# Patient Record
Sex: MALE | Race: WHITE | NOT HISPANIC OR LATINO | Employment: OTHER | URBAN - NONMETROPOLITAN AREA
[De-identification: names, ages, dates, MRNs, and addresses within clinical notes are randomized per-mention and may not be internally consistent; named-entity substitution may affect disease eponyms.]

---

## 2019-10-07 ENCOUNTER — OFFICE VISIT (OUTPATIENT)
Dept: INTERNAL MEDICINE | Facility: CLINIC | Age: 74
End: 2019-10-07

## 2019-10-07 VITALS
BODY MASS INDEX: 29.73 KG/M2 | WEIGHT: 185 LBS | TEMPERATURE: 97.8 F | OXYGEN SATURATION: 98 % | HEIGHT: 66 IN | HEART RATE: 55 BPM | SYSTOLIC BLOOD PRESSURE: 166 MMHG | DIASTOLIC BLOOD PRESSURE: 79 MMHG | RESPIRATION RATE: 18 BRPM

## 2019-10-07 DIAGNOSIS — I10 ESSENTIAL HYPERTENSION: ICD-10-CM

## 2019-10-07 DIAGNOSIS — G62.9 PERIPHERAL POLYNEUROPATHY: ICD-10-CM

## 2019-10-07 DIAGNOSIS — M25.511 ACUTE PAIN OF RIGHT SHOULDER: Primary | ICD-10-CM

## 2019-10-07 DIAGNOSIS — E78.5 HYPERLIPIDEMIA, UNSPECIFIED HYPERLIPIDEMIA TYPE: ICD-10-CM

## 2019-10-07 DIAGNOSIS — I44.7 BUNDLE BRANCH BLOCK, LEFT: ICD-10-CM

## 2019-10-07 DIAGNOSIS — J44.9 CHRONIC OBSTRUCTIVE PULMONARY DISEASE, UNSPECIFIED COPD TYPE (HCC): ICD-10-CM

## 2019-10-07 PROCEDURE — 99213 OFFICE O/P EST LOW 20 MIN: CPT | Performed by: NURSE PRACTITIONER

## 2019-10-07 RX ORDER — PHENOL 1.4 %
600 AEROSOL, SPRAY (ML) MUCOUS MEMBRANE 2 TIMES DAILY
COMMUNITY

## 2019-10-07 RX ORDER — CYCLOSPORINE 0.5 MG/ML
1 EMULSION OPHTHALMIC 2 TIMES DAILY
COMMUNITY
End: 2020-01-09

## 2019-10-07 RX ORDER — BUDESONIDE AND FORMOTEROL FUMARATE DIHYDRATE 160; 4.5 UG/1; UG/1
2 AEROSOL RESPIRATORY (INHALATION)
COMMUNITY
End: 2019-12-19 | Stop reason: SDUPTHER

## 2019-10-07 RX ORDER — GABAPENTIN 300 MG/1
300 CAPSULE ORAL 3 TIMES DAILY
COMMUNITY
End: 2020-06-15 | Stop reason: SDUPTHER

## 2019-10-07 RX ORDER — AMLODIPINE BESYLATE 5 MG/1
5 TABLET ORAL DAILY
COMMUNITY
End: 2020-06-15 | Stop reason: SDUPTHER

## 2019-10-07 RX ORDER — ASPIRIN 81 MG/1
81 TABLET ORAL DAILY
COMMUNITY

## 2019-10-07 RX ORDER — CYCLOBENZAPRINE HCL 10 MG
10 TABLET ORAL 3 TIMES DAILY PRN
Qty: 25 TABLET | Refills: 0 | Status: ON HOLD | OUTPATIENT
Start: 2019-10-07 | End: 2021-09-29

## 2019-10-07 RX ORDER — LISINOPRIL 20 MG/1
20 TABLET ORAL DAILY
COMMUNITY
End: 2020-06-15 | Stop reason: SDUPTHER

## 2019-10-07 RX ORDER — BISOPROLOL FUMARATE 5 MG/1
5 TABLET, FILM COATED ORAL DAILY
COMMUNITY
End: 2020-06-15 | Stop reason: SDUPTHER

## 2019-10-07 RX ORDER — IBUPROFEN 800 MG/1
800 TABLET ORAL EVERY 6 HOURS PRN
Status: ON HOLD | COMMUNITY
End: 2020-01-14

## 2019-10-07 RX ORDER — ALBUTEROL SULFATE 90 UG/1
2 AEROSOL, METERED RESPIRATORY (INHALATION) EVERY 4 HOURS PRN
COMMUNITY
End: 2020-09-15 | Stop reason: SDUPTHER

## 2019-10-07 RX ORDER — OMEPRAZOLE 20 MG/1
20 CAPSULE, DELAYED RELEASE ORAL DAILY
COMMUNITY

## 2019-10-07 RX ORDER — ASCORBIC ACID 500 MG
500 TABLET ORAL DAILY
COMMUNITY

## 2019-10-07 RX ORDER — SIMVASTATIN 40 MG
80 TABLET ORAL NIGHTLY
COMMUNITY
End: 2020-06-15 | Stop reason: SDUPTHER

## 2019-10-07 RX ORDER — UBIDECARENONE 100 MG
100 CAPSULE ORAL DAILY
COMMUNITY

## 2019-10-07 NOTE — PROGRESS NOTES
Subjective     Chief Complaint   Patient presents with   • Shoulder Injury     right - fall yesterday       History of Present Illness  He presents here to establish care today and with complaints of right shoulder pain. States he was going down steps and missed a step. Fell directly onto his right shoulder. Did not hit his head. Also scraped his right forearm and right knee. No chest pain or shortness of breath. He admits that he had dizziness after the fall yesterday but none today. Does not check his blood pressure at home. States it had been stable for some time so he quit checking it. He does state that his shoulder feels like it did before when he tore his rotator cuff. He has had it repaired in the past same shoulder.     Patient's PMR from outside medical facility reviewed and noted.    Review of Systems   Constitutional: Negative for activity change, fever and unexpected weight change.   HENT: Negative for congestion and sore throat.    Eyes: Negative.    Respiratory: Positive for shortness of breath.    Gastrointestinal: Negative for abdominal pain, blood in stool, constipation and diarrhea.   Endocrine: Negative.    Genitourinary: Negative.  Negative for urgency.   Musculoskeletal: Positive for joint swelling.   Allergic/Immunologic: Negative.  Negative for environmental allergies and food allergies.   Neurological: Positive for dizziness.   Hematological: Bruises/bleeds easily.      Otherwise complete ROS reviewed and negative except as mentioned in the HPI.    Past Medical History:   Past Medical History:   Diagnosis Date   • Arthritis    • COPD (chronic obstructive pulmonary disease) (CMS/[facility])    • Hyperlipidemia    • Hypertension    • Sleep apnea      Past Surgical History:  Past Surgical History:   Procedure Laterality Date   • KNEE MENISCAL REPAIR Left 2018   • ROTATOR CUFF REPAIR Right 2000   • SPINE SURGERY  1981    lower back ruptured disc     Social History:  reports that he quit smoking  about 8 years ago. He has never used smokeless tobacco. He reports that he drinks about 3.0 oz of alcohol per week. He reports that he does not use drugs.    Family History: family history includes Arthritis in his father and mother; Cancer in his brother; Diabetes in his mother and sister; Hypertension in his father and mother; Kidney disease in his mother; Obesity in his sister; Osteoporosis in his mother; Stroke in his father.      Allergies:  No Known Allergies  Medications:  Prior to Admission medications    Medication Sig Start Date End Date Taking? Authorizing Provider   Acetaminophen (TYLENOL ARTHRITIS PAIN PO) Take 600 capsules by mouth.   Yes Cristian Pfeiffer MD   albuterol sulfate  (90 Base) MCG/ACT inhaler Inhale 2 puffs Every 4 (Four) Hours As Needed for Wheezing.   Yes Cristian Pfeiffer MD   amLODIPine (NORVASC) 5 MG tablet Take 5 mg by mouth Daily.   Yes Cristian Pfeiffer MD   aspirin 81 MG EC tablet Take 81 mg by mouth Daily.   Yes Cristian Pfeiffer MD   bisoprolol (ZEBeta) 5 MG tablet Take 5 mg by mouth Daily.   Yes Cristian Pfeiffer MD   budesonide-formoterol (SYMBICORT) 160-4.5 MCG/ACT inhaler Inhale 2 puffs 2 (Two) Times a Day.   Yes Cristian Pfeiffer MD   calcium carbonate (OS-TASHIA) 600 MG tablet Take 600 mg by mouth 2 (Two) Times a Day.   Yes Cristian Pfeiffer MD   Cholecalciferol (VITAMIN D3) 1000 units capsule Take 1,000 Units by mouth.   Yes Cristian Pfeiffer MD   coenzyme Q10 100 MG capsule Take 100 mg by mouth Daily.   Yes Cristian Pfeiffer MD   cycloSPORINE (RESTASIS) 0.05 % ophthalmic emulsion 1 drop 2 (Two) Times a Day.   Yes Cristian Pfeiffer MD   gabapentin (NEURONTIN) 300 MG capsule Take 300 mg by mouth 3 (Three) Times a Day.   Yes Cristian Pfeiffer MD   Garlic 1000 MG capsule Take  by mouth.   Yes Cristian Pfeiffer MD   Glucos-Chondroit-Hyaluron-MSM (GLUCOSAMINE CHONDROITIN JOINT PO) Take  by mouth.   Yes Cristian Pfeiffer  "MD   ibuprofen (ADVIL,MOTRIN) 800 MG tablet Take 800 mg by mouth Every 6 (Six) Hours As Needed for Mild Pain .   Yes Cristian Pfeiffer MD   Krill Oil 300 MG capsule Take  by mouth.   Yes Cristian Pfeiffer MD   lisinopril (PRINIVIL,ZESTRIL) 20 MG tablet Take 20 mg by mouth Daily.   Yes Cristian Pfeiffer MD   Milk Thistle 175 MG capsule Take 175 mg by mouth.   Yes Cristian Pfeiffer MD   omeprazole (priLOSEC) 20 MG capsule Take 20 mg by mouth Daily.   Yes Cristian Pfeiffer MD   polyethyl glycol-propyl glycol (SYSTANE) 0.4-0.3 % solution ophthalmic solution Every 1 (One) Hour As Needed.   Yes Cristian Pfeiffer MD   S-Adenosylmethionine (SULAIMAN-E) 200 MG tablet Take 200 mg by mouth.   Yes Cristian Pfeiffer MD   simvastatin (ZOCOR) 40 MG tablet Take 40 mg by mouth Every Night.   Yes Cristian Pfeiffer MD   SUPER B COMPLEX/C PO Take  by mouth.   Yes Cristian Pfeiffer MD   TAMSULOSIN HCL PO Take 0.4 mg by mouth.   Yes Cristian Pfeiffre MD   tiotropium (SPIRIVA) 18 MCG per inhalation capsule Place 1 capsule into inhaler and inhale Daily.   Yes Cristian Pfeiffer MD   tiotropium bromide monohydrate (SPIRIVA RESPIMAT) 2.5 MCG/ACT aerosol solution inhaler Inhale 2 puffs Daily.   Yes Cristian Pfeiffer MD   TURMERIC PO Take 1,000 mg by mouth.   Yes Cristian Pfeiffer MD   vitamin C (ASCORBIC ACID) 500 MG tablet Take 500 mg by mouth Daily.   Yes Cristian Pfeiffer MD       Objective     Vital Signs: /79 (BP Location: Left arm, Patient Position: Sitting, Cuff Size: Adult)   Pulse 55   Temp 97.8 °F (36.6 °C) (Oral)   Resp 18   Ht 167.6 cm (66\")   Wt 83.9 kg (185 lb)   SpO2 98%   BMI 29.86 kg/m²   Physical Exam   Constitutional: He is oriented to person, place, and time. He appears well-developed and well-nourished.   HENT:   Head: Normocephalic and atraumatic.   Mouth/Throat: Oropharynx is clear and moist.   Eyes: Conjunctivae and EOM are normal. Pupils are equal, round, " and reactive to light. No scleral icterus.   Neck: Neck supple. No JVD present.   Cardiovascular: Normal rate, regular rhythm, normal heart sounds and intact distal pulses. Exam reveals no gallop and no friction rub.   No murmur heard.  Pulmonary/Chest: Effort normal and breath sounds normal. He has no wheezes. He has no rales.   Abdominal: Soft. Bowel sounds are normal. He exhibits no distension and no mass. There is no tenderness. There is no rebound and no guarding.   Musculoskeletal: He exhibits tenderness (right shoulder tenderness. Unable to laterally raise his right arm. ). He exhibits no edema.   No cyanosis or clubbing   Lymphadenopathy:     He has no cervical adenopathy.   Neurological: He is alert and oriented to person, place, and time. No cranial nerve deficit.   Skin: Skin is warm and dry.   Skin tear right forearm. Right lateral wrist area with abrasion and right knee with abrasions X3. No evidence of infection noted.    Psychiatric: He has a normal mood and affect. His behavior is normal.       Patient's Body mass index is 29.86 kg/m². BMI is above normal parameters. Recommendations include: educational material.      Results Reviewed:  No results found for: GLUCOSE, BUN, CREATININE, NA, K, CL, CO2, CALCIUM, ALT, AST, WBC, HCT, PLT, CHOL, TRIG, HDL, LDL, LDLHDL, HGBA1C      Assessment / Plan     Assessment/Plan:  Justin was seen today for shoulder injury.    Diagnoses and all orders for this visit:    Acute pain of right shoulder  -     XR Shoulder 2+ View Right (In Office)  -     Ambulatory Referral to Orthopedic Surgery    Chronic obstructive pulmonary disease, unspecified COPD type (CMS/HCC)    Essential hypertension    Bundle branch block, left    Hyperlipidemia, unspecified hyperlipidemia type    Peripheral polyneuropathy  Comments:  secondary to chronic back pain.     Other orders  -     cyclobenzaprine (FLEXERIL) 10 MG tablet; Take 1 tablet by mouth 3 (Three) Times a Day As Needed for Muscle  Spasms.        Return in about 1 month (around 11/7/2019) for Annual physical.     Code Status: Full    I have discussed the patient results/orders and and plan/recommendation with them at today's visit.      Katerine Mcclain, APRN   10/07/2019

## 2019-10-14 ENCOUNTER — OFFICE VISIT (OUTPATIENT)
Dept: INTERNAL MEDICINE | Facility: CLINIC | Age: 74
End: 2019-10-14

## 2019-10-14 VITALS
DIASTOLIC BLOOD PRESSURE: 84 MMHG | BODY MASS INDEX: 29.8 KG/M2 | TEMPERATURE: 97.8 F | WEIGHT: 185.4 LBS | SYSTOLIC BLOOD PRESSURE: 182 MMHG | HEART RATE: 65 BPM | OXYGEN SATURATION: 98 % | HEIGHT: 66 IN | RESPIRATION RATE: 18 BRPM

## 2019-10-14 DIAGNOSIS — J42 CHRONIC BRONCHITIS, UNSPECIFIED CHRONIC BRONCHITIS TYPE (HCC): ICD-10-CM

## 2019-10-14 DIAGNOSIS — R07.81 RIB PAIN ON RIGHT SIDE: Primary | ICD-10-CM

## 2019-10-14 DIAGNOSIS — I10 ESSENTIAL HYPERTENSION: ICD-10-CM

## 2019-10-14 PROCEDURE — 96372 THER/PROPH/DIAG INJ SC/IM: CPT | Performed by: NURSE PRACTITIONER

## 2019-10-14 PROCEDURE — 99214 OFFICE O/P EST MOD 30 MIN: CPT | Performed by: NURSE PRACTITIONER

## 2019-10-14 RX ORDER — MELOXICAM 15 MG/1
15 TABLET ORAL DAILY
COMMUNITY
End: 2020-09-15 | Stop reason: SDUPTHER

## 2019-10-14 RX ORDER — HYDROCODONE BITARTRATE AND ACETAMINOPHEN 5; 325 MG/1; MG/1
1 TABLET ORAL EVERY 8 HOURS PRN
Qty: 10 TABLET | Refills: 0 | Status: ON HOLD | OUTPATIENT
Start: 2019-10-14 | End: 2020-01-14

## 2019-10-14 RX ORDER — KETOROLAC TROMETHAMINE 15 MG/ML
15 INJECTION, SOLUTION INTRAMUSCULAR; INTRAVENOUS ONCE
Status: COMPLETED | OUTPATIENT
Start: 2019-10-14 | End: 2019-10-14

## 2019-10-14 RX ADMIN — KETOROLAC TROMETHAMINE 15 MG: 15 INJECTION, SOLUTION INTRAMUSCULAR; INTRAVENOUS at 16:22

## 2019-10-14 NOTE — PROGRESS NOTES
Subjective     Chief Complaint   Patient presents with   • Pain     Patient reports right rib pain, he states that he fell one week ago.       History of Present Illness  Pt comes in today with complaints of right sided rib pain. He was seen last week after sustaining a fall after missing a step. States the pain in the right rib cage started about 3 days after the fall but is now worsening in intensity. Rates pain at a 8-9/10. Worse with movement and deep breathing. Has not tried any modalities to help. States it has just worsened today more so than other days. No shortness of breath above his baseline. No chest pain. Shoulder pain is better. He saw orthopedic institute and was given steroid injection in the joint. He is scheduled for an MRI next week.    Patient's PMR from outside medical facility reviewed and noted.    Review of Systems   Otherwise complete ROS reviewed and negative except as mentioned in the HPI.    Past Medical History:   Past Medical History:   Diagnosis Date   • Arthritis    • COPD (chronic obstructive pulmonary disease) (CMS/[facility])    • Hyperlipidemia    • Hypertension    • Sleep apnea      Past Surgical History:  Past Surgical History:   Procedure Laterality Date   • KNEE MENISCAL REPAIR Left 2018   • ROTATOR CUFF REPAIR Right 2000   • SPINE SURGERY  1981    lower back ruptured disc     Social History:  reports that he quit smoking about 8 years ago. He has never used smokeless tobacco. He reports that he drinks about 3.0 oz of alcohol per week. He reports that he does not use drugs.    Family History: family history includes Arthritis in his father and mother; Cancer in his brother; Diabetes in his mother and sister; Hypertension in his father and mother; Kidney disease in his mother; Obesity in his sister; Osteoporosis in his mother; Stroke in his father.      Allergies:  No Known Allergies  Medications:  Prior to Admission medications    Medication Sig Start Date End Date Taking?  Authorizing Provider   Acetaminophen (TYLENOL ARTHRITIS PAIN PO) Take 600 capsules by mouth.   Yes Cristian Pfeiffer MD   albuterol sulfate  (90 Base) MCG/ACT inhaler Inhale 2 puffs Every 4 (Four) Hours As Needed for Wheezing.   Yes Cristian Pfeiffer MD   amLODIPine (NORVASC) 5 MG tablet Take 5 mg by mouth Daily.   Yes Cristian fPeiffer MD   aspirin 81 MG EC tablet Take 81 mg by mouth Daily.   Yes Cristian Pfeiffer MD   bisoprolol (ZEBeta) 5 MG tablet Take 5 mg by mouth Daily.   Yes Cristian Pfeiffer MD   budesonide-formoterol (SYMBICORT) 160-4.5 MCG/ACT inhaler Inhale 2 puffs 2 (Two) Times a Day.   Yes Cristian Pfeiffer MD   calcium carbonate (OS-TASHIA) 600 MG tablet Take 600 mg by mouth 2 (Two) Times a Day.   Yes Cristian Pfeiffer MD   Cholecalciferol (VITAMIN D3) 1000 units capsule Take 1,000 Units by mouth.   Yes Cristian Pfeiffer MD   coenzyme Q10 100 MG capsule Take 100 mg by mouth Daily.   Yes Cristian Pfeiffer MD   cycloSPORINE (RESTASIS) 0.05 % ophthalmic emulsion 1 drop 2 (Two) Times a Day.   Yes Cristian Pfeiffer MD   gabapentin (NEURONTIN) 300 MG capsule Take 300 mg by mouth 3 (Three) Times a Day.   Yes Cristian Pfeiffer MD   Garlic 1000 MG capsule Take  by mouth.   Yes Cristian Pfeiffer MD   Glucos-Chondroit-Hyaluron-MSM (GLUCOSAMINE CHONDROITIN JOINT PO) Take  by mouth.   Yes Cristian Pfeiffer MD   Krill Oil 300 MG capsule Take  by mouth.   Yes Cristian Pfeiffer MD   lisinopril (PRINIVIL,ZESTRIL) 20 MG tablet Take 20 mg by mouth Daily.   Yes ProviderCristian MD   meloxicam (MOBIC) 15 MG tablet Take 15 mg by mouth Daily.   Yes Cristian Pfeiffer MD   Milk Thistle 175 MG capsule Take 175 mg by mouth.   Yes Cristian Pfeiffer MD   omeprazole (priLOSEC) 20 MG capsule Take 20 mg by mouth Daily.   Yes Cristian Pfeiffer MD   polyethyl glycol-propyl glycol (SYSTANE) 0.4-0.3 % solution ophthalmic solution Every 1 (One) Hour As Needed.    "Yes Cristian Pfeiffer MD   S-Adenosylmethionine (SULAIMAN-E) 200 MG tablet Take 200 mg by mouth.   Yes Cristian Pfeiffer MD   simvastatin (ZOCOR) 40 MG tablet Take 40 mg by mouth Every Night.   Yes Cristian Pfeiffer MD   SUPER B COMPLEX/C PO Take  by mouth.   Yes Cristian Pfeiffer MD   TAMSULOSIN HCL PO Take 0.4 mg by mouth.   Yes Cristian Pfeiffer MD   tiotropium (SPIRIVA) 18 MCG per inhalation capsule Place 1 capsule into inhaler and inhale Daily.   Yes Cristian Pfeiffer MD   tiotropium bromide monohydrate (SPIRIVA RESPIMAT) 2.5 MCG/ACT aerosol solution inhaler Inhale 2 puffs Daily.   Yes Cristian Pfeiffer MD   TURMERIC PO Take 1,000 mg by mouth.   Yes Cristian Pfeiffer MD   vitamin C (ASCORBIC ACID) 500 MG tablet Take 500 mg by mouth Daily.   Yes Cristian Pfeiffer MD   cyclobenzaprine (FLEXERIL) 10 MG tablet Take 1 tablet by mouth 3 (Three) Times a Day As Needed for Muscle Spasms. 10/7/19   Katerine Mcclain APRN   ibuprofen (ADVIL,MOTRIN) 800 MG tablet Take 800 mg by mouth Every 6 (Six) Hours As Needed for Mild Pain .    ProviderCristian MD       Objective     Vital Signs: BP (!) 182/84 (BP Location: Right arm, Patient Position: Sitting, Cuff Size: Adult)   Pulse 65   Temp 97.8 °F (36.6 °C) (Oral)   Resp 18   Ht 167.6 cm (65.98\")   Wt 84.1 kg (185 lb 6.4 oz)   SpO2 98%   BMI 29.94 kg/m²   Physical Exam   Constitutional: He is oriented to person, place, and time. He appears well-developed and well-nourished.   HENT:   Head: Normocephalic and atraumatic.   Eyes: EOM are normal. Pupils are equal, round, and reactive to light.   Neck: Normal range of motion. Neck supple. No JVD present.   Cardiovascular: Normal rate and regular rhythm.   Pulmonary/Chest: Effort normal.   Right sided chest wall tenderness.    Abdominal: Soft. Bowel sounds are normal.   Musculoskeletal: He exhibits no edema or deformity.   Lymphadenopathy:     He has no cervical adenopathy. "   Neurological: He is alert and oriented to person, place, and time.   Skin: Skin is warm and dry.   Psychiatric: He has a normal mood and affect. His behavior is normal. Judgment and thought content normal.   Vitals reviewed.      Results Reviewed:  No results found for: GLUCOSE, BUN, CREATININE, NA, K, CL, CO2, CALCIUM, ALT, AST, WBC, HCT, PLT, CHOL, TRIG, HDL, LDL, LDLHDL, HGBA1C      Assessment / Plan     Right rib fractures:  1. Toradol IM 15 mg in office today.   2. Norco 5/325 mg every 8 hours prn. #10 with no refills.   3. Discussed deep breathing.   4. Xray-with rib detail shows 8th and 9th distal rib fractures probable.     Hypertension  1. Suspect his blood pressure is more elevated today secondary to pain.   2. Continue his home medications of norvasc, bisoprolol, lisinopril,     No Follow-up on file. unless patient needs to be seen sooner or acute issues arise.    I have discussed the patient results/orders and and plan/recommendation with them at today's visit.      Katerine Mcclain, APRN   10/14/2019

## 2019-10-16 ENCOUNTER — TELEPHONE (OUTPATIENT)
Dept: INTERNAL MEDICINE | Facility: CLINIC | Age: 74
End: 2019-10-16

## 2019-10-16 NOTE — TELEPHONE ENCOUNTER
Called to check on how he is feeling since coming into the office on 10/14/19, and to also make sure he was able to get his medications filled. Pt stated that he was able to get the medicine and it seems to be working. He states that he is feeling better. I let the pt know that if his symptoms get worse or if he has and questions or concerns to please give us a call. Pt has no questions or concerns at this time.

## 2019-10-29 ENCOUNTER — OFFICE VISIT (OUTPATIENT)
Dept: INTERNAL MEDICINE | Facility: CLINIC | Age: 74
End: 2019-10-29

## 2019-10-29 VITALS
TEMPERATURE: 98.7 F | HEIGHT: 66 IN | HEART RATE: 69 BPM | DIASTOLIC BLOOD PRESSURE: 74 MMHG | OXYGEN SATURATION: 97 % | RESPIRATION RATE: 24 BRPM | BODY MASS INDEX: 26.28 KG/M2 | SYSTOLIC BLOOD PRESSURE: 136 MMHG | WEIGHT: 163.5 LBS

## 2019-10-29 DIAGNOSIS — J44.1 COPD EXACERBATION (HCC): Primary | ICD-10-CM

## 2019-10-29 PROCEDURE — 96372 THER/PROPH/DIAG INJ SC/IM: CPT | Performed by: FAMILY MEDICINE

## 2019-10-29 PROCEDURE — 99213 OFFICE O/P EST LOW 20 MIN: CPT | Performed by: FAMILY MEDICINE

## 2019-10-29 RX ORDER — METHYLPREDNISOLONE SODIUM SUCCINATE 125 MG/2ML
125 INJECTION, POWDER, LYOPHILIZED, FOR SOLUTION INTRAMUSCULAR; INTRAVENOUS ONCE
Status: COMPLETED | OUTPATIENT
Start: 2019-10-29 | End: 2019-10-29

## 2019-10-29 RX ORDER — PREDNISONE 10 MG/1
TABLET ORAL
Qty: 30 TABLET | Refills: 0 | Status: SHIPPED | OUTPATIENT
Start: 2019-10-29 | End: 2019-12-10

## 2019-10-29 RX ORDER — METHYLPREDNISOLONE SODIUM SUCCINATE 125 MG/2ML
125 INJECTION, POWDER, LYOPHILIZED, FOR SOLUTION INTRAMUSCULAR; INTRAVENOUS EVERY 6 HOURS
Status: DISCONTINUED | OUTPATIENT
Start: 2019-10-29 | End: 2019-10-29

## 2019-10-29 RX ADMIN — METHYLPREDNISOLONE SODIUM SUCCINATE 125 MG: 125 INJECTION, POWDER, LYOPHILIZED, FOR SOLUTION INTRAMUSCULAR; INTRAVENOUS at 10:00

## 2019-10-29 NOTE — PROGRESS NOTES
Subjective     Chief Complaint   Patient presents with   • COPD     increased coughing, 2 days, his pulminologist is in [place] has not found one here but is generally put on steroids       History of Present Illness  Patient presents with increased cough over the last 2 days.  He does have continued sputum production but notes that it remains the same color as usual.  He has had no fever chills.  He has used his albuterol inhaler.  He continues to use his Spiriva routinely.    Patient's PMR from outside medical facility reviewed and noted.    Review of Systems     Otherwise complete ROS reviewed and negative except as mentioned in the HPI.    Past Medical History:   Past Medical History:   Diagnosis Date   • Arthritis    • COPD (chronic obstructive pulmonary disease) (CMS/[facility])    • Hyperlipidemia    • Hypertension    • Sleep apnea      Past Surgical History:  Past Surgical History:   Procedure Laterality Date   • KNEE MENISCAL REPAIR Left 2018   • ROTATOR CUFF REPAIR Right 2000   • SPINE SURGERY  1981    lower back ruptured disc     Social History:  reports that he quit smoking about 8 years ago. He has never used smokeless tobacco. He reports that he drinks about 3.0 oz of alcohol per week. He reports that he does not use drugs.    Family History: family history includes Arthritis in his father and mother; Cancer in his brother; Diabetes in his mother and sister; Hypertension in his father and mother; Kidney disease in his mother; Obesity in his sister; Osteoporosis in his mother; Stroke in his father.       Allergies:  No Known Allergies  Medications:  Prior to Admission medications    Medication Sig Start Date End Date Taking? Authorizing Provider   Acetaminophen (TYLENOL ARTHRITIS PAIN PO) Take 600 capsules by mouth.   Yes Cristian Pfeiffer MD   albuterol sulfate  (90 Base) MCG/ACT inhaler Inhale 2 puffs Every 4 (Four) Hours As Needed for Wheezing.   Yes Cristian Pfeiffer MD   amLODIPine  (NORVASC) 5 MG tablet Take 5 mg by mouth Daily.   Yes ProviderCristian MD   aspirin 81 MG EC tablet Take 81 mg by mouth Daily.   Yes Cristian Pfeiffer MD   bisoprolol (ZEBeta) 5 MG tablet Take 5 mg by mouth Daily.   Yes ProviderCristian MD   budesonide-formoterol (SYMBICORT) 160-4.5 MCG/ACT inhaler Inhale 2 puffs 2 (Two) Times a Day.   Yes Cristian Pfeiffer MD   calcium carbonate (OS-TASHIA) 600 MG tablet Take 600 mg by mouth 2 (Two) Times a Day.   Yes ProviderCristian MD   Cholecalciferol (VITAMIN D3) 1000 units capsule Take 1,000 Units by mouth.   Yes ProviderCristian MD   coenzyme Q10 100 MG capsule Take 100 mg by mouth Daily.   Yes ProviderCristian MD   cyclobenzaprine (FLEXERIL) 10 MG tablet Take 1 tablet by mouth 3 (Three) Times a Day As Needed for Muscle Spasms. 10/7/19  Yes Katerine Mcclain APRN   cycloSPORINE (RESTASIS) 0.05 % ophthalmic emulsion 1 drop 2 (Two) Times a Day.   Yes Cristian Pfeiffer MD   gabapentin (NEURONTIN) 300 MG capsule Take 300 mg by mouth 3 (Three) Times a Day.   Yes ProviderCristian MD   Garlic 1000 MG capsule Take  by mouth.   Yes ProviderCristian MD   Glucos-Chondroit-Hyaluron-MSM (GLUCOSAMINE CHONDROITIN JOINT PO) Take  by mouth.   Yes ProviderCristian MD   HYDROcodone-acetaminophen (NORCO) 5-325 MG per tablet Take 1 tablet by mouth Every 8 (Eight) Hours As Needed for Severe Pain . 10/14/19  Yes Katerine Mcclain APRN   ibuprofen (ADVIL,MOTRIN) 800 MG tablet Take 800 mg by mouth Every 6 (Six) Hours As Needed for Mild Pain .   Yes ProviderCristian MD   Krill Oil 300 MG capsule Take  by mouth.   Yes ProviderCristian MD   lisinopril (PRINIVIL,ZESTRIL) 20 MG tablet Take 20 mg by mouth Daily.   Yes ProviderCristian MD   meloxicam (MOBIC) 15 MG tablet Take 15 mg by mouth Daily.   Yes ProviderCristian MD   Milk Thistle 175 MG capsule Take 175 mg by mouth.   Yes ProviderCristian MD   omeprazole  "(priLOSEC) 20 MG capsule Take 20 mg by mouth Daily.   Yes Cristian Pfeiffer MD   polyethyl glycol-propyl glycol (SYSTANE) 0.4-0.3 % solution ophthalmic solution Every 1 (One) Hour As Needed.   Yes Cristian Pfeiffer MD   S-Adenosylmethionine (SULAIMAN-E) 200 MG tablet Take 200 mg by mouth.   Yes Cristian Pfeiffer MD   simvastatin (ZOCOR) 40 MG tablet Take 40 mg by mouth Every Night.   Yes Cristian Pfeiffer MD   SUPER B COMPLEX/C PO Take  by mouth.   Yes Cristian Pfeiffer MD   TAMSULOSIN HCL PO Take 0.4 mg by mouth.   Yes Cristian Pfeiffer MD   tiotropium (SPIRIVA) 18 MCG per inhalation capsule Place 1 capsule into inhaler and inhale Daily.   Yes Cristian Pfeiffer MD   tiotropium bromide monohydrate (SPIRIVA RESPIMAT) 2.5 MCG/ACT aerosol solution inhaler Inhale 2 puffs Daily.   Yes Cristian Pfeiffer MD   TURMERIC PO Take 1,000 mg by mouth.   Yes Cristian Pfeiffer MD   vitamin C (ASCORBIC ACID) 500 MG tablet Take 500 mg by mouth Daily.   Yes Cristian Pfeiffer MD       Objective     Vital Signs: /74 (BP Location: Left arm, Patient Position: Sitting, Cuff Size: Adult)   Pulse 69   Temp 98.7 °F (37.1 °C) (Temporal)   Resp 24   Ht 167.6 cm (65.98\")   Wt 74.2 kg (163 lb 8 oz)   SpO2 97%   BMI 26.40 kg/m²   Physical Exam   Constitutional: He is oriented to person, place, and time. He appears well-developed.   HENT:   Head: Normocephalic and atraumatic.   Right Ear: External ear normal.   Left Ear: External ear normal.   Nose: Nose normal.   Mouth/Throat: Oropharynx is clear and moist.   Eyes: Conjunctivae and EOM are normal. Pupils are equal, round, and reactive to light.   Neck: Normal range of motion. Neck supple. No JVD present.   Cardiovascular: Normal rate, regular rhythm, normal heart sounds and intact distal pulses. Exam reveals no gallop and no friction rub.   No murmur heard.  Pulmonary/Chest: Effort normal.   Prolonged expiratory phase.  Frequent cough.  Lungs " diminished bases.   Abdominal: Soft. Bowel sounds are normal.   Musculoskeletal: Normal range of motion. He exhibits no edema.   Neurological: He is alert and oriented to person, place, and time.   Skin: Skin is warm and dry.   Psychiatric: He has a normal mood and affect. His behavior is normal.   Nursing note and vitals reviewed.        Patient's Body mass index is 26.4 kg/m². BMI is within normal parameters. No follow-up required..      Results Reviewed:  No results found for: GLUCOSE, BUN, CREATININE, NA, K, CL, CO2, CALCIUM, ALT, AST, WBC, HCT, PLT, CHOL, TRIG, HDL, LDL, LDLHDL, HGBA1C      Assessment / Plan     Assessment/Plan:  1. COPD exacerbation (CMS/HCC)    - methylPREDNISolone sodium succinate (SOLU-Medrol) injection 125 mg  Prednisone taper dose      Return if symptoms worsen or fail to improve. unless patient needs to be seen sooner or acute issues arise.    Code Status: full    I have discussed the patient results/orders and and plan/recommendation with them at today's visit.      Angelica Mccabe DO   10/29/2019

## 2019-11-01 ENCOUNTER — OFFICE VISIT (OUTPATIENT)
Dept: INTERNAL MEDICINE | Facility: CLINIC | Age: 74
End: 2019-11-01

## 2019-11-01 VITALS
HEIGHT: 66 IN | HEART RATE: 59 BPM | BODY MASS INDEX: 29.73 KG/M2 | DIASTOLIC BLOOD PRESSURE: 78 MMHG | SYSTOLIC BLOOD PRESSURE: 140 MMHG | WEIGHT: 185 LBS | OXYGEN SATURATION: 98 % | RESPIRATION RATE: 18 BRPM | TEMPERATURE: 97.8 F

## 2019-11-01 DIAGNOSIS — R05.8 NOCTURNAL COUGH: ICD-10-CM

## 2019-11-01 DIAGNOSIS — J41.0 SIMPLE CHRONIC BRONCHITIS (HCC): Primary | ICD-10-CM

## 2019-11-01 DIAGNOSIS — J98.11 ATELECTASIS: ICD-10-CM

## 2019-11-01 PROCEDURE — 99213 OFFICE O/P EST LOW 20 MIN: CPT | Performed by: FAMILY MEDICINE

## 2019-11-01 RX ORDER — BENZONATATE 100 MG/1
200 CAPSULE ORAL 3 TIMES DAILY PRN
Qty: 30 CAPSULE | Refills: 3 | Status: SHIPPED | OUTPATIENT
Start: 2019-11-01 | End: 2019-12-19

## 2019-11-01 RX ORDER — GUAIFENESIN AND CODEINE PHOSPHATE 100; 10 MG/5ML; MG/5ML
5 SOLUTION ORAL 3 TIMES DAILY PRN
Qty: 180 ML | Refills: 0 | Status: SHIPPED | OUTPATIENT
Start: 2019-11-01 | End: 2019-12-19

## 2019-11-01 NOTE — PROGRESS NOTES
Subjective     Chief Complaint   Patient presents with   • Follow-up     does not feel as though he is improving       History of Present Illness  Patient with complaints that he still feels like he is having a problem breathing.  Mostly when he is taking a deep breath.  He is also having problems with nocturnal cough.  No real sputum production.  Is bad enough that his wife is leaving the bed at night sleep in another room.  He said no fever.  He is taking his steroids in a compliant manner.  Patient's PMR from outside medical facility reviewed and noted.    Review of Systems     Otherwise complete ROS reviewed and negative except as mentioned in the HPI.    Past Medical History:   Past Medical History:   Diagnosis Date   • Arthritis    • COPD (chronic obstructive pulmonary disease) (CMS/[facility])    • Hyperlipidemia    • Hypertension    • Sleep apnea      Past Surgical History:  Past Surgical History:   Procedure Laterality Date   • KNEE MENISCAL REPAIR Left 2018   • ROTATOR CUFF REPAIR Right 2000   • SPINE SURGERY  1981    lower back ruptured disc     Social History:  reports that he quit smoking about 8 years ago. He has never used smokeless tobacco. He reports that he drinks about 3.0 oz of alcohol per week. He reports that he does not use drugs.    Family History: family history includes Arthritis in his father and mother; Cancer in his brother; Diabetes in his mother and sister; Hypertension in his father and mother; Kidney disease in his mother; Obesity in his sister; Osteoporosis in his mother; Stroke in his father.       Allergies:  No Known Allergies  Medications:  Prior to Admission medications    Medication Sig Start Date End Date Taking? Authorizing Provider   Acetaminophen (TYLENOL ARTHRITIS PAIN PO) Take 600 capsules by mouth.   Yes ProviderCristian MD   albuterol sulfate  (90 Base) MCG/ACT inhaler Inhale 2 puffs Every 4 (Four) Hours As Needed for Wheezing.   Yes ProviderCristian  MD   amLODIPine (NORVASC) 5 MG tablet Take 5 mg by mouth Daily.   Yes Cristian Pfeiffer MD   aspirin 81 MG EC tablet Take 81 mg by mouth Daily.   Yes Cristian Pfeiffer MD   bisoprolol (ZEBeta) 5 MG tablet Take 5 mg by mouth Daily.   Yes Cristian Pfeiffer MD   budesonide-formoterol (SYMBICORT) 160-4.5 MCG/ACT inhaler Inhale 2 puffs 2 (Two) Times a Day.   Yes Cristian Pfeiffer MD   calcium carbonate (OS-TASHIA) 600 MG tablet Take 600 mg by mouth 2 (Two) Times a Day.   Yes Cristian Pfeiffer MD   Cholecalciferol (VITAMIN D3) 1000 units capsule Take 1,000 Units by mouth.   Yes Cristian Pfeiffer MD   coenzyme Q10 100 MG capsule Take 100 mg by mouth Daily.   Yes Cristian Pfeiffer MD   cyclobenzaprine (FLEXERIL) 10 MG tablet Take 1 tablet by mouth 3 (Three) Times a Day As Needed for Muscle Spasms. 10/7/19  Yes Katerine Mcclain APRN   cycloSPORINE (RESTASIS) 0.05 % ophthalmic emulsion 1 drop 2 (Two) Times a Day.   Yes Cristian Pfeiffer MD   gabapentin (NEURONTIN) 300 MG capsule Take 300 mg by mouth 3 (Three) Times a Day.   Yes Cristian Pfeiffer MD   Garlic 1000 MG capsule Take  by mouth.   Yes Cristian Pfeiffer MD   Glucos-Chondroit-Hyaluron-MSM (GLUCOSAMINE CHONDROITIN JOINT PO) Take  by mouth.   Yes Cristian Pfeiffer MD   HYDROcodone-acetaminophen (NORCO) 5-325 MG per tablet Take 1 tablet by mouth Every 8 (Eight) Hours As Needed for Severe Pain . 10/14/19  Yes Katerine Mcclain APRN   ibuprofen (ADVIL,MOTRIN) 800 MG tablet Take 800 mg by mouth Every 6 (Six) Hours As Needed for Mild Pain .   Yes Cristian Pfeiffer MD   Krill Oil 300 MG capsule Take  by mouth.   Yes Cristian Pfeiffer MD   lisinopril (PRINIVIL,ZESTRIL) 20 MG tablet Take 20 mg by mouth Daily.   Yes Cristian Pfeiffer MD   meloxicam (MOBIC) 15 MG tablet Take 15 mg by mouth Daily.   Yes Cristian Pfeiffer MD   Milk Thistle 175 MG capsule Take 175 mg by mouth.   Yes Cristian Pfeiffer MD  "  omeprazole (priLOSEC) 20 MG capsule Take 20 mg by mouth Daily.   Yes Cristian Pfeiffer MD   polyethyl glycol-propyl glycol (SYSTANE) 0.4-0.3 % solution ophthalmic solution Every 1 (One) Hour As Needed.   Yes Cristian Pfeiffer MD   predniSONE (DELTASONE) 10 MG tablet Take 4 daily x 3 then 3 daily x 3 then 2 daily x 3 then 1 daily x 3. 10/29/19  Yes Angelica Mccabe DO   S-Adenosylmethionine (SULAIMAN-E) 200 MG tablet Take 200 mg by mouth.   Yes Cristian Pfeiffer MD   simvastatin (ZOCOR) 40 MG tablet Take 40 mg by mouth Every Night.   Yes Cristian Pfeiffer MD   SUPER B COMPLEX/C PO Take  by mouth.   Yes Cristian Pfeiffer MD   TAMSULOSIN HCL PO Take 0.4 mg by mouth.   Yes Cristian Pfeiffer MD   tiotropium (SPIRIVA) 18 MCG per inhalation capsule Place 1 capsule into inhaler and inhale Daily.   Yes Cristian Pfeiffer MD   tiotropium bromide monohydrate (SPIRIVA RESPIMAT) 2.5 MCG/ACT aerosol solution inhaler Inhale 2 puffs Daily.   Yes Cristian Pfeiffer MD   TURMERIC PO Take 1,000 mg by mouth.   Yes Cristian Pfeiffer MD   vitamin C (ASCORBIC ACID) 500 MG tablet Take 500 mg by mouth Daily.   Yes Cristian Pfeiffer MD       Objective     Vital Signs: /78 (BP Location: Left arm, Patient Position: Sitting, Cuff Size: Adult)   Pulse 59   Temp 97.8 °F (36.6 °C) (Temporal)   Resp 18   Ht 167.6 cm (65.98\")   Wt 83.9 kg (185 lb) Comment: Pt states that he believes his weight was entered in [facility]  SpO2 98%   BMI 29.87 kg/m²   Physical Exam   Constitutional: He is oriented to person, place, and time. He appears well-developed.   HENT:   Head: Normocephalic and atraumatic.   Right Ear: External ear normal.   Left Ear: External ear normal.   Mouth/Throat: Oropharynx is clear and moist.   Eyes: EOM are normal. Pupils are equal, round, and reactive to light.   Neck: Normal range of motion. Neck supple. No JVD present.   Cardiovascular: Normal rate, regular rhythm, normal heart sounds " and intact distal pulses. Exam reveals no gallop and no friction rub.   No murmur heard.  Pulmonary/Chest: Effort normal and breath sounds normal.   Mildly diminished bases.  No crackles or wheezes.   Abdominal: Soft. Bowel sounds are normal.   Musculoskeletal: He exhibits no deformity.   Neurological: He is alert and oriented to person, place, and time. No cranial nerve deficit.   Skin: Skin is warm and dry.   Psychiatric: He has a normal mood and affect. His behavior is normal.   Nursing note and vitals reviewed.      Patient's Body mass index is 29.87 kg/m². BMI is within normal parameters. No follow-up required..      Results Reviewed:  No results found for: GLUCOSE, BUN, CREATININE, NA, K, CL, CO2, CALCIUM, ALT, AST, WBC, HCT, PLT, CHOL, TRIG, HDL, LDL, LDLHDL, HGBA1C      Assessment / Plan     Assessment/Plan:  1. Simple chronic bronchitis (CMS/HCC)    - XR Chest PA & Lateral (In Office)  - OSCILLATING POSITIVE EXIRATORY PRESSURE (OPEP)    2. Atelectasis  Accapella device to be used 2-4 times a day    3. Nocturnal cough  Robitussin-AC 1 teaspoon every 8 hours.  Tessalon Perles 200 mg every 8 hours.        Return in about 1 week (around 11/8/2019). unless patient needs to be seen sooner or acute issues arise.    Code Status: <no information>     I have discussed the patient results/orders and and plan/recommendation with them at today's visit.      Angelica Mccabe, DO   11/01/2019

## 2019-11-04 ENCOUNTER — TELEPHONE (OUTPATIENT)
Dept: INTERNAL MEDICINE | Facility: CLINIC | Age: 74
End: 2019-11-04

## 2019-11-05 NOTE — TELEPHONE ENCOUNTER
Returned call to Pt's wife as requested, she states that she has had him in the office since leaving that message. He is doing ok.

## 2019-11-08 ENCOUNTER — OFFICE VISIT (OUTPATIENT)
Dept: INTERNAL MEDICINE | Facility: CLINIC | Age: 74
End: 2019-11-08

## 2019-11-08 VITALS
RESPIRATION RATE: 16 BRPM | DIASTOLIC BLOOD PRESSURE: 78 MMHG | OXYGEN SATURATION: 98 % | HEART RATE: 68 BPM | WEIGHT: 187 LBS | BODY MASS INDEX: 30.05 KG/M2 | TEMPERATURE: 98 F | HEIGHT: 66 IN | SYSTOLIC BLOOD PRESSURE: 136 MMHG

## 2019-11-08 DIAGNOSIS — J42 CHRONIC BRONCHITIS, UNSPECIFIED CHRONIC BRONCHITIS TYPE (HCC): Primary | ICD-10-CM

## 2019-11-08 DIAGNOSIS — I25.10 CORONARY ARTERY DISEASE INVOLVING NATIVE CORONARY ARTERY OF NATIVE HEART WITHOUT ANGINA PECTORIS: ICD-10-CM

## 2019-11-08 PROCEDURE — 99213 OFFICE O/P EST LOW 20 MIN: CPT | Performed by: FAMILY MEDICINE

## 2019-11-08 NOTE — PROGRESS NOTES
Subjective     Chief Complaint   Patient presents with   • Follow-up     bronchitis       History of Present Illness  Feels better.   Using the accapella device helped significantly.  Feels back to base line.     Patient relates he needs a referral to cardiology for surgical clearance as he does not believe that his cardiologist in [place] will provide surgical clearance.      Patient's PMR from outside medical facility reviewed and noted.    Review of Systems     Otherwise complete ROS reviewed and negative except as mentioned in the HPI.    Past Medical History:   Past Medical History:   Diagnosis Date   • Arthritis    • COPD (chronic obstructive pulmonary disease) (CMS/[facility])    • Hyperlipidemia    • Hypertension    • Sleep apnea      Past Surgical History:  Past Surgical History:   Procedure Laterality Date   • KNEE MENISCAL REPAIR Left 2018   • ROTATOR CUFF REPAIR Right 2000   • SPINE SURGERY  1981    lower back ruptured disc     Social History:  reports that he quit smoking about 8 years ago. He has never used smokeless tobacco. He reports that he drinks about 3.0 oz of alcohol per week. He reports that he does not use drugs.    Family History: family history includes Arthritis in his father and mother; Cancer in his brother; Diabetes in his mother and sister; Hypertension in his father and mother; Kidney disease in his mother; Obesity in his sister; Osteoporosis in his mother; Stroke in his father.       Allergies:  No Known Allergies  Medications:  Prior to Admission medications    Medication Sig Start Date End Date Taking? Authorizing Provider   Acetaminophen (TYLENOL ARTHRITIS PAIN PO) Take 600 capsules by mouth.   Yes Cristian Pfeiffer MD   albuterol sulfate  (90 Base) MCG/ACT inhaler Inhale 2 puffs Every 4 (Four) Hours As Needed for Wheezing.   Yes Cristian Pfeiffer MD   amLODIPine (NORVASC) 5 MG tablet Take 5 mg by mouth Daily.   Yes Cristian Pfeiffer MD   aspirin 81 MG EC tablet  Take 81 mg by mouth Daily.   Yes Cristian Pfeiffer MD   bisoprolol (ZEBeta) 5 MG tablet Take 5 mg by mouth Daily.   Yes Cristian Pfeiffer MD   budesonide-formoterol (SYMBICORT) 160-4.5 MCG/ACT inhaler Inhale 2 puffs 2 (Two) Times a Day.   Yes Cristian Pfeiffer MD   calcium carbonate (OS-TASHIA) 600 MG tablet Take 600 mg by mouth 2 (Two) Times a Day.   Yes Cristian Pfeiffer MD   Cholecalciferol (VITAMIN D3) 1000 units capsule Take 1,000 Units by mouth.   Yes Cristian Pfeiffer MD   coenzyme Q10 100 MG capsule Take 100 mg by mouth Daily.   Yes Cristian Pfeiffer MD   cyclobenzaprine (FLEXERIL) 10 MG tablet Take 1 tablet by mouth 3 (Three) Times a Day As Needed for Muscle Spasms. 10/7/19  Yes Katerine Mcclain APRN   cycloSPORINE (RESTASIS) 0.05 % ophthalmic emulsion 1 drop 2 (Two) Times a Day.   Yes Cristian Pfeiffer MD   gabapentin (NEURONTIN) 300 MG capsule Take 300 mg by mouth 3 (Three) Times a Day.   Yes Cristian Pfeiffer MD   Garlic 1000 MG capsule Take  by mouth.   Yes Cristian Pfeiffer MD   Glucos-Chondroit-Hyaluron-MSM (GLUCOSAMINE CHONDROITIN JOINT PO) Take  by mouth.   Yes Cristian Pfeiffer MD   HYDROcodone-acetaminophen (NORCO) 5-325 MG per tablet Take 1 tablet by mouth Every 8 (Eight) Hours As Needed for Severe Pain . 10/14/19  Yes Katerien Mcclain APRN   ibuprofen (ADVIL,MOTRIN) 800 MG tablet Take 800 mg by mouth Every 6 (Six) Hours As Needed for Mild Pain .   Yes Cristian Pfeiffer MD   Krill Oil 300 MG capsule Take  by mouth.   Yes Cristian Pfeiffer MD   lisinopril (PRINIVIL,ZESTRIL) 20 MG tablet Take 20 mg by mouth Daily.   Yes Cristian Pfeiffer MD   meloxicam (MOBIC) 15 MG tablet Take 15 mg by mouth Daily.   Yes Cristian Pfeiffer MD   Milk Thistle 175 MG capsule Take 175 mg by mouth.   Yes Cristian Pfeiffer MD   omeprazole (priLOSEC) 20 MG capsule Take 20 mg by mouth Daily.   Yes Cristian Pfeiffer MD   polyethyl glycol-propyl  "glycol (SYSTANE) 0.4-0.3 % solution ophthalmic solution Every 1 (One) Hour As Needed.   Yes Cristian Pfeiffer MD   predniSONE (DELTASONE) 10 MG tablet Take 4 daily x 3 then 3 daily x 3 then 2 daily x 3 then 1 daily x 3. 10/29/19  Yes Angelica Mccabe DO   S-Adenosylmethionine (SULAIMAN-E) 200 MG tablet Take 200 mg by mouth.   Yes ProviderCristian MD   simvastatin (ZOCOR) 40 MG tablet Take 40 mg by mouth Every Night.   Yes ProviderCristian MD   SUPER B COMPLEX/C PO Take  by mouth.   Yes Cristian Pfeiffer MD   TAMSULOSIN HCL PO Take 0.4 mg by mouth.   Yes Cristian Pfeiffer MD   tiotropium (SPIRIVA) 18 MCG per inhalation capsule Place 1 capsule into inhaler and inhale Daily.   Yes Cristian Pfeiffer MD   tiotropium bromide monohydrate (SPIRIVA RESPIMAT) 2.5 MCG/ACT aerosol solution inhaler Inhale 2 puffs Daily.   Yes ProviderCristian MD   TURMERIC PO Take 1,000 mg by mouth.   Yes ProviderCristian MD   vitamin C (ASCORBIC ACID) 500 MG tablet Take 500 mg by mouth Daily.   Yes ProviderCristian MD   benzonatate (TESSALON PERLES) 100 MG capsule Take 2 capsules by mouth 3 (Three) Times a Day As Needed for Cough. 11/1/19   Angelica Mccabe DO   guaifenesin-codeine (GUAIFENESIN AC) 100-10 MG/5ML liquid Take 5 mL by mouth 3 (Three) Times a Day As Needed for Cough. 11/1/19   Angelica Mccabe DO       Objective     Vital Signs: /78 (BP Location: Left arm, Patient Position: Sitting, Cuff Size: Adult)   Pulse 68   Temp 98 °F (36.7 °C) (Temporal)   Resp 16   Ht 167.6 cm (65.98\")   Wt 84.8 kg (187 lb)   SpO2 98%   BMI 30.20 kg/m²   Physical Exam   Constitutional: He is oriented to person, place, and time. He appears well-developed and well-nourished.   HENT:   Head: Normocephalic and atraumatic.   Eyes: Pupils are equal, round, and reactive to light.   Neck: Neck supple.   Cardiovascular: Normal rate, regular rhythm and normal heart sounds. Exam reveals no gallop and no " friction rub.   No murmur heard.  Pulmonary/Chest: Effort normal and breath sounds normal.   Much improved air movement bilaterally   Abdominal: Soft. Bowel sounds are normal.   Neurological: He is alert and oriented to person, place, and time.   Skin: Skin is warm and dry.   Psychiatric: He has a normal mood and affect. His behavior is normal.   Nursing note and vitals reviewed.        Results Reviewed:  No results found for: GLUCOSE, BUN, CREATININE, NA, K, CL, CO2, CALCIUM, ALT, AST, WBC, HCT, PLT, CHOL, TRIG, HDL, LDL, LDLHDL, HGBA1C      Assessment / Plan     Assessment/Plan:  1. Chronic bronchitis, unspecified chronic bronchitis type (CMS/HCC)  May use accapella prn now     2. Coronary artery disease involving native coronary artery of native heart without angina pectoris    - Ambulatory Referral to Cardiology      Keep next scheduled follow up visit and     Return if symptoms worsen or fail to improve. unless patient needs to be seen sooner or acute issues arise.    I have discussed the patient results/orders and and plan/recommendation with them at today's visit.      Angelica Mccabe DO   11/08/2019

## 2019-11-23 ENCOUNTER — TELEPHONE (OUTPATIENT)
Dept: INTERNAL MEDICINE | Facility: CLINIC | Age: 74
End: 2019-11-23

## 2019-12-10 ENCOUNTER — OFFICE VISIT (OUTPATIENT)
Dept: INTERNAL MEDICINE | Facility: CLINIC | Age: 74
End: 2019-12-10

## 2019-12-10 VITALS
TEMPERATURE: 97.7 F | WEIGHT: 189.25 LBS | HEIGHT: 66 IN | SYSTOLIC BLOOD PRESSURE: 130 MMHG | RESPIRATION RATE: 24 BRPM | HEART RATE: 84 BPM | BODY MASS INDEX: 30.41 KG/M2 | DIASTOLIC BLOOD PRESSURE: 64 MMHG | OXYGEN SATURATION: 96 %

## 2019-12-10 DIAGNOSIS — J41.8 MIXED SIMPLE AND MUCOPURULENT CHRONIC BRONCHITIS (HCC): Primary | ICD-10-CM

## 2019-12-10 DIAGNOSIS — R06.02 SHORTNESS OF BREATH: ICD-10-CM

## 2019-12-10 PROCEDURE — 99213 OFFICE O/P EST LOW 20 MIN: CPT | Performed by: FAMILY MEDICINE

## 2019-12-10 RX ORDER — PREDNISONE 10 MG/1
TABLET ORAL
Qty: 30 TABLET | Refills: 0 | Status: SHIPPED | OUTPATIENT
Start: 2019-12-10 | End: 2019-12-19

## 2019-12-10 RX ORDER — DOXYCYCLINE 100 MG/1
100 CAPSULE ORAL 2 TIMES DAILY
Qty: 14 CAPSULE | Refills: 0 | Status: SHIPPED | OUTPATIENT
Start: 2019-12-10 | End: 2019-12-17

## 2019-12-10 NOTE — PROGRESS NOTES
Subjective     Chief Complaint   Patient presents with   • Cough     runny nose, 3 weeks       History of Present Illness  Symptoms onset for 3 weeks.  Increased cough with thick sputum.  Has not used his bladder valve.  No fever chills.  No nausea or vomiting.  No chest pain.  Patient did try to get into see pulmonologist in [place] however they do need a referral.   Is taking Flonase, Symbicort, Spiriva  .  Is not currently taking any prednisone.  Patient's PMR from outside medical facility reviewed and noted.    Review of Systems     Otherwise complete ROS reviewed and negative except as mentioned in the HPI.    Past Medical History:   Past Medical History:   Diagnosis Date   • Arthritis    • COPD (chronic obstructive pulmonary disease) (CMS/[facility])    • Hyperlipidemia    • Hypertension    • Sleep apnea      Past Surgical History:  Past Surgical History:   Procedure Laterality Date   • KNEE MENISCAL REPAIR Left 2018   • ROTATOR CUFF REPAIR Right 2000   • SPINE SURGERY  1981    lower back ruptured disc     Social History:  reports that he quit smoking about 8 years ago. He has never used smokeless tobacco. He reports that he drinks about 5.0 standard drinks of alcohol per week. He reports that he does not use drugs.    Family History: family history includes Arthritis in his father and mother; Cancer in his brother; Diabetes in his mother and sister; Hypertension in his father and mother; Kidney disease in his mother; Obesity in his sister; Osteoporosis in his mother; Stroke in his father.   Is taking Flonase, Symbicort, Spiriva,    Allergies:  No Known Allergies  Medications:  Prior to Admission medications    Medication Sig Start Date End Date Taking? Authorizing Provider   Acetaminophen (TYLENOL ARTHRITIS PAIN PO) Take 600 capsules by mouth.   Yes Provider, MD Cristian   albuterol sulfate  (90 Base) MCG/ACT inhaler Inhale 2 puffs Every 4 (Four) Hours As Needed for Wheezing.   Yes Provider  MD Cristian   amLODIPine (NORVASC) 5 MG tablet Take 5 mg by mouth Daily.   Yes Cristian Pfeiffer MD   aspirin 81 MG EC tablet Take 81 mg by mouth Daily.   Yes Cristian Pfeiffer MD   benzonatate (TESSALON PERLES) 100 MG capsule Take 2 capsules by mouth 3 (Three) Times a Day As Needed for Cough. 11/1/19  Yes Angelica Mccabe DO   bisoprolol (ZEBeta) 5 MG tablet Take 5 mg by mouth Daily.   Yes Cristian Pfeiffer MD   budesonide-formoterol (SYMBICORT) 160-4.5 MCG/ACT inhaler Inhale 2 puffs 2 (Two) Times a Day.   Yes Cristian Pfeiffer MD   calcium carbonate (OS-TASHIA) 600 MG tablet Take 600 mg by mouth 2 (Two) Times a Day.   Yes Cristian Pfeiffer MD   Cholecalciferol (VITAMIN D3) 1000 units capsule Take 1,000 Units by mouth.   Yes Cristian Pfeiffer MD   coenzyme Q10 100 MG capsule Take 100 mg by mouth Daily.   Yes Cristian Pfeiffer MD   cyclobenzaprine (FLEXERIL) 10 MG tablet Take 1 tablet by mouth 3 (Three) Times a Day As Needed for Muscle Spasms. 10/7/19  Yes Katerine Mcclain APRN   cycloSPORINE (RESTASIS) 0.05 % ophthalmic emulsion 1 drop 2 (Two) Times a Day.   Yes Cristian Pfeiffer MD   gabapentin (NEURONTIN) 300 MG capsule Take 300 mg by mouth 3 (Three) Times a Day.   Yes Cristian Pfeiffer MD   Garlic 1000 MG capsule Take  by mouth.   Yes Cristian Pfeiffer MD   Glucos-Chondroit-Hyaluron-MSM (GLUCOSAMINE CHONDROITIN JOINT PO) Take  by mouth.   Yes Cristian Pfeiffer MD   guaifenesin-codeine (GUAIFENESIN AC) 100-10 MG/5ML liquid Take 5 mL by mouth 3 (Three) Times a Day As Needed for Cough. 11/1/19  Yes Angelica Mccabe DO   HYDROcodone-acetaminophen (NORCO) 5-325 MG per tablet Take 1 tablet by mouth Every 8 (Eight) Hours As Needed for Severe Pain . 10/14/19  Yes Katerine Mcclain APRN   ibuprofen (ADVIL,MOTRIN) 800 MG tablet Take 800 mg by mouth Every 6 (Six) Hours As Needed for Mild Pain .   Yes Cristian Pfeiffer MD   Krill Oil 300 MG capsule Take   "by mouth.   Yes Cristian Pfeiffer MD   lisinopril (PRINIVIL,ZESTRIL) 20 MG tablet Take 20 mg by mouth Daily.   Yes Cristian Pfeiffer MD   meloxicam (MOBIC) 15 MG tablet Take 15 mg by mouth Daily.   Yes Cristian Pfeiffer MD   Milk Thistle 175 MG capsule Take 175 mg by mouth.   Yes Cristian Pfeiffer MD   omeprazole (priLOSEC) 20 MG capsule Take 20 mg by mouth Daily.   Yes Cristian Pfeiffer MD   polyethyl glycol-propyl glycol (SYSTANE) 0.4-0.3 % solution ophthalmic solution Every 1 (One) Hour As Needed.   Yes Cristian Pfeiffer MD   S-Adenosylmethionine (SULAIMAN-E) 200 MG tablet Take 200 mg by mouth.   Yes Cristian Pfeiffer MD   simvastatin (ZOCOR) 40 MG tablet Take 40 mg by mouth Every Night.   Yes Cristian Pfeiffer MD   SUPER B COMPLEX/C PO Take  by mouth.   Yes Cristian Pfeiffer MD   TAMSULOSIN HCL PO Take 0.4 mg by mouth.   Yes Cristian Pfeiffer MD   tiotropium (SPIRIVA) 18 MCG per inhalation capsule Place 1 capsule into inhaler and inhale Daily.   Yes Cristian Pfeiffer MD   tiotropium bromide monohydrate (SPIRIVA RESPIMAT) 2.5 MCG/ACT aerosol solution inhaler Inhale 2 puffs Daily.   Yes Cristian Pfeiffer MD   TURMERIC PO Take 1,000 mg by mouth.   Yes Cristian Pfeiffer MD   vitamin C (ASCORBIC ACID) 500 MG tablet Take 500 mg by mouth Daily.   Yes Cristian Pfeiffer MD   predniSONE (DELTASONE) 10 MG tablet Take 4 daily x 3 then 3 daily x 3 then 2 daily x 3 then 1 daily x 3. 10/29/19 12/10/19 Yes Angelica Mccabe DO       Objective     Vital Signs: /64 (BP Location: Left arm, Patient Position: Sitting, Cuff Size: Adult)   Pulse 84   Temp 97.7 °F (36.5 °C) (Temporal)   Resp 24   Ht 167.6 cm (65.98\")   Wt 85.8 kg (189 lb 4 oz)   SpO2 96%   BMI 30.56 kg/m²   Physical Exam   Constitutional: He is oriented to person, place, and time. He appears well-developed and well-nourished.   HENT:   Head: Normocephalic and atraumatic.   Right Ear: External ear " normal.   Left Ear: External ear normal.   Nose: Nose normal.   Mouth/Throat: Oropharynx is clear and moist. No oropharyngeal exudate.   Eyes: Pupils are equal, round, and reactive to light. Conjunctivae and EOM are normal.   Neck: Normal range of motion. Neck supple. No JVD present.   Cardiovascular: Normal rate, regular rhythm and intact distal pulses. Exam reveals no gallop and no friction rub.   No murmur heard.  Pulmonary/Chest: Effort normal.   Markedly diminished breath sounds bilaterally   Abdominal: Soft. Bowel sounds are normal.   Musculoskeletal: Normal range of motion. He exhibits no edema.   Neurological: He is alert and oriented to person, place, and time.   Skin: Skin is warm and dry.   Psychiatric: He has a normal mood and affect. His behavior is normal.   Nursing note and vitals reviewed.            Results Reviewed:  No results found for: GLUCOSE, BUN, CREATININE, NA, K, CL, CO2, CALCIUM, ALT, AST, WBC, HCT, PLT, CHOL, TRIG, HDL, LDL, LDLHDL, HGBA1C    Chest PA and lateral.  Reviewed by myself.  Reveals no active infiltrate.  However there is an area of haziness in the right  lung.  This is previously been associated with atelectasis.    Assessment / Plan     Assessment/Plan:  1. Mixed simple and mucopurulent chronic bronchitis (CMS/HCC)    - Ambulatory Referral to Pulmonology    2. Shortness of breath    - XR Chest PA & Lateral (In Office)    Doxycycline 100 mg twice daily for 7 days  Prednisone taper dose 40 daily for 3,30 daily for 3,20 daily for 3,10 daily for 3 then stop  Use flutter valve daily  Consideration should be given to potentially getting percussive vest therapy for this gentleman.    Return in about 3 months (around 3/10/2020), or if symptoms worsen or fail to improve. unless patient needs to be seen sooner or acute issues arise.        I have discussed the patient results/orders and and plan/recommendation with them at today's visit.      Angelica Mccabe,    12/10/2019

## 2019-12-19 ENCOUNTER — OFFICE VISIT (OUTPATIENT)
Dept: CARDIOLOGY | Facility: CLINIC | Age: 74
End: 2019-12-19

## 2019-12-19 VITALS
WEIGHT: 188 LBS | HEART RATE: 59 BPM | HEIGHT: 66 IN | DIASTOLIC BLOOD PRESSURE: 80 MMHG | BODY MASS INDEX: 30.22 KG/M2 | SYSTOLIC BLOOD PRESSURE: 138 MMHG | OXYGEN SATURATION: 97 %

## 2019-12-19 DIAGNOSIS — Z01.818 PRE-OP EVALUATION: Primary | ICD-10-CM

## 2019-12-19 DIAGNOSIS — I10 ESSENTIAL HYPERTENSION: ICD-10-CM

## 2019-12-19 DIAGNOSIS — E78.2 MIXED HYPERLIPIDEMIA: ICD-10-CM

## 2019-12-19 PROBLEM — E78.5 HYPERLIPIDEMIA: Status: ACTIVE | Noted: 2019-12-19

## 2019-12-19 PROBLEM — I25.10 CAD (CORONARY ARTERY DISEASE): Status: ACTIVE | Noted: 2019-12-19

## 2019-12-19 PROCEDURE — 93000 ELECTROCARDIOGRAM COMPLETE: CPT | Performed by: INTERNAL MEDICINE

## 2019-12-19 PROCEDURE — 99204 OFFICE O/P NEW MOD 45 MIN: CPT | Performed by: INTERNAL MEDICINE

## 2019-12-19 RX ORDER — BUDESONIDE AND FORMOTEROL FUMARATE DIHYDRATE 160; 4.5 UG/1; UG/1
1 AEROSOL RESPIRATORY (INHALATION) DAILY
COMMUNITY
End: 2020-06-15

## 2019-12-19 NOTE — PROGRESS NOTES
Subjective:     Encounter Date:12/19/2019      Patient ID: Justin Robledo is a 74 y.o. male with a reported history of coronary artery disease, although the patient denies any history of PCI or coronary artery bypass grafting, also with hypertension, hyperlipidemia, COPD, here for preoperative risk assessment in the setting of upcoming shoulder surgery.    Referring provider:Angelica Mccabe DO  Reason for referral: Preoperative evaluation, establish care    Chief Complaint: Establish cardiac care, also preoperative evaluation prior to shoulder surgery    Hypertension   This is a chronic problem. The problem is unchanged. The problem is controlled. Pertinent negatives include no blurred vision, chest pain, headaches, neck pain, orthopnea, palpitations, peripheral edema, PND or shortness of breath. There are no associated agents to hypertension. Risk factors for coronary artery disease include male gender and dyslipidemia. Current antihypertension treatment includes calcium channel blockers, beta blockers and ACE inhibitors. The current treatment provides significant improvement. There are no compliance problems.  Hypertensive end-organ damage includes CAD/MI. There is no history of angina.      This is a 74-year-old male who is here for preoperative evaluation.  Reportedly, in the medical records, the patient is identified as having coronary artery disease although the patient denies ever having any history of PCI or coronary artery bypass grafting.  He says that in about 2000 or 2011 he had significantly elevated blood pressure and it was recommended that at that time he saw a cardiologist.  He tells me that he had a stress test in that same year that was normal and has never had any further cardiac work-up since then including cardiac catheterization.  In any event, he also has a history of hyperlipidemia and previously tobacco abuse.  The patient is to have upcoming shoulder surgery.  He says that on a  normal day he has been doing well in terms of his activity level with no decline in exercise tolerance.  He says that he could easily walk up a flight of steps without any difficulties.  He does have a history of COPD therefore he says that he does have some mild intermittent shortness of breath and dyspnea on exertion that have not increased lately.  Occasionally, he will have some cough and wheezing.  No orthopnea, PND or any significant edema.  No lightheadedness, dizziness or syncope.  No palpitations.  He reports that his blood pressures generally well controlled.    The following portions of the patient's history were reviewed and updated as appropriate: allergies, current medications, past family history, past medical history, past social history, past surgical history and problem list.     Past Medical History:   Diagnosis Date   • Arthritis    • CAD (coronary artery disease)    • COPD (chronic obstructive pulmonary disease) (CMS/[facility])    • Hyperlipidemia    • Hypertension    • Sleep apnea      Past Surgical History:   Procedure Laterality Date   • KNEE MENISCAL REPAIR Left 2018   • ROTATOR CUFF REPAIR Right 2000   • SPINE SURGERY  1981    lower back ruptured disc       Current Outpatient Medications:   •  Acetaminophen (TYLENOL ARTHRITIS PAIN PO), Take 600 capsules by mouth., Disp: , Rfl:   •  albuterol sulfate  (90 Base) MCG/ACT inhaler, Inhale 2 puffs Every 4 (Four) Hours As Needed for Wheezing., Disp: , Rfl:   •  amLODIPine (NORVASC) 5 MG tablet, Take 5 mg by mouth Daily., Disp: , Rfl:   •  aspirin 81 MG EC tablet, Take 81 mg by mouth Daily., Disp: , Rfl:   •  bisoprolol (ZEBeta) 5 MG tablet, Take 5 mg by mouth Daily., Disp: , Rfl:   •  budesonide-formoterol (SYMBICORT) 160-4.5 MCG/ACT inhaler, Inhale 1 puff Daily., Disp: , Rfl:   •  calcium carbonate (OS-TASHIA) 600 MG tablet, Take 600 mg by mouth 2 (Two) Times a Day., Disp: , Rfl:   •  Cholecalciferol (VITAMIN D3) 1000 units capsule, Take 1,000  Units by mouth., Disp: , Rfl:   •  coenzyme Q10 100 MG capsule, Take 100 mg by mouth Daily., Disp: , Rfl:   •  Coenzyme Q10 400 MG capsule, Take 1 capsule by mouth Daily., Disp: , Rfl:   •  cyclobenzaprine (FLEXERIL) 10 MG tablet, Take 1 tablet by mouth 3 (Three) Times a Day As Needed for Muscle Spasms., Disp: 25 tablet, Rfl: 0  •  cycloSPORINE (RESTASIS) 0.05 % ophthalmic emulsion, 1 drop 2 (Two) Times a Day., Disp: , Rfl:   •  gabapentin (NEURONTIN) 300 MG capsule, Take 300 mg by mouth 3 (Three) Times a Day., Disp: , Rfl:   •  Garlic 1000 MG capsule, Take  by mouth., Disp: , Rfl:   •  Glucos-Chondroit-Hyaluron-MSM (GLUCOSAMINE CHONDROITIN JOINT PO), Take  by mouth., Disp: , Rfl:   •  HYDROcodone-acetaminophen (NORCO) 5-325 MG per tablet, Take 1 tablet by mouth Every 8 (Eight) Hours As Needed for Severe Pain ., Disp: 10 tablet, Rfl: 0  •  ibuprofen (ADVIL,MOTRIN) 800 MG tablet, Take 800 mg by mouth Every 6 (Six) Hours As Needed for Mild Pain ., Disp: , Rfl:   •  Krill Oil 300 MG capsule, Take  by mouth., Disp: , Rfl:   •  lisinopril (PRINIVIL,ZESTRIL) 20 MG tablet, Take 20 mg by mouth Daily., Disp: , Rfl:   •  meloxicam (MOBIC) 15 MG tablet, Take 15 mg by mouth Daily., Disp: , Rfl:   •  Milk Thistle 175 MG capsule, Take 175 mg by mouth., Disp: , Rfl:   •  Multiple Vitamins-Minerals (OCUVITE ADULT 50+ PO), Take 1 tablet by mouth Daily., Disp: , Rfl:   •  omeprazole (priLOSEC) 20 MG capsule, Take 20 mg by mouth Daily., Disp: , Rfl:   •  polyethyl glycol-propyl glycol (SYSTANE) 0.4-0.3 % solution ophthalmic solution, Every 1 (One) Hour As Needed., Disp: , Rfl:   •  S-Adenosylmethionine (SULAIMAN-E) 200 MG tablet, Take 200 mg by mouth., Disp: , Rfl:   •  simvastatin (ZOCOR) 40 MG tablet, Take 40 mg by mouth Every Night., Disp: , Rfl:   •  SUPER B COMPLEX/C PO, Take  by mouth., Disp: , Rfl:   •  TAMSULOSIN HCL PO, Take 0.4 mg by mouth., Disp: , Rfl:   •  tiotropium (SPIRIVA) 18 MCG per inhalation capsule, Place 1 capsule  into inhaler and inhale Daily., Disp: , Rfl:   •  tiotropium bromide monohydrate (SPIRIVA RESPIMAT) 2.5 MCG/ACT aerosol solution inhaler, Inhale 2 puffs Daily., Disp: , Rfl:   •  TURMERIC PO, Take 1,000 mg by mouth., Disp: , Rfl:   •  vitamin C (ASCORBIC ACID) 500 MG tablet, Take 500 mg by mouth Daily., Disp: , Rfl:   •  tobramycin-dexamethasone (TOBRADEX) 0.3-0.1 % ophthalmic suspension, Administer 1 drop into the left eye Every 4 (Four) Hours While Awake., Disp: 5 mL, Rfl: 1    No Known Allergies    Social History     Tobacco Use   • Smoking status: Former Smoker     Last attempt to quit:      Years since quittin.0   • Smokeless tobacco: Never Used   Substance Use Topics   • Alcohol use: Yes     Alcohol/week: 5.0 standard drinks     Types: 5 Cans of beer per week     Family History   Problem Relation Age of Onset   • Arthritis Mother    • Diabetes Mother    • Osteoporosis Mother    • Hypertension Mother    • Kidney disease Mother    • Arthritis Father    • Hypertension Father    • Stroke Father    • Diabetes Sister    • Obesity Sister    • Cancer Brother      Review of Systems   Constitution: Negative for chills, fever, night sweats and weight loss.   HENT: Negative for congestion and hearing loss.    Eyes: Negative for blurred vision and pain.   Cardiovascular: Negative for chest pain, claudication, dyspnea on exertion, irregular heartbeat, leg swelling, orthopnea, palpitations, paroxysmal nocturnal dyspnea and syncope.   Respiratory: Positive for cough. Negative for hemoptysis, shortness of breath and wheezing.    Endocrine: Negative for cold intolerance, heat intolerance, polydipsia and polyuria.   Hematologic/Lymphatic: Negative for adenopathy and bleeding problem. Does not bruise/bleed easily.   Skin: Negative for color change, poor wound healing and rash.   Musculoskeletal: Positive for arthritis and joint pain. Negative for back pain, joint swelling, myalgias and neck pain.   Gastrointestinal:  Negative for abdominal pain, change in bowel habit, constipation, diarrhea, heartburn, hematochezia, melena, nausea and vomiting.   Genitourinary: Negative for dysuria, frequency, hematuria and nocturia.   Neurological: Negative for dizziness, focal weakness, headaches, light-headedness, loss of balance and numbness.   Psychiatric/Behavioral: Negative for altered mental status, memory loss and substance abuse.   Allergic/Immunologic: Negative for hives and persistent infections.       ECG 12 Lead  Date/Time: 12/19/2019 11:15 AM  Performed by: Ramiro Cutler MD  Authorized by: Ramiro Cutler MD   Rhythm: sinus rhythm  Rate: normal  BPM: 70  Conduction: left bundle branch block  T inversion: V1-V6  QRS axis: normal  Other findings: poor R wave progression    Clinical impression: abnormal EKG             Objective:     Physical Exam   Constitutional: He is oriented to person, place, and time. Vital signs are normal. He appears well-developed and well-nourished. He is cooperative.  Non-toxic appearance. No distress.   HENT:   Head: Normocephalic and atraumatic.   Right Ear: External ear normal.   Left Ear: External ear normal.   Nose: Nose normal.   Mouth/Throat: Uvula is midline, oropharynx is clear and moist and mucous membranes are normal. Mucous membranes are not pale, not dry and not cyanotic. No oropharyngeal exudate.   Eyes: Pupils are equal, round, and reactive to light. EOM and lids are normal.   Neck: Normal range of motion. Neck supple. No hepatojugular reflux and no JVD present. Carotid bruit is not present. No tracheal deviation and no edema present. No thyroid mass and no thyromegaly present.   Cardiovascular: Normal rate, regular rhythm, S1 normal, S2 normal, normal heart sounds, intact distal pulses and normal pulses.  No extrasystoles are present. PMI is not displaced. Exam reveals no gallop and no friction rub.   No murmur heard.  Pulses:       Radial pulses are 2+ on the right side,  "and 2+ on the left side.        Femoral pulses are 2+ on the right side, and 2+ on the left side.       Dorsalis pedis pulses are 2+ on the right side, and 2+ on the left side.        Posterior tibial pulses are 2+ on the right side, and 2+ on the left side.   Pulmonary/Chest: Effort normal and breath sounds normal. No accessory muscle usage. No respiratory distress. He has no wheezes. He has no rales. He exhibits no tenderness.   Abdominal: Soft. Normal appearance and bowel sounds are normal. He exhibits no distension, no abdominal bruit and no pulsatile midline mass. There is no hepatosplenomegaly. There is no tenderness.   Musculoskeletal: Normal range of motion. He exhibits no edema, tenderness or deformity.   Lymphadenopathy:     He has no cervical adenopathy.   Neurological: He is oriented to person, place, and time. He has normal strength. No cranial nerve deficit.   Skin: Skin is warm, dry and intact. No rash noted. He is not diaphoretic. No cyanosis or erythema. Nails show no clubbing.   Psychiatric: He has a normal mood and affect. His speech is normal and behavior is normal. Thought content normal.   Vitals reviewed.    /80 (BP Location: Left arm, Patient Position: Sitting)   Pulse 59   Ht 167.6 cm (66\")   Wt 85.3 kg (188 lb)   SpO2 97%   BMI 30.34 kg/m²         Assessment:          Diagnosis Plan   1. Pre-op evaluation  ECG 12 Lead   2. Essential hypertension     3. Mixed hyperlipidemia            Plan:       1.  Preoperative evaluation: This patient is to undergo shoulder surgery.  The patient has no history of coronary artery disease or congestive heart failure.  He does not have any cardiac arrhythmias.  He does not have any history of diabetes or renal failure.  His exercise tolerance is estimated greater than or equal to 4 metabolic equivalents and at this level of activity the patient is asymptomatic.  Therefore, with the patient's lack of symptoms and stable clinical status, he would " not need any further cardiac testing prior to his surgery as this would not necessarily lower his operative risk.    2.  Essential hypertension: The patient's blood pressure is well controlled today and he reports good control on home readings in the past.  Continue current medications.    3.  Mixed hyperlipidemia: Patient remains on statin therapy at this time.  His lipids have been followed by his primary care provider.    Patient's Body mass index is 30.34 kg/m². BMI is above normal parameters. Recommendations include: exercise counseling and nutrition counseling.    Follow-up: 12 months unless otherwise needed sooner.

## 2019-12-30 ENCOUNTER — OFFICE VISIT (OUTPATIENT)
Dept: INTERNAL MEDICINE | Facility: CLINIC | Age: 74
End: 2019-12-30

## 2019-12-30 VITALS
TEMPERATURE: 97.9 F | OXYGEN SATURATION: 98 % | SYSTOLIC BLOOD PRESSURE: 147 MMHG | BODY MASS INDEX: 30.56 KG/M2 | HEART RATE: 65 BPM | HEIGHT: 66 IN | WEIGHT: 190.13 LBS | DIASTOLIC BLOOD PRESSURE: 77 MMHG

## 2019-12-30 DIAGNOSIS — S05.8X2A EYE ABRASION, LEFT, INITIAL ENCOUNTER: Primary | ICD-10-CM

## 2019-12-30 PROCEDURE — 99213 OFFICE O/P EST LOW 20 MIN: CPT | Performed by: INTERNAL MEDICINE

## 2019-12-30 RX ORDER — TETRACAINE HYDROCHLORIDE 5 MG/ML
1 SOLUTION OPHTHALMIC ONCE
Qty: 5 ML | Refills: 0 | Status: SHIPPED | OUTPATIENT
Start: 2019-12-30 | End: 2019-12-30

## 2019-12-30 RX ORDER — TOBRAMYCIN AND DEXAMETHASONE 3; 1 MG/ML; MG/ML
1 SUSPENSION/ DROPS OPHTHALMIC
Qty: 5 ML | Refills: 1 | Status: SHIPPED | OUTPATIENT
Start: 2019-12-30 | End: 2020-01-09

## 2019-12-30 NOTE — PROGRESS NOTES
Subjective     Chief Complaint   Patient presents with   • Eye Pain     Eye is red, itching and painful. Symptoms started on Thursday 12-26       History of Present Illness  It happened Thursday morning whle trying to get liquid out of my eye. Cant remember if he was using a tissue or his hand. Patient has had cataract surgery and has a lens in his eye.   Has chronic dry eye and his eyes tear a lot. Feels like there is something in the eye with blinking.     Patient's PMR from outside medical facility reviewed and noted.    Review of Systems   Constitutional: Negative for chills and fever.   HENT: Negative for postnasal drip and sore throat.    Eyes: Positive for pain and redness.   Respiratory: Positive for cough and shortness of breath.    Skin: Negative for color change and wound.        Otherwise complete ROS reviewed and negative except as mentioned in the HPI.    Past Medical History:   Past Medical History:   Diagnosis Date   • Arthritis    • CAD (coronary artery disease)    • COPD (chronic obstructive pulmonary disease) (CMS/[facility])    • Hyperlipidemia    • Hypertension    • Sleep apnea      Past Surgical History:  Past Surgical History:   Procedure Laterality Date   • KNEE MENISCAL REPAIR Left 2018   • ROTATOR CUFF REPAIR Right 2000   • SPINE SURGERY  1981    lower back ruptured disc     Social History:  reports that he quit smoking about 9 years ago. He has never used smokeless tobacco. He reports that he drinks about 5.0 standard drinks of alcohol per week. He reports that he does not use drugs.    Family History: family history includes Arthritis in his father and mother; Cancer in his brother; Diabetes in his mother and sister; Hypertension in his father and mother; Kidney disease in his mother; Obesity in his sister; Osteoporosis in his mother; Stroke in his father.       Allergies:  No Known Allergies  Medications:  Prior to Admission medications    Medication Sig Start Date End Date Taking?  Authorizing Provider   Acetaminophen (TYLENOL ARTHRITIS PAIN PO) Take 600 capsules by mouth.    Cristian Pfeiffer MD   albuterol sulfate  (90 Base) MCG/ACT inhaler Inhale 2 puffs Every 4 (Four) Hours As Needed for Wheezing.    Cristian Pfeiffer MD   amLODIPine (NORVASC) 5 MG tablet Take 5 mg by mouth Daily.    Cristian Pfeiffer MD   aspirin 81 MG EC tablet Take 81 mg by mouth Daily.    Cristian Pfeiffer MD   bisoprolol (ZEBeta) 5 MG tablet Take 5 mg by mouth Daily.    Cristian Pfeiffer MD   budesonide-formoterol (SYMBICORT) 160-4.5 MCG/ACT inhaler Inhale 1 puff Daily.    Cristian Pfeiffer MD   calcium carbonate (OS-TASHIA) 600 MG tablet Take 600 mg by mouth 2 (Two) Times a Day.    Cristian Pfeiffer MD   Cholecalciferol (VITAMIN D3) 1000 units capsule Take 1,000 Units by mouth.    Cristian Pfeiffer MD   coenzyme Q10 100 MG capsule Take 100 mg by mouth Daily.    Cristian Pfeiffer MD   Coenzyme Q10 400 MG capsule Take 1 capsule by mouth Daily.    Cristian Pfeiffer MD   cyclobenzaprine (FLEXERIL) 10 MG tablet Take 1 tablet by mouth 3 (Three) Times a Day As Needed for Muscle Spasms. 10/7/19   Katerine Mcclain APRN   cycloSPORINE (RESTASIS) 0.05 % ophthalmic emulsion 1 drop 2 (Two) Times a Day.    Cristian Pfeiffer MD   gabapentin (NEURONTIN) 300 MG capsule Take 300 mg by mouth 3 (Three) Times a Day.    Cristian Pfeiffer MD   Garlic 1000 MG capsule Take  by mouth.    Cristian Pfeiffer MD   Glucos-Chondroit-Hyaluron-MSM (GLUCOSAMINE CHONDROITIN JOINT PO) Take  by mouth.    Cristian Pfeiffer MD   HYDROcodone-acetaminophen (NORCO) 5-325 MG per tablet Take 1 tablet by mouth Every 8 (Eight) Hours As Needed for Severe Pain . 10/14/19   Katerine Mcclain APRN   ibuprofen (ADVIL,MOTRIN) 800 MG tablet Take 800 mg by mouth Every 6 (Six) Hours As Needed for Mild Pain .    Cristian Pfeiffer MD   Krill Oil 300 MG capsule Take  by mouth.    Kentrell  "MD Cristian   lisinopril (PRINIVIL,ZESTRIL) 20 MG tablet Take 20 mg by mouth Daily.    Cristian Pfeiffer MD   meloxicam (MOBIC) 15 MG tablet Take 15 mg by mouth Daily.    Cristian Pfeiffer MD   Milk Thistle 175 MG capsule Take 175 mg by mouth.    Cristian Pfeiffer MD   Multiple Vitamins-Minerals (OCUVITE ADULT 50+ PO) Take 1 tablet by mouth Daily.    Cristian Pfeiffer MD   omeprazole (priLOSEC) 20 MG capsule Take 20 mg by mouth Daily.    Cristian Pfeiffer MD   polyethyl glycol-propyl glycol (SYSTANE) 0.4-0.3 % solution ophthalmic solution Every 1 (One) Hour As Needed.    Cristian Pfeiffer MD   S-Adenosylmethionine (SULAIMAN-E) 200 MG tablet Take 200 mg by mouth.    Cristian Pfeiffer MD   simvastatin (ZOCOR) 40 MG tablet Take 40 mg by mouth Every Night.    Cristian Pfeiffer MD   SUPER B COMPLEX/C PO Take  by mouth.    Cristian Pfeiffer MD   TAMSULOSIN HCL PO Take 0.4 mg by mouth.    Cristian Pfeiffer MD   tiotropium (SPIRIVA) 18 MCG per inhalation capsule Place 1 capsule into inhaler and inhale Daily.    Cristian Pfeiffer MD   tiotropium bromide monohydrate (SPIRIVA RESPIMAT) 2.5 MCG/ACT aerosol solution inhaler Inhale 2 puffs Daily.    Cristian Pfeiffer MD   TURMERIC PO Take 1,000 mg by mouth.    Cristian Pfeiffer MD   vitamin C (ASCORBIC ACID) 500 MG tablet Take 500 mg by mouth Daily.    Cristian Pfeiffer MD       Objective     Vital Signs: /77 (BP Location: Right arm, Patient Position: Sitting, Cuff Size: Adult)   Pulse 65   Temp 97.9 °F (36.6 °C) (Oral)   Ht 167.6 cm (66\")   Wt 86.2 kg (190 lb 2 oz)   SpO2 98%   BMI 30.69 kg/m²   Physical Exam   Constitutional: He appears well-developed and well-nourished.   Eyes: Right eye exhibits discharge.   Left eye redness and visible abrasion on eye, iris area mid central eye. No green-yellow discharge.    Neck: Normal range of motion. Neck supple.   Pulmonary/Chest: Effort normal. No respiratory distress. "   Musculoskeletal: Normal range of motion. He exhibits no deformity.   Neurological: He is alert. Coordination normal.   Skin: Skin is warm and dry.   Psychiatric: He has a normal mood and affect. Thought content normal.       Patient's Body mass index is 30.69 kg/m². BMI is within normal parameters. No follow-up required..      Results Reviewed:  No results found for: GLUCOSE, BUN, CREATININE, NA, K, CL, CO2, CALCIUM, ALT, AST, WBC, HCT, PLT, CHOL, TRIG, HDL, LDL, LDLHDL, HGBA1C      Assessment / Plan     Assessment/Plan:  1. Eye abrasion, left, initial encounter  - tobramycin-dexamethasone (TOBRADEX) 0.3-0.1 % ophthalmic suspension; Administer 1 drop into the left eye Every 4 (Four) Hours While Awake.  Dispense: 5 mL; Refill: 1  - tetracaine (ALTACAINE) 0.5 % ophthalmic solution; Administer 1 drop into the left eye 1 (One) Time for 1 dose.  Dispense: 5 mL; Refill: 0    Refer to follow up at Opthalmologist. Appointment given at discharge    Return for Next scheduled follow up. unless patient needs to be seen sooner or acute issues arise.    Code Status: Full    I have discussed the patient results/orders and and plan/recommendation with them at today's visit.      Nelsy Rich,    12/30/2019

## 2020-01-09 ENCOUNTER — APPOINTMENT (OUTPATIENT)
Dept: PREADMISSION TESTING | Facility: HOSPITAL | Age: 75
End: 2020-01-09

## 2020-01-09 VITALS
WEIGHT: 190.92 LBS | BODY MASS INDEX: 30.68 KG/M2 | SYSTOLIC BLOOD PRESSURE: 144 MMHG | HEART RATE: 60 BPM | HEIGHT: 66 IN | OXYGEN SATURATION: 96 % | RESPIRATION RATE: 16 BRPM | DIASTOLIC BLOOD PRESSURE: 75 MMHG

## 2020-01-09 LAB
ANION GAP SERPL CALCULATED.3IONS-SCNC: 10 MMOL/L (ref 5–15)
BUN BLD-MCNC: 10 MG/DL (ref 8–23)
BUN/CREAT SERPL: 12.8 (ref 7–25)
CALCIUM SPEC-SCNC: 9.7 MG/DL (ref 8.6–10.5)
CHLORIDE SERPL-SCNC: 105 MMOL/L (ref 98–107)
CO2 SERPL-SCNC: 29 MMOL/L (ref 22–29)
CREAT BLD-MCNC: 0.78 MG/DL (ref 0.76–1.27)
DEPRECATED RDW RBC AUTO: 46.6 FL (ref 37–54)
ERYTHROCYTE [DISTWIDTH] IN BLOOD BY AUTOMATED COUNT: 13.2 % (ref 12.3–15.4)
GFR SERPL CREATININE-BSD FRML MDRD: 97 ML/MIN/1.73
GLUCOSE BLD-MCNC: 116 MG/DL (ref 65–99)
HCT VFR BLD AUTO: 40.4 % (ref 37.5–51)
HGB BLD-MCNC: 13.7 G/DL (ref 13–17.7)
MCH RBC QN AUTO: 32.6 PG (ref 26.6–33)
MCHC RBC AUTO-ENTMCNC: 33.9 G/DL (ref 31.5–35.7)
MCV RBC AUTO: 96.2 FL (ref 79–97)
PLATELET # BLD AUTO: 172 10*3/MM3 (ref 140–450)
PMV BLD AUTO: 10.2 FL (ref 6–12)
POTASSIUM BLD-SCNC: 3.6 MMOL/L (ref 3.5–5.2)
RBC # BLD AUTO: 4.2 10*6/MM3 (ref 4.14–5.8)
SODIUM BLD-SCNC: 144 MMOL/L (ref 136–145)
WBC NRBC COR # BLD: 4.57 10*3/MM3 (ref 3.4–10.8)

## 2020-01-09 PROCEDURE — 80048 BASIC METABOLIC PNL TOTAL CA: CPT | Performed by: ORTHOPAEDIC SURGERY

## 2020-01-09 PROCEDURE — 85027 COMPLETE CBC AUTOMATED: CPT | Performed by: ORTHOPAEDIC SURGERY

## 2020-01-09 PROCEDURE — 36415 COLL VENOUS BLD VENIPUNCTURE: CPT

## 2020-01-09 NOTE — DISCHARGE INSTRUCTIONS
DAY OF SURGERY INSTRUCTIONS        YOUR SURGEON: SHO    PROCEDURE: RIGHT REVERSE TOTAL SHOULDER ARTHROPLASTY    DATE OF SURGERY: January 14, 2020    ARRIVAL TIME: AS DIRECTED BY OFFICE    YOU MAY TAKE THE FOLLOWING MEDICATION(S) THE MORNING OF SURGERY WITH A SIP OF WATER: NORVASC       ALL OTHER HOME MEDICATION CHECK WITH YOUR PHYSICIAN                MANAGING PAIN AFTER SURGERY    We know you are probably wondering what your pain will be like after surgery.  Following surgery it is unrealistic to expect you will not have pain.   Pain is how our bodies let us know that something is wrong or cautions us to be careful.  That said, our goal is to make your pain tolerable.    Methods we may use to treat your pain include (oral or IV medications, PCAs, epidurals, nerve blocks, etc.)   While some procedures require IV pain medications for a short time after surgery, transitioning to pain medications by mouth allows for better management of pain.   Your nurse will encourage you to take oral pain medications whenever possible.  IV medications work almost immediately, but only last a short while.  Taking medications by mouth allows for a more constant level of medication in your blood stream for a longer period of time.      Once your pain is out of control it is harder to get back under control.  It is important you are aware when your next dose of pain medication is due.  If you are admitted, your nurse may write the time of your next dose on the white board in your room to help you remember.      We are interested in your pain and encourage you to inform us about aggravating factors during your visit.   Many times a simple repositioning every few hours can make a big difference.    If your physician says it is okay, do not let your pain prevent you from getting out of bed. Be sure to call your nurse for assistance prior to getting up so you do not fall.      Before surgery, please decide your tolerable pain goal.   These faces help describe the pain ratings we use on a 0-10 scale.   Be prepared to tell us your goal and whether or not you take pain or anxiety medications at home.          BEFORE YOU COME TO THE HOSPITAL  (Pre-op instructions)  • Do not eat, drink, smoke or chew gum after midnight the night before surgery.  This also includes no mints.  • Morning of surgery take only the medicines you have been instructed with a sip of water unless otherwise instructed  by your physician.  • Do not shave, wear makeup or dark nail polish.  • Remove all jewelry including rings.  • Leave anything you consider valuable at home.  • Leave your suitcase in the car until after your surgery.  • Bring the following with you if applicable:  o Picture ID and insurance, Medicare or Medicaid cards  o Co-pay/deductible required by insurance (cash, check, credit card)  o Copy of advance directive, living will or power-of- documents if not brought to PAT  o CPAP or BIPAP mask and tubing  o Relaxation aids ( book, magazine), etc.  o Hearing aids                        ON THE DAY OF SURGERY  · On the day of surgery check in at registration located at the main entrance of the hospital.   ? You will be registered and given a beeper with instructions where to wait in the main lobby.  ? When your beeper lights up and vibrates a member of the Outpatient Surgery staff will meet you at the double doors under the stair steps and escort you to your preoperative room.   · You may have cloth compression devices placed on your legs. These help to prevent blood clots and reduce swelling in your legs.  · An IV may be inserted into one of your veins.  · In the operating room, you may be given one or more of the following:  ? A medicine to help you relax (sedative).  ? A medicine to numb the area (local anesthetic).  ? A medicine to make you fall asleep (general anesthetic).  ? A medicine that is injected into an area of your body to numb everything below  "the injection site (regional anesthetic).  · Your surgical site will be marked or identified.  · You may be given an antibiotic through your IV to help prevent infection.  Contact a health care provider if you:  · Develop a fever of more than 100.4°F (38°C) or other feelings of illness during the 48 hours before your surgery.  · Have symptoms that get worse.  Have questions or concerns about your surgery    General Anesthesia/Surgery, Adult  General anesthesia is the use of medicines to make a person \"go to sleep\" (unconscious) for a medical procedure. General anesthesia must be used for certain procedures, and is often recommended for procedures that:  · Last a long time.  · Require you to be still or in an unusual position.  · Are major and can cause blood loss.  The medicines used for general anesthesia are called general anesthetics. As well as making you unconscious for a certain amount of time, these medicines:  · Prevent pain.  · Control your blood pressure.  · Relax your muscles.  Tell a health care provider about:  · Any allergies you have.  · All medicines you are taking, including vitamins, herbs, eye drops, creams, and over-the-counter medicines.  · Any problems you or family members have had with anesthetic medicines.  · Types of anesthetics you have had in the past.  · Any blood disorders you have.  · Any surgeries you have had.  · Any medical conditions you have.  · Any recent upper respiratory, chest, or ear infections.  · Any history of:  ? Heart or lung conditions, such as heart failure, sleep apnea, asthma, or chronic obstructive pulmonary disease (COPD).  ?  service.  ? Depression or anxiety.  · Any tobacco or drug use, including marijuana or alcohol use.  · Whether you are pregnant or may be pregnant.  What are the risks?  Generally, this is a safe procedure. However, problems may occur, including:  · Allergic reaction.  · Lung and heart problems.  · Inhaling food or liquid from the " stomach into the lungs (aspiration).  · Nerve injury.  · Air in the bloodstream, which can lead to stroke.  · Extreme agitation or confusion (delirium) when you wake up from the anesthetic.  · Waking up during your procedure and being unable to move. This is rare.  These problems are more likely to develop if you are having a major surgery or if you have an advanced or serious medical condition. You can prevent some of these complications by answering all of your health care provider's questions thoroughly and by following all instructions before your procedure.  General anesthesia can cause side effects, including:  · Nausea or vomiting.  · A sore throat from the breathing tube.  · Hoarseness.  · Wheezing or coughing.  · Shaking chills.  · Tiredness.  · Body aches.  · Anxiety.  · Sleepiness or drowsiness.  · Confusion or agitation.  RISKS AND COMPLICATIONS OF SURGERY  Your health care provider will discuss possible risks and complications with you before surgery. Common risks and complications include:    · Problems due to the use of anesthetics.  · Blood loss and replacement (does not apply to minor surgical procedures).  · Temporary increase in pain due to surgery.  · Uncorrected pain or problems that the surgery was meant to correct.  · Infection.  · New damage.    What happens before the procedure?    Medicines  Ask your health care provider about:  · Changing or stopping your regular medicines. This is especially important if you are taking diabetes medicines or blood thinners.  · Taking medicines such as aspirin and ibuprofen. These medicines can thin your blood. Do not take these medicines unless your health care provider tells you to take them.  · Taking over-the-counter medicines, vitamins, herbs, and supplements. Do not take these during the week before your procedure unless your health care provider approves them.  General instructions  · Starting 3-6 weeks before the procedure, do not use any products  that contain nicotine or tobacco, such as cigarettes and e-cigarettes. If you need help quitting, ask your health care provider.  · If you brush your teeth on the morning of the procedure, make sure to spit out all of the toothpaste.  · Tell your health care provider if you become ill or develop a cold, cough, or fever.  · If instructed by your health care provider, bring your sleep apnea device with you on the day of your surgery (if applicable).  · Ask your health care provider if you will be going home the same day, the following day, or after a longer hospital stay.  ? Plan to have someone take you home from the hospital or clinic.  ? Plan to have a responsible adult care for you for at least 24 hours after you leave the hospital or clinic. This is important.  What happens during the procedure?  · You will be given anesthetics through both of the following:  ? A mask placed over your nose and mouth.  ? An IV in one of your veins.  · You may receive a medicine to help you relax (sedative).  · After you are unconscious, a breathing tube may be inserted down your throat to help you breathe. This will be removed before you wake up.  · An anesthesia specialist will stay with you throughout your procedure. He or she will:  ? Keep you comfortable and safe by continuing to give you medicines and adjusting the amount of medicine that you get.  ? Monitor your blood pressure, pulse, and oxygen levels to make sure that the anesthetics do not cause any problems.  The procedure may vary among health care providers and hospitals.  What happens after the procedure?  · Your blood pressure, temperature, heart rate, breathing rate, and blood oxygen level will be monitored until the medicines you were given have worn off.  · You will wake up in a recovery area. You may wake up slowly.  · If you feel anxious or agitated, you may be given medicine to help you calm down.  · If you will be going home the same day, your health care  provider may check to make sure you can walk, drink, and urinate.  · Your health care provider will treat any pain or side effects you have before you go home.  · Do not drive for 24 hours if you were given a sedative.  Summary  · General anesthesia is used to keep you still and prevent pain during a procedure.  · It is important to tell your healthcare provider about your medical history and any surgeries you have had, and previous experience with anesthesia.  · Follow your healthcare provider’s instructions about when to stop eating, drinking, or taking certain medicines before your procedure.  · Plan to have someone take you home from the hospital or clinic.  This information is not intended to replace advice given to you by your health care provider. Make sure you discuss any questions you have with your health care provider.  Document Released: 03/26/2009 Document Revised: 08/03/2018 Document Reviewed: 08/03/2018  [employer] Interactive Patient Education © 2019 [employer]       Fall Prevention in Hospitals, Adult  As a hospital patient, your condition and the treatments you receive can increase your risk for falls. Some additional risk factors for falls in a hospital include:  · Being in an unfamiliar environment.  · Being on bed rest.  · Your surgery.  · Taking certain medicines.  · Your tubing requirements, such as intravenous (IV) therapy or catheters.  It is important that you learn how to decrease fall risks while at the hospital. Below are important tips that can help prevent falls.  SAFETY TIPS FOR PREVENTING FALLS  Talk about your risk of falling.  · Ask your health care provider why you are at risk for falling. Is it your medicine, illness, tubing placement, or something else?  · Make a plan with your health care provider to keep you safe from falls.  · Ask your health care provider or pharmacist about side effects of your medicines. Some medicines can make you dizzy or affect your coordination.  Ask  for help.  · Ask for help before getting out of bed. You may need to press your call button.  · Ask for assistance in getting safely to the toilet.  · Ask for a walker or cane to be put at your bedside. Ask that most of the side rails on your bed be placed up before your health care provider leaves the room.  · Ask family or friends to sit with you.  · Ask for things that are out of your reach, such as your glasses, hearing aids, telephone, bedside table, or call button.  Follow these tips to avoid falling:  · Stay lying or seated, rather than standing, while waiting for help.  · Wear rubber-soled slippers or shoes whenever you walk in the hospital.  · Avoid quick, sudden movements.  ¨ Change positions slowly.  ¨ Sit on the side of your bed before standing.  ¨ Stand up slowly and wait before you start to walk.  · Let your health care provider know if there is a spill on the floor.  · Pay careful attention to the medical equipment, electrical cords, and tubes around you.  · When you need help, use your call button by your bed or in the bathroom. Wait for one of your health care providers to help you.  · If you feel dizzy or unsure of your footing, return to bed and wait for assistance.  · Avoid being distracted by the TV, telephone, or another person in your room.  · Do not lean or support yourself on rolling objects, such as IV poles or bedside tables.     This information is not intended to replace advice given to you by your health care provider. Make sure you discuss any questions you have with your health care provider.     Document Released: 12/15/2001 Document Revised: 01/08/2016 Document Reviewed: 08/25/2013  [employer] Interactive Patient Education ©2016 [employer]       Surgical Site Infections FAQs  What is a Surgical Site Infection (SSI)?  A surgical site infection is an infection that occurs after surgery in the part of the body where the surgery took place. Most patients who have surgery do not develop  an infection. However, infections develop in about 1 to 3 out of every 100 patients who have surgery.  Some of the common symptoms of a surgical site infection are:  · Redness and pain around the area where you had surgery  · Drainage of cloudy fluid from your surgical wound  · Fever  Can SSIs be treated?  Yes. Most surgical site infections can be treated with antibiotics. The antibiotic given to you depends on the bacteria (germs) causing the infection. Sometimes patients with SSIs also need another surgery to treat the infection.  What are some of the things that hospitals are doing to prevent SSIs?  To prevent SSIs, doctors, nurses, and other healthcare providers:  · Clean their hands and arms up to their elbows with an antiseptic agent just before the surgery.  · Clean their hands with soap and water or an alcohol-based hand rub before and after caring for each patient.  · May remove some of your hair immediately before your surgery using electric clippers if the hair is in the same area where the procedure will occur. They should not shave you with a razor.  · Wear special hair covers, masks, gowns, and gloves during surgery to keep the surgery area clean.  · Give you antibiotics before your surgery starts. In most cases, you should get antibiotics within 60 minutes before the surgery starts and the antibiotics should be stopped within 24 hours after surgery.  · Clean the skin at the site of your surgery with a special soap that kills germs.  What can I do to help prevent SSIs?  Before your surgery:  · Tell your doctor about other medical problems you may have. Health problems such as allergies, diabetes, and obesity could affect your surgery and your treatment.  · Quit smoking. Patients who smoke get more infections. Talk to your doctor about how you can quit before your surgery.  · Do not shave near where you will have surgery. Shaving with a razor can irritate your skin and make it easier to develop an  infection.  At the time of your surgery:  · Speak up if someone tries to shave you with a razor before surgery. Ask why you need to be shaved and talk with your surgeon if you have any concerns.  · Ask if you will get antibiotics before surgery.  After your surgery:  · Make sure that your healthcare providers clean their hands before examining you, either with soap and water or an alcohol-based hand rub.  · If you do not see your providers clean their hands, please ask them to do so.  · Family and friends who visit you should not touch the surgical wound or dressings.  · Family and friends should clean their hands with soap and water or an alcohol-based hand rub before and after visiting you. If you do not see them clean their hands, ask them to clean their hands.  What do I need to do when I go home from the hospital?  · Before you go home, your doctor or nurse should explain everything you need to know about taking care of your wound. Make sure you understand how to care for your wound before you leave the hospital.  · Always clean your hands before and after caring for your wound.  · Before you go home, make sure you know who to contact if you have questions or problems after you get home.  · If you have any symptoms of an infection, such as redness and pain at the surgery site, drainage, or fever, call your doctor immediately.  If you have additional questions, please ask your doctor or nurse.  Developed and co-sponsored by The Society for Healthcare Epidemiology of [place] (SHEA); Infectious Diseases Society of [place] (IDSA); American Hospital Association; Association for Professionals in Infection Control and Epidemiology (APIC); Centers for Disease Control and Prevention (CDC); and The Joint Commission.     This information is not intended to replace advice given to you by your health care provider. Make sure you discuss any questions you have with your health care provider.     Document Released: 12/23/2014  Document Revised: 01/08/2016 Document Reviewed: 03/02/2016  [employer] Interactive Patient Education ©2016 [employer]           [facility]  CHG 4% Patient Instruction Sheet    Chlorhexidine Before Surgery  Chlorhexidine gluconate (CHG) is a germ-killing (antiseptic) solution that is used to clean the skin. It gets rid of the bacteria that normally live on the skin. Cleaning your skin with CHG before surgery helps lower the risk for infection after surgery.    How to use CHG solution  · You will take 2 showers, one shower the night before surgery, the second shower the morning of surgery before coming to the hospital.  · Use CHG only as told by your health care provider, and follow the instructions on the label.  · Use CHG solution while taking a shower. Follow these steps when using CHG solution (unless your health care provider gives you different instructions):  1. Start the shower.  2. Use your normal soap and shampoo to wash your face and hair.  3. Turn off the shower or move out of the shower stream.  4. Pour the CHG onto a clean washcloth. Do not use any type of brush or rough-edged sponge.  5. Starting at your neck, lather your body down to your toes. Make sure you:  6. Pay special attention to the part of your body where you will be having surgery. Scrub this area for at least 1 minute.  7. Use the full amount of CHG as directed. Usually, this is one half bottle for each shower.  8. Do not use CHG on your head or face. If the solution gets into your ears or eyes, rinse them well with water.  9. Avoid your genital area.  10. Avoid any areas of skin that have broken skin, cuts, or scrapes.  11. Scrub your back and under your arms. Make sure to wash skin folds.  12. Let the lather sit on your skin for 1-2 minutes or as long as told by your health care  provider.  13. Thoroughly rinse your entire body in the shower. Make sure that all body creases and crevices are rinsed well.  14. Dry off with a  clean towel. Do not put any substances on your body afterward, such as powder, lotion, or perfume.  15. Put on clean clothes or pajamas.  16. If it is the night before your surgery, sleep in clean sheets.    What are the risks?  Risks of using CHG include:  · A skin reaction.  · Hearing loss, if CHG gets in your ears.  · Eye injury, if CHG gets in your eyes and is not rinsed out.  · The CHG product catching fire.  Make sure that you avoid smoking and flames after applying CHG to your skin.  Do not use CHG:  · If you have a chlorhexidine allergy or have previously reacted to chlorhexidine.  · On babies younger than 2 months of age.      On the day of surgery, when you are taken to your room in Outpatient Surgery you will be given a CHG prepackaged cloth to wipe the site for your surgery.  How to use CHG prepackaged cloths  · Follow the instructions on the label.  · Use the CHG cloth on clean, dry skin. Follow these steps when using a CHG cloth (unless your health care provider gives you different instructions):  1. Using the CHG cloth, vigorously scrub the part of your body where you will be having surgery. Scrub using a back-and-forth motion for 3 minutes. The area on your body should be completely wet with CHG when you are finished scrubbing.  2. Do not rinse. Discard the cloth and let the area air-dry for 1 minute. Do not put any substances on your body afterward, such as powder, lotion, or perfume.  Contact a health care provider if:  · Your skin gets irritated after scrubbing.  · You have questions about using your solution or cloth.  Get help right away if:  · Your eyes become very red or swollen.  · Your eyes itch badly.  · Your skin itches badly and is red or swollen.  · Your hearing changes.  · You have trouble seeing.  · You have swelling or tingling in your mouth or throat.  · You have trouble breathing.  · You swallow any chlorhexidine.  Summary  · Chlorhexidine gluconate (CHG) is a germ-killing  (antiseptic) solution that is used to clean the skin. Cleaning your skin with CHG before surgery helps lower the risk for infection after surgery.  · You may be given CHG to use at home. It may be in a bottle or in a prepackaged cloth to use on your skin. Carefully follow your health care provider's instructions and the instructions on the product label.  · Do not use CHG if you have a chlorhexidine allergy.  · Contact your health care provider if your skin gets irritated after scrubbing.  This information is not intended to replace advice given to you by your health care provider. Make sure you discuss any questions you have with your health care provider.  Document Released: 09/11/2013 Document Revised: 11/15/2018 Document Reviewed: 11/15/2018  [employer] Interactive Patient Education © 2019 [employer]          PATIENT/FAMILY/RESPONSIBLE PARTY VERBALIZES UNDERSTANDING OF ABOVE EDUCATION.  COPY OF PAIN SCALE GIVEN AND REVIEWED WITH VERBALIZED UNDERSTANDING.

## 2020-01-12 PROBLEM — M75.101 RIGHT ROTATOR CUFF TEAR ARTHROPATHY: Status: ACTIVE | Noted: 2020-01-12

## 2020-01-12 PROBLEM — M12.811 RIGHT ROTATOR CUFF TEAR ARTHROPATHY: Status: ACTIVE | Noted: 2020-01-12

## 2020-01-12 NOTE — OP NOTE
Patient Name: Casey  MRN: [id number]  : 1945      DATE of SURGERY: 2020    SURGEON: Suman Ventura MD    ASSISTANT: NONE    PREOPERATIVE DIAGNOSIS: Right Shoulder Rotator Cuff Tear Arthropathy    POSTOPERATIVE DIAGNOSIS:  Right Shoulder Rotator Cuff Tear Arthropathy    PROCEDURE PERFORMED:  Right Reverse Total Shoulder Arthroplasty    IMPLANTS: [employer]                Baseplate: medium                Glenosphere: 39 + 4                Poly: + 6                Suture Cup: 39 neutral                              Stem: 11 pressfit    ANESTHESIA USED: General endotrachial anesthesia, interscalene block    OPERATIVE INDICATIONS: 74 y.o. male with progressive loss of function and increasing pain of the upper extremity  due to a massive irreparable tear of the rotator cuff. He had a prior open repair of his rotator cuff years ago that subsequently failed.  Due to loss of function and progressive pain, a reverse shoulder arthroplasty is planned to improve function and decrease pain. Surgical evaluation was discussed and the patient wished to proceed understanding risks, benefits, and alternatives. The surgical indications were to relieve pain, improve function, and prevent future disability in regards to the shoulder pathology dictated in the aboved diagnoses.  Risks included, but were not limited to, that of anesthesia, bleeding, infection, pain, damage to local structures, postoperative dislocation, need for further surgery, instability, stiffness, failure of implants, and loss of function.    The patient has failed a combination of the following improve pain and function: physical therapy >12 wks, corticosteroid injections, NSAID’s, activity modification.     ESTIMATED BLOOD LOSS: 150 mL    DRAINS: none     COMPLICATIONS: none    SPECIMENS: none    PROCEDURE in DETAIL:  The patient was seen in the preoperative holding room, once again the informed consent was reviewed with the  "patient and signed.  The site of surgery was marked with the patient's agreement.  After being transported to the operating room, a timeout was performed identifying the correct patient as well as the operative site.  Dose appropriate IV antibiotics were given prior to incision.  The patient was positioned in the beach chair position, all bony prominences were protected and a sterile prep and drape was performed.  The surgical site was draped with ioban dressing.    A deltopectoral approach to the shoulder joint was utilized as soft tissue was dissected down the level of the cephalic vein which was taken laterally along with the deltoid.  The biceps tendon was located, tenodesed to the superior border of the pectoralis major tendon insertion.  The rotator interval was opened.  A tagging stitch was placed in the subscapularis and with progressive external rotation of the shoulder, the tendon and underlying capsule were peeled from the less tuberosity.  The humeral head was dislocated from the glenoid and strategic retractors were placed to protect the surrounding soft tissue.  The axillary nerve was palpated and protected, verified with a \"tug test.\"    Beginning a the apex of the humeral head, a starting reamer was introduced into the shaft of the humerus, followed by reaming with a 5 and 6 mm reamer.  The proximal humeral osteotomy guide was inserted and an osteotomy was performed and 135 degrees in 30 degrees of retroversion.  A protective plate was placed on the osteotomy site and attention was turned to the glenoid.      Again, strategic retractors were placed surround the glenoid, the axillary nerve was protected, and a complete capsulectomy was performed.  The labrum was excised.  A guidepin was placed in the inferior-central aspect of the glenoid, following by a reaming device, and drilling of the central peg hole.  A baseplate was impacted and superior, central, and inferior locking screws were inserted.  " The glenosphere was then impacted without complication.    Attention was turned back to the humeral side where progressively sized broaches were inserted until a stable fit was achieved, followed by the metaphyseal reaming guide.  The metaphysis was reamed and a trial stem inserted.  Trial polyethylenes were placed in the suture cup until range of motion and stability were adequate.  The conjoint tendon showed increased tension. With traction of the shoulder, the entire scapula was translating without dissociation of the polyethylene.    Trial implants were removed, final implants impacted, and the shoulder was once again reduced showing excellent stability and range of motion.    The incision was thoroughly irrigated, followed by closure in layers.  The skin was closed with adhesive glue.  A sterile dressing and sling were placed.  Counts were correct.    The patient was awakened by anesthesia, transported to the recovery room in stable condition.    POSTOPERATIVE PLAN:  1) Admit inpatient for pain control, neurovascular monitoring  2) Discharge home once pain is controlled  3) Reverse total shoulder protocol    Electronically signed by Suman Ventura MD on 1/14/2020 at 4:28 PM

## 2020-01-12 NOTE — DISCHARGE INSTRUCTIONS
UPPER EXTREMITY POST-OP INSTRUCTIONS - DR. BERKOWITZ    IMPORTANT PHONE NUMBERS:  • For emergencies, please call [phone]  • You may reach Dr. Berkowitz and clinical staff at [phone] M-F 8:00 am-5:00 pm  • After 5pm or on the weekends, please call [phone]  • Call immediately if you have any of the following symptoms:     Elevated temperature above 101.5 degrees for more than 48 hours after surgery     Persistent drainage from wound     Severe pain around surgical site    Sling use: The sling is provided for your comfort and to ensure proper healing of your repair following surgery. Please place the abduction pillow with the curved side against your side and the sling on the side of the pillow. Your surgery requires that you wear the sling if noted below.  ____ For comfort. Remove sling 24 hours and begin range of motion exercises  __x__ At all times except bathing, dressing, and therapy. Also wear the sling during sleep.  ____ No sling required    Bathing:  ___No bandages, no restrictions!!  _x__You may remove you dressing and shower on the 3rd day after surgery (Ex. Tues surgery, shower on Friday)  ** if you are told to it is ok to remove your dressing and shower, DO NOT SOAK your incisions in a tub.  ___Keep splint clean, dry, and intact. DO NOT place foreign objects into your splint.      Dressings: Keep dressing/splint intact unless instructed otherwise below. SOME DRAINAGE IS NORMAL!    • DO NOT touch or apply ointment to the incision.    • DO NOT remove the steri-strips over the incisions (if you have steri-strips). They will         generally fall off on their own or can be removed 1 weeksafter surgery.    • If you have yellow gauze and it comes off, do not worry about it. Leave them off.   • Signs of infection that warrant a phone call to our clinical line:     o Excessive drainage or redness     o Red streaking coming away from the incision  o Increased pain  o Increased temperature  above 101 degrees      Physical Therapy:        *  Your physical therapy status will be discussed with you postoperatively and at your first post-op appointment. Some injuries will not require physical therapy.      *  If you have a shoulder manipulation, please schedule therapy for the next day      Medications: You will be discharged with the appropriate medications following your surgery. Fill these at the pharmacy and take them as directed on the label. Not all of the medications below may be prescribed. Occasionally, other medications may be prescribed with specific instructions.    Percocet/Lortab (oxycodone/hydrocodone with tylenol) - Pain Medication, will cause drowsiness, possibly itchiness (this is NOT an allergy - use benadryl or an over the counter allergy medication such as Claritin or Zyrtec)     o Take 1-2 tablets every 4-6 hours. DO NOT EXCEED 4,000mg of Tylenol in 24 hours.  **DO NOT MIX WITH ALCOHOL, DRIVE WHILE TAKING, OR TAKE with extra TYLENOL**    Colace (Docusate) - stool softener, used for constipation. Take this only if you feel constipated.      Zofran (Ondansetron) or Phenergan - Anti-nausea medication, will cause drowsiness      **If you are running low on pain medications, please notify us if you need a refill 24-48 hours prior to when you run out, so we can make arrangements to refill the prescription for you if we determine is necessary

## 2020-01-14 ENCOUNTER — HOSPITAL ENCOUNTER (OUTPATIENT)
Facility: HOSPITAL | Age: 75
Discharge: HOME | End: 2020-01-15
Attending: ORTHOPAEDIC SURGERY | Admitting: ORTHOPAEDIC SURGERY

## 2020-01-14 ENCOUNTER — APPOINTMENT (OUTPATIENT)
Dept: GENERAL RADIOLOGY | Facility: HOSPITAL | Age: 75
End: 2020-01-14

## 2020-01-14 ENCOUNTER — ANESTHESIA EVENT (OUTPATIENT)
Dept: PERIOP | Facility: HOSPITAL | Age: 75
End: 2020-01-14

## 2020-01-14 ENCOUNTER — ANESTHESIA (OUTPATIENT)
Dept: PERIOP | Facility: HOSPITAL | Age: 75
End: 2020-01-14

## 2020-01-14 DIAGNOSIS — M75.101 RIGHT ROTATOR CUFF TEAR ARTHROPATHY: Primary | ICD-10-CM

## 2020-01-14 DIAGNOSIS — M12.811 RIGHT ROTATOR CUFF TEAR ARTHROPATHY: Primary | ICD-10-CM

## 2020-01-14 DIAGNOSIS — Z78.9 DECREASED ACTIVITIES OF DAILY LIVING (ADL): ICD-10-CM

## 2020-01-14 LAB
ABO GROUP BLD: NORMAL
BLD GP AB SCN SERPL QL: NEGATIVE
INR PPP: 0.91 (ref 0.91–1.09)
PROTHROMBIN TIME: 12.5 SECONDS (ref 11.9–14.6)
RH BLD: POSITIVE
T&S EXPIRATION DATE: NORMAL

## 2020-01-14 PROCEDURE — 94799 UNLISTED PULMONARY SVC/PX: CPT

## 2020-01-14 PROCEDURE — 25010000002 PROPOFOL 10 MG/ML EMULSION: Performed by: NURSE ANESTHETIST, CERTIFIED REGISTERED

## 2020-01-14 PROCEDURE — 86901 BLOOD TYPING SEROLOGIC RH(D): CPT | Performed by: ORTHOPAEDIC SURGERY

## 2020-01-14 PROCEDURE — C9290 INJ, BUPIVACAINE LIPOSOME: HCPCS | Performed by: ANESTHESIOLOGY

## 2020-01-14 PROCEDURE — 25010000002 ONDANSETRON PER 1 MG: Performed by: NURSE ANESTHETIST, CERTIFIED REGISTERED

## 2020-01-14 PROCEDURE — 25010000003 BUPIVACAINE LIPOSOME 1.3 % SUSPENSION: Performed by: ANESTHESIOLOGY

## 2020-01-14 PROCEDURE — 86900 BLOOD TYPING SEROLOGIC ABO: CPT | Performed by: ORTHOPAEDIC SURGERY

## 2020-01-14 PROCEDURE — 25010000002 CEFAZOLIN PER 500 MG: Performed by: ORTHOPAEDIC SURGERY

## 2020-01-14 PROCEDURE — 25010000002 NEOSTIGMINE 10 MG/10ML SOLUTION 10 ML VIAL: Performed by: NURSE ANESTHETIST, CERTIFIED REGISTERED

## 2020-01-14 PROCEDURE — 73030 X-RAY EXAM OF SHOULDER: CPT

## 2020-01-14 PROCEDURE — 25010000002 MIDAZOLAM PER 1 MG: Performed by: ANESTHESIOLOGY

## 2020-01-14 PROCEDURE — 85610 PROTHROMBIN TIME: CPT | Performed by: ORTHOPAEDIC SURGERY

## 2020-01-14 PROCEDURE — C1776 JOINT DEVICE (IMPLANTABLE): HCPCS | Performed by: ORTHOPAEDIC SURGERY

## 2020-01-14 PROCEDURE — 86850 RBC ANTIBODY SCREEN: CPT | Performed by: ORTHOPAEDIC SURGERY

## 2020-01-14 PROCEDURE — 94760 N-INVAS EAR/PLS OXIMETRY 1: CPT

## 2020-01-14 DEVICE — LINER HUM UNIVERS REVERS MD 39  PLS6MM: Type: IMPLANTABLE DEVICE | Status: FUNCTIONAL

## 2020-01-14 DEVICE — CUP SUT UNIVERS REVERS 39 NTRL: Type: IMPLANTABLE DEVICE | Status: FUNCTIONAL

## 2020-01-14 DEVICE — SCRW GLEN UNIVERS REVERS PERIPH 4.5X36MM: Type: IMPLANTABLE DEVICE | Status: FUNCTIONAL

## 2020-01-14 DEVICE — IMPLANTABLE DEVICE: Type: IMPLANTABLE DEVICE | Status: FUNCTIONAL

## 2020-01-14 DEVICE — SCRW GLEN UNIVERS REVERS CENTRL 6.5X20MM: Type: IMPLANTABLE DEVICE | Status: FUNCTIONAL

## 2020-01-14 DEVICE — SCRW GLEN UNIVERS REVERS PERIPH 4.5X30MM: Type: IMPLANTABLE DEVICE | Status: FUNCTIONAL

## 2020-01-14 DEVICE — GLENOSPHERE UNIVERS REVERS M/39 PLS4 LAT: Type: IMPLANTABLE DEVICE | Status: FUNCTIONAL

## 2020-01-14 DEVICE — STEM HUM/SHLDR UNIVERS REVERS SZ11: Type: IMPLANTABLE DEVICE | Status: FUNCTIONAL

## 2020-01-14 DEVICE — BASEPLT GLEN UNIVERS REVERS MD: Type: IMPLANTABLE DEVICE | Status: FUNCTIONAL

## 2020-01-14 RX ORDER — BUPIVACAINE HYDROCHLORIDE 5 MG/ML
INJECTION, SOLUTION EPIDURAL; INTRACAUDAL
Status: COMPLETED | OUTPATIENT
Start: 2020-01-14 | End: 2020-01-14

## 2020-01-14 RX ORDER — METOCLOPRAMIDE HYDROCHLORIDE 5 MG/ML
5 INJECTION INTRAMUSCULAR; INTRAVENOUS
Status: DISCONTINUED | OUTPATIENT
Start: 2020-01-14 | End: 2020-01-14 | Stop reason: HOSPADM

## 2020-01-14 RX ORDER — TAMSULOSIN HYDROCHLORIDE 0.4 MG/1
0.4 CAPSULE ORAL DAILY
Status: DISCONTINUED | OUTPATIENT
Start: 2020-01-14 | End: 2020-01-15 | Stop reason: HOSPADM

## 2020-01-14 RX ORDER — SODIUM CHLORIDE 0.9 % (FLUSH) 0.9 %
1-10 SYRINGE (ML) INJECTION AS NEEDED
Status: DISCONTINUED | OUTPATIENT
Start: 2020-01-14 | End: 2020-01-15 | Stop reason: HOSPADM

## 2020-01-14 RX ORDER — IPRATROPIUM BROMIDE AND ALBUTEROL SULFATE 2.5; .5 MG/3ML; MG/3ML
3 SOLUTION RESPIRATORY (INHALATION) ONCE AS NEEDED
Status: DISCONTINUED | OUTPATIENT
Start: 2020-01-14 | End: 2020-01-14 | Stop reason: HOSPADM

## 2020-01-14 RX ORDER — DIPHENHYDRAMINE HCL 25 MG
25 CAPSULE ORAL EVERY 6 HOURS PRN
Status: DISCONTINUED | OUTPATIENT
Start: 2020-01-14 | End: 2020-01-15 | Stop reason: HOSPADM

## 2020-01-14 RX ORDER — PROPOFOL 10 MG/ML
VIAL (ML) INTRAVENOUS AS NEEDED
Status: DISCONTINUED | OUTPATIENT
Start: 2020-01-14 | End: 2020-01-14 | Stop reason: SURG

## 2020-01-14 RX ORDER — GABAPENTIN 300 MG/1
300 CAPSULE ORAL EVERY 8 HOURS SCHEDULED
Status: DISCONTINUED | OUTPATIENT
Start: 2020-01-14 | End: 2020-01-15 | Stop reason: HOSPADM

## 2020-01-14 RX ORDER — MAGNESIUM HYDROXIDE 1200 MG/15ML
LIQUID ORAL AS NEEDED
Status: DISCONTINUED | OUTPATIENT
Start: 2020-01-14 | End: 2020-01-14 | Stop reason: HOSPADM

## 2020-01-14 RX ORDER — AMLODIPINE BESYLATE 5 MG/1
5 TABLET ORAL DAILY
Status: DISCONTINUED | OUTPATIENT
Start: 2020-01-14 | End: 2020-01-15 | Stop reason: HOSPADM

## 2020-01-14 RX ORDER — ONDANSETRON 4 MG/1
4 TABLET, FILM COATED ORAL EVERY 6 HOURS PRN
Status: DISCONTINUED | OUTPATIENT
Start: 2020-01-14 | End: 2020-01-15 | Stop reason: HOSPADM

## 2020-01-14 RX ORDER — FAMOTIDINE 20 MG/1
40 TABLET, FILM COATED ORAL DAILY
Status: DISCONTINUED | OUTPATIENT
Start: 2020-01-14 | End: 2020-01-15 | Stop reason: HOSPADM

## 2020-01-14 RX ORDER — SODIUM CHLORIDE 0.9 % (FLUSH) 0.9 %
3 SYRINGE (ML) INJECTION AS NEEDED
Status: DISCONTINUED | OUTPATIENT
Start: 2020-01-14 | End: 2020-01-14 | Stop reason: HOSPADM

## 2020-01-14 RX ORDER — BUPIVACAINE HCL/0.9 % NACL/PF 0.1 %
2 PLASTIC BAG, INJECTION (ML) EPIDURAL ONCE
Status: COMPLETED | OUTPATIENT
Start: 2020-01-14 | End: 2020-01-14

## 2020-01-14 RX ORDER — FENTANYL CITRATE 50 UG/ML
25 INJECTION, SOLUTION INTRAMUSCULAR; INTRAVENOUS AS NEEDED
Status: DISCONTINUED | OUTPATIENT
Start: 2020-01-14 | End: 2020-01-14 | Stop reason: HOSPADM

## 2020-01-14 RX ORDER — PROMETHAZINE HYDROCHLORIDE 25 MG/ML
12.5 INJECTION, SOLUTION INTRAMUSCULAR; INTRAVENOUS EVERY 6 HOURS PRN
Status: DISCONTINUED | OUTPATIENT
Start: 2020-01-14 | End: 2020-01-15 | Stop reason: HOSPADM

## 2020-01-14 RX ORDER — ONDANSETRON 2 MG/ML
4 INJECTION INTRAMUSCULAR; INTRAVENOUS EVERY 6 HOURS PRN
Status: DISCONTINUED | OUTPATIENT
Start: 2020-01-14 | End: 2020-01-15 | Stop reason: HOSPADM

## 2020-01-14 RX ORDER — MELOXICAM 7.5 MG/1
15 TABLET ORAL DAILY
Status: DISCONTINUED | OUTPATIENT
Start: 2020-01-14 | End: 2020-01-15 | Stop reason: HOSPADM

## 2020-01-14 RX ORDER — ONDANSETRON 2 MG/ML
INJECTION INTRAMUSCULAR; INTRAVENOUS AS NEEDED
Status: DISCONTINUED | OUTPATIENT
Start: 2020-01-14 | End: 2020-01-14 | Stop reason: SURG

## 2020-01-14 RX ORDER — SODIUM CHLORIDE, SODIUM LACTATE, POTASSIUM CHLORIDE, CALCIUM CHLORIDE 600; 310; 30; 20 MG/100ML; MG/100ML; MG/100ML; MG/100ML
100 INJECTION, SOLUTION INTRAVENOUS CONTINUOUS PRN
Status: DISCONTINUED | OUTPATIENT
Start: 2020-01-14 | End: 2020-01-15 | Stop reason: HOSPADM

## 2020-01-14 RX ORDER — ATORVASTATIN CALCIUM 10 MG/1
20 TABLET, FILM COATED ORAL NIGHTLY
Status: DISCONTINUED | OUTPATIENT
Start: 2020-01-14 | End: 2020-01-15 | Stop reason: HOSPADM

## 2020-01-14 RX ORDER — SODIUM CHLORIDE 0.9 % (FLUSH) 0.9 %
3 SYRINGE (ML) INJECTION EVERY 12 HOURS SCHEDULED
Status: DISCONTINUED | OUTPATIENT
Start: 2020-01-14 | End: 2020-01-15 | Stop reason: HOSPADM

## 2020-01-14 RX ORDER — SODIUM CHLORIDE 0.9 % (FLUSH) 0.9 %
10 SYRINGE (ML) INJECTION AS NEEDED
Status: DISCONTINUED | OUTPATIENT
Start: 2020-01-14 | End: 2020-01-14 | Stop reason: HOSPADM

## 2020-01-14 RX ORDER — MIDAZOLAM HYDROCHLORIDE 1 MG/ML
1 INJECTION INTRAMUSCULAR; INTRAVENOUS
Status: DISCONTINUED | OUTPATIENT
Start: 2020-01-14 | End: 2020-01-14 | Stop reason: HOSPADM

## 2020-01-14 RX ORDER — FLUTICASONE PROPIONATE 50 MCG
2 SPRAY, SUSPENSION (ML) NASAL DAILY
Status: DISCONTINUED | OUTPATIENT
Start: 2020-01-14 | End: 2020-01-15 | Stop reason: HOSPADM

## 2020-01-14 RX ORDER — ONDANSETRON 2 MG/ML
4 INJECTION INTRAMUSCULAR; INTRAVENOUS ONCE AS NEEDED
Status: DISCONTINUED | OUTPATIENT
Start: 2020-01-14 | End: 2020-01-14 | Stop reason: HOSPADM

## 2020-01-14 RX ORDER — SODIUM CHLORIDE, SODIUM LACTATE, POTASSIUM CHLORIDE, CALCIUM CHLORIDE 600; 310; 30; 20 MG/100ML; MG/100ML; MG/100ML; MG/100ML
100 INJECTION, SOLUTION INTRAVENOUS CONTINUOUS
Status: DISCONTINUED | OUTPATIENT
Start: 2020-01-14 | End: 2020-01-15 | Stop reason: HOSPADM

## 2020-01-14 RX ORDER — LABETALOL HYDROCHLORIDE 5 MG/ML
5 INJECTION, SOLUTION INTRAVENOUS
Status: DISCONTINUED | OUTPATIENT
Start: 2020-01-14 | End: 2020-01-14 | Stop reason: HOSPADM

## 2020-01-14 RX ORDER — MIDAZOLAM HYDROCHLORIDE 1 MG/ML
2 INJECTION INTRAMUSCULAR; INTRAVENOUS
Status: DISCONTINUED | OUTPATIENT
Start: 2020-01-14 | End: 2020-01-14 | Stop reason: HOSPADM

## 2020-01-14 RX ORDER — NEOSTIGMINE METHYLSULFATE 1 MG/ML
INJECTION, SOLUTION INTRAVENOUS AS NEEDED
Status: DISCONTINUED | OUTPATIENT
Start: 2020-01-14 | End: 2020-01-14 | Stop reason: SURG

## 2020-01-14 RX ORDER — ACETAMINOPHEN 650 MG/1
650 SUPPOSITORY RECTAL EVERY 4 HOURS PRN
Status: DISCONTINUED | OUTPATIENT
Start: 2020-01-14 | End: 2020-01-15 | Stop reason: HOSPADM

## 2020-01-14 RX ORDER — GLYCOPYRROLATE 0.2 MG/ML
INJECTION INTRAMUSCULAR; INTRAVENOUS AS NEEDED
Status: DISCONTINUED | OUTPATIENT
Start: 2020-01-14 | End: 2020-01-14 | Stop reason: SURG

## 2020-01-14 RX ORDER — BUDESONIDE AND FORMOTEROL FUMARATE DIHYDRATE 160; 4.5 UG/1; UG/1
1 AEROSOL RESPIRATORY (INHALATION) DAILY
Status: DISCONTINUED | OUTPATIENT
Start: 2020-01-14 | End: 2020-01-15 | Stop reason: HOSPADM

## 2020-01-14 RX ORDER — SODIUM CHLORIDE 0.9 % (FLUSH) 0.9 %
3-10 SYRINGE (ML) INJECTION AS NEEDED
Status: DISCONTINUED | OUTPATIENT
Start: 2020-01-14 | End: 2020-01-14 | Stop reason: HOSPADM

## 2020-01-14 RX ORDER — LIDOCAINE HYDROCHLORIDE 10 MG/ML
0.5 INJECTION, SOLUTION EPIDURAL; INFILTRATION; INTRACAUDAL; PERINEURAL ONCE AS NEEDED
Status: DISCONTINUED | OUTPATIENT
Start: 2020-01-14 | End: 2020-01-14 | Stop reason: HOSPADM

## 2020-01-14 RX ORDER — OXYCODONE AND ACETAMINOPHEN 10; 325 MG/1; MG/1
1 TABLET ORAL EVERY 4 HOURS PRN
Status: DISCONTINUED | OUTPATIENT
Start: 2020-01-14 | End: 2020-01-15 | Stop reason: HOSPADM

## 2020-01-14 RX ORDER — NALOXONE HCL 0.4 MG/ML
0.4 VIAL (ML) INJECTION
Status: DISCONTINUED | OUTPATIENT
Start: 2020-01-14 | End: 2020-01-15 | Stop reason: HOSPADM

## 2020-01-14 RX ORDER — OXYCODONE AND ACETAMINOPHEN 7.5; 325 MG/1; MG/1
2 TABLET ORAL EVERY 4 HOURS PRN
Status: DISCONTINUED | OUTPATIENT
Start: 2020-01-14 | End: 2020-01-15 | Stop reason: HOSPADM

## 2020-01-14 RX ORDER — DOCUSATE SODIUM 100 MG/1
100 CAPSULE, LIQUID FILLED ORAL 2 TIMES DAILY PRN
Status: DISCONTINUED | OUTPATIENT
Start: 2020-01-14 | End: 2020-01-15 | Stop reason: HOSPADM

## 2020-01-14 RX ORDER — AMOXICILLIN 250 MG
2 CAPSULE ORAL NIGHTLY
Status: DISCONTINUED | OUTPATIENT
Start: 2020-01-14 | End: 2020-01-15 | Stop reason: HOSPADM

## 2020-01-14 RX ORDER — BUPIVACAINE HCL/0.9 % NACL/PF 0.1 %
2 PLASTIC BAG, INJECTION (ML) EPIDURAL EVERY 8 HOURS
Status: COMPLETED | OUTPATIENT
Start: 2020-01-14 | End: 2020-01-15

## 2020-01-14 RX ORDER — LIDOCAINE HYDROCHLORIDE 40 MG/ML
SOLUTION TOPICAL AS NEEDED
Status: DISCONTINUED | OUTPATIENT
Start: 2020-01-14 | End: 2020-01-14 | Stop reason: SURG

## 2020-01-14 RX ORDER — ALBUTEROL SULFATE 2.5 MG/3ML
2.5 SOLUTION RESPIRATORY (INHALATION) EVERY 4 HOURS PRN
Status: DISCONTINUED | OUTPATIENT
Start: 2020-01-14 | End: 2020-01-15 | Stop reason: HOSPADM

## 2020-01-14 RX ORDER — ROCURONIUM BROMIDE 10 MG/ML
INJECTION, SOLUTION INTRAVENOUS AS NEEDED
Status: DISCONTINUED | OUTPATIENT
Start: 2020-01-14 | End: 2020-01-14 | Stop reason: SURG

## 2020-01-14 RX ORDER — ACETAMINOPHEN 500 MG
1000 TABLET ORAL ONCE
Status: COMPLETED | OUTPATIENT
Start: 2020-01-14 | End: 2020-01-14

## 2020-01-14 RX ORDER — ACETAMINOPHEN 325 MG/1
650 TABLET ORAL EVERY 4 HOURS PRN
Status: DISCONTINUED | OUTPATIENT
Start: 2020-01-14 | End: 2020-01-15 | Stop reason: HOSPADM

## 2020-01-14 RX ORDER — SODIUM CHLORIDE, SODIUM LACTATE, POTASSIUM CHLORIDE, CALCIUM CHLORIDE 600; 310; 30; 20 MG/100ML; MG/100ML; MG/100ML; MG/100ML
1000 INJECTION, SOLUTION INTRAVENOUS CONTINUOUS
Status: DISCONTINUED | OUTPATIENT
Start: 2020-01-14 | End: 2020-01-14 | Stop reason: HOSPADM

## 2020-01-14 RX ORDER — FLUTICASONE PROPIONATE 50 MCG
2 SPRAY, SUSPENSION (ML) NASAL DAILY
COMMUNITY

## 2020-01-14 RX ORDER — DIPHENHYDRAMINE HYDROCHLORIDE 50 MG/ML
25 INJECTION INTRAMUSCULAR; INTRAVENOUS EVERY 6 HOURS PRN
Status: DISCONTINUED | OUTPATIENT
Start: 2020-01-14 | End: 2020-01-15 | Stop reason: HOSPADM

## 2020-01-14 RX ORDER — LISINOPRIL 20 MG/1
20 TABLET ORAL DAILY
Status: DISCONTINUED | OUTPATIENT
Start: 2020-01-14 | End: 2020-01-15 | Stop reason: HOSPADM

## 2020-01-14 RX ORDER — NALOXONE HCL 0.4 MG/ML
0.4 VIAL (ML) INJECTION AS NEEDED
Status: DISCONTINUED | OUTPATIENT
Start: 2020-01-14 | End: 2020-01-14 | Stop reason: HOSPADM

## 2020-01-14 RX ORDER — BISOPROLOL FUMARATE 5 MG/1
5 TABLET, FILM COATED ORAL DAILY
Status: DISCONTINUED | OUTPATIENT
Start: 2020-01-15 | End: 2020-01-15 | Stop reason: HOSPADM

## 2020-01-14 RX ORDER — SODIUM CHLORIDE 0.9 % (FLUSH) 0.9 %
10 SYRINGE (ML) INJECTION EVERY 12 HOURS SCHEDULED
Status: DISCONTINUED | OUTPATIENT
Start: 2020-01-14 | End: 2020-01-14 | Stop reason: HOSPADM

## 2020-01-14 RX ORDER — SODIUM CHLORIDE 0.9 % (FLUSH) 0.9 %
3 SYRINGE (ML) INJECTION EVERY 12 HOURS SCHEDULED
Status: DISCONTINUED | OUTPATIENT
Start: 2020-01-14 | End: 2020-01-14 | Stop reason: HOSPADM

## 2020-01-14 RX ORDER — CYCLOBENZAPRINE HCL 10 MG
10 TABLET ORAL 3 TIMES DAILY PRN
Status: DISCONTINUED | OUTPATIENT
Start: 2020-01-14 | End: 2020-01-15 | Stop reason: HOSPADM

## 2020-01-14 RX ADMIN — BUPIVACAINE HYDROCHLORIDE 10 ML: 5 INJECTION, SOLUTION EPIDURAL; INTRACAUDAL at 14:49

## 2020-01-14 RX ADMIN — PROPOFOL 200 MG: 10 INJECTION, EMULSION INTRAVENOUS at 15:00

## 2020-01-14 RX ADMIN — GABAPENTIN 300 MG: 300 CAPSULE ORAL at 21:58

## 2020-01-14 RX ADMIN — SENNOSIDES AND DOCUSATE SODIUM 2 TABLET: 8.6; 5 TABLET ORAL at 23:49

## 2020-01-14 RX ADMIN — ACETAMINOPHEN 1000 MG: 500 TABLET, FILM COATED ORAL at 14:48

## 2020-01-14 RX ADMIN — NEOSTIGMINE METHYLSULFATE 3.5 MG: 1 INJECTION INTRAVENOUS at 16:07

## 2020-01-14 RX ADMIN — OXYCODONE HYDROCHLORIDE AND ACETAMINOPHEN 2 TABLET: 7.5; 325 TABLET ORAL at 21:59

## 2020-01-14 RX ADMIN — ROCURONIUM BROMIDE 50 MG: 10 INJECTION INTRAVENOUS at 15:00

## 2020-01-14 RX ADMIN — SODIUM CHLORIDE 2 G: 9 INJECTION, SOLUTION INTRAVENOUS at 23:50

## 2020-01-14 RX ADMIN — Medication 2 G: at 14:57

## 2020-01-14 RX ADMIN — FAMOTIDINE 40 MG: 20 TABLET, FILM COATED ORAL at 18:21

## 2020-01-14 RX ADMIN — ATORVASTATIN CALCIUM 20 MG: 10 TABLET, FILM COATED ORAL at 21:58

## 2020-01-14 RX ADMIN — SODIUM CHLORIDE, POTASSIUM CHLORIDE, SODIUM LACTATE AND CALCIUM CHLORIDE: 600; 310; 30; 20 INJECTION, SOLUTION INTRAVENOUS at 16:17

## 2020-01-14 RX ADMIN — ONDANSETRON HYDROCHLORIDE 4 MG: 2 SOLUTION INTRAMUSCULAR; INTRAVENOUS at 15:58

## 2020-01-14 RX ADMIN — FLUTICASONE PROPIONATE 2 SPRAY: 50 SPRAY, METERED NASAL at 18:21

## 2020-01-14 RX ADMIN — MIDAZOLAM 2 MG: 1 INJECTION INTRAMUSCULAR; INTRAVENOUS at 14:46

## 2020-01-14 RX ADMIN — SODIUM CHLORIDE, POTASSIUM CHLORIDE, SODIUM LACTATE AND CALCIUM CHLORIDE 1000 ML: 600; 310; 30; 20 INJECTION, SOLUTION INTRAVENOUS at 14:08

## 2020-01-14 RX ADMIN — GLYCOPYRROLATE 0.4 MG: 0.2 INJECTION, SOLUTION INTRAMUSCULAR; INTRAVENOUS at 16:07

## 2020-01-14 RX ADMIN — SODIUM CHLORIDE, POTASSIUM CHLORIDE, SODIUM LACTATE AND CALCIUM CHLORIDE 100 ML/HR: 600; 310; 30; 20 INJECTION, SOLUTION INTRAVENOUS at 18:21

## 2020-01-14 RX ADMIN — TAMSULOSIN HYDROCHLORIDE 0.4 MG: 0.4 CAPSULE ORAL at 18:21

## 2020-01-14 RX ADMIN — CYCLOBENZAPRINE 10 MG: 10 TABLET, FILM COATED ORAL at 23:49

## 2020-01-14 RX ADMIN — AMLODIPINE BESYLATE 5 MG: 5 TABLET ORAL at 18:21

## 2020-01-14 RX ADMIN — BUPIVACAINE 10 ML: 13.3 INJECTION, SUSPENSION, LIPOSOMAL INFILTRATION at 14:49

## 2020-01-14 RX ADMIN — LISINOPRIL 20 MG: 20 TABLET ORAL at 18:21

## 2020-01-14 RX ADMIN — LIDOCAINE HYDROCHLORIDE 1 EACH: 40 SOLUTION TOPICAL at 15:00

## 2020-01-14 RX ADMIN — OXYCODONE HYDROCHLORIDE AND ACETAMINOPHEN 1 TABLET: 10; 325 TABLET ORAL at 18:20

## 2020-01-14 NOTE — ANESTHESIA POSTPROCEDURE EVALUATION
"Patient: Justin Robledo    Procedure Summary     Date:  01/14/20 Room / Location:   [facility] /  [facility]    Anesthesia Start:  1457 Anesthesia Stop:  1624    Procedure:  RIGHT REVERSE TOTAL SHOULDER  ARTHROPLASTY (Right Shoulder) Diagnosis:       Rotator cuff arthropathy, right      (M75.101)    Surgeon:  Suman Ventura MD Provider:  Jacinto Macias CRNA    Anesthesia Type:  general with block ASA Status:  3          Anesthesia Type: general with block    Vitals  Vitals Value Taken Time   /63 1/14/2020  4:56 PM   Temp 97 °F (36.1 °C) 1/14/2020  4:45 PM   Pulse 56 1/14/2020  5:02 PM   Resp 12 1/14/2020  4:55 PM   SpO2 98 % 1/14/2020  5:02 PM   Vitals shown include unvalidated device data.        Post Anesthesia Care and Evaluation    Patient location during evaluation: PACU  Patient participation: complete - patient participated  Level of consciousness: awake and alert  Pain management: adequate  Airway patency: patent  Anesthetic complications: No anesthetic complications  PONV Status: none  Cardiovascular status: acceptable and hemodynamically stable  Respiratory status: acceptable  Hydration status: acceptable    Comments: Blood pressure 138/62, pulse 58, temperature 97.4 °F (36.3 °C), temperature source Oral, resp. rate 14, height 168 cm (66.14\"), weight 86.6 kg (190 lb 14.7 oz), SpO2 95 %.    Patient discharged from PACU based upon Ramya score. Please see RN notes for further details      "

## 2020-01-14 NOTE — H&P
Pt Name: Justin Robledo  MRN: [id number]  YOB: 1945  Date of evaluation: 1/14/2020    H&P including current review of systems was updated in the paper chart and/or the document previously scanned into the record.  There have been no significant changes or new problems since the original evaluation.  The patient's problems continue and indications for contemplated procedure have not changed.    Electronically signed by Suman Ventura MD on 1/14/2020 at 2:35 PM

## 2020-01-14 NOTE — ANESTHESIA PREPROCEDURE EVALUATION
Anesthesia Evaluation     Patient summary reviewed   no history of anesthetic complications:  NPO Solid Status: > 8 hours  NPO Liquid Status: > 8 hours           Airway   Mallampati: II  TM distance: >3 FB  Neck ROM: full  No difficulty expected  Dental - normal exam     Pulmonary    (+) a smoker Former, COPD, sleep apnea on CPAP,   Cardiovascular   Exercise tolerance: good (4-7 METS)    ECG reviewed  Patient on routine beta blocker and Beta blocker given within 24 hours of surgery    (+) hypertension, hyperlipidemia,   (-) past MI, CAD, angina, CHF, cardiac stents      Neuro/Psych  (-) seizures, TIA, CVA  GI/Hepatic/Renal/Endo    (+)  GERD,    (-) liver disease, no renal disease, diabetes    Musculoskeletal     Abdominal    Substance History      OB/GYN          Other                        Anesthesia Plan    ASA 3     general with block   (With exparel)  intravenous induction     Anesthetic plan, all risks, benefits, and alternatives have been provided, discussed and informed consent has been obtained with: patient.

## 2020-01-14 NOTE — ANESTHESIA PROCEDURE NOTES
Peripheral Block    Pre-sedation assessment completed: 1/14/2020 2:46 PM    Patient reassessed immediately prior to procedure    Patient location during procedure: holding area  Start time: 1/14/2020 2:47 PM  Stop time: 1/14/2020 2:49 PM  Reason for block: procedure for pain, at surgeon's request, post-op pain management and Request by Dr. Ventura  Performed by  Anesthesiologist: Galilea Ragland MD  Preanesthetic Checklist  Completed: patient identified, site marked, surgical consent, pre-op evaluation, timeout performed, IV checked, risks and benefits discussed and monitors and equipment checked  Prep:  Pt Position: supine  Sterile barriers:gloves  Prep: ChloraPrep  Patient monitoring: blood pressure monitoring, continuous pulse oximetry and EKG  Procedure  Sedation:yes    Guidance:ultrasound guided and Brachial plexus identified and local anesthetic seen surrounding nerves  ULTRASOUND INTERPRETATION. Using ultrasound guidance a 22 G gauge needle was placed in close proximity to the nerve, at which point, under ultrasound guidance anesthetic was injected in the area of the nerve and spread of the anesthesia was seen on ultrasound in close proximity thereto.  There were no abnormalities seen on ultrasound; a digital image was taken; and the patient tolerated the procedure with no complications. Images:still images obtained, printed/placed on chart (picture printed and placed in patients chart)    Laterality:right  Block Type:interscalene  Injection Technique:single-shot  Needle Type:echogenic  Needle Gauge:22 G  Resistance on Injection: none    Medications Used: bupivacaine PF (MARCAINE) injection 0.5%, 10 mL  bupivacaine liposome (EXPAREL) 1.3 % injection, 10 mL  Med admintered at 1/14/2020 2:49 PM      Post Assessment  Injection Assessment: negative aspiration for heme, no paresthesia on injection and incremental injection  Patient Tolerance:comfortable throughout block  Complications:no

## 2020-01-14 NOTE — ANESTHESIA PROCEDURE NOTES
Airway  Urgency: elective    Date/Time: 1/14/2020 3:01 PM  Airway not difficult    General Information and Staff    Patient location during procedure: OR  CRNA: KULDEEP Cunningham CRNA    Indications and Patient Condition  Indications for airway management: airway protection    Preoxygenated: yes  MILS maintained throughout  Mask difficulty assessment: 1 - vent by mask    Final Airway Details  Final airway type: endotracheal airway      Successful airway: ETT  Cuffed: yes   Successful intubation technique: direct laryngoscopy  Facilitating devices/methods: intubating stylet  Endotracheal tube insertion site: oral  Blade: Taylor  Blade size: 3  ETT size (mm): 7.5  Placement verified by: chest auscultation and capnometry   Cuff volume (mL): 7  Measured from: lips  ETT/EBT  to lips (cm): 22  Number of attempts at approach: 1  Assessment: lips, teeth, and gum same as pre-op and atraumatic intubation

## 2020-01-14 NOTE — BRIEF OP NOTE
TOTAL SHOULDER REVERSE ARTHROPLASTY  Progress Note    Justin Robledo  1/14/2020    Pre-op Diagnosis:   M75.101       Post-Op Diagnosis Codes:     * Rotator cuff arthropathy, right [M12.811]    Procedure/CPT® Codes:  TX RECONSTR TOTAL SHOULDER IMPLANT [76259]    Procedure(s):  RIGHT REVERSE TOTAL SHOULDER  ARTHROPLASTY    Surgeon(s):  Suman Ventura MD    Anesthesia: General with Block    Staff:   Circulator: Brigid Gates RN  Scrub Person: Arpan Blair; Jose R Acevedo  Assistant: Elena Kimball RN; Diana Bullock    Estimated Blood Loss: minimal    Urine Voided: * No values recorded between 1/14/2020  2:55 PM and 1/14/2020  4:20 PM *    Specimens:                None          Drains: * No LDAs found *    Findings: see op note     Complications: none      Suman Ventura MD     Date: 1/14/2020  Time: 4:26 PM

## 2020-01-15 VITALS
WEIGHT: 190.92 LBS | BODY MASS INDEX: 30.68 KG/M2 | OXYGEN SATURATION: 98 % | RESPIRATION RATE: 16 BRPM | HEIGHT: 66 IN | HEART RATE: 77 BPM | SYSTOLIC BLOOD PRESSURE: 108 MMHG | DIASTOLIC BLOOD PRESSURE: 51 MMHG | TEMPERATURE: 98.1 F

## 2020-01-15 LAB
ANION GAP SERPL CALCULATED.3IONS-SCNC: 10 MMOL/L (ref 5–15)
BUN BLD-MCNC: 16 MG/DL (ref 8–23)
BUN/CREAT SERPL: 18.8 (ref 7–25)
CALCIUM SPEC-SCNC: 8.1 MG/DL (ref 8.6–10.5)
CHLORIDE SERPL-SCNC: 102 MMOL/L (ref 98–107)
CO2 SERPL-SCNC: 25 MMOL/L (ref 22–29)
CREAT BLD-MCNC: 0.85 MG/DL (ref 0.76–1.27)
GFR SERPL CREATININE-BSD FRML MDRD: 88 ML/MIN/1.73
GLUCOSE BLD-MCNC: 145 MG/DL (ref 65–99)
HCT VFR BLD AUTO: 31.3 % (ref 37.5–51)
HGB BLD-MCNC: 10.4 G/DL (ref 13–17.7)
POTASSIUM BLD-SCNC: 4.1 MMOL/L (ref 3.5–5.2)
SODIUM BLD-SCNC: 137 MMOL/L (ref 136–145)

## 2020-01-15 PROCEDURE — 94799 UNLISTED PULMONARY SVC/PX: CPT

## 2020-01-15 PROCEDURE — 80048 BASIC METABOLIC PNL TOTAL CA: CPT | Performed by: ORTHOPAEDIC SURGERY

## 2020-01-15 PROCEDURE — P9612 CATHETERIZE FOR URINE SPEC: HCPCS

## 2020-01-15 PROCEDURE — 25010000002 CEFAZOLIN PER 500 MG: Performed by: ORTHOPAEDIC SURGERY

## 2020-01-15 PROCEDURE — 85014 HEMATOCRIT: CPT | Performed by: ORTHOPAEDIC SURGERY

## 2020-01-15 PROCEDURE — 51702 INSERT TEMP BLADDER CATH: CPT

## 2020-01-15 PROCEDURE — 97166 OT EVAL MOD COMPLEX 45 MIN: CPT | Performed by: OCCUPATIONAL THERAPIST

## 2020-01-15 PROCEDURE — 94760 N-INVAS EAR/PLS OXIMETRY 1: CPT

## 2020-01-15 PROCEDURE — 85018 HEMOGLOBIN: CPT | Performed by: ORTHOPAEDIC SURGERY

## 2020-01-15 RX ORDER — OXYCODONE AND ACETAMINOPHEN 7.5; 325 MG/1; MG/1
TABLET ORAL
Qty: 25 TABLET | Refills: 0 | Status: SHIPPED | OUTPATIENT
Start: 2020-01-15 | End: 2020-01-28 | Stop reason: ALTCHOICE

## 2020-01-15 RX ORDER — PSEUDOEPHEDRINE HCL 30 MG
250 TABLET ORAL 2 TIMES DAILY
Qty: 60 EACH | Refills: 0 | Status: SHIPPED | OUTPATIENT
Start: 2020-01-15 | End: 2020-09-15

## 2020-01-15 RX ADMIN — OXYCODONE HYDROCHLORIDE AND ACETAMINOPHEN 2 TABLET: 7.5; 325 TABLET ORAL at 03:03

## 2020-01-15 RX ADMIN — OXYCODONE HYDROCHLORIDE AND ACETAMINOPHEN 2 TABLET: 7.5; 325 TABLET ORAL at 07:45

## 2020-01-15 RX ADMIN — SODIUM CHLORIDE, POTASSIUM CHLORIDE, SODIUM LACTATE AND CALCIUM CHLORIDE 100 ML/HR: 600; 310; 30; 20 INJECTION, SOLUTION INTRAVENOUS at 04:09

## 2020-01-15 RX ADMIN — GABAPENTIN 300 MG: 300 CAPSULE ORAL at 06:33

## 2020-01-15 RX ADMIN — SODIUM CHLORIDE 2 G: 9 INJECTION, SOLUTION INTRAVENOUS at 06:33

## 2020-01-15 NOTE — PLAN OF CARE
Problem: Patient Care Overview  Goal: Plan of Care Review  Outcome: Ongoing (interventions implemented as appropriate)  Flowsheets  Taken 1/14/2020 1848 by Eli Velasco RN  Progress: no change  Outcome Summary: Pt A&Ox4, VSS safety maintained. C/o of tingling in right hand. C/o pain relieved by po prn pain medication. Will continue to monitor  Taken 1/14/2020 1655 by Denisse De, RN  Plan of Care Reviewed With: patient

## 2020-01-15 NOTE — THERAPY DISCHARGE NOTE
Acute Care - Occupational Therapy Initial Eval/Discharge  [facility]     Patient Name: Justin Robledo  : 1945  MRN: [id number]  Today's Date: 1/15/2020  Onset of Illness/Injury or Date of Surgery: (P) 20  Date of Referral to OT: (P) 20  Referring Physician: (P) Dr. Ventura      Admit Date: 2020       ICD-10-CM ICD-9-CM   1. Right rotator cuff tear arthropathy M75.101 716.81    M12.811    2. Decreased activities of daily living (ADL) Z78.9 V49.89     Patient Active Problem List   Diagnosis   • Rib pain on right side   • Chronic bronchitis (CMS/HCC)   • Essential hypertension   • COPD exacerbation (CMS/HCC)   • Atelectasis   • Nocturnal cough   • Pre-op evaluation   • Hyperlipidemia   • Right rotator cuff tear arthropathy   • Rotator cuff arthropathy, right     Past Medical History:   Diagnosis Date   • Arthritis    • CAD (coronary artery disease)    • Chronic back pain    • Chronic knee pain     LEFT KNEE   • COPD (chronic obstructive pulmonary disease) (CMS/HCC)    • GERD (gastroesophageal reflux disease)    • Hyperlipidemia    • Hypertension    • Sleep apnea      Past Surgical History:   Procedure Laterality Date   • BACK SURGERY     • COLONOSCOPY     • KNEE MENISCAL REPAIR Left 2018   • ROTATOR CUFF REPAIR Right    • SPINE SURGERY  1981    lower back ruptured disc   • TOTAL SHOULDER ARTHROPLASTY W/ DISTAL CLAVICLE EXCISION Right 2020    Procedure: RIGHT REVERSE TOTAL SHOULDER  ARTHROPLASTY;  Surgeon: Suman Ventura MD;  Location: [facility];  Service: Orthopedics          OT ASSESSMENT FLOWSHEET (last 12 hours)      Occupational Therapy Evaluation     Row Name 01/15/20 0813 01/15/20 0800                OT Evaluation Time/Intention    Subjective Information  complains of;pain;numbness  (Pended)  fingers  -CR  --  (Pended)  fingers  -CR       Document Type  evaluation  (Pended)   -CR  --       Mode of Treatment  occupational therapy  (Pended)   -CR  --       Symptoms Noted  During/After Treatment  fatigue  (Pended)  reported due to medication  -CR  --          General Information    Patient Profile Reviewed?  yes  (Pended)   -CR  --       Onset of Illness/Injury or Date of Surgery  01/14/20  (Pended)   -CR  --       Referring Physician  Dr. Ventura  (Pended)   -CR  --       Patient Observations  alert;cooperative;agree to therapy  (Pended)   -CR  --       Patient/Family Observations  spouse returned to room during evaluation  (Pended)   -CR  --       General Observations of Patient  Pt was laying in bed with shoulder imobilizer   (Pended)   -CR  --       Prior Level of Function  independent:  (Pended)   -CR  --       Equipment Currently Used at Home  none  (Pended)   -CR  --       Pertinent History of Current Functional Problem  Pt had prior rotator cuff repair, pt reported tingling in R hand/ fingers, pt reported pain in R hand/ fingers. S/P: 1/14/2020 R reverse total shoulder  (Pended)   -CR  --       Existing Precautions/Restrictions  shoulder;non-weight bearing  (Pended)  RUE non weight bearing   -CR  --       Barriers to Rehab  none identified  (Pended)   -CR  --          Relationship/Environment    Primary Source of Support/Comfort  spouse  (Pended)   -CR  --       Lives With  spouse  (Pended)   -CR  --       Family Caregiver if Needed  spouse  (Pended)   -CR  --          Resource/Environmental Concerns    Current Living Arrangements  home/apartment/condo  (Pended)   -CR  --          Home Main Entrance    Number of Stairs, Main Entrance  three  (Pended)   -CR  --       Stair Railings, Main Entrance  none  (Pended)   -CR  --          Stairs Within Home, Primary    Number of Stairs, Within Home, Primary  --  (Pended)   -CR  --       Stair Railings, Within Home, Primary  --  (Pended)   -CR  --          Cognitive Assessment/Intervention- PT/OT    Orientation Status (Cognition)  oriented x 4  (Pended)   -CR  --          Safety Issues, Functional Mobility    Safety Issues Affecting  Function (Mobility)  --  (Pended)  Lethargy due to medication  -CR  --          Mobility Assessment/Treatment    Extremity Weight-bearing Status  right upper extremity  (Pended)   -CR  --       Right Upper Extremity (Weight-bearing Status)  non weight-bearing (NWB)  (Pended)   -CR  --          Bed Mobility Assessment/Treatment    Bed Mobility Assessment/Treatment  supine-sit  (Pended)   -CR  --       Supine-Sit [place] (Bed Mobility)  conditional independence  (Pended)   -CR  --       Assistive Device (Bed Mobility)  head of bed elevated  (Pended)   -CR  --          Functional Mobility    Functional Mobility- Ind. Level  contact guard assist  (Pended)   -CR  --       Functional Mobility- Device  --  (Pended)  hand held assist on left hand  -CR  --       Functional Mobility-Maintain WBing  able to maintain weight bearing status  (Pended)   -CR  --       Functional Mobility- Comment  Pt walked in hallway and back to sit on edge of bed  (Pended)   -CR  --          Transfer Assessment/Treatment    Transfer Assessment/Treatment  sit-stand transfer;stand-sit transfer  (Pended)   -CR  --       Maintains Weight-bearing Status (Transfers)  verbal cues to maintain  (Pended)   -CR  --          Sit-Stand Transfer    Sit-Stand [place] (Transfers)  supervision  (Pended)   -CR  --          Stand-Sit Transfer    Stand-Sit [place] (Transfers)  contact guard  (Pended)   -CR  --          ADL Assessment/Intervention    BADL Assessment/Intervention  upper body dressing;clothing fastener management;bathing  (Pended)   -CR  --          Bathing Assessment/Intervention    Comment (Bathing)  OT educated and demonstrated to pt and spouse how to forward lean in order to access axillary region for bathing and donning deoderant   (Pended)   -CR  --          Upper Body Dressing Assessment/Training    Upper Body Dressing [place] Level  don;front opening garment;moderate assist (50% patient effort);maximum assist (25% patient  effort)  (Pended)   -CR  --       Upper Body Dressing Position  unsupported sitting  (Pended)   -CR  --          Clothes Fastener Management    [place] Level (Clothes Fastener Management)  buttons;dependent (less than 25% patient effort)  (Pended)   -CR  --       Position (Clothes Fastener Management)  edge of bed sitting  (Pended)   -CR  --          BADL Safety/Performance    Impairments, BADL Safety/Performance  maintains weight bearing status;range of motion;pain;strength;grasp/prehension  (Pended)   -CR  --          General ROM    GENERAL ROM COMMENTS  LUE AROM WFL, R shoulder AROM deffered due to surgical precaution, R elbow AROM impaired 50%-70% flexion, R elbow extension WFL, R wrist/hand WFL  (Pended)   -CR  --          MMT (Manual Muscle Testing)    General MMT Comments  RUE deffered due to surgical precautions, LUE functional strength 4+/5  (Pended)   -CR  --          Motor Assessment/Interventions    Additional Documentation  Balance (Group)  (Pended)   -CR  --          Balance    Balance  static sitting balance;static standing balance;dynamic sitting balance;dynamic standing balance  (Pended)   -CR  --          Static Sitting Balance    Level of [place] (Unsupported Sitting, Static Balance)  supervision  (Pended)   -CR  --       Sitting Position (Unsupported Sitting, Static Balance)  sitting on edge of bed  (Pended)   -CR  --          Dynamic Sitting Balance    Level of [place], Reaches Outside Midline (Sitting, Dynamic Balance)  supervision  (Pended)   -CR  --       Sitting Position, Reaches Outside Midline (Sitting, Dynamic Balance)  sitting on edge of bed  (Pended)   -CR  --          Static Standing Balance    Level of [place] (Supported Standing, Static Balance)  standby assist;contact guard assist  (Pended)   -CR  --          Dynamic Standing Balance    Level of [place], Reaches Outside Midline (Standing, Dynamic Balance)  contact guard assist  (Pended)   -CR  --           Sensory Assessment/Intervention    Sensory General Assessment  light touch sensation deficits identified  (Pended)   -CR  --          Light Touch Sensation Assessment    Comment, Right Upper Extremity: Light Touch Sensation Assessment  Pt reported increased tingling and deminished sensation in his R hand compaired to his L. Pt also reports that tingling feels worse than prior to surgery.  (Pended)   -CR  --          Positioning and Restraints    Pre-Treatment Position  in bed  (Pended)   -CR  --       Post Treatment Position  bed  (Pended)   -CR  --       In Bed  sitting EOB;call light within reach;encouraged to call for assist;with family/caregiver;side rails up x2;with brace  (Pended)   -CR  --          Pain Assessment    Additional Documentation  Pain Scale: Numbers Pre/Post-Treatment (Group)  (Pended)   -CR  --          Pain Scale: Numbers Pre/Post-Treatment    Pain Scale: Numbers, Pretreatment  7/10  (Pended)   -CR  --       Pain Scale: Numbers, Post-Treatment  7/10  (Pended)   -CR  --       Pain Location - Side  Right  (Pended)   -CR  --       Pain Location  shoulder  (Pended)   -CR  --       Pain Intervention(s)  Medication (See MAR);Repositioned;Ambulation/increased activity  (Pended)   -CR  --          Orthotics & Prosthetics Management    Orthosis Location  upper extremity orthosis  (Pended)   -CR  --       Additional Documentation  Orthosis Location (Row)  (Pended)   -CR  --          Upper Extremity Orthosis Management    Type (Upper Extremity Orthosis)  Shoulder Imobilizer sling  (Pended)   -CR  --       Fabrication Comment (Upper Extremity Orthosis)  pre-patrice; OT fit sling for optimal comfort  (Pended)   -CR  --       Functional Design (Upper Extremity Orthosis)  static orthosis  (Pended)   -CR  --       Therapeutic Indications (Upper Extremity Orthosis)  pain management;post-op positioning/protection;stabilization and support  (Pended)   -CR  --       Wearing Schedule (Upper Extremity Orthosis)  wear  full time;remove for hygiene/bathing  (Pended)   -CR  --       Orthosis Training (Upper Extremity Orthosis)  patient;caregiver;activity limitations/precautions;donning/doffing orthosis;orthosis adjustment;purpose/goals of orthosis;wearing schedule;able to verbalize training  (Pended)   -CR  --       Compliance/Wearing Issues (Upper Extremity Orthosis)  patient/caregiver comprehend strategies;patient/caregiver comprehend rationale for orthosis  (Pended)   -CR  --          Wound 01/14/20 1523 Right shoulder Incision    Wound - Properties Group Date first assessed: 01/14/20  - Time first assessed: 1523  - Side: Right  - Location: shoulder  - Primary Wound Type: Incision  -       Plan of Care Review    Plan of Care Reviewed With  patient;spouse  (Pended)   -CR  --       Progress  no change  (Pended)   -CR  --       Outcome Summary  OT evaluation completed. Pt demonstrated no further need for OT services as he is discharging home with family and all training and education was provided. OT discussed with pt adaptive upper body dressing and hygiene, shoulder precautions/safety, donning and doffing shoulder sling, and exercises for elbow/wrist/hand. Pt was max assist for donning shirt, supervision to CGA for functional transfers and mobility. Pt and spouse verbalized understanding of all education provided. OT recommends that pt discharges to home with assist to complete ADL routine.   (Pended)   -CR  --          Clinical Impression (OT)    Date of Referral to OT  01/14/20  (Pended)   -CR  --       OT Diagnosis  impaired ADLs and functional mobility  (Pended)   -CR  --       Patient/Family Goals Statement (OT Eval)  to go home  (Pended)   -CR  --       Criteria for Skilled Therapeutic Interventions Met (OT Eval)  no  (Pended)  Pt discharging home  -CR  --       Therapy Frequency (OT Eval)  evaluation only  (Pended)   -CR  --       Care Plan Review (OT)  evaluation/treatment results reviewed  (Pended)   -CR  --        Care Plan Review, Other Participant (OT Eval)  spouse  (Pended)   -CR  --       Anticipated Discharge Disposition (OT)  home with assist  (Pended)   -CR  --          Living Environment    Home Accessibility  tub/shower is not walk in;stairs to enter home  (Pended)   -CR  --         User Key  (r) = Recorded By, (t) = Taken By, (c) = Cosigned By    Initials Name Effective Dates    Brigid Barber RN 06/07/17 -     CR Shay De Jesus, ROSS Student 01/13/20 -               OT Recommendation and Plan  Outcome Summary/Treatment Plan (OT)  Anticipated Discharge Disposition (OT): (P) home with assist  Therapy Frequency (OT Eval): (P) evaluation only  Plan of Care Review  Plan of Care Reviewed With: (P) patient, spouse  Plan of Care Reviewed With: (P) patient, spouse  Outcome Summary: (P) OT evaluation completed. Pt demonstrated no further need for OT services as he is discharging home with family and all training and education was provided. OT discussed with pt adaptive upper body dressing and hygiene, shoulder precautions/safety, donning and doffing shoulder sling, and exercises for elbow/wrist/hand. Pt was max assist for donning shirt, supervision to CGA for functional transfers and mobility. Pt and spouse verbalized understanding of all education provided. OT recommends that pt discharges to home with assist to complete ADL routine.         Outcome Measures     Row Name 01/15/20 1000             How much help from another is currently needed...    Putting on and taking off regular lower body clothing?  2  (Pended)   -CR      Bathing (including washing, rinsing, and drying)  3  (Pended)   -CR      Toileting (which includes using toilet bed pan or urinal)  3  (Pended)   -CR      Putting on and taking off regular upper body clothing  2  (Pended)   -CR      Taking care of personal grooming (such as brushing teeth)  4  (Pended)   -CR      Eating meals  4  (Pended)   -CR      AM-PAC 6 Clicks Score (OT)  18  (Pended)   -CR          Functional Assessment    Outcome Measure Options  AM-PAC 6 Clicks Daily Activity (OT)  (Pended)   -CR        User Key  (r) = Recorded By, (t) = Taken By, (c) = Cosigned By    Initials Name Provider Type    Shay Angulo OT Vandana OT Student          Time Calculation:   Time Calculation- OT     Row Name 01/15/20 1016             Time Calculation- OT    OT Start Time  0730  (Pended)   -CR      OT Stop Time  0825  (Pended)   -CR      OT Time Calculation (min)  55 min  (Pended)   -CR      OT Received On  01/15/20  (Pended)   -CR        User Key  (r) = Recorded By, (t) = Taken By, (c) = Cosigned By    Initials Name Provider Type    Shay Angulo OT Vandana OT Vandana        Therapy Suggested Charges     Code   Minutes Charges    None           Therapy Charges for Today     Code Description Service Date Service Provider Modifiers Qty    [id number]  OT EVAL MOD COMPLEXITY 4 1/15/2020 Shay De Jesus OT Vandana GO 1               OT Discharge Summary  Anticipated Discharge Disposition (OT): (P) home with assist  Reason for Discharge: (P) Discharge from facility  Outcomes Achieved: (P) Discharge from facility occurred on same date as evluation  Discharge Destination: (P) Home with assist    ROSS Hunt  1/15/2020

## 2020-01-15 NOTE — PLAN OF CARE
Problem: Patient Care Overview  Goal: Plan of Care Review  Outcome: Ongoing (interventions implemented as appropriate)  Flowsheets  Taken 1/15/2020 1012 by Shay De Jesus OT Student  Progress: no change (Pended)  Outcome Summary: OT evaluation completed. Pt demonstrated no further need for OT services as he is discharging home with family and all training and education was provided. OT discussed with pt adaptive upper body dressing and hygiene, shoulder precautions/safety, donning and doffing shoulder sling, and exercises for elbow/wrist/hand. Pt was max assist for donning shirt, supervision to CGA for functional transfers and mobility. Pt and spouse verbalized understanding of all education provided. OT recommends that pt discharges to home with assist to complete ADL routine. (Pended)  Taken 1/15/2020 0872 by Eli Velasco RN  Plan of Care Reviewed With: patient;spouse

## 2020-01-15 NOTE — DISCHARGE SUMMARY
NAME: Justin Robledo  : 1945  MRN: [id number]      Admission Date: 2020    Discharge Date: 1/15/20    Final Diagnoses: Rotator cuff arthropathy, right [M12.811]    Procedures: R Reverse TSA    Consultations: none    Reason for Admission: 74 y.o. male with progressive loss of function and increasing pain of the upper extremity  due to a massive irreparable tear of the rotator cuff. He had a prior open repair of his rotator cuff years ago that subsequently failed.  Due to loss of function and progressive pain, a reverse shoulder arthroplasty is planned to improve function and decrease pain. Surgical evaluation was discussed and the patient wished to proceed understanding risks, benefits, and alternatives. The surgical indications were to relieve pain, improve function, and prevent future disability in regards to the shoulder pathology dictated in the aboved diagnoses.     Hospital Course:  The patient was admitted with the above named diagnosis, surgery was performed and tolerated well.  At the time of discharge, the patient was afebrile, vitals stable, pain was controlled with oral medication, they were tolerating a by mouth diet, and voiding appropriately. Physical therapy and occupational therapy were consulted. Given these findings they were deemed suitable to be discharged.    Disposition: Home    Activity: NWBRUE    Wound Instructions: see postop instructions    Diet: regular    Resume home meds:   Prior to Admission medications    Medication Sig Start Date End Date Taking? Authorizing Provider   Acetaminophen (TYLENOL ARTHRITIS PAIN PO) Take 600 mg by mouth 2 (Two) Times a Day As Needed.   Yes Cristian Pfeiffer MD   amLODIPine (NORVASC) 5 MG tablet Take 5 mg by mouth Daily.   Yes Cristian Pfeiffer MD   aspirin 81 MG EC tablet Take 81 mg by mouth Daily.   Yes Cristian Pfeiffer MD   bisoprolol (ZEBeta) 5 MG tablet Take 5 mg by mouth Daily.   Yes Cristian Pfeiffer MD    budesonide-formoterol (SYMBICORT) 160-4.5 MCG/ACT inhaler Inhale 1 puff Daily.   Yes Cristian Pfeiffer MD   calcium carbonate (OS-TASHIA) 600 MG tablet Take 600 mg by mouth 2 (Two) Times a Day.   Yes Cristian Pfeiffer MD   Cholecalciferol (VITAMIN D3) 1000 units capsule Take 1,000 Units by mouth Daily.   Yes Cristian Pfeiffer MD   coenzyme Q10 100 MG capsule Take 100 mg by mouth Daily.   Yes Cristian Pfeiffer MD   fluticasone (FLONASE) 50 MCG/ACT nasal spray 2 sprays into the nostril(s) as directed by provider Daily.   Yes Cristian Pfeiffer MD   gabapentin (NEURONTIN) 300 MG capsule Take 300 mg by mouth 3 (Three) Times a Day.   Yes Cristian Pfeiffer MD   Garlic 1000 MG capsule Take 1,000 mg by mouth Daily.   Yes Cristian Pfeiffer MD   Glucos-Chondroit-Hyaluron-MSM (GLUCOSAMINE CHONDROITIN JOINT PO) Take 1 tablet/day by mouth Daily.   Yes Cristian Pfeiffer MD   Krill Oil 300 MG capsule Take 300 mg by mouth Daily.   Yes Cristian Pfeiffer MD   lisinopril (PRINIVIL,ZESTRIL) 20 MG tablet Take 20 mg by mouth Daily.   Yes Cristian Pfeiffer MD   meloxicam (MOBIC) 15 MG tablet Take 15 mg by mouth Daily.   Yes ProviderCristian MD   Milk Thistle 175 MG capsule Take 175 mg by mouth Daily.   Yes Cristian Pfeiffer MD   Multiple Vitamins-Minerals (OCUVITE ADULT 50+ PO) Take 1 tablet by mouth Daily.   Yes Cristian Pfeiffer MD   omeprazole (priLOSEC) 20 MG capsule Take 20 mg by mouth Daily.   Yes Cristian Pfeiffer MD   S-Adenosylmethionine (SULAIMAN-E) 200 MG tablet Take 200 mg by mouth.   Yes Cristian Pfeiffer MD   simvastatin (ZOCOR) 40 MG tablet Take 40 mg by mouth Every Night.   Yes Cristian Pfeiffer MD   SUPER B COMPLEX/C PO Take 1 tablet by mouth Every Night.   Yes Cristian Pfeiffer MD   TAMSULOSIN HCL PO Take 0.4 mg by mouth Daily.   Yes Cristian Pfeiffer MD   tiotropium (SPIRIVA) 18 MCG per inhalation capsule Place 1 capsule into inhaler and inhale Daily.    Yes Provider, MD Cristian   TURMERIC PO Take 1,000 mg by mouth Daily.   Yes Provider, MD Cristian   vitamin C (ASCORBIC ACID) 500 MG tablet Take 500 mg by mouth Daily.   Yes ProviderCristian MD   albuterol sulfate  (90 Base) MCG/ACT inhaler Inhale 2 puffs Every 4 (Four) Hours As Needed for Wheezing.    Provider, MD Cristian   cyclobenzaprine (FLEXERIL) 10 MG tablet Take 1 tablet by mouth 3 (Three) Times a Day As Needed for Muscle Spasms. 10/7/19   Katerine Mcclain APRN   docusate sodium 250 MG capsule Take 250 mg by mouth 2 (Two) Times a Day. 1/15/20 1/14/21  Suman Ventura MD   oxyCODONE-acetaminophen (PERCOCET) 7.5-325 MG per tablet 1-2 tabs po q4-6 hrs prn pain 1/15/20   Suman Ventura MD       Prescriptions for:  Percocet 735/325, #25    Return to Clinic: 1 week    Xrays: yes

## 2020-01-15 NOTE — PLAN OF CARE
Problem: Patient Care Overview  Goal: Plan of Care Review  1/15/2020 0826 by Eli Velasco RN  Outcome: Ongoing (interventions implemented as appropriate)  Flowsheets (Taken 1/15/2020 0826)  Progress: improving  Plan of Care Reviewed With: patient; spouse  Outcome Summary: Pt A&OX4, VSS safety maintained. C/o pain relieved by po prn pain medication. Plan to d/c home today

## 2020-01-15 NOTE — PLAN OF CARE
Medicated patient for c/o R shoulder, mainly R hand pain with prn pain med, with relief noted. His R hand went from tingling to numbness now has more feeling to R hand. Dressing to R shoulder C/D/I. VSS. Patient was not voiding last night. In and out 300ml at 0017. This morning he voided small amounts. Patient currently sitting in chair, R arm in sling/immobilizer. Hoping to be discharged home today. Spouse at bedside. Safety maintained.

## 2020-01-17 ENCOUNTER — OFFICE VISIT (OUTPATIENT)
Dept: INTERNAL MEDICINE | Facility: CLINIC | Age: 75
End: 2020-01-17

## 2020-01-17 VITALS
HEART RATE: 76 BPM | HEIGHT: 66 IN | SYSTOLIC BLOOD PRESSURE: 124 MMHG | DIASTOLIC BLOOD PRESSURE: 60 MMHG | WEIGHT: 204.5 LBS | TEMPERATURE: 98.2 F | BODY MASS INDEX: 32.87 KG/M2 | RESPIRATION RATE: 22 BRPM | OXYGEN SATURATION: 95 %

## 2020-01-17 DIAGNOSIS — H53.9 VISUAL DISTURBANCE: ICD-10-CM

## 2020-01-17 DIAGNOSIS — Z96.611 STATUS POST REPLACEMENT OF RIGHT SHOULDER JOINT: ICD-10-CM

## 2020-01-17 DIAGNOSIS — J98.11 ATELECTASIS: ICD-10-CM

## 2020-01-17 DIAGNOSIS — J42 CHRONIC BRONCHITIS, UNSPECIFIED CHRONIC BRONCHITIS TYPE (HCC): ICD-10-CM

## 2020-01-17 DIAGNOSIS — R04.2 HEMOPTYSIS: Primary | ICD-10-CM

## 2020-01-17 PROBLEM — Z01.818 PRE-OP EVALUATION: Status: RESOLVED | Noted: 2019-12-19 | Resolved: 2020-01-17

## 2020-01-17 PROBLEM — J44.1 COPD EXACERBATION: Status: RESOLVED | Noted: 2019-10-29 | Resolved: 2020-01-17

## 2020-01-17 PROCEDURE — 99214 OFFICE O/P EST MOD 30 MIN: CPT | Performed by: FAMILY MEDICINE

## 2020-01-17 RX ORDER — DOXYCYCLINE 100 MG/1
100 CAPSULE ORAL 2 TIMES DAILY
Qty: 20 CAPSULE | Refills: 0 | Status: SHIPPED | OUTPATIENT
Start: 2020-01-17 | End: 2020-01-28 | Stop reason: ALTCHOICE

## 2020-01-17 NOTE — PROGRESS NOTES
Subjective     Chief Complaint   Patient presents with   • Cough     coughing up browm/red mucus(started yesterday), vison in right eye his focus is off, had surgery on right arm tuesday.        History of Present Illness  Patient presents to the office today with his wife.  He has started coughing up dark brown bloody tinged sputum.  Onset was this a.m.  He has had no fever or chills.  He had a right shoulder replacement on Tuesday.  He has been taking Percocet for pain control.  This is caused him quite a bit of sedation.  He is unsteady on his feet.  Today when he is walking in the office he noticed that he is having difficulty seeing out of his right eye.  Is like it will not focus.  And he sees shadows that are not there.  According to his wife he is not drinking enough fluids.  He believes his last bowel movement was yesterday.  Patient's PMR from outside medical facility reviewed and noted.    Review of Systems     Otherwise complete ROS reviewed and negative except as mentioned in the HPI.    Past Medical History:   Past Medical History:   Diagnosis Date   • Arthritis    • CAD (coronary artery disease)    • Chronic back pain    • Chronic knee pain     LEFT KNEE   • COPD (chronic obstructive pulmonary disease) (CMS/[facility])    • GERD (gastroesophageal reflux disease)    • Hyperlipidemia    • Hypertension    • Sleep apnea      Past Surgical History:  Past Surgical History:   Procedure Laterality Date   • BACK SURGERY     • COLONOSCOPY     • KNEE MENISCAL REPAIR Left 2018   • ROTATOR CUFF REPAIR Right 2000   • SPINE SURGERY  1981    lower back ruptured disc   • TOTAL SHOULDER ARTHROPLASTY W/ DISTAL CLAVICLE EXCISION Right 1/14/2020    Procedure: RIGHT REVERSE TOTAL SHOULDER  ARTHROPLASTY;  Surgeon: Suman Ventura MD;  Location: [facility];  Service: Orthopedics     Social History:  reports that he quit smoking about 9 years ago. He has never used smokeless tobacco. He reports that he drinks about 5.0  standard drinks of alcohol per week. He reports that he does not use drugs.    Family History: family history includes Arthritis in his father and mother; Cancer in his brother; Diabetes in his mother and sister; Hypertension in his father and mother; Kidney disease in his mother; Obesity in his sister; Osteoporosis in his mother; Stroke in his father.       Allergies:  No Known Allergies  Medications:  Prior to Admission medications    Medication Sig Start Date End Date Taking? Authorizing Provider   albuterol sulfate  (90 Base) MCG/ACT inhaler Inhale 2 puffs Every 4 (Four) Hours As Needed for Wheezing.   Yes Cristian Pfeiffer MD   amLODIPine (NORVASC) 5 MG tablet Take 5 mg by mouth Daily.   Yes Cristian Pfeiffer MD   aspirin 81 MG EC tablet Take 81 mg by mouth Daily.   Yes Cristian Pfeiffer MD   bisoprolol (ZEBeta) 5 MG tablet Take 5 mg by mouth Daily.   Yes Cristian Pfeiffer MD   budesonide-formoterol (SYMBICORT) 160-4.5 MCG/ACT inhaler Inhale 1 puff Daily.   Yes Cristian Pfeiffer MD   calcium carbonate (OS-TASHIA) 600 MG tablet Take 600 mg by mouth 2 (Two) Times a Day.   Yes Cristian Pfeiffer MD   Cholecalciferol (VITAMIN D3) 1000 units capsule Take 1,000 Units by mouth Daily.   Yes Cristian Pfeiffer MD   coenzyme Q10 100 MG capsule Take 100 mg by mouth Daily.   Yes Cristian Pfeiffer MD   cyclobenzaprine (FLEXERIL) 10 MG tablet Take 1 tablet by mouth 3 (Three) Times a Day As Needed for Muscle Spasms. 10/7/19  Yes Katerine Mcclain APRN   docusate sodium 250 MG capsule Take 250 mg by mouth 2 (Two) Times a Day. 1/15/20 1/14/21 Yes Suman Ventura MD   fluticasone (FLONASE) 50 MCG/ACT nasal spray 2 sprays into the nostril(s) as directed by provider Daily.   Yes Cristian Pfeiffer MD   gabapentin (NEURONTIN) 300 MG capsule Take 300 mg by mouth 3 (Three) Times a Day.   Yes Cristian Pfeiffer MD   Garlic 1000 MG capsule Take 1,000 mg by mouth Daily.   Yes  "ProviderCristian MD   Glucos-Chondroit-Hyaluron-MSM (GLUCOSAMINE CHONDROITIN JOINT PO) Take 1 tablet/day by mouth Daily.   Yes Cristian Pfeiffer MD   Krill Oil 300 MG capsule Take 300 mg by mouth Daily.   Yes Cristian Pfeiffer MD   lisinopril (PRINIVIL,ZESTRIL) 20 MG tablet Take 20 mg by mouth Daily.   Yes Cristian Pfeiffer MD   meloxicam (MOBIC) 15 MG tablet Take 15 mg by mouth Daily.   Yes Cristian Pfeiffer MD   Milk Thistle 175 MG capsule Take 175 mg by mouth Daily.   Yes Cristian Pfeiffer MD   Multiple Vitamins-Minerals (OCUVITE ADULT 50+ PO) Take 1 tablet by mouth Daily.   Yes Cristian Pfeiffer MD   omeprazole (priLOSEC) 20 MG capsule Take 20 mg by mouth Daily.   Yes Cristian Pfeiffer MD   oxyCODONE-acetaminophen (PERCOCET) 7.5-325 MG per tablet Take 1-2 tablets by mouth every 4-6 hours as needed for pain. 1/15/20  Yes Suman Ventura MD   S-Adenosylmethionine (SULAIMAN-E) 200 MG tablet Take 200 mg by mouth.   Yes Cristian Pfeiffer MD   simvastatin (ZOCOR) 40 MG tablet Take 40 mg by mouth Every Night.   Yes Cristian Pfeiffer MD   SUPER B COMPLEX/C PO Take 1 tablet by mouth Every Night.   Yes Cristian Pfeiffer MD   TAMSULOSIN HCL PO Take 0.4 mg by mouth Daily.   Yes Cristian Pfeiffer MD   tiotropium (SPIRIVA) 18 MCG per inhalation capsule Place 1 capsule into inhaler and inhale Daily.   Yes Cristian Pfeiffer MD   TURMERIC PO Take 1,000 mg by mouth Daily.   Yes Cristian Pfeiffer MD   vitamin C (ASCORBIC ACID) 500 MG tablet Take 500 mg by mouth Daily.   Yes ProviderCristian MD   doxycycline (MONODOX) 100 MG capsule Take 1 capsule by mouth 2 (Two) Times a Day. 1/17/20   Angelica Mccabe DO       Objective     Vital Signs: /60 (BP Location: Left arm, Patient Position: Sitting, Cuff Size: Adult)   Pulse 76   Temp 98.2 °F (36.8 °C) (Oral)   Resp 22   Ht 168 cm (66.14\")   Wt 92.8 kg (204 lb 8 oz)   SpO2 95%   BMI 32.87 kg/m²   Physical " Exam   Constitutional: He is oriented to person, place, and time. He appears well-developed and well-nourished.   HENT:   Head: Normocephalic and atraumatic.   Right Ear: External ear normal.   Left Ear: External ear normal.   Mouth/Throat: Oropharynx is clear and moist.   Eyes: Pupils are equal, round, and reactive to light. Conjunctivae and EOM are normal.   Unable to do a retinal evaluation due to the pupillary constriction.   Neck: Normal range of motion. Neck supple. No JVD present.   Cardiovascular: Normal rate, regular rhythm and intact distal pulses. Exam reveals no gallop and no friction rub.   No murmur heard.  Pulmonary/Chest: Effort normal.   There are crackles in the base bilaterally left is worse than right.   Abdominal: Bowel sounds are normal. He exhibits distension. There is no tenderness.   Musculoskeletal: He exhibits edema.   Right arm is in swath and sling  Patient is able to open and close his fingers  Right upper arm shoulder area remains swollen postoperatively   Neurological: He is oriented to person, place, and time. No cranial nerve deficit.   Patient is somewhat lethargic.  He has been taking Percocet.  This is not a normal medication for him.  He is verbally responsive and appropriate.   Skin: Skin is warm and dry.   Psychiatric: He has a normal mood and affect. His behavior is normal.   Nursing note and vitals reviewed.    Chest x-ray ordered and reviewed.  There appears to be atelectasis in the left lower base.  There also appears to be atelectasis versus infiltrate right middle lobe.      Results Reviewed:  Glucose   Date Value Ref Range Status   01/15/2020 145 (H) 65 - 99 mg/dL Final     BUN   Date Value Ref Range Status   01/15/2020 16 8 - 23 mg/dL Final     Creatinine   Date Value Ref Range Status   01/15/2020 0.85 0.76 - 1.27 mg/dL Final     Sodium   Date Value Ref Range Status   01/15/2020 137 136 - 145 mmol/L Final     Potassium   Date Value Ref Range Status   01/15/2020 4.1 3.5  - 5.2 mmol/L Final     Chloride   Date Value Ref Range Status   01/15/2020 102 98 - 107 mmol/L Final     CO2   Date Value Ref Range Status   01/15/2020 25.0 22.0 - 29.0 mmol/L Final     Calcium   Date Value Ref Range Status   01/15/2020 8.1 (L) 8.6 - 10.5 mg/dL Final     WBC   Date Value Ref Range Status   01/09/2020 4.57 3.40 - 10.80 10*3/mm3 Final     Hematocrit   Date Value Ref Range Status   01/15/2020 31.3 (L) 37.5 - 51.0 % Final     Platelets   Date Value Ref Range Status   01/09/2020 172 140 - 450 10*3/mm3 Final         Assessment / Plan     Assessment/Plan:  1. Hemoptysis    - XR Chest PA & Lateral (In Office)    2. Atelectasis  versus pneumonia  doxycycline 100 mg twice daily for 10 days    3. Visual disturbance right eye    Dr. Sanjana James called, case discussed.  Patient will present to her office as soon as he is done here.    4. Status post replacement of right shoulder joint  Continue postop instructions as per Dr. Ventura    5. Chronic bronchitis, unspecified chronic bronchitis type (CMS/HCC)  Incentive spirometer every hour while he is awake for 10 repetitions.  When this is done he is to drink a glass of water  Aerobika 4 times a day   use inhalers as directed          Return if symptoms worsen or fail to improve, for Keep next scheduled appointment. unless patient needs to be seen sooner or acute issues arise.        I have discussed the patient results/orders and and plan/recommendation with them at today's visit.      Angelica Mccabe,    01/17/2020

## 2020-01-20 PROBLEM — I25.810 CORONARY ARTERY DISEASE INVOLVING AUTOLOGOUS VEIN CORONARY BYPASS GRAFT WITHOUT ANGINA PECTORIS: Status: ACTIVE | Noted: 2020-01-20

## 2020-01-20 PROBLEM — Z87.891 PERSONAL HISTORY OF NICOTINE DEPENDENCE: Status: ACTIVE | Noted: 2020-01-20

## 2020-01-20 PROBLEM — G47.33 OBSTRUCTIVE SLEEP APNEA: Status: ACTIVE | Noted: 2020-01-20

## 2020-01-20 PROBLEM — E66.09 CLASS 1 OBESITY DUE TO EXCESS CALORIES WITH SERIOUS COMORBIDITY AND BODY MASS INDEX (BMI) OF 32.0 TO 32.9 IN ADULT: Status: ACTIVE | Noted: 2020-01-20

## 2020-01-20 PROBLEM — E66.811 CLASS 1 OBESITY DUE TO EXCESS CALORIES WITH SERIOUS COMORBIDITY AND BODY MASS INDEX (BMI) OF 32.0 TO 32.9 IN ADULT: Status: ACTIVE | Noted: 2020-01-20

## 2020-01-20 NOTE — PROGRESS NOTES
[facility]ON[facility]leg[facility]  [facility]   Respiratory Disease Clinic  [address] [place] [address]  Phone: [phone]  Fax: [phone]     Justin Robledo is a 74 y.o. male.   CC:   Chief Complaint   Patient presents with   • Bronchitis        HPI: Mr. Robledo is coming in as a new patient referral for management of COPD and recent pneumonia post surgery.  Patient reports moderate persistent shortness of breath and coughing up rust colored sputum occurring in the chest for several weeks.  Recently had an orthopedic procedure.  He was seen at an outlying clinic.  Chest x-ray imaging showed an infiltrate to the right middle lobe and some atelectasis of the left lower lobe.  It appears he was given doxycycline, incentive spirometer and a flutter device.  He reports that these therapies were very beneficial to him.  He is feeling better.  He reports he is also here today to establish care.  He was seeing a pulmonologist when he resided in [place].  He moved here in June and wanted to establish with someone in the event that he had acute symptoms and also to continue his annual assessment of his COPD.  He reports since moving to [place] from [place] he has had several exacerbations.  He is attributing this to allergens and environmental triggers.  He is on Spiriva and full dose Symbicort at baseline.  He does not utilize his emergency medication.  He reports he is never had to use it.  Condition is complicated by underlying coronary artery disease, sleep apnea, obesity and history of smoking although he no longer smokes.  He quit smoking in 2009.  He denies any childhood issues with breathing.  No family history of lung disease.  He was employed as a , [employer] and  over the years.  No exposure to asbestos that he is aware of.  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  He has no pets in the home.  He has been on CPAP since 2014.  He utilizes it religiously and does  benefit from it.  He has been seen by a cardiologist since his arrival here.  He has a known left bundle branch block which is documented.  No other issues to report.    The following portions of the patient's history were reviewed and updated as appropriate: allergies, current medications, past family history, past medical history, past social history, past surgical history and problem list.    Past Medical History:   Diagnosis Date   • Arthritis    • CAD (coronary artery disease)    • Chronic back pain    • Chronic knee pain     LEFT KNEE   • Class 1 obesity due to excess calories with serious comorbidity and body mass index (BMI) of 32.0 to 32.9 in adult 1/20/2020   • COPD (chronic obstructive pulmonary disease) (CMS/[facility])    • Coronary artery disease involving autologous vein coronary bypass graft without angina pectoris 1/20/2020   • Essential hypertension 10/14/2019   • GERD (gastroesophageal reflux disease)    • Hyperlipidemia    • Hypertension    • Obstructive sleep apnea 1/20/2020   • Sleep apnea        Prior to Admission medications    Medication Sig Start Date End Date Taking? Authorizing Provider   albuterol sulfate  (90 Base) MCG/ACT inhaler Inhale 2 puffs Every 4 (Four) Hours As Needed for Wheezing.    Cristian Pfeiffer MD   amLODIPine (NORVASC) 5 MG tablet Take 5 mg by mouth Daily.    Cristian Pfeiffer MD   aspirin 81 MG EC tablet Take 81 mg by mouth Daily.    Cristian Pfeiffer MD   bisoprolol (ZEBeta) 5 MG tablet Take 5 mg by mouth Daily.    Cristian Pfeiffer MD   budesonide-formoterol (SYMBICORT) 160-4.5 MCG/ACT inhaler Inhale 1 puff Daily.    Cristian Pfeiffer MD   calcium carbonate (OS-TASHIA) 600 MG tablet Take 600 mg by mouth 2 (Two) Times a Day.    Cristian Pfieffer MD   Cholecalciferol (VITAMIN D3) 1000 units capsule Take 1,000 Units by mouth Daily.    Cristian Pfeiffer MD   coenzyme Q10 100 MG capsule Take 100 mg by mouth Daily.    Cristian Pfeiffer MD    cyclobenzaprine (FLEXERIL) 10 MG tablet Take 1 tablet by mouth 3 (Three) Times a Day As Needed for Muscle Spasms. 10/7/19   Katerine Mcclain APRN   docusate sodium 250 MG capsule Take 250 mg by mouth 2 (Two) Times a Day. 1/15/20 1/14/21  Suman Ventura MD   doxycycline (MONODOX) 100 MG capsule Take 1 capsule by mouth 2 (Two) Times a Day. 1/17/20   Angelica Mccabe DO   fluticasone (FLONASE) 50 MCG/ACT nasal spray 2 sprays into the nostril(s) as directed by provider Daily.    Cristian Pfeiffer MD   gabapentin (NEURONTIN) 300 MG capsule Take 300 mg by mouth 3 (Three) Times a Day.    Cristian Pfeiffer MD   Garlic 1000 MG capsule Take 1,000 mg by mouth Daily.    Cristian Pfeiffer MD   Glucos-Chondroit-Hyaluron-MSM (GLUCOSAMINE CHONDROITIN JOINT PO) Take 1 tablet/day by mouth Daily.    Cristian Pfeiffer MD   Krill Oil 300 MG capsule Take 300 mg by mouth Daily.    Cristian Pfeiffer MD   lisinopril (PRINIVIL,ZESTRIL) 20 MG tablet Take 20 mg by mouth Daily.    Cristian Pfeiffer MD   meloxicam (MOBIC) 15 MG tablet Take 15 mg by mouth Daily.    Cristian Pfeiffer MD   Milk Thistle 175 MG capsule Take 175 mg by mouth Daily.    Cristian Pfeiffer MD   Multiple Vitamins-Minerals (OCUVITE ADULT 50+ PO) Take 1 tablet by mouth Daily.    Cristian Pfeiffer MD   omeprazole (priLOSEC) 20 MG capsule Take 20 mg by mouth Daily.    Cristian Pfeiffer MD   oxyCODONE-acetaminophen (PERCOCET) 7.5-325 MG per tablet Take 1-2 tablets by mouth every 4-6 hours as needed for pain. 1/15/20   Suman Ventura MD   S-Adenosylmethionine (SULAIMAN-E) 200 MG tablet Take 200 mg by mouth.    Cristian Pfeiffer MD   simvastatin (ZOCOR) 40 MG tablet Take 40 mg by mouth Every Night.    Cristian Pfeiffer MD   SUPER B COMPLEX/C PO Take 1 tablet by mouth Every Night.    Cristian Pfeiffer MD   TAMSULOSIN HCL PO Take 0.4 mg by mouth Daily.    Cristian Pfeiffer MD   tiotropium (SPIRIVA) 18  MCG per inhalation capsule Place 1 capsule into inhaler and inhale Daily.    Provider, MD Cristian   TURMERIC PO Take 1,000 mg by mouth Daily.    ProviderCristian MD   vitamin C (ASCORBIC ACID) 500 MG tablet Take 500 mg by mouth Daily.    ProviderCristian MD       Family History   Problem Relation Age of Onset   • Arthritis Mother    • Diabetes Mother    • Osteoporosis Mother    • Hypertension Mother    • Kidney disease Mother    • Arthritis Father    • Hypertension Father    • Stroke Father    • Diabetes Sister    • Obesity Sister    • Cancer Brother        Social History     Socioeconomic History   • Marital status:      Spouse name: Not on file   • Number of children: Not on file   • Years of education: Not on file   • Highest education level: Not on file   Tobacco Use   • Smoking status: Former Smoker     Years: 50.00     Types: Pipe     Last attempt to quit:      Years since quittin.0   • Smokeless tobacco: Never Used   Substance and Sexual Activity   • Alcohol use: Yes     Alcohol/week: 5.0 standard drinks     Types: 5 Cans of beer per week   • Drug use: No   • Sexual activity: Defer       Review of Systems   Constitutional: Negative for activity change, chills, fatigue and fever.   HENT: Positive for postnasal drip and rhinorrhea. Negative for congestion, sinus pressure, sore throat and trouble swallowing.    Eyes: Negative for blurred vision, double vision and pain.   Respiratory: Positive for cough and shortness of breath. Negative for chest tightness and wheezing.    Cardiovascular: Negative for chest pain, palpitations and leg swelling.   Gastrointestinal: Negative for abdominal distention, constipation, diarrhea, nausea and vomiting.   Endocrine: Negative for polydipsia, polyphagia and polyuria.   Genitourinary: Negative for dysuria, frequency and urgency.   Musculoskeletal: Positive for arthralgias and joint swelling. Negative for back pain and gait problem.   Skin: Negative  "for color change, dry skin, rash and skin lesions.   Allergic/Immunologic: Negative for environmental allergies, food allergies and immunocompromised state.   Neurological: Negative for dizziness, seizures, speech difficulty, weakness, light-headedness, memory problem and confusion.   Hematological: Negative for adenopathy. Does not bruise/bleed easily.   Psychiatric/Behavioral: Negative for sleep disturbance, negative for hyperactivity and depressed mood. The patient is not nervous/anxious.        /68   Pulse 66   Ht 163.8 cm (64.5\")   Wt 87.5 kg (193 lb)   SpO2 98% Comment: RA  BMI 32.62 kg/m²     Physical Exam   Constitutional: He is oriented to person, place, and time. He appears well-developed and well-nourished. No distress. He is obese.  HENT:   Head: Normocephalic and atraumatic.   Right Ear: External ear normal.   Left Ear: External ear normal.   Nose: Nose normal.   Mouth/Throat: Oropharynx is clear and moist. No oropharyngeal exudate.   Eyes: Pupils are equal, round, and reactive to light. Conjunctivae and EOM are normal. Right eye exhibits no discharge. Left eye exhibits no discharge.   Neck: Normal range of motion. Neck supple. No JVD present.   Cardiovascular: Normal rate and regular rhythm.   No murmur heard.  Pulmonary/Chest: Effort normal and breath sounds normal. No respiratory distress. He has no wheezes.   Abdominal: Soft. Bowel sounds are normal. He exhibits no distension. There is no tenderness.   Musculoskeletal: Normal range of motion. He exhibits no edema or deformity.   Sling to right upper extremity   Neurological: He is alert and oriented to person, place, and time. He displays normal reflexes. No cranial nerve deficit. Coordination normal.   Skin: Skin is warm and dry. No rash noted. He is not diaphoretic. No erythema.   Psychiatric: He has a normal mood and affect. His behavior is normal. Thought content normal.   Nursing note and vitals reviewed.      Pulmonary Functions " Testing Results:    PFT Values        Some values may be hidden. Unless noted otherwise, only the newest values recorded on each date are displayed.         Old Values PFT Results 1/28/20   No data to display.      Pre Drug PFT Results 1/28/20      FEV1 90   FEF 25-75% 46   FEV1/FVC 67.97      Post Drug PFT Results 1/28/20   No data to display.      Other Tests PFT Results 1/28/20         DLCO 69   D/VAsb 72               My interpretation of his PFTs show mild obstructive disease with reduced mid flows, normal lung volumes, moderate diffusion impairment corrected for alveolar volume is still moderate    CXR: Chest x-ray imaging at [facility] in January 2020 shows a right middle lobe infiltrate with trace effusion and some left lower lobe atelectasis        Problem List Items Addressed This Visit        Respiratory    Chronic bronchitis (CMS/HCC) - Primary    Relevant Medications    tiotropium bromide monohydrate (SPIRIVA RESPIMAT) 2.5 MCG/ACT aerosol solution inhaler    benzonatate (TESSALON) 100 MG capsule    guaiFENesin-codeine (ROMILAR-AC) 100-10 MG/5ML syrup    Other Relevant Orders    Pulmonary Function Test (Completed)    Obstructive sleep apnea       Digestive    Class 1 obesity due to excess calories with serious comorbidity and body mass index (BMI) of 32.0 to 32.9 in adult       Other    Personal history of nicotine dependence      Other Visit Diagnoses     Lung infiltrate        Relevant Medications    tiotropium bromide monohydrate (SPIRIVA RESPIMAT) 2.5 MCG/ACT aerosol solution inhaler    benzonatate (TESSALON) 100 MG capsule    guaiFENesin-codeine (ROMILAR-AC) 100-10 MG/5ML syrup        Patient's Body mass index is 32.62 kg/m². BMI is above normal parameters. Recommendations include: educational material.      Assessment/Plan         Recent issue with atelectasis postoperatively.  Improved with doxycycline, incentive spirometry and flutter per his primary care provider.  He has mild  COPD which is managed with Spiriva and Symbicort at baseline.  He has a rescue inhaler although that he is never had to use it.  He is on CPAP therapy which is beneficial and updated in 2014.  Recommend he have annual low-dose lung cancer screening till the age of 78.  Annual PFTs.  He will call if he has any exacerbations and we can see him sooner otherwise we will reassess him in 6 months.    Hien Pimentel, APRN  1/28/2020  1:49 PM    Return in about 6 months (around 7/28/2020).

## 2020-01-28 ENCOUNTER — OFFICE VISIT (OUTPATIENT)
Dept: PULMONOLOGY | Facility: CLINIC | Age: 75
End: 2020-01-28

## 2020-01-28 VITALS
SYSTOLIC BLOOD PRESSURE: 112 MMHG | WEIGHT: 193 LBS | HEART RATE: 66 BPM | HEIGHT: 65 IN | DIASTOLIC BLOOD PRESSURE: 68 MMHG | OXYGEN SATURATION: 98 % | BODY MASS INDEX: 32.15 KG/M2

## 2020-01-28 DIAGNOSIS — J42 CHRONIC BRONCHITIS, UNSPECIFIED CHRONIC BRONCHITIS TYPE (HCC): Primary | ICD-10-CM

## 2020-01-28 DIAGNOSIS — G47.33 OBSTRUCTIVE SLEEP APNEA: ICD-10-CM

## 2020-01-28 DIAGNOSIS — E66.09 CLASS 1 OBESITY DUE TO EXCESS CALORIES WITH SERIOUS COMORBIDITY AND BODY MASS INDEX (BMI) OF 32.0 TO 32.9 IN ADULT: ICD-10-CM

## 2020-01-28 DIAGNOSIS — Z87.891 PERSONAL HISTORY OF NICOTINE DEPENDENCE: ICD-10-CM

## 2020-01-28 DIAGNOSIS — R91.8 LUNG INFILTRATE: ICD-10-CM

## 2020-01-28 PROCEDURE — 99214 OFFICE O/P EST MOD 30 MIN: CPT | Performed by: NURSE PRACTITIONER

## 2020-01-28 PROCEDURE — 94010 BREATHING CAPACITY TEST: CPT | Performed by: NURSE PRACTITIONER

## 2020-01-28 PROCEDURE — 94729 DIFFUSING CAPACITY: CPT | Performed by: NURSE PRACTITIONER

## 2020-01-28 PROCEDURE — 94727 GAS DIL/WSHOT DETER LNG VOL: CPT | Performed by: NURSE PRACTITIONER

## 2020-01-28 RX ORDER — CYCLOSPORINE 0.5 MG/ML
1 EMULSION OPHTHALMIC 2 TIMES DAILY
COMMUNITY
End: 2020-09-15

## 2020-01-28 RX ORDER — CODEINE PHOSPHATE AND GUAIFENESIN 10; 100 MG/5ML; MG/5ML
5 SOLUTION ORAL 3 TIMES DAILY PRN
COMMUNITY
End: 2020-09-15

## 2020-01-28 RX ORDER — HYDROCODONE BITARTRATE AND ACETAMINOPHEN 5; 325 MG/1; MG/1
1 TABLET ORAL EVERY 6 HOURS PRN
COMMUNITY
End: 2020-09-15

## 2020-01-28 RX ORDER — BENZONATATE 100 MG/1
100 CAPSULE ORAL 3 TIMES DAILY PRN
COMMUNITY
End: 2020-09-15

## 2020-01-28 RX ORDER — IBUPROFEN 800 MG/1
800 TABLET ORAL EVERY 6 HOURS PRN
COMMUNITY
End: 2022-04-25

## 2020-01-28 NOTE — PROCEDURES
Pulmonary Function Test  Performed by: Hien Pimentel APRN  Authorized by: Hien Pimentel APRN      Pre Drug    FVC: 103%   FEV1: 90%   FEF 25-75%: 46%   FEV1/FVC: 67.97%   T%   RV: 119%   DLCO: 69%   D/VAsb: 72%

## 2020-01-28 NOTE — PATIENT INSTRUCTIONS

## 2020-02-19 ENCOUNTER — OFFICE VISIT (OUTPATIENT)
Dept: INTERNAL MEDICINE | Facility: CLINIC | Age: 75
End: 2020-02-19

## 2020-02-19 VITALS
WEIGHT: 193 LBS | HEIGHT: 65 IN | RESPIRATION RATE: 18 BRPM | SYSTOLIC BLOOD PRESSURE: 143 MMHG | OXYGEN SATURATION: 98 % | TEMPERATURE: 98.1 F | BODY MASS INDEX: 32.15 KG/M2 | DIASTOLIC BLOOD PRESSURE: 67 MMHG | HEART RATE: 67 BPM

## 2020-02-19 DIAGNOSIS — J44.1 ACUTE EXACERBATION OF CHRONIC OBSTRUCTIVE PULMONARY DISEASE (COPD) (HCC): Primary | ICD-10-CM

## 2020-02-19 PROCEDURE — 99213 OFFICE O/P EST LOW 20 MIN: CPT | Performed by: FAMILY MEDICINE

## 2020-02-19 RX ORDER — DOXYCYCLINE 50 MG/1
50 CAPSULE ORAL 2 TIMES DAILY
Qty: 20 CAPSULE | Refills: 0 | Status: SHIPPED | OUTPATIENT
Start: 2020-02-19 | End: 2020-09-15

## 2020-02-19 RX ORDER — PREDNISONE 10 MG/1
TABLET ORAL
Qty: 30 TABLET | Refills: 0 | Status: SHIPPED | OUTPATIENT
Start: 2020-02-19 | End: 2020-11-02

## 2020-02-19 NOTE — PROGRESS NOTES
"        Subjective     Chief Complaint   Patient presents with   • Cough     Patient reports symptoms started about three days ago. Patient denies fever. He reports \"an area of concern of the right side of his face, he states it looks like a laceration\" he first noticed the area three weeks ago and is having trouble healing.   • Nasal Congestion       History of Present Illness  Patient presents with complaints of increased cough and congestion.  Onset approximately 3 days ago.  There is been no nausea or vomiting.  He does have some sputum production.  He does feel short of breath.  He has been out about with his wife as she is undergoing chemotherapy.  He has been wearing a mask during his Sojourn's into the hospital.  Patient's PMR from outside medical facility reviewed and noted.    Review of Systems     Otherwise complete ROS reviewed and negative except as mentioned in the HPI.    Past Medical History:   Past Medical History:   Diagnosis Date   • Arthritis    • CAD (coronary artery disease)    • Chronic back pain    • Chronic knee pain     LEFT KNEE   • Class 1 obesity due to excess calories with serious comorbidity and body mass index (BMI) of 32.0 to 32.9 in adult 1/20/2020   • COPD (chronic obstructive pulmonary disease) (CMS/[facility])    • Coronary artery disease involving autologous vein coronary bypass graft without angina pectoris 1/20/2020   • Essential hypertension 10/14/2019   • GERD (gastroesophageal reflux disease)    • Hyperlipidemia    • Hypertension    • Obstructive sleep apnea 1/20/2020   • Sleep apnea      Past Surgical History:  Past Surgical History:   Procedure Laterality Date   • BACK SURGERY     • COLONOSCOPY     • KNEE MENISCAL REPAIR Left 2018   • ROTATOR CUFF REPAIR Right 2000   • SPINE SURGERY  1981    lower back ruptured disc   • TOTAL SHOULDER ARTHROPLASTY W/ DISTAL CLAVICLE EXCISION Right 1/14/2020    Procedure: RIGHT REVERSE TOTAL SHOULDER  ARTHROPLASTY;  Surgeon: Suman Ventura " MD Jose F;  Location: [facility];  Service: Orthopedics     Social History:  reports that he quit smoking about 9 years ago. His smoking use included pipe. He quit after 50.00 years of use. He has never used smokeless tobacco. He reports that he drinks about 5.0 standard drinks of alcohol per week. He reports that he does not use drugs.    Family History: family history includes Arthritis in his father and mother; Cancer in his brother; Diabetes in his mother and sister; Hypertension in his father and mother; Kidney disease in his mother; Obesity in his sister; Osteoporosis in his mother; Stroke in his father.       Allergies:  No Known Allergies  Medications:  Prior to Admission medications    Medication Sig Start Date End Date Taking? Authorizing Provider   albuterol sulfate  (90 Base) MCG/ACT inhaler Inhale 2 puffs Every 4 (Four) Hours As Needed for Wheezing.   Yes Cristian Pfeiffer MD   amLODIPine (NORVASC) 5 MG tablet Take 5 mg by mouth Daily.   Yes Cristian Pfeiffer MD   aspirin 81 MG EC tablet Take 81 mg by mouth Daily.   Yes Cristian Pfeiffer MD   bisoprolol (ZEBeta) 5 MG tablet Take 5 mg by mouth Daily.   Yes Cristian Pfeiffer MD   budesonide-formoterol (SYMBICORT) 160-4.5 MCG/ACT inhaler Inhale 1 puff Daily.   Yes Cristian Pfeiffer MD   calcium carbonate (OS-TASHIA) 600 MG tablet Take 600 mg by mouth 2 (Two) Times a Day.   Yes Cristian Pfeiffer MD   Cholecalciferol (VITAMIN D3) 1000 units capsule Take 2,000 Units by mouth Daily.   Yes Cristian Pfeiffer MD   coenzyme Q10 100 MG capsule Take 100 mg by mouth Daily.   Yes Cristian Pfeiffer MD   fluticasone (FLONASE) 50 MCG/ACT nasal spray 2 sprays into the nostril(s) as directed by provider Daily.   Yes Cristian Pfeiffer MD   gabapentin (NEURONTIN) 300 MG capsule Take 300 mg by mouth 3 (Three) Times a Day.   Yes Cristian Pfeiffer MD   Garlic 1000 MG capsule Take 1,000 mg by mouth Daily.   Yes Cristian Pfeiffer MD    ibuprofen (ADVIL,MOTRIN) 800 MG tablet Take 800 mg by mouth Every 6 (Six) Hours As Needed for Mild Pain .   Yes Cristian Pfeiffer MD   Krill Oil 300 MG capsule Take 300 mg by mouth Daily.   Yes Cristian Pfeiffer MD   lisinopril (PRINIVIL,ZESTRIL) 20 MG tablet Take 20 mg by mouth Daily.   Yes Cristian Pfeiffer MD   meloxicam (MOBIC) 15 MG tablet Take 15 mg by mouth Daily.   Yes Cristian Pfeiffer MD   Milk Thistle 175 MG capsule Take 175 mg by mouth Daily.   Yes Cristian Pfeiffer MD   Multiple Vitamins-Minerals (OCUVITE ADULT 50+ PO) Take 1 tablet by mouth Daily.   Yes Cristian Pfeiffer MD   omeprazole (priLOSEC) 20 MG capsule Take 20 mg by mouth Daily.   Yes Cristian Pfeiffer MD   S-Adenosylmethionine (SULAIMAN-E) 200 MG tablet Take 200 mg by mouth.   Yes Cristian Pfeiffer MD   simvastatin (ZOCOR) 40 MG tablet Take 40 mg by mouth Every Night.   Yes Cristian Pfeiffer MD   SUPER B COMPLEX/C PO Take 1 tablet by mouth Every Night.   Yes Cristian Pfeiffer MD   TAMSULOSIN HCL PO Take 0.4 mg by mouth Daily.   Yes Cristian Pfeiffer MD   tiotropium bromide monohydrate (SPIRIVA RESPIMAT) 2.5 MCG/ACT aerosol solution inhaler Inhale 2 puffs Daily.   Yes Cristian Pfeiffer MD   TURMERIC PO Take 1,000 mg by mouth Daily.   Yes Cristian Pfeiffer MD   vitamin C (ASCORBIC ACID) 500 MG tablet Take 500 mg by mouth Daily.   Yes Cristian Pfeiffer MD   benzonatate (TESSALON) 100 MG capsule Take 100 mg by mouth 3 (Three) Times a Day As Needed for Cough.    Cristian Pfeiffer MD   cyclobenzaprine (FLEXERIL) 10 MG tablet Take 1 tablet by mouth 3 (Three) Times a Day As Needed for Muscle Spasms. 10/7/19   Katerine Mcclain APRN   cycloSPORINE (RESTASIS) 0.05 % ophthalmic emulsion 1 drop 2 (Two) Times a Day.    Cristian Pfeiffer MD   docusate sodium 250 MG capsule Take 250 mg by mouth 2 (Two) Times a Day. 1/15/20 1/14/21  Suman Ventura MD   Glucos-Chondroit-Hyaluron-MSM  "(GLUCOSAMINE CHONDROITIN JOINT PO) Take 1 tablet/day by mouth Daily.    Provider, MD Cristian   guaiFENesin-codeine (ROMILAR-AC) 100-10 MG/5ML syrup Take 5 mL by mouth 3 (Three) Times a Day As Needed for Cough.    Provider, MD Cristian   HYDROcodone-acetaminophen (NORCO) 5-325 MG per tablet Take 1 tablet by mouth Every 6 (Six) Hours As Needed.    Provider, MD Cristian       Objective     Vital Signs: /67 (BP Location: Right arm, Patient Position: Sitting, Cuff Size: Adult)   Pulse 67   Temp 98.1 °F (36.7 °C) (Oral)   Resp 18   Ht 163.8 cm (64.5\")   Wt 87.5 kg (193 lb)   SpO2 98%   BMI 32.62 kg/m²   Physical Exam   Constitutional: He is oriented to person, place, and time. He appears well-developed and well-nourished.   HENT:   Head: Normocephalic and atraumatic.   Right Ear: External ear normal.   Left Ear: External ear normal.   Nose: Nose normal.   Mouth/Throat: Oropharynx is clear and moist. No oropharyngeal exudate.   Eyes: Pupils are equal, round, and reactive to light. Conjunctivae and EOM are normal.   Neck: Normal range of motion. Neck supple. No JVD present.   Cardiovascular: Normal rate, regular rhythm, normal heart sounds and intact distal pulses. Exam reveals no gallop and no friction rub.   No murmur heard.  Pulmonary/Chest: Effort normal.   Coarse with crackles in the left lower lobe.   Abdominal: Soft. Bowel sounds are normal.   Musculoskeletal: Normal range of motion.   Neurological: He is alert and oriented to person, place, and time. No cranial nerve deficit.   Skin: Skin is warm and dry. Capillary refill takes less than 2 seconds.   Psychiatric: He has a normal mood and affect.   Nursing note and vitals reviewed.        Results Reviewed:  Glucose   Date Value Ref Range Status   01/15/2020 145 (H) 65 - 99 mg/dL Final     BUN   Date Value Ref Range Status   01/15/2020 16 8 - 23 mg/dL Final     Creatinine   Date Value Ref Range Status   01/15/2020 0.85 0.76 - 1.27 mg/dL Final "     Sodium   Date Value Ref Range Status   01/15/2020 137 136 - 145 mmol/L Final     Potassium   Date Value Ref Range Status   01/15/2020 4.1 3.5 - 5.2 mmol/L Final     Chloride   Date Value Ref Range Status   01/15/2020 102 98 - 107 mmol/L Final     CO2   Date Value Ref Range Status   01/15/2020 25.0 22.0 - 29.0 mmol/L Final     Calcium   Date Value Ref Range Status   01/15/2020 8.1 (L) 8.6 - 10.5 mg/dL Final     WBC   Date Value Ref Range Status   01/09/2020 4.57 3.40 - 10.80 10*3/mm3 Final     Hematocrit   Date Value Ref Range Status   01/15/2020 31.3 (L) 37.5 - 51.0 % Final     Platelets   Date Value Ref Range Status   01/09/2020 172 140 - 450 10*3/mm3 Final         Assessment / Plan     Assessment/Plan:  1. Acute exacerbation of chronic obstructive pulmonary disease (COPD) (CMS/[facility])      Prednisone taper dose  Doxycycline  Continue nebs        Return if symptoms worsen or fail to improve. unless patient needs to be seen sooner or acute issues arise.      I have discussed the patient results/orders and and plan/recommendation with them at today's visit.      Angelica Mccabe,    02/19/2020

## 2020-03-03 ENCOUNTER — TELEPHONE (OUTPATIENT)
Dept: INTERNAL MEDICINE | Facility: CLINIC | Age: 75
End: 2020-03-03

## 2020-03-03 NOTE — TELEPHONE ENCOUNTER
PATIENT'S WIFE CALLED INFERRING ABOUT THE SUGAR BLOOD TEST THAT WAS ORDERED BY HIS EYE DOCTOR. SHE WANTED TO KNOW IF WE WOULD CONTACT HER AND LET HER KNOW WHEN TO COME IN FOR THE BLOOD WORK. THANKS

## 2020-03-04 NOTE — TELEPHONE ENCOUNTER
Spoke to Pt's wife, we never received and order for blood work from his eye Dr, or a phone call in regards to them wanting his glucose tested. However if they have an order, the pt can come in at anytime to have the labs drawn. She voiced understanding and going to call his eye  back to get an order or have their office call us.

## 2020-06-15 ENCOUNTER — OFFICE VISIT (OUTPATIENT)
Dept: INTERNAL MEDICINE | Facility: CLINIC | Age: 75
End: 2020-06-15

## 2020-06-15 VITALS
BODY MASS INDEX: 31.99 KG/M2 | HEIGHT: 65 IN | WEIGHT: 192 LBS | SYSTOLIC BLOOD PRESSURE: 162 MMHG | DIASTOLIC BLOOD PRESSURE: 78 MMHG | TEMPERATURE: 98.5 F | HEART RATE: 70 BPM | OXYGEN SATURATION: 98 %

## 2020-06-15 DIAGNOSIS — I10 ESSENTIAL HYPERTENSION: Primary | ICD-10-CM

## 2020-06-15 DIAGNOSIS — J44.9 CHRONIC OBSTRUCTIVE PULMONARY DISEASE, UNSPECIFIED COPD TYPE (HCC): ICD-10-CM

## 2020-06-15 DIAGNOSIS — E78.5 HYPERLIPIDEMIA, UNSPECIFIED HYPERLIPIDEMIA TYPE: ICD-10-CM

## 2020-06-15 DIAGNOSIS — G62.9 PERIPHERAL POLYNEUROPATHY: ICD-10-CM

## 2020-06-15 DIAGNOSIS — N40.0 BENIGN PROSTATIC HYPERPLASIA, UNSPECIFIED WHETHER LOWER URINARY TRACT SYMPTOMS PRESENT: ICD-10-CM

## 2020-06-15 DIAGNOSIS — J42 CHRONIC BRONCHITIS, UNSPECIFIED CHRONIC BRONCHITIS TYPE (HCC): ICD-10-CM

## 2020-06-15 PROCEDURE — 99214 OFFICE O/P EST MOD 30 MIN: CPT | Performed by: NURSE PRACTITIONER

## 2020-06-15 RX ORDER — TAMSULOSIN HYDROCHLORIDE 0.4 MG/1
1 CAPSULE ORAL DAILY
Qty: 90 CAPSULE | Refills: 1 | Status: SHIPPED | OUTPATIENT
Start: 2020-06-15 | End: 2020-12-17

## 2020-06-15 RX ORDER — BISOPROLOL FUMARATE 5 MG/1
5 TABLET, FILM COATED ORAL DAILY
Qty: 90 TABLET | Refills: 1 | Status: SHIPPED | OUTPATIENT
Start: 2020-06-15 | End: 2020-09-15 | Stop reason: SDUPTHER

## 2020-06-15 RX ORDER — LISINOPRIL 20 MG/1
20 TABLET ORAL DAILY
Qty: 90 TABLET | Refills: 1 | Status: SHIPPED | OUTPATIENT
Start: 2020-06-15 | End: 2020-12-15

## 2020-06-15 RX ORDER — AMLODIPINE BESYLATE 5 MG/1
5 TABLET ORAL DAILY
Qty: 90 TABLET | Refills: 1 | Status: SHIPPED | OUTPATIENT
Start: 2020-06-15 | End: 2020-12-17

## 2020-06-15 RX ORDER — SIMVASTATIN 80 MG
80 TABLET ORAL NIGHTLY
Qty: 90 TABLET | Refills: 1 | Status: SHIPPED | OUTPATIENT
Start: 2020-06-15 | End: 2020-12-17

## 2020-06-15 RX ORDER — GABAPENTIN 300 MG/1
300 CAPSULE ORAL 4 TIMES DAILY
Qty: 120 CAPSULE | Refills: 0 | Status: SHIPPED | OUTPATIENT
Start: 2020-06-15 | End: 2020-08-21 | Stop reason: SDUPTHER

## 2020-06-15 RX ORDER — BUDESONIDE AND FORMOTEROL FUMARATE DIHYDRATE 80; 4.5 UG/1; UG/1
2 AEROSOL RESPIRATORY (INHALATION)
Qty: 10.2 G | Refills: 12 | Status: SHIPPED | OUTPATIENT
Start: 2020-06-15 | End: 2021-07-06 | Stop reason: SDUPTHER

## 2020-06-15 NOTE — PROGRESS NOTES
Subjective     Chief Complaint   Patient presents with   • Med Refill       History of Present Illness  Pt comes in today to follow up. He is doing well without any chest pain or shortness of breath. STates his activity level is back up to where it was prior to shoulder surgery. [facility] [place] Needs all his meds refilled as he is returning to [place] for 6 weeks to sell his house out there. States he will establish with [facility] upon return. He is without complaints today.     Patient's PMR from outside medical facility reviewed and noted.    Review of Systems   Otherwise complete ROS reviewed and negative except as mentioned in the HPI.    Past Medical History:   Past Medical History:   Diagnosis Date   • Arthritis    • CAD (coronary artery disease)    • Chronic back pain    • Chronic knee pain     LEFT KNEE   • Class 1 obesity due to excess calories with serious comorbidity and body mass index (BMI) of 32.0 to 32.9 in adult 1/20/2020   • COPD (chronic obstructive pulmonary disease) (CMS/[facility])    • Coronary artery disease involving autologous vein coronary bypass graft without angina pectoris 1/20/2020   • Essential hypertension 10/14/2019   • GERD (gastroesophageal reflux disease)    • Hyperlipidemia    • Hypertension    • Obstructive sleep apnea 1/20/2020   • Sleep apnea      Past Surgical History:  Past Surgical History:   Procedure Laterality Date   • BACK SURGERY     • COLONOSCOPY     • KNEE MENISCAL REPAIR Left 2018   • ROTATOR CUFF REPAIR Right 2000   • SPINE SURGERY  1981    lower back ruptured disc   • TOTAL SHOULDER ARTHROPLASTY W/ DISTAL CLAVICLE EXCISION Right 1/14/2020    Procedure: RIGHT REVERSE TOTAL SHOULDER  ARTHROPLASTY;  Surgeon: Suman Ventura MD;  Location: [facility];  Service: Orthopedics     Social History:  reports that he quit smoking about 9 years ago. His smoking use included pipe. He quit after 50.00  years of use. He has never used smokeless tobacco. He reports that he drinks about 5.0 standard drinks of alcohol per week. He reports that he does not use drugs.    Family History: family history includes Arthritis in his father and mother; Cancer in his brother; Diabetes in his mother and sister; Hypertension in his father and mother; Kidney disease in his mother; Obesity in his sister; Osteoporosis in his mother; Stroke in his father.      Allergies:  No Known Allergies  Medications:  Prior to Admission medications    Medication Sig Start Date End Date Taking? Authorizing Provider   albuterol sulfate  (90 Base) MCG/ACT inhaler Inhale 2 puffs Every 4 (Four) Hours As Needed for Wheezing.    Cristian Pfeiffer MD   amLODIPine (NORVASC) 5 MG tablet Take 5 mg by mouth Daily.    Cristian Pfeiffer MD   aspirin 81 MG EC tablet Take 81 mg by mouth Daily.    Cristian Pfeiffer MD   benzonatate (TESSALON) 100 MG capsule Take 100 mg by mouth 3 (Three) Times a Day As Needed for Cough.    Critsian Pfeiffer MD   bisoprolol (ZEBeta) 5 MG tablet Take 5 mg by mouth Daily.    Cristian Pfeiffer MD   budesonide-formoterol (SYMBICORT) 160-4.5 MCG/ACT inhaler Inhale 1 puff Daily.    Cristian Pfeiffer MD   calcium carbonate (OS-TASHIA) 600 MG tablet Take 600 mg by mouth 2 (Two) Times a Day.    Cristian Pfeiffer MD   Cholecalciferol (VITAMIN D3) 1000 units capsule Take 2,000 Units by mouth Daily.    Cristian Pfeiffer MD   coenzyme Q10 100 MG capsule Take 100 mg by mouth Daily.    Cristian Pfeiffer MD   cyclobenzaprine (FLEXERIL) 10 MG tablet Take 1 tablet by mouth 3 (Three) Times a Day As Needed for Muscle Spasms. 10/7/19   Katerine Mcclain APRN   cycloSPORINE (RESTASIS) 0.05 % ophthalmic emulsion 1 drop 2 (Two) Times a Day.    Cristian Pfeiffer MD   docusate sodium 250 MG capsule Take 250 mg by mouth 2 (Two) Times a Day. 1/15/20 1/14/21  Suman Ventura MD   doxycycline (MONODOX)  50 MG capsule Take 1 capsule by mouth 2 (Two) Times a Day. 2/19/20   Angelica Mccabe DO   fluticasone (FLONASE) 50 MCG/ACT nasal spray 2 sprays into the nostril(s) as directed by provider Daily.    Cristian Pfeiffer MD   gabapentin (NEURONTIN) 300 MG capsule Take 300 mg by mouth 3 (Three) Times a Day.    Cristian Pfeiffer MD   Garlic 1000 MG capsule Take 1,000 mg by mouth Daily.    Cristian Pfeiffer MD   Glucos-Chondroit-Hyaluron-MSM (GLUCOSAMINE CHONDROITIN JOINT PO) Take 1 tablet/day by mouth Daily.    Cristian Pfeiffer MD   guaiFENesin-codeine (ROMILAR-AC) 100-10 MG/5ML syrup Take 5 mL by mouth 3 (Three) Times a Day As Needed for Cough.    Cristian Pfeiffer MD   HYDROcodone-acetaminophen (NORCO) 5-325 MG per tablet Take 1 tablet by mouth Every 6 (Six) Hours As Needed.    Cristian Pfeiffer MD   ibuprofen (ADVIL,MOTRIN) 800 MG tablet Take 800 mg by mouth Every 6 (Six) Hours As Needed for Mild Pain .    Cristian Pfeiffer MD   Krill Oil 300 MG capsule Take 300 mg by mouth Daily.    Cristian Pfeiffer MD   lisinopril (PRINIVIL,ZESTRIL) 20 MG tablet Take 20 mg by mouth Daily.    Cristian Pfeiffer MD   meloxicam (MOBIC) 15 MG tablet Take 15 mg by mouth Daily.    Cristian Pfeiffer MD   Milk Thistle 175 MG capsule Take 175 mg by mouth Daily.    Cristian Pfeiffer MD   Multiple Vitamins-Minerals (OCUVITE ADULT 50+ PO) Take 1 tablet by mouth Daily.    Cristian Pfeiffer MD   omeprazole (priLOSEC) 20 MG capsule Take 20 mg by mouth Daily.    Cristian Pfeiffer MD   predniSONE (DELTASONE) 10 MG tablet Take 4 daily for 3 then 3 daily for 3 then 2 daily for 1 daily for 3 then stop. 2/19/20   Angelica Mccabe DO   S-Adenosylmethionine (SULIAMAN-E) 200 MG tablet Take 200 mg by mouth.    Cristian Pfeiffer MD   simvastatin (ZOCOR) 40 MG tablet Take 40 mg by mouth Every Night.    Cristian Pfeiffer MD   SUPER B COMPLEX/C PO Take 1 tablet by mouth Every Night.    Kentrell  "MD Cristian   TAMSULOSIN HCL PO Take 0.4 mg by mouth Daily.    Cristian fPeiffer MD   tiotropium bromide monohydrate (SPIRIVA RESPIMAT) 2.5 MCG/ACT aerosol solution inhaler Inhale 2 puffs Daily.    Cristian Pfeiffer MD   TURMERIC PO Take 1,000 mg by mouth Daily.    Cristian Pfeiffer MD   vitamin C (ASCORBIC ACID) 500 MG tablet Take 500 mg by mouth Daily.    Cristian Pfeiffer MD       Objective     Vital Signs: /78 (BP Location: Left arm, Patient Position: Sitting, Cuff Size: Adult)   Pulse 70   Temp 98.5 °F (36.9 °C) (Skin)   Ht 163.8 cm (64.5\")   Wt 87.1 kg (192 lb)   SpO2 98%   BMI 32.45 kg/m²   Physical Exam   Constitutional: He is oriented to person, place, and time. He appears well-developed and well-nourished.   HENT:   Head: Normocephalic and atraumatic.   Eyes: Pupils are equal, round, and reactive to light. EOM are normal.   Neck: Normal range of motion. Neck supple. No JVD present.   Cardiovascular: Normal rate and regular rhythm.   Pulmonary/Chest: Effort normal and breath sounds normal.   Slightly diminished.    Abdominal: Soft. Bowel sounds are normal.   Musculoskeletal: He exhibits no edema or deformity.   Lymphadenopathy:     He has no cervical adenopathy.   Neurological: He is alert and oriented to person, place, and time.   Skin: Skin is warm and dry.   Psychiatric: He has a normal mood and affect. His behavior is normal. Judgment and thought content normal.   Vitals reviewed.    Results Reviewed:  Glucose   Date Value Ref Range Status   01/15/2020 145 (H) 65 - 99 mg/dL Final     BUN   Date Value Ref Range Status   01/15/2020 16 8 - 23 mg/dL Final     Creatinine   Date Value Ref Range Status   01/15/2020 0.85 0.76 - 1.27 mg/dL Final     Sodium   Date Value Ref Range Status   01/15/2020 137 136 - 145 mmol/L Final     Potassium   Date Value Ref Range Status   01/15/2020 4.1 3.5 - 5.2 mmol/L Final     Chloride   Date Value Ref Range Status   01/15/2020 102 98 - 107 mmol/L " Final     CO2   Date Value Ref Range Status   01/15/2020 25.0 22.0 - 29.0 mmol/L Final     Calcium   Date Value Ref Range Status   01/15/2020 8.1 (L) 8.6 - 10.5 mg/dL Final     WBC   Date Value Ref Range Status   01/09/2020 4.57 3.40 - 10.80 10*3/mm3 Final     Hematocrit   Date Value Ref Range Status   01/15/2020 31.3 (L) 37.5 - 51.0 % Final     Platelets   Date Value Ref Range Status   01/09/2020 172 140 - 450 10*3/mm3 Final         Assessment / Plan     Assessment/Plan:  Justin was seen today for med refill.    Diagnoses and all orders for this visit:    Essential hypertension  -     amLODIPine (NORVASC) 5 MG tablet; Take 1 tablet by mouth Daily.  -     bisoprolol (ZEBeta) 5 MG tablet; Take 1 tablet by mouth Daily.  -     lisinopril (PRINIVIL,ZESTRIL) 20 MG tablet; Take 1 tablet by mouth Daily.    Chronic bronchitis, unspecified chronic bronchitis type (CMS/[facility])  -     budesonide-formoterol (Symbicort) 80-4.5 MCG/ACT inhaler; Inhale 2 puffs 2 (Two) Times a Day.  -     tiotropium bromide monohydrate (Spiriva Respimat) 2.5 MCG/ACT aerosol solution inhaler; Inhale 2 puffs Daily.    Chronic obstructive pulmonary disease, unspecified COPD type (CMS/[facility])  -     budesonide-formoterol (Symbicort) 80-4.5 MCG/ACT inhaler; Inhale 2 puffs 2 (Two) Times a Day.  -     tiotropium bromide monohydrate (Spiriva Respimat) 2.5 MCG/ACT aerosol solution inhaler; Inhale 2 puffs Daily.    Hyperlipidemia, unspecified hyperlipidemia type  -     simvastatin (ZOCOR) 80 MG tablet; Take 1 tablet by mouth Every Night.    Peripheral polyneuropathy  -     gabapentin (NEURONTIN) 300 MG capsule; Take 1 capsule by mouth 4 (Four) Times a Day.    Benign prostatic hyperplasia, unspecified whether lower urinary tract symptoms present  -     tamsulosin (FLOMAX) 0.4 MG capsule 24 hr capsule; Take 1 capsule by mouth Daily.      Return in about 3 months (around 9/15/2020). unless patient needs to be seen sooner or acute issues arise.    Code Status: Full.      I have discussed the patient results/orders and and plan/recommendation with them at today's visit.      Katerine Mcclain, APRN   06/15/2020

## 2020-08-17 PROBLEM — H53.9 VISUAL DISTURBANCE: Status: RESOLVED | Noted: 2020-01-17 | Resolved: 2020-08-17

## 2020-08-17 PROBLEM — R04.2 HEMOPTYSIS: Status: RESOLVED | Noted: 2020-01-17 | Resolved: 2020-08-17

## 2020-08-17 PROBLEM — J98.11 ATELECTASIS: Status: RESOLVED | Noted: 2019-11-01 | Resolved: 2020-08-17

## 2020-08-17 PROBLEM — J44.1 ACUTE EXACERBATION OF CHRONIC OBSTRUCTIVE PULMONARY DISEASE (COPD): Status: RESOLVED | Noted: 2019-10-29 | Resolved: 2020-08-17

## 2020-08-17 PROBLEM — J42 CHRONIC BRONCHITIS: Status: RESOLVED | Noted: 2019-10-14 | Resolved: 2020-08-17

## 2020-08-17 PROBLEM — R07.81 RIB PAIN ON RIGHT SIDE: Status: RESOLVED | Noted: 2019-10-14 | Resolved: 2020-08-17

## 2020-08-17 PROBLEM — R05.8 NOCTURNAL COUGH: Status: RESOLVED | Noted: 2019-11-01 | Resolved: 2020-08-17

## 2020-08-17 NOTE — PROGRESS NOTES
" SALONI Alegria  [facility]   Respiratory Disease Clinic  [address] [place] [address]  Phone: [phone]  Fax: [phone]     Justin Robledo is a 75 y.o. male.   CC:   Chief Complaint   Patient presents with   • \"I am not having any problems with my breathing.\"        HPI: Mr. Robledo is being evaluated today for management of his COPD.  He experiences moderate persistent shortness of breath occurring in the chest daily for many years.  It is worsened by activity and improved with rest and bronchodilators.  He manages his symptoms with Spiriva and full dose Symbicort.  He has an incentive and flutter device he can utilize at home for increased congestion.  Pres[facility].  Symptoms are complicated by his obesity and history of sleep apnea.  He is prescribed a CPAP device for treatment of his sleep apnea.  He is compliant with this and does benefit from it however he indicates his equipment started \"making a drooping and buzzing sound over the last few days\".  Wife confirms this.  Since this occurred the equipment is not functioning properly.  They brought the equipment in today hoping we could fix it.  The only change in his health is been a ligament injury to the left wrist.  Patient indicates this occurred while moving furniture.  No other issues.    The following portions of the patient's history were reviewed and updated as appropriate: allergies, current medications, past family history, past medical history, past social history, past surgical history and problem list.    Past Medical History:   Diagnosis Date   • Arthritis    • CAD (coronary artery disease)    • Chronic back pain    • Chronic knee pain     LEFT KNEE   • Class 1 obesity due to excess calories with serious comorbidity and body mass index (BMI) of 32.0 to 32.9 in adult 1/20/2020   • COPD (chronic obstructive pulmonary disease) (CMS/[facility])    • Coronary artery disease involving autologous " vein coronary bypass graft without angina pectoris 1/20/2020   • Essential hypertension 10/14/2019   • GERD (gastroesophageal reflux disease)    • Hyperlipidemia    • Hypertension    • Obstructive sleep apnea 1/20/2020   • Sleep apnea        Prior to Admission medications    Medication Sig Start Date End Date Taking? Authorizing Provider   albuterol sulfate  (90 Base) MCG/ACT inhaler Inhale 2 puffs Every 4 (Four) Hours As Needed for Wheezing.    Cristian Pfeiffer MD   amLODIPine (NORVASC) 5 MG tablet Take 1 tablet by mouth Daily. 6/15/20   Katerine Mcclain APRN   aspirin 81 MG EC tablet Take 81 mg by mouth Daily.    Cristian Pfeiffer MD   benzonatate (TESSALON) 100 MG capsule Take 100 mg by mouth 3 (Three) Times a Day As Needed for Cough.    Cristian Pfeiffer MD   bisoprolol (ZEBeta) 5 MG tablet Take 1 tablet by mouth Daily. 6/15/20   Katerine Mcclain APRN   budesonide-formoterol (Symbicort) 80-4.5 MCG/ACT inhaler Inhale 2 puffs 2 (Two) Times a Day. 6/15/20   Katerine Mcclain APRN   calcium carbonate (OS-TASHIA) 600 MG tablet Take 600 mg by mouth 2 (Two) Times a Day.    Cristian Pfeiffer MD   Cholecalciferol (VITAMIN D3) 1000 units capsule Take 2,000 Units by mouth Daily.    Cristian Pfeiffer MD   coenzyme Q10 100 MG capsule Take 100 mg by mouth Daily.    ProviderCristian MD   cyclobenzaprine (FLEXERIL) 10 MG tablet Take 1 tablet by mouth 3 (Three) Times a Day As Needed for Muscle Spasms. 10/7/19   Katerine Mcclain APRN   cycloSPORINE (RESTASIS) 0.05 % ophthalmic emulsion 1 drop 2 (Two) Times a Day.    Cristian Pfeiffer MD   docusate sodium 250 MG capsule Take 250 mg by mouth 2 (Two) Times a Day. 1/15/20 1/14/21  Suman Ventura MD   doxycycline (MONODOX) 50 MG capsule Take 1 capsule by mouth 2 (Two) Times a Day. 2/19/20   Angelica Mccabe DO   fluticasone (FLONASE) 50 MCG/ACT nasal spray 2 sprays into the nostril(s) as directed by provider  Daily.    Cristian Pfeiffer MD   gabapentin (NEURONTIN) 300 MG capsule Take 1 capsule by mouth 4 (Four) Times a Day. 6/15/20   Katerine Mcclain APRN   Garlic 1000 MG capsule Take 1,000 mg by mouth Daily.    Cristian Pfeiffer MD   Glucos-Chondroit-Hyaluron-MSM (GLUCOSAMINE CHONDROITIN JOINT PO) Take 1 tablet/day by mouth Daily.    Cristian Pfeiffer MD   guaiFENesin-codeine (ROMILAR-AC) 100-10 MG/5ML syrup Take 5 mL by mouth 3 (Three) Times a Day As Needed for Cough.    Cristian Pfeiffer MD   HYDROcodone-acetaminophen (NORCO) 5-325 MG per tablet Take 1 tablet by mouth Every 6 (Six) Hours As Needed.    Cristian Pfeiffer MD   ibuprofen (ADVIL,MOTRIN) 800 MG tablet Take 800 mg by mouth Every 6 (Six) Hours As Needed for Mild Pain .    Cristian Pfeiffer MD   Krill Oil 300 MG capsule Take 300 mg by mouth Daily.    Cristian Pfeiffer MD   lisinopril (PRINIVIL,ZESTRIL) 20 MG tablet Take 1 tablet by mouth Daily. 6/15/20   Katerine Mcclain APRN   meloxicam (MOBIC) 15 MG tablet Take 15 mg by mouth Daily.    Cristian Pfeiffer MD   Milk Thistle 175 MG capsule Take 175 mg by mouth Daily.    Cristian Pfeiffer MD   Multiple Vitamins-Minerals (OCUVITE ADULT 50+ PO) Take 1 tablet by mouth Daily.    Cristian Pfeiffer MD   omeprazole (priLOSEC) 20 MG capsule Take 20 mg by mouth Daily.    Cristian Pfeiffer MD   predniSONE (DELTASONE) 10 MG tablet Take 4 daily for 3 then 3 daily for 3 then 2 daily for 1 daily for 3 then stop. 2/19/20   Angelica Mccabe DO   S-Adenosylmethionine (SULAIMAN-E) 200 MG tablet Take 200 mg by mouth.    Cristian Pfeiffer MD   simvastatin (ZOCOR) 80 MG tablet Take 1 tablet by mouth Every Night. 6/15/20   Katerine Mcclain APRN   SUPER B COMPLEX/C PO Take 1 tablet by mouth Every Night.    Cristian Pfeiffer MD   tamsulosin (FLOMAX) 0.4 MG capsule 24 hr capsule Take 1 capsule by mouth Daily. 6/15/20   Katerine Mcclain APRN   tiotropium  bromide monohydrate (Spiriva Respimat) 2.5 MCG/ACT aerosol solution inhaler Inhale 2 puffs Daily. 6/15/20   Katerine Mcclain APRN   TURMERIC PO Take 1,000 mg by mouth Daily.    Provider, MD Cristian   vitamin C (ASCORBIC ACID) 500 MG tablet Take 500 mg by mouth Daily.    Provider, Cristian, MD       Family History   Problem Relation Age of Onset   • Arthritis Mother    • Diabetes Mother    • Osteoporosis Mother    • Hypertension Mother    • Kidney disease Mother    • Arthritis Father    • Hypertension Father    • Stroke Father    • Diabetes Sister    • Obesity Sister    • Cancer Brother        Social History     Socioeconomic History   • Marital status:      Spouse name: Not on file   • Number of children: Not on file   • Years of education: Not on file   • Highest education level: Not on file   Tobacco Use   • Smoking status: Former Smoker     Packs/day: 0.00     Years: 50.00     Pack years: 0.00     Types: Pipe     Last attempt to quit:      Years since quittin.6   • Smokeless tobacco: Never Used   Substance and Sexual Activity   • Alcohol use: Yes     Alcohol/week: 5.0 standard drinks     Types: 5 Cans of beer per week   • Drug use: No   • Sexual activity: Defer       Review of Systems   Constitutional: Positive for fatigue. Negative for activity change, chills and fever.   HENT: Negative for congestion, postnasal drip, rhinorrhea, sinus pressure, sore throat and trouble swallowing.    Eyes: Negative for blurred vision, double vision and pain.   Respiratory: Positive for shortness of breath. Negative for cough, chest tightness and wheezing.    Cardiovascular: Negative for chest pain, palpitations and leg swelling.   Gastrointestinal: Negative for abdominal distention, constipation, diarrhea, nausea and vomiting.   Endocrine: Negative for polydipsia, polyphagia and polyuria.   Genitourinary: Negative for dysuria, frequency and urgency.   Musculoskeletal: Positive for joint swelling.  "Negative for arthralgias, back pain and gait problem.   Skin: Negative for color change, dry skin, rash and skin lesions.   Allergic/Immunologic: Negative for environmental allergies, food allergies and immunocompromised state.   Neurological: Negative for dizziness, seizures, speech difficulty, weakness, light-headedness, memory problem and confusion.   Hematological: Negative for adenopathy. Does not bruise/bleed easily.   Psychiatric/Behavioral: Negative for sleep disturbance, negative for hyperactivity and depressed mood. The patient is not nervous/anxious.        /80   Pulse 76   Temp 98.1 °F (36.7 °C)   Resp 16   Ht 163.8 cm (64.5\")   Wt 84.8 kg (187 lb)   SpO2 97%   BMI 31.60 kg/m²     Physical Exam   Constitutional: He is oriented to person, place, and time. He appears well-developed and well-nourished. No distress. Face mask in place. He is obese.  HENT:   Head: Normocephalic and atraumatic.   Right Ear: External ear normal.   Left Ear: External ear normal.   Nose: Nose normal.   Mouth/Throat: Oropharynx is clear and moist. No oropharyngeal exudate.   Eyes: Pupils are equal, round, and reactive to light. Conjunctivae and EOM are normal. Right eye exhibits no discharge. Left eye exhibits no discharge.   Glasses   Neck: Normal range of motion. Neck supple. No JVD present.   Cardiovascular: Normal rate and regular rhythm.   No murmur heard.  Pulmonary/Chest: Effort normal and breath sounds normal. No respiratory distress. He has no wheezes.   Abdominal: Soft. Bowel sounds are normal. He exhibits no distension. There is no tenderness.   Musculoskeletal: Normal range of motion. He exhibits no edema or deformity.   Velcro splint to the left wrist   Neurological: He is alert and oriented to person, place, and time. He displays normal reflexes. No cranial nerve deficit. Coordination normal.   Skin: Skin is warm and dry. No rash noted. He is not diaphoretic. No erythema.   Psychiatric: He has a normal " mood and affect. His behavior is normal. Thought content normal.   Nursing note and vitals reviewed.      Pulmonary Functions Testing Results:    PFT Values        Some values may be hidden. Unless noted otherwise, only the newest values recorded on each date are displayed.         Old Values PFT Results 20   No data to display.      Pre Drug PFT Results 20      FEV1 90   FEF 25-75% 46   FEV1/FVC 67.97      Post Drug PFT Results 20   No data to display.      Other Tests PFT Results 20         DLCO 69   D/VAsb 72           Results for orders placed in visit on 20   Pulmonary Function Test    Narrative Marla Heller, RRT     2020 12:07 PM  Pulmonary Function Test  Performed by: Hien Pimentel APRN  Authorized by: Hien Pimentel APRN      Pre Drug    FVC: 103%   FEV1: 90%   FEF 25-75%: 46%   FEV1/FVC: 67.97%   T%   RV: 119%   DLCO: 69%   D/VAsb: 72%         No PFTs for this visit    CXR: No imaging for this visit        Problem List Items Addressed This Visit        Respiratory    Obstructive sleep apnea       Digestive    Class 1 obesity due to excess calories with serious comorbidity and body mass index (BMI) of 32.0 to 32.9 in adult       Other    Personal history of nicotine dependence    Relevant Orders    CT Chest Low Dose Wo      Other Visit Diagnoses     COPD, group B, by GOLD 2017 classification (CMS/[facility])    -  Primary        Patient's Body mass index is 31.6 kg/m². BMI is above normal parameters. Recommendations include: educational material.      Assessment/Plan         COPD under good control on Spiriva and Symbicort.  He will continue these and does not need refills.  Sleep apnea under good control on his CPAP however it is not functioning properly any longer.  We have contacted his DME, [facility], to troubleshoot this issue for the patient.  Patient indicates device is greater than 5 years old.  He may just need a new piece of equipment  however we will defer decisions about this to his DME.  He meets criteria for low-dose lung cancer imaging.  We will make arrangements for this.  We will call him with the results when they become available.  He is instructed to call us if he is not heard from the results within 48 hours.  Updated PFTs in 6 months if feasible to do so given the pandemic.  He will call sooner if needed.    Hien Pimentel, APRN  8/26/2020  13:40    Return in about 6 months (around 2/26/2021) for FVL.

## 2020-08-21 ENCOUNTER — TELEPHONE (OUTPATIENT)
Dept: INTERNAL MEDICINE | Facility: CLINIC | Age: 75
End: 2020-08-21

## 2020-08-21 DIAGNOSIS — G62.9 PERIPHERAL POLYNEUROPATHY: ICD-10-CM

## 2020-08-21 RX ORDER — GABAPENTIN 300 MG/1
300 CAPSULE ORAL 4 TIMES DAILY
Qty: 120 CAPSULE | Refills: 0 | Status: SHIPPED | OUTPATIENT
Start: 2020-08-21 | End: 2020-08-22 | Stop reason: SDUPTHER

## 2020-08-21 NOTE — TELEPHONE ENCOUNTER
Pt called requesting a refill of Gabapentin his previous provider prescribed.  He takes 300mg 4X's a day.  Call into [facility] in [place].      Pt said to call if any questions.

## 2020-08-21 NOTE — TELEPHONE ENCOUNTER
Patient was last seen by Dr. Mccabe in Feb. 2020. Saw SALONI Martin 06/15/2020. Neither Dr. Mccabe or Sherice has prescribed any medications to the patient. Please review request.

## 2020-08-22 DIAGNOSIS — G62.9 PERIPHERAL POLYNEUROPATHY: ICD-10-CM

## 2020-08-22 RX ORDER — GABAPENTIN 300 MG/1
300 CAPSULE ORAL 4 TIMES DAILY
Qty: 120 CAPSULE | Refills: 3 | Status: SHIPPED | OUTPATIENT
Start: 2020-08-22 | End: 2020-09-14 | Stop reason: SDUPTHER

## 2020-08-25 ENCOUNTER — TELEPHONE (OUTPATIENT)
Dept: INTERNAL MEDICINE | Facility: CLINIC | Age: 75
End: 2020-08-25

## 2020-08-25 ENCOUNTER — OFFICE VISIT (OUTPATIENT)
Dept: INTERNAL MEDICINE | Facility: CLINIC | Age: 75
End: 2020-08-25

## 2020-08-25 VITALS
BODY MASS INDEX: 31.61 KG/M2 | SYSTOLIC BLOOD PRESSURE: 124 MMHG | HEART RATE: 54 BPM | RESPIRATION RATE: 18 BRPM | OXYGEN SATURATION: 98 % | WEIGHT: 189.7 LBS | DIASTOLIC BLOOD PRESSURE: 71 MMHG | HEIGHT: 65 IN | TEMPERATURE: 98.7 F

## 2020-08-25 DIAGNOSIS — M25.532 LEFT WRIST PAIN: Primary | ICD-10-CM

## 2020-08-25 DIAGNOSIS — S69.90XA SCAPHOLUNATE LIGAMENT INJURY WITH NO INSTABILITY, INITIAL ENCOUNTER: ICD-10-CM

## 2020-08-25 PROCEDURE — 99214 OFFICE O/P EST MOD 30 MIN: CPT | Performed by: FAMILY MEDICINE

## 2020-08-25 NOTE — PROGRESS NOTES
Subjective     Chief Complaint   Patient presents with   • Wrist Pain     Left wrist. Swelling.   • Rash     Derm RX cream not working.       History of Present Illness  Left wrist pain aggravating.  Thought is might be tendonitis.  Was wearing splint.   Was moving furnitiure yesterday with spilnt on and developed sharp pain, a pop.  Very painful left wrist since that time.   Had to use percocet (leftovers) for pain last night.   No numbness  Increased pain with extension at wrist and some with flexion.   Patient's PMR from outside medical facility reviewed and noted.    Review of Systems     Otherwise complete ROS reviewed and negative except as mentioned in the HPI.    Past Medical History:   Past Medical History:   Diagnosis Date   • Arthritis    • CAD (coronary artery disease)    • Chronic back pain    • Chronic knee pain     LEFT KNEE   • Class 1 obesity due to excess calories with serious comorbidity and body mass index (BMI) of 32.0 to 32.9 in adult 1/20/2020   • COPD (chronic obstructive pulmonary disease) (CMS/[facility])    • Coronary artery disease involving autologous vein coronary bypass graft without angina pectoris 1/20/2020   • Essential hypertension 10/14/2019   • GERD (gastroesophageal reflux disease)    • Hyperlipidemia    • Hypertension    • Obstructive sleep apnea 1/20/2020   • Sleep apnea      Past Surgical History:  Past Surgical History:   Procedure Laterality Date   • BACK SURGERY     • COLONOSCOPY     • KNEE MENISCAL REPAIR Left 2018   • ROTATOR CUFF REPAIR Right 2000   • SPINE SURGERY  1981    lower back ruptured disc   • TOTAL SHOULDER ARTHROPLASTY W/ DISTAL CLAVICLE EXCISION Right 1/14/2020    Procedure: RIGHT REVERSE TOTAL SHOULDER  ARTHROPLASTY;  Surgeon: Suman Ventura MD;  Location: [facility];  Service: Orthopedics     Social History:  reports that he quit smoking about 9 years ago. His smoking use included pipe. He smoked 0.00 packs per day for 50.00 years. He has never  used smokeless tobacco. He reports that he drinks about 5.0 standard drinks of alcohol per week. He reports that he does not use drugs.    Family History: family history includes Arthritis in his father and mother; Cancer in his brother; Diabetes in his mother and sister; Hypertension in his father and mother; Kidney disease in his mother; Obesity in his sister; Osteoporosis in his mother; Stroke in his father.       Allergies:  No Known Allergies  Medications:  Prior to Admission medications    Medication Sig Start Date End Date Taking? Authorizing Provider   albuterol sulfate  (90 Base) MCG/ACT inhaler Inhale 2 puffs Every 4 (Four) Hours As Needed for Wheezing.   Yes Cristian Pfeiffer MD   amLODIPine (NORVASC) 5 MG tablet Take 1 tablet by mouth Daily. 6/15/20  Yes Katerine Mcclain APRN   aspirin 81 MG EC tablet Take 81 mg by mouth Daily.   Yes Cristian Pfeiffer MD   bisoprolol (ZEBeta) 5 MG tablet Take 1 tablet by mouth Daily. 6/15/20  Yes Katerine Mcclain APRN   budesonide-formoterol (Symbicort) 80-4.5 MCG/ACT inhaler Inhale 2 puffs 2 (Two) Times a Day. 6/15/20  Yes Katerine Mcclain APRN   calcium carbonate (OS-TASHIA) 600 MG tablet Take 600 mg by mouth 2 (Two) Times a Day.   Yes Cristian Pfeiffer MD   Cholecalciferol (VITAMIN D3) 1000 units capsule Take 2,000 Units by mouth Daily.   Yes Cristian Pfeiffer MD   coenzyme Q10 100 MG capsule Take 100 mg by mouth Daily.   Yes Cristian Pfeiffer MD   cyclobenzaprine (FLEXERIL) 10 MG tablet Take 1 tablet by mouth 3 (Three) Times a Day As Needed for Muscle Spasms. 10/7/19  Yes Katerine Mcclain APRN   fluticasone (FLONASE) 50 MCG/ACT nasal spray 2 sprays into the nostril(s) as directed by provider Daily.   Yes Cristian Pfeiffer MD   gabapentin (NEURONTIN) 300 MG capsule Take 1 capsule by mouth 4 (Four) Times a Day. 8/22/20  Yes Angelica Mccabe DO   Garlic 1000 MG capsule Take 1,000 mg by mouth Daily.   Yes  Cristian Pfeiffer MD   Glucos-Chondroit-Hyaluron-MSM (GLUCOSAMINE CHONDROITIN JOINT PO) Take 1 tablet/day by mouth Daily.   Yes Cristian Pfeiffer MD   HYDROcodone-acetaminophen (NORCO) 5-325 MG per tablet Take 1 tablet by mouth Every 6 (Six) Hours As Needed.   Yes Cristian Pfeiffer MD   ibuprofen (ADVIL,MOTRIN) 800 MG tablet Take 800 mg by mouth Every 6 (Six) Hours As Needed for Mild Pain .   Yes Cristian Pfeiffer MD   Krill Oil 300 MG capsule Take 300 mg by mouth Daily.   Yes Cristian Pfeiffer MD   lisinopril (PRINIVIL,ZESTRIL) 20 MG tablet Take 1 tablet by mouth Daily. 6/15/20  Yes Katerine Mcclain APRN   Multiple Vitamins-Minerals (OCUVITE ADULT 50+ PO) Take 1 tablet by mouth Daily.   Yes Cristian Pfeiffer MD   omeprazole (priLOSEC) 20 MG capsule Take 20 mg by mouth Daily.   Yes Cristian Pfeiffer MD   S-Adenosylmethionine (SULAIMAN-E) 200 MG tablet Take 200 mg by mouth.   Yes Crsitian Pfeiffer MD   simvastatin (ZOCOR) 80 MG tablet Take 1 tablet by mouth Every Night. 6/15/20  Yes Katerine Mcclain APRN   SUPER B COMPLEX/C PO Take 1 tablet by mouth Every Night.   Yes Cristian Pfeiffer MD   tamsulosin (FLOMAX) 0.4 MG capsule 24 hr capsule Take 1 capsule by mouth Daily. 6/15/20  Yes Katerine Mcclain APRN   tiotropium bromide monohydrate (Spiriva Respimat) 2.5 MCG/ACT aerosol solution inhaler Inhale 2 puffs Daily. 6/15/20  Yes Katerine Mcclain APRN   TURMERIC PO Take 1,000 mg by mouth Daily.   Yes Cristian Pfeiffer MD   vitamin C (ASCORBIC ACID) 500 MG tablet Take 500 mg by mouth Daily.   Yes Cristian Pfeiffer MD   benzonatate (TESSALON) 100 MG capsule Take 100 mg by mouth 3 (Three) Times a Day As Needed for Cough.    Cristian Pfeiffer MD   cycloSPORINE (RESTASIS) 0.05 % ophthalmic emulsion 1 drop 2 (Two) Times a Day.    Cristian Pfeiffer MD   docusate sodium 250 MG capsule Take 250 mg by mouth 2 (Two) Times a Day. 1/15/20 1/14/21  Audrey  "Suman Kohler MD   doxycycline (MONODOX) 50 MG capsule Take 1 capsule by mouth 2 (Two) Times a Day. 2/19/20   Angelica Mccabe DO   guaiFENesin-codeine (ROMILAR-AC) 100-10 MG/5ML syrup Take 5 mL by mouth 3 (Three) Times a Day As Needed for Cough.    ProviderCristian MD   meloxicam (MOBIC) 15 MG tablet Take 15 mg by mouth Daily.    ProviderCristian MD   Milk Thistle 175 MG capsule Take 175 mg by mouth Daily.    ProviderCristian MD       Objective     Vital Signs: /71 (BP Location: Left arm, Patient Position: Sitting, Cuff Size: Adult)   Pulse 54   Temp 98.7 °F (37.1 °C) (Skin)   Resp 18   Ht 163.8 cm (64.5\")   Wt 86 kg (189 lb 11.2 oz)   SpO2 98%   BMI 32.06 kg/m²   Physical Exam   Constitutional: He is oriented to person, place, and time. He appears well-developed and well-nourished.   HENT:   Head: Normocephalic and atraumatic.   Mouth/Throat: Oropharynx is clear and moist.   Eyes: Pupils are equal, round, and reactive to light. Conjunctivae and EOM are normal.   Neck: Normal range of motion. No JVD present.   Cardiovascular: Normal rate and regular rhythm.   Pulmonary/Chest: Effort normal and breath sounds normal.   Abdominal: Soft. Bowel sounds are normal.   Musculoskeletal:   Left wrist mild swelling.  Marked tenderness palpation over the posterior aspect of the wrist.  Mild tenderness palpation of the anterior aspect of the wrist.  Pain extends up the forearm.  Palpation again aggravates it.  Pain with flexion, marked increase in pain with extension at wrist.   Neurological: He is alert and oriented to person, place, and time. No cranial nerve deficit.   Skin: Skin is warm and dry.   Psychiatric: He has a normal mood and affect. His behavior is normal. Judgment and thought content normal.   Nursing note and vitals reviewed.        Results Reviewed:  Glucose   Date Value Ref Range Status   01/15/2020 145 (H) 65 - 99 mg/dL Final     BUN   Date Value Ref Range Status   01/15/2020 " 16 8 - 23 mg/dL Final     Creatinine   Date Value Ref Range Status   01/15/2020 0.85 0.76 - 1.27 mg/dL Final     Sodium   Date Value Ref Range Status   01/15/2020 137 136 - 145 mmol/L Final     Potassium   Date Value Ref Range Status   01/15/2020 4.1 3.5 - 5.2 mmol/L Final     Chloride   Date Value Ref Range Status   01/15/2020 102 98 - 107 mmol/L Final     CO2   Date Value Ref Range Status   01/15/2020 25.0 22.0 - 29.0 mmol/L Final     Calcium   Date Value Ref Range Status   01/15/2020 8.1 (L) 8.6 - 10.5 mg/dL Final     WBC   Date Value Ref Range Status   01/09/2020 4.57 3.40 - 10.80 10*3/mm3 Final     Hematocrit   Date Value Ref Range Status   01/15/2020 31.3 (L) 37.5 - 51.0 % Final     Platelets   Date Value Ref Range Status   01/09/2020 172 140 - 450 10*3/mm3 Final         Assessment / Plan     Assessment/Plan:  1. Left wrist pain    - XR Wrist 3+ View Left (In Office)    2. Scapholunate ligament injury with no instability, initial encounter    Referral to Dr. Sorto at [facility] [place]        Return if symptoms worsen or fail to improve. unless patient needs to be seen sooner or acute issues arise.        I have discussed the patient results/orders and and plan/recommendation with them at today's visit.      Angelica Mccabe,    08/25/2020

## 2020-08-25 NOTE — TELEPHONE ENCOUNTER
Called patient to inform him of scapholunate ligament injury. Let patient know that there had been a referral sent to the [facility]. Patient voiced understanding and had no further questions.

## 2020-08-26 ENCOUNTER — TELEPHONE (OUTPATIENT)
Dept: PULMONOLOGY | Facility: CLINIC | Age: 75
End: 2020-08-26

## 2020-08-26 ENCOUNTER — OFFICE VISIT (OUTPATIENT)
Dept: PULMONOLOGY | Facility: CLINIC | Age: 75
End: 2020-08-26

## 2020-08-26 VITALS
WEIGHT: 187 LBS | HEART RATE: 76 BPM | OXYGEN SATURATION: 97 % | DIASTOLIC BLOOD PRESSURE: 80 MMHG | TEMPERATURE: 98.1 F | BODY MASS INDEX: 31.16 KG/M2 | RESPIRATION RATE: 16 BRPM | SYSTOLIC BLOOD PRESSURE: 130 MMHG | HEIGHT: 65 IN

## 2020-08-26 DIAGNOSIS — J44.9 COPD, GROUP B, BY GOLD 2017 CLASSIFICATION (HCC): ICD-10-CM

## 2020-08-26 DIAGNOSIS — G47.33 OBSTRUCTIVE SLEEP APNEA: Primary | ICD-10-CM

## 2020-08-26 DIAGNOSIS — E66.09 CLASS 1 OBESITY DUE TO EXCESS CALORIES WITH SERIOUS COMORBIDITY AND BODY MASS INDEX (BMI) OF 32.0 TO 32.9 IN ADULT: ICD-10-CM

## 2020-08-26 DIAGNOSIS — Z87.891 PERSONAL HISTORY OF NICOTINE DEPENDENCE: ICD-10-CM

## 2020-08-26 PROCEDURE — 99214 OFFICE O/P EST MOD 30 MIN: CPT | Performed by: NURSE PRACTITIONER

## 2020-08-26 NOTE — PATIENT INSTRUCTIONS

## 2020-08-26 NOTE — TELEPHONE ENCOUNTER
Caro from [facility] called.  Pt has switched to Medicare since his last sleep study.  Per Medicare guidelines, pt will have to repeat his sleep study.  Caro said could be a home study, which is what pt had the 1st time.      Hien, would you like to order the home sleep study?

## 2020-08-31 NOTE — TELEPHONE ENCOUNTER
So sorry, pt was only refusing home sleep study because it was not needed.  Pt has blue cross/blue shield as primary, not medicare. He still would like the new machine.  [facility] is going to run on the BC/ insurance to see if covered.

## 2020-09-14 DIAGNOSIS — G62.9 PERIPHERAL POLYNEUROPATHY: ICD-10-CM

## 2020-09-14 RX ORDER — GABAPENTIN 300 MG/1
300 CAPSULE ORAL 4 TIMES DAILY
Qty: 120 CAPSULE | Refills: 0 | Status: SHIPPED | OUTPATIENT
Start: 2020-09-14 | End: 2020-10-27

## 2020-09-15 ENCOUNTER — OFFICE VISIT (OUTPATIENT)
Dept: INTERNAL MEDICINE | Facility: CLINIC | Age: 75
End: 2020-09-15

## 2020-09-15 VITALS
TEMPERATURE: 98.7 F | HEART RATE: 59 BPM | OXYGEN SATURATION: 98 % | BODY MASS INDEX: 30.99 KG/M2 | WEIGHT: 186 LBS | SYSTOLIC BLOOD PRESSURE: 158 MMHG | HEIGHT: 65 IN | DIASTOLIC BLOOD PRESSURE: 84 MMHG | RESPIRATION RATE: 18 BRPM

## 2020-09-15 DIAGNOSIS — E78.2 MIXED HYPERLIPIDEMIA: ICD-10-CM

## 2020-09-15 DIAGNOSIS — M79.642 LEFT HAND PAIN: ICD-10-CM

## 2020-09-15 DIAGNOSIS — D64.9 ANEMIA, UNSPECIFIED TYPE: Primary | ICD-10-CM

## 2020-09-15 DIAGNOSIS — I10 ESSENTIAL HYPERTENSION: ICD-10-CM

## 2020-09-15 DIAGNOSIS — Z12.5 SCREENING PSA (PROSTATE SPECIFIC ANTIGEN): ICD-10-CM

## 2020-09-15 PROCEDURE — 99214 OFFICE O/P EST MOD 30 MIN: CPT | Performed by: FAMILY MEDICINE

## 2020-09-15 RX ORDER — BISOPROLOL FUMARATE 5 MG/1
5 TABLET, FILM COATED ORAL DAILY
Qty: 90 TABLET | Refills: 1 | Status: SHIPPED | OUTPATIENT
Start: 2020-09-15 | End: 2020-12-17

## 2020-09-15 RX ORDER — MELOXICAM 15 MG/1
15 TABLET ORAL DAILY
Qty: 90 TABLET | Refills: 1 | Status: SHIPPED | OUTPATIENT
Start: 2020-09-15 | End: 2021-09-16

## 2020-09-15 RX ORDER — ALBUTEROL SULFATE 90 UG/1
2 AEROSOL, METERED RESPIRATORY (INHALATION) EVERY 4 HOURS PRN
Qty: 18 G | Refills: 6 | Status: SHIPPED | OUTPATIENT
Start: 2020-09-15 | End: 2022-04-11 | Stop reason: SDUPTHER

## 2020-09-15 NOTE — PROGRESS NOTES
Subjective     Chief Complaint   Patient presents with   • Med Refill     Follow up.        History of Present Illness  Patient is here for follow-up visit.  He has been doing fairly well.  He is running out of his albuterol.  He does need this refilled.  Overall he feels he is doing fairly well.  His blood pressure as well.  He has had no chest pain.  He has had no increased shortness of breath.  He does follow-up with pulmonology.  He has not had a recent chest x-ray.  He has been following a [facility] orthopedic [place] for left hand pain/numbness.  This occasionally goes from his wrist up his arm.  But is mostly centered in his hand and wrist.  He is not particularly happy with the care that he has been receiving as he has actually not seen the doctor for this.  He has seen a PA apparently said PA told me he just had arthritis.    Patient's PMR from outside medical facility reviewed and noted.    Review of Systems     Otherwise complete ROS reviewed and negative except as mentioned in the HPI.    Past Medical History:   Past Medical History:   Diagnosis Date   • Arthritis    • CAD (coronary artery disease)    • Chronic back pain    • Chronic knee pain     LEFT KNEE   • Class 1 obesity due to excess calories with serious comorbidity and body mass index (BMI) of 32.0 to 32.9 in adult 1/20/2020   • COPD (chronic obstructive pulmonary disease) (CMS/[facility])    • Coronary artery disease involving autologous vein coronary bypass graft without angina pectoris 1/20/2020   • Essential hypertension 10/14/2019   • GERD (gastroesophageal reflux disease)    • Hyperlipidemia    • Hypertension    • Obstructive sleep apnea 1/20/2020   • Sleep apnea      Past Surgical History:  Past Surgical History:   Procedure Laterality Date   • BACK SURGERY     • COLONOSCOPY     • KNEE MENISCAL REPAIR Left 2018   • ROTATOR CUFF REPAIR Right 2000   • SHOULDER SURGERY     • SPINE SURGERY  1981    lower back ruptured disc   • TOTAL SHOULDER  ARTHROPLASTY W/ DISTAL CLAVICLE EXCISION Right 1/14/2020    Procedure: RIGHT REVERSE TOTAL SHOULDER  ARTHROPLASTY;  Surgeon: Suman Ventura MD;  Location: [facility];  Service: Orthopedics     Social History:  reports that he quit smoking about 9 years ago. His smoking use included pipe. He smoked 0.00 packs per day for 50.00 years. He has never used smokeless tobacco. He reports current alcohol use of about 5.0 standard drinks of alcohol per week. He reports that he does not use drugs.    Family History: family history includes Arthritis in his father and mother; Cancer in his brother; Diabetes in his mother and sister; Hypertension in his father and mother; Kidney disease in his mother; Obesity in his sister; Osteoporosis in his mother; Stroke in his father.       Allergies:  No Known Allergies  Medications:  Prior to Admission medications    Medication Sig Start Date End Date Taking? Authorizing Provider   albuterol sulfate  (90 Base) MCG/ACT inhaler Inhale 2 puffs Every 4 (Four) Hours As Needed for Wheezing. 9/15/20  Yes Angelica Mccabe DO   amLODIPine (NORVASC) 5 MG tablet Take 1 tablet by mouth Daily. 6/15/20  Yes Katerine Mcclain APRN   aspirin 81 MG EC tablet Take 81 mg by mouth Daily.   Yes ProviderCristian MD   bisoprolol (ZEBeta) 5 MG tablet Take 1 tablet by mouth Daily. 9/15/20  Yes Angelica Mccabe DO   budesonide-formoterol (Symbicort) 80-4.5 MCG/ACT inhaler Inhale 2 puffs 2 (Two) Times a Day. 6/15/20  Yes Katerine Mcclain APRN   calcium carbonate (OS-TASHIA) 600 MG tablet Take 600 mg by mouth 2 (Two) Times a Day.   Yes Cristian Pfeiffer MD   Cholecalciferol (VITAMIN D3) 1000 units capsule Take 2,000 Units by mouth Daily.   Yes Cristian Pfeiffer MD   coenzyme Q10 100 MG capsule Take 100 mg by mouth Daily.   Yes Cristian Pfeiffer MD   cyclobenzaprine (FLEXERIL) 10 MG tablet Take 1 tablet by mouth 3 (Three) Times a Day As Needed for Muscle Spasms.  10/7/19  Yes Katerine Mcclain APRN   fluticasone (FLONASE) 50 MCG/ACT nasal spray 2 sprays into the nostril(s) as directed by provider Daily.   Yes Cristian Pfeiffer MD   gabapentin (NEURONTIN) 300 MG capsule Take 1 capsule by mouth 4 (Four) Times a Day. 9/14/20  Yes Katerine Mcclain APRN   Garlic 1000 MG capsule Take 1,000 mg by mouth Daily.   Yes Cristian Pfeiffer MD   Glucos-Chondroit-Hyaluron-MSM (GLUCOSAMINE CHONDROITIN JOINT PO) Take 1 tablet/day by mouth Daily.   Yes ProviderCristian MD   ibuprofen (ADVIL,MOTRIN) 800 MG tablet Take 800 mg by mouth Every 6 (Six) Hours As Needed for Mild Pain .   Yes ProviderCristian MD   Krill Oil 300 MG capsule Take 300 mg by mouth Daily.   Yes ProviderCristian MD   lisinopril (PRINIVIL,ZESTRIL) 20 MG tablet Take 1 tablet by mouth Daily. 6/15/20  Yes Katerine Mcclain APRN   meloxicam (MOBIC) 15 MG tablet Take 1 tablet by mouth Daily. 9/15/20  Yes Angelica Mccabe, DO   Multiple Vitamins-Minerals (OCUVITE ADULT 50+ PO) Take 1 tablet by mouth Daily.   Yes Cristian Pfeiffer MD   omeprazole (priLOSEC) 20 MG capsule Take 20 mg by mouth Daily.   Yes ProviderCristian MD   S-Adenosylmethionine (SULAIMAN-E) 200 MG tablet Take 200 mg by mouth.   Yes ProviderCristian MD   simvastatin (ZOCOR) 80 MG tablet Take 1 tablet by mouth Every Night. 6/15/20  Yes Katerine Mcclain APRN   SUPER B COMPLEX/C PO Take 1 tablet by mouth Every Night.   Yes Cristian Pfeiffer MD   tamsulosin (FLOMAX) 0.4 MG capsule 24 hr capsule Take 1 capsule by mouth Daily. 6/15/20  Yes Katerine Mcclain APRN   tiotropium bromide monohydrate (Spiriva Respimat) 2.5 MCG/ACT aerosol solution inhaler Inhale 2 puffs Daily. 6/15/20  Yes Katerine Mcclain APRN   TURMERIC PO Take 1,000 mg by mouth Daily.   Yes ProviderCristian MD   vitamin C (ASCORBIC ACID) 500 MG tablet Take 500 mg by mouth Daily.   Yes Provider, MD Cristian   albuterol  "sulfate  (90 Base) MCG/ACT inhaler Inhale 2 puffs Every 4 (Four) Hours As Needed for Wheezing.  9/15/20 Yes Cristian Pfeiffer MD   bisoprolol (ZEBeta) 5 MG tablet Take 1 tablet by mouth Daily. 6/15/20 9/15/20 Yes Katerine Mcclain APRN   meloxicam (MOBIC) 15 MG tablet Take 15 mg by mouth Daily.  9/15/20 Yes Cristian Pfeiffer MD   predniSONE (DELTASONE) 10 MG tablet Take 4 daily for 3 then 3 daily for 3 then 2 daily for 1 daily for 3 then stop. 2/19/20   Angelica Mccabe DO   benzonatate (TESSALON) 100 MG capsule Take 100 mg by mouth 3 (Three) Times a Day As Needed for Cough.  9/15/20  Cristian Pfeiffer MD   cycloSPORINE (RESTASIS) 0.05 % ophthalmic emulsion 1 drop 2 (Two) Times a Day.  9/15/20  Cristian Pfeiffer MD   docusate sodium 250 MG capsule Take 250 mg by mouth 2 (Two) Times a Day. 1/15/20 9/15/20  Suman Ventura MD   doxycycline (MONODOX) 50 MG capsule Take 1 capsule by mouth 2 (Two) Times a Day. 2/19/20 9/15/20  Angelica Mccabe DO   guaiFENesin-codeine (ROMILAR-AC) 100-10 MG/5ML syrup Take 5 mL by mouth 3 (Three) Times a Day As Needed for Cough.  9/15/20  Cristian Pfeiffer MD   HYDROcodone-acetaminophen (NORCO) 5-325 MG per tablet Take 1 tablet by mouth Every 6 (Six) Hours As Needed.  9/15/20  Cristian Pfeiffer MD   Milk Thistle 175 MG capsule Take 175 mg by mouth Daily.  9/15/20  Cristian Pfeiffer MD       Objective     Vital Signs: /84 (BP Location: Left arm, Patient Position: Sitting, Cuff Size: Adult)   Pulse 59   Temp 98.7 °F (37.1 °C) (Skin)   Resp 18   Ht 163.8 cm (64.5\")   Wt 84.4 kg (186 lb)   SpO2 98%   BMI 31.43 kg/m²   Physical Exam  Vitals signs and nursing note reviewed.   Constitutional:       General: He is not in acute distress.     Appearance: Normal appearance. He is normal weight.   HENT:      Head: Normocephalic and atraumatic.      Right Ear: Ear canal normal.      Left Ear: Tympanic membrane and external ear " normal.      Nose: Nose normal.      Mouth/Throat:      Mouth: Mucous membranes are moist.      Pharynx: Oropharynx is clear.   Eyes:      General: No scleral icterus.     Extraocular Movements: Extraocular movements intact.      Conjunctiva/sclera: Conjunctivae normal.      Pupils: Pupils are equal, round, and reactive to light.   Neck:      Musculoskeletal: Normal range of motion and neck supple. No neck rigidity.      Vascular: No carotid bruit.   Cardiovascular:      Rate and Rhythm: Normal rate and regular rhythm.      Pulses: Normal pulses.      Heart sounds: Normal heart sounds.   Pulmonary:      Effort: Pulmonary effort is normal.      Breath sounds: Normal breath sounds.   Abdominal:      General: Bowel sounds are normal.      Palpations: Abdomen is soft.   Genitourinary:     Rectum: Guaiac stool: jjjj.   Musculoskeletal: Normal range of motion.         General: No swelling.   Lymphadenopathy:      Cervical: No cervical adenopathy.   Skin:     General: Skin is warm and dry.      Capillary Refill: Capillary refill takes less than 2 seconds.   Neurological:      General: No focal deficit present.      Mental Status: He is alert and oriented to person, place, and time. Mental status is at baseline.   Psychiatric:         Mood and Affect: Mood normal.         Behavior: Behavior normal.         Thought Content: Thought content normal.         Judgment: Judgment normal.         Patient's Body mass index is 31.43 kg/m².     Results Reviewed:  Glucose   Date Value Ref Range Status   01/15/2020 145 (H) 65 - 99 mg/dL Final     BUN   Date Value Ref Range Status   01/15/2020 16 8 - 23 mg/dL Final     Creatinine   Date Value Ref Range Status   01/15/2020 0.85 0.76 - 1.27 mg/dL Final     Sodium   Date Value Ref Range Status   01/15/2020 137 136 - 145 mmol/L Final     Potassium   Date Value Ref Range Status   01/15/2020 4.1 3.5 - 5.2 mmol/L Final     Chloride   Date Value Ref Range Status   01/15/2020 102 98 - 107 mmol/L  Final     CO2   Date Value Ref Range Status   01/15/2020 25.0 22.0 - 29.0 mmol/L Final     Calcium   Date Value Ref Range Status   01/15/2020 8.1 (L) 8.6 - 10.5 mg/dL Final     WBC   Date Value Ref Range Status   01/09/2020 4.57 3.40 - 10.80 10*3/mm3 Final     Hematocrit   Date Value Ref Range Status   01/15/2020 31.3 (L) 37.5 - 51.0 % Final     Platelets   Date Value Ref Range Status   01/09/2020 172 140 - 450 10*3/mm3 Final         Assessment / Plan     Assessment/Plan:  1. Essential hypertension    - bisoprolol (ZEBeta) 5 MG tablet; Take 1 tablet by mouth Daily.  Dispense: 90 tablet; Refill: 1  - Comprehensive metabolic panel; Future    2. Anemia, unspecified type    - CBC w AUTO Differential; Future    3. Screening PSA (prostate specific antigen)    - PSA SCREENING; Future    4. Mixed hyperlipidemia   Lipid panel; Future    5.  CTS left wrist.    If remains unsatified with the ortopedic isntitute will transition to NS for evaluation    Return in about 4 weeks (around 10/13/2020), or if symptoms worsen or fail to improve. unless patient needs to be seen sooner or acute issues arise.        I have discussed the patient results/orders and and plan/recommendation with them at today's visit.      Angelica Mccabe,    09/15/2020

## 2020-09-18 ENCOUNTER — CLINICAL SUPPORT (OUTPATIENT)
Dept: INTERNAL MEDICINE | Facility: CLINIC | Age: 75
End: 2020-09-18

## 2020-09-18 DIAGNOSIS — Z12.5 SCREENING PSA (PROSTATE SPECIFIC ANTIGEN): ICD-10-CM

## 2020-09-18 DIAGNOSIS — I10 ESSENTIAL HYPERTENSION: ICD-10-CM

## 2020-09-18 DIAGNOSIS — E78.2 MIXED HYPERLIPIDEMIA: ICD-10-CM

## 2020-09-18 DIAGNOSIS — D64.9 ANEMIA, UNSPECIFIED TYPE: ICD-10-CM

## 2020-09-18 PROCEDURE — 36415 COLL VENOUS BLD VENIPUNCTURE: CPT | Performed by: FAMILY MEDICINE

## 2020-09-18 NOTE — PROGRESS NOTES
Venipuncture Blood Specimen Collection  Venipuncture performed in right ac by Jessie Paige MA with good hemostasis. Patient tolerated the procedure well without complications.   09/18/20   Jessie Paige MA

## 2020-09-19 LAB
ALBUMIN SERPL-MCNC: 4.2 G/DL (ref 3.7–4.7)
ALBUMIN/GLOB SERPL: 1.8 {RATIO} (ref 1.2–2.2)
ALP SERPL-CCNC: 49 IU/L (ref 39–117)
ALT SERPL-CCNC: 16 IU/L (ref 0–44)
AST SERPL-CCNC: 12 IU/L (ref 0–40)
BASOPHILS # BLD AUTO: 0.1 X10E3/UL (ref 0–0.2)
BASOPHILS NFR BLD AUTO: 1 %
BILIRUB SERPL-MCNC: 0.3 MG/DL (ref 0–1.2)
BUN SERPL-MCNC: 17 MG/DL (ref 8–27)
BUN/CREAT SERPL: 18 (ref 10–24)
CALCIUM SERPL-MCNC: 9.6 MG/DL (ref 8.6–10.2)
CHLORIDE SERPL-SCNC: 103 MMOL/L (ref 96–106)
CHOLEST SERPL-MCNC: 160 MG/DL (ref 100–199)
CO2 SERPL-SCNC: 26 MMOL/L (ref 20–29)
CREAT SERPL-MCNC: 0.97 MG/DL (ref 0.76–1.27)
EOSINOPHIL # BLD AUTO: 0.2 X10E3/UL (ref 0–0.4)
EOSINOPHIL NFR BLD AUTO: 3 %
ERYTHROCYTE [DISTWIDTH] IN BLOOD BY AUTOMATED COUNT: 13 % (ref 11.6–15.4)
GLOBULIN SER CALC-MCNC: 2.3 G/DL (ref 1.5–4.5)
GLUCOSE SERPL-MCNC: 96 MG/DL (ref 65–99)
HCT VFR BLD AUTO: 39.4 % (ref 37.5–51)
HDLC SERPL-MCNC: 65 MG/DL
HGB BLD-MCNC: 12.8 G/DL (ref 13–17.7)
IMM GRANULOCYTES # BLD AUTO: 0 X10E3/UL (ref 0–0.1)
IMM GRANULOCYTES NFR BLD AUTO: 0 %
LDLC SERPL CALC-MCNC: 82 MG/DL (ref 0–99)
LYMPHOCYTES # BLD AUTO: 2.2 X10E3/UL (ref 0.7–3.1)
LYMPHOCYTES NFR BLD AUTO: 33 %
MCH RBC QN AUTO: 32.7 PG (ref 26.6–33)
MCHC RBC AUTO-ENTMCNC: 32.5 G/DL (ref 31.5–35.7)
MCV RBC AUTO: 101 FL (ref 79–97)
MONOCYTES # BLD AUTO: 0.5 X10E3/UL (ref 0.1–0.9)
MONOCYTES NFR BLD AUTO: 8 %
NEUTROPHILS # BLD AUTO: 3.8 X10E3/UL (ref 1.4–7)
NEUTROPHILS NFR BLD AUTO: 55 %
PLATELET # BLD AUTO: 167 X10E3/UL (ref 150–450)
POTASSIUM SERPL-SCNC: 4.4 MMOL/L (ref 3.5–5.2)
PROT SERPL-MCNC: 6.5 G/DL (ref 6–8.5)
PSA SERPL-MCNC: 0.6 NG/ML (ref 0–4)
RBC # BLD AUTO: 3.91 X10E6/UL (ref 4.14–5.8)
SODIUM SERPL-SCNC: 140 MMOL/L (ref 134–144)
TRIGL SERPL-MCNC: 64 MG/DL (ref 0–149)
VLDLC SERPL CALC-MCNC: 13 MG/DL (ref 5–40)
WBC # BLD AUTO: 6.8 X10E3/UL (ref 3.4–10.8)

## 2020-09-22 ENCOUNTER — TELEPHONE (OUTPATIENT)
Dept: INTERNAL MEDICINE | Facility: CLINIC | Age: 75
End: 2020-09-22

## 2020-09-22 NOTE — TELEPHONE ENCOUNTER
Called patient to communicated stable lab. Patient voiced understanding and had no further questions.

## 2020-09-30 ENCOUNTER — HOSPITAL ENCOUNTER (OUTPATIENT)
Dept: CT IMAGING | Facility: HOSPITAL | Age: 75
Discharge: HOME | End: 2020-09-30
Admitting: NURSE PRACTITIONER

## 2020-09-30 DIAGNOSIS — Z87.891 PERSONAL HISTORY OF NICOTINE DEPENDENCE: ICD-10-CM

## 2020-09-30 PROCEDURE — G0297 LDCT FOR LUNG CA SCREEN: HCPCS

## 2020-10-05 ENCOUNTER — FLU SHOT (OUTPATIENT)
Dept: INTERNAL MEDICINE | Facility: CLINIC | Age: 75
End: 2020-10-05

## 2020-10-05 DIAGNOSIS — Z23 ENCOUNTER FOR IMMUNIZATION: Primary | ICD-10-CM

## 2020-10-05 PROCEDURE — 90471 IMMUNIZATION ADMIN: CPT | Performed by: NURSE PRACTITIONER

## 2020-10-05 PROCEDURE — 90694 VACC AIIV4 NO PRSRV 0.5ML IM: CPT | Performed by: NURSE PRACTITIONER

## 2020-10-23 DIAGNOSIS — G62.9 PERIPHERAL POLYNEUROPATHY: ICD-10-CM

## 2020-10-27 RX ORDER — GABAPENTIN 300 MG/1
300 CAPSULE ORAL 4 TIMES DAILY
Qty: 120 CAPSULE | Refills: 0 | Status: SHIPPED | OUTPATIENT
Start: 2020-10-27 | End: 2021-01-20 | Stop reason: SDUPTHER

## 2020-11-02 ENCOUNTER — OFFICE VISIT (OUTPATIENT)
Dept: INTERNAL MEDICINE | Facility: CLINIC | Age: 75
End: 2020-11-02

## 2020-11-02 VITALS
SYSTOLIC BLOOD PRESSURE: 159 MMHG | DIASTOLIC BLOOD PRESSURE: 84 MMHG | HEART RATE: 69 BPM | OXYGEN SATURATION: 99 % | TEMPERATURE: 98.5 F | RESPIRATION RATE: 18 BRPM | BODY MASS INDEX: 31.82 KG/M2 | WEIGHT: 191 LBS | HEIGHT: 65 IN

## 2020-11-02 DIAGNOSIS — M25.562 ACUTE PAIN OF LEFT KNEE: Primary | ICD-10-CM

## 2020-11-02 PROCEDURE — 99213 OFFICE O/P EST LOW 20 MIN: CPT | Performed by: NURSE PRACTITIONER

## 2020-11-02 PROCEDURE — 96372 THER/PROPH/DIAG INJ SC/IM: CPT | Performed by: NURSE PRACTITIONER

## 2020-11-02 RX ORDER — DEXAMETHASONE SODIUM PHOSPHATE 10 MG/ML
10 INJECTION INTRAMUSCULAR; INTRAVENOUS ONCE
Status: COMPLETED | OUTPATIENT
Start: 2020-11-02 | End: 2020-11-02

## 2020-11-02 RX ORDER — TRIAMCINOLONE ACETONIDE 40 MG/ML
40 INJECTION, SUSPENSION INTRA-ARTICULAR; INTRAMUSCULAR ONCE
Status: COMPLETED | OUTPATIENT
Start: 2020-11-02 | End: 2020-11-02

## 2020-11-02 RX ORDER — METHYLPREDNISOLONE 4 MG/1
TABLET ORAL
Qty: 21 EACH | Refills: 0 | Status: SHIPPED | OUTPATIENT
Start: 2020-11-02 | End: 2021-09-08

## 2020-11-02 RX ADMIN — DEXAMETHASONE SODIUM PHOSPHATE 10 MG: 10 INJECTION INTRAMUSCULAR; INTRAVENOUS at 14:26

## 2020-11-02 RX ADMIN — TRIAMCINOLONE ACETONIDE 40 MG: 40 INJECTION, SUSPENSION INTRA-ARTICULAR; INTRAMUSCULAR at 14:26

## 2020-11-02 NOTE — PROGRESS NOTES
Subjective     Chief Complaint   Patient presents with   • Knee Pain     Left Knee, can hear grinding.       History of Present Illness  Pt presents today with complaints of left knee pain. States he has had pain for years but aggravated it 3 weeks ago. 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 He felt it grinding but it is worse. He has taken Ibuprofen every 4 hours and now is taking ASA every 4 hours and is getting some relief. Is a grinding pain. Has a history of meniscus surgery on bilateral knees in the past. When it hurts rates it at a 10/10. Has occasional pinching. Worse with activity and walking up hills but will awaken him at night.      Patient's PMR from outside medical facility reviewed and noted.    Review of Systems   Otherwise complete ROS reviewed.    Past Medical History:   Past Medical History:   Diagnosis Date   • Arthritis    • CAD (coronary artery disease)    • Chronic back pain    • Chronic knee pain     LEFT KNEE   • Class 1 obesity due to excess calories with serious comorbidity and body mass index (BMI) of 32.0 to 32.9 in adult 1/20/2020   • COPD (chronic obstructive pulmonary disease) (CMS/[facility])    • Coronary artery disease involving autologous vein coronary bypass graft without angina pectoris 1/20/2020   • Essential hypertension 10/14/2019   • GERD (gastroesophageal reflux disease)    • Hyperlipidemia    • Hypertension    • Obstructive sleep apnea 1/20/2020   • Sleep apnea      Past Surgical History:  Past Surgical History:   Procedure Laterality Date   • BACK SURGERY     • COLONOSCOPY     • KNEE MENISCAL REPAIR Left 2018   • ROTATOR CUFF REPAIR Right 2000   • SHOULDER SURGERY     • SPINE SURGERY  1981    lower back ruptured disc   • TOTAL SHOULDER ARTHROPLASTY W/ DISTAL CLAVICLE EXCISION Right 1/14/2020    Procedure: RIGHT REVERSE TOTAL SHOULDER  ARTHROPLASTY;  Surgeon: Suman Ventura MD;  Location: [facility];  Service: Orthopedics     Social History:  reports that  he quit smoking about 9 years ago. His smoking use included pipe. He smoked 0.00 packs per day for 50.00 years. He has never used smokeless tobacco. He reports current alcohol use of about 5.0 standard drinks of alcohol per week. He reports that he does not use drugs.    Family History: family history includes Arthritis in his father and mother; Cancer in his brother; Diabetes in his mother and sister; Hypertension in his father and mother; Kidney disease in his mother; Obesity in his sister; Osteoporosis in his mother; Stroke in his father.       Allergies:  No Known Allergies  Medications:  Prior to Admission medications    Medication Sig Start Date End Date Taking? Authorizing Provider   albuterol sulfate  (90 Base) MCG/ACT inhaler Inhale 2 puffs Every 4 (Four) Hours As Needed for Wheezing. 9/15/20   Angelica Mccabe DO   amLODIPine (NORVASC) 5 MG tablet Take 1 tablet by mouth Daily. 6/15/20   Katerine Mcclain APRN   aspirin 81 MG EC tablet Take 81 mg by mouth Daily.    Cristian Pfeiffer MD   bisoprolol (ZEBeta) 5 MG tablet Take 1 tablet by mouth Daily. 9/15/20   Angelica Mccabe DO   budesonide-formoterol (Symbicort) 80-4.5 MCG/ACT inhaler Inhale 2 puffs 2 (Two) Times a Day. 6/15/20   Katerine Mcclain APRN   calcium carbonate (OS-TASHIA) 600 MG tablet Take 600 mg by mouth 2 (Two) Times a Day.    Cristian Pfeiffer MD   Cholecalciferol (VITAMIN D3) 1000 units capsule Take 2,000 Units by mouth Daily.    Cristian Pfeiffer MD   coenzyme Q10 100 MG capsule Take 100 mg by mouth Daily.    Cristian Pfeiffer MD   cyclobenzaprine (FLEXERIL) 10 MG tablet Take 1 tablet by mouth 3 (Three) Times a Day As Needed for Muscle Spasms. 10/7/19   Katerine Mcclain APRN   fluticasone (FLONASE) 50 MCG/ACT nasal spray 2 sprays into the nostril(s) as directed by provider Daily.    Cristian Pfeiffer MD   gabapentin (NEURONTIN) 300 MG capsule TAKE 1 CAPSULE BY MOUTH 4 (FOUR) TIMES A  DAY. 10/27/20   Angelica Mccabe DO   Garlic 1000 MG capsule Take 1,000 mg by mouth Daily.    Cristian Pfeiffer MD   Glucos-Chondroit-Hyaluron-MSM (GLUCOSAMINE CHONDROITIN JOINT PO) Take 1 tablet/day by mouth Daily.    Cristian Pfeiffer MD   ibuprofen (ADVIL,MOTRIN) 800 MG tablet Take 800 mg by mouth Every 6 (Six) Hours As Needed for Mild Pain .    Cristian Pfeiffer MD   Krill Oil 300 MG capsule Take 300 mg by mouth Daily.    Cristian Pfeiffer MD   lisinopril (PRINIVIL,ZESTRIL) 20 MG tablet Take 1 tablet by mouth Daily. 6/15/20   Katerine Mcclain APRN   meloxicam (MOBIC) 15 MG tablet Take 1 tablet by mouth Daily. 9/15/20   Angelica Mccabe DO   Multiple Vitamins-Minerals (OCUVITE ADULT 50+ PO) Take 1 tablet by mouth Daily.    Cristian Pfeiffer MD   omeprazole (priLOSEC) 20 MG capsule Take 20 mg by mouth Daily.    Cristian Pfeiffer MD   predniSONE (DELTASONE) 10 MG tablet Take 4 daily for 3 then 3 daily for 3 then 2 daily for 1 daily for 3 then stop. 2/19/20   Angelica Mccabe DO   S-Adenosylmethionine (SULAIMAN-E) 200 MG tablet Take 200 mg by mouth.    Cristian Pfeiffer MD   simvastatin (ZOCOR) 80 MG tablet Take 1 tablet by mouth Every Night. 6/15/20   Katerine Mcclain APRN   SUPER B COMPLEX/C PO Take 1 tablet by mouth Every Night.    Cristian Pfeiffer MD   tamsulosin (FLOMAX) 0.4 MG capsule 24 hr capsule Take 1 capsule by mouth Daily. 6/15/20   Katerine Mcclain APRN   tiotropium bromide monohydrate (Spiriva Respimat) 2.5 MCG/ACT aerosol solution inhaler Inhale 2 puffs Daily. 6/15/20   Katerine Mcclain APRN   TURMERIC PO Take 1,000 mg by mouth Daily.    Cristian Pfeiffer MD   vitamin C (ASCORBIC ACID) 500 MG tablet Take 500 mg by mouth Daily.    Cristian Pfeiffer MD       Objective     Vital Signs: /84 (BP Location: Right arm, Patient Position: Sitting, Cuff Size: Small Adult)   Pulse 69   Temp 98.5 °F (36.9 °C) (Skin)   Resp 18   " Ht 163.8 cm (64.5\")   Wt 86.6 kg (191 lb)   SpO2 99%   BMI 32.28 kg/m²   Physical Exam  Vitals signs reviewed.   Constitutional:       Appearance: He is well-developed.   HENT:      Head: Normocephalic and atraumatic.   Eyes:      Pupils: Pupils are equal, round, and reactive to light.   Neck:      Musculoskeletal: Normal range of motion and neck supple.      Vascular: No JVD.   Cardiovascular:      Rate and Rhythm: Normal rate and regular rhythm.   Pulmonary:      Effort: Pulmonary effort is normal.   Abdominal:      General: Bowel sounds are normal.      Palpations: Abdomen is soft.   Musculoskeletal:         General: Tenderness ( left medial knee tenderness. ) present. No deformity.   Lymphadenopathy:      Cervical: No cervical adenopathy.   Skin:     General: Skin is warm and dry.   Neurological:      Mental Status: He is alert and oriented to person, place, and time.   Psychiatric:         Behavior: Behavior normal.         Thought Content: Thought content normal.         Judgment: Judgment normal.       Results Reviewed:  Glucose   Date Value Ref Range Status   01/15/2020 145 (H) 65 - 99 mg/dL Final     BUN   Date Value Ref Range Status   09/18/2020 17 8 - 27 mg/dL Final   01/15/2020 16 8 - 23 mg/dL Final     Creatinine   Date Value Ref Range Status   09/18/2020 0.97 0.76 - 1.27 mg/dL Final   01/15/2020 0.85 0.76 - 1.27 mg/dL Final     Sodium   Date Value Ref Range Status   09/18/2020 140 134 - 144 mmol/L Final   01/15/2020 137 136 - 145 mmol/L Final     Potassium   Date Value Ref Range Status   09/18/2020 4.4 3.5 - 5.2 mmol/L Final   01/15/2020 4.1 3.5 - 5.2 mmol/L Final     Chloride   Date Value Ref Range Status   09/18/2020 103 96 - 106 mmol/L Final   01/15/2020 102 98 - 107 mmol/L Final     CO2   Date Value Ref Range Status   01/15/2020 25.0 22.0 - 29.0 mmol/L Final     Total CO2   Date Value Ref Range Status   09/18/2020 26 20 - 29 mmol/L Final     Calcium   Date Value Ref Range Status   09/18/2020 " 9.6 8.6 - 10.2 mg/dL Final   01/15/2020 8.1 (L) 8.6 - 10.5 mg/dL Final     ALT (SGPT)   Date Value Ref Range Status   09/18/2020 16 0 - 44 IU/L Final     AST (SGOT)   Date Value Ref Range Status   09/18/2020 12 0 - 40 IU/L Final     WBC   Date Value Ref Range Status   09/18/2020 6.8 3.4 - 10.8 x10E3/uL Final     Hematocrit   Date Value Ref Range Status   09/18/2020 39.4 37.5 - 51.0 % Final   01/15/2020 31.3 (L) 37.5 - 51.0 % Final     Platelets   Date Value Ref Range Status   09/18/2020 167 150 - 450 x10E3/uL Final   01/09/2020 172 140 - 450 10*3/mm3 Final     Triglycerides   Date Value Ref Range Status   09/18/2020 64 0 - 149 mg/dL Final     HDL Cholesterol   Date Value Ref Range Status   09/18/2020 65 >39 mg/dL Final     LDL Chol Calc (NIH)   Date Value Ref Range Status   09/18/2020 82 0 - 99 mg/dL Final         Assessment / Plan     Assessment/Plan:  Diagnoses and all orders for this visit:    1. Acute pain of left knee (Primary)  -     XR Knee 1 or 2 View Left (In Office)  -     triamcinolone acetonide (KENALOG-40) injection 40 mg  -     dexamethasone (DECADRON) injection 10 mg    Other orders  -     methylPREDNISolone (MEDROL) 4 MG dose pack; Take as directed on package instructions.  Dispense: 21 each; Refill: 0      No follow-ups on file. unless patient needs to be seen sooner or acute issues arise.    I have discussed the patient results/orders and and plan/recommendation with them at today's visit.      Katerine Mcclain, APRN   11/02/2020

## 2020-12-15 DIAGNOSIS — I10 ESSENTIAL HYPERTENSION: ICD-10-CM

## 2020-12-15 RX ORDER — LISINOPRIL 20 MG/1
TABLET ORAL
Qty: 90 TABLET | Refills: 1 | Status: SHIPPED | OUTPATIENT
Start: 2020-12-15 | End: 2021-06-08

## 2020-12-17 DIAGNOSIS — I10 ESSENTIAL HYPERTENSION: ICD-10-CM

## 2020-12-17 DIAGNOSIS — N40.0 BENIGN PROSTATIC HYPERPLASIA, UNSPECIFIED WHETHER LOWER URINARY TRACT SYMPTOMS PRESENT: ICD-10-CM

## 2020-12-17 DIAGNOSIS — E78.5 HYPERLIPIDEMIA, UNSPECIFIED HYPERLIPIDEMIA TYPE: ICD-10-CM

## 2020-12-17 RX ORDER — SIMVASTATIN 80 MG
TABLET ORAL
Qty: 90 TABLET | Refills: 1 | Status: SHIPPED | OUTPATIENT
Start: 2020-12-17 | End: 2021-06-15

## 2020-12-17 RX ORDER — TAMSULOSIN HYDROCHLORIDE 0.4 MG/1
CAPSULE ORAL
Qty: 90 CAPSULE | Refills: 1 | Status: SHIPPED | OUTPATIENT
Start: 2020-12-17 | End: 2021-06-15

## 2020-12-17 RX ORDER — BISOPROLOL FUMARATE 5 MG/1
TABLET, FILM COATED ORAL
Qty: 90 TABLET | Refills: 1 | Status: SHIPPED | OUTPATIENT
Start: 2020-12-17 | End: 2021-06-15

## 2020-12-17 RX ORDER — AMLODIPINE BESYLATE 5 MG/1
TABLET ORAL
Qty: 90 TABLET | Refills: 1 | Status: SHIPPED | OUTPATIENT
Start: 2020-12-17 | End: 2021-06-15

## 2020-12-22 ENCOUNTER — HOSPITAL ENCOUNTER (OUTPATIENT)
Dept: PREADMISSION TESTING | Age: 75
Discharge: HOME | End: 2020-12-26
Payer: COMMERCIAL

## 2020-12-22 VITALS — WEIGHT: 180 LBS | HEIGHT: 67 IN | BODY MASS INDEX: 28.25 KG/M2

## 2020-12-22 LAB
ABO/RH: NORMAL
ANION GAP SERPL CALCULATED.3IONS-SCNC: 12 MMOL/L (ref 7–19)
ANTIBODY SCREEN: NORMAL
APTT: 25.4 SEC (ref 26–36.2)
BASOPHILS ABSOLUTE: 0.1 K/UL (ref 0–0.2)
BASOPHILS RELATIVE PERCENT: 0.8 % (ref 0–1)
BUN BLDV-MCNC: 16 MG/DL (ref 8–23)
CALCIUM SERPL-MCNC: 9.4 MG/DL (ref 8.8–10.2)
CHLORIDE BLD-SCNC: 102 MMOL/L (ref 98–111)
CO2: 25 MMOL/L (ref 22–29)
CREAT SERPL-MCNC: 0.8 MG/DL (ref 0.5–1.2)
EKG P AXIS: 45 DEGREES
EKG P-R INTERVAL: 178 MS
EKG Q-T INTERVAL: 468 MS
EKG QRS DURATION: 146 MS
EKG QTC CALCULATION (BAZETT): 458 MS
EKG T AXIS: 112 DEGREES
EOSINOPHILS ABSOLUTE: 0.1 K/UL (ref 0–0.6)
EOSINOPHILS RELATIVE PERCENT: 1.7 % (ref 0–5)
GFR AFRICAN AMERICAN: >59
GFR NON-AFRICAN AMERICAN: >60
GLUCOSE BLD-MCNC: 92 MG/DL (ref 74–109)
HCT VFR BLD CALC: 40.7 % (ref 42–52)
HEMOGLOBIN: 13.3 G/DL (ref 14–18)
IMMATURE GRANULOCYTES #: 0 K/UL
INR BLD: 0.93 (ref 0.88–1.18)
LYMPHOCYTES ABSOLUTE: 2 K/UL (ref 1.1–4.5)
LYMPHOCYTES RELATIVE PERCENT: 29.5 % (ref 20–40)
MCH RBC QN AUTO: 33.5 PG (ref 27–31)
MCHC RBC AUTO-ENTMCNC: 32.7 G/DL (ref 33–37)
MCV RBC AUTO: 102.5 FL (ref 80–94)
MONOCYTES ABSOLUTE: 0.7 K/UL (ref 0–0.9)
MONOCYTES RELATIVE PERCENT: 10.6 % (ref 0–10)
NEUTROPHILS ABSOLUTE: 3.8 K/UL (ref 1.5–7.5)
NEUTROPHILS RELATIVE PERCENT: 56.9 % (ref 50–65)
PDW BLD-RTO: 13.5 % (ref 11.5–14.5)
PLATELET # BLD: 175 K/UL (ref 130–400)
PMV BLD AUTO: 10.4 FL (ref 9.4–12.4)
POTASSIUM SERPL-SCNC: 4.1 MMOL/L (ref 3.5–5)
PROTHROMBIN TIME: 12.4 SEC (ref 12–14.6)
RBC # BLD: 3.97 M/UL (ref 4.7–6.1)
SODIUM BLD-SCNC: 139 MMOL/L (ref 136–145)
WBC # BLD: 6.6 K/UL (ref 4.8–10.8)

## 2020-12-22 PROCEDURE — 85610 PROTHROMBIN TIME: CPT

## 2020-12-22 PROCEDURE — 80048 BASIC METABOLIC PNL TOTAL CA: CPT

## 2020-12-22 PROCEDURE — 93005 ELECTROCARDIOGRAM TRACING: CPT | Performed by: ORTHOPAEDIC SURGERY

## 2020-12-22 PROCEDURE — 86900 BLOOD TYPING SEROLOGIC ABO: CPT

## 2020-12-22 PROCEDURE — 93010 ELECTROCARDIOGRAM REPORT: CPT | Performed by: INTERNAL MEDICINE

## 2020-12-22 PROCEDURE — 85730 THROMBOPLASTIN TIME PARTIAL: CPT

## 2020-12-22 PROCEDURE — 87081 CULTURE SCREEN ONLY: CPT

## 2020-12-22 PROCEDURE — 86901 BLOOD TYPING SEROLOGIC RH(D): CPT

## 2020-12-22 PROCEDURE — 86850 RBC ANTIBODY SCREEN: CPT

## 2020-12-22 PROCEDURE — 85025 COMPLETE CBC W/AUTO DIFF WBC: CPT

## 2020-12-22 RX ORDER — OMEPRAZOLE 20 MG/1
20 CAPSULE, DELAYED RELEASE ORAL DAILY
COMMUNITY

## 2020-12-22 RX ORDER — PHENOL 1.4 %
1 AEROSOL, SPRAY (ML) MUCOUS MEMBRANE 2 TIMES DAILY
COMMUNITY

## 2020-12-22 RX ORDER — GABAPENTIN 300 MG/1
600 CAPSULE ORAL 2 TIMES DAILY
COMMUNITY

## 2020-12-22 RX ORDER — BISOPROLOL FUMARATE 5 MG/1
5 TABLET ORAL DAILY
COMMUNITY

## 2020-12-22 RX ORDER — SIMVASTATIN 40 MG
40 TABLET ORAL NIGHTLY
COMMUNITY

## 2020-12-22 RX ORDER — ASCORBIC ACID 500 MG
500 TABLET ORAL DAILY
COMMUNITY

## 2020-12-22 RX ORDER — IBUPROFEN 800 MG/1
800 TABLET ORAL EVERY 6 HOURS PRN
COMMUNITY

## 2020-12-22 RX ORDER — TURMERIC ROOT EXTRACT 500 MG
2 TABLET ORAL DAILY
COMMUNITY

## 2020-12-22 RX ORDER — LISINOPRIL 20 MG/1
20 TABLET ORAL NIGHTLY
COMMUNITY

## 2020-12-22 RX ORDER — TAMSULOSIN HYDROCHLORIDE 0.4 MG/1
0.4 CAPSULE ORAL NIGHTLY
COMMUNITY

## 2020-12-22 RX ORDER — ASPIRIN 81 MG/1
81 TABLET ORAL DAILY
COMMUNITY

## 2020-12-22 RX ORDER — PHENOL 1.4 %
1 AEROSOL, SPRAY (ML) MUCOUS MEMBRANE DAILY
COMMUNITY

## 2020-12-22 RX ORDER — ALBUTEROL SULFATE 90 UG/1
2 AEROSOL, METERED RESPIRATORY (INHALATION) EVERY 6 HOURS PRN
COMMUNITY

## 2020-12-22 RX ORDER — VITAMIN B COMPLEX
1 TABLET ORAL DAILY
COMMUNITY

## 2020-12-22 RX ORDER — CYCLOSPORINE 0.5 MG/ML
1 EMULSION OPHTHALMIC EVERY 12 HOURS
Status: ON HOLD | COMMUNITY
End: 2020-12-28 | Stop reason: ALTCHOICE

## 2020-12-22 RX ORDER — SENNOSIDES 8.6 MG
650 CAPSULE ORAL 2 TIMES DAILY
COMMUNITY

## 2020-12-22 RX ORDER — AMLODIPINE BESYLATE 5 MG/1
5 TABLET ORAL NIGHTLY
COMMUNITY

## 2020-12-22 RX ORDER — BUDESONIDE AND FORMOTEROL FUMARATE DIHYDRATE 160; 4.5 UG/1; UG/1
2 AEROSOL RESPIRATORY (INHALATION) 2 TIMES DAILY
COMMUNITY

## 2020-12-23 LAB — MRSA CULTURE ONLY: NORMAL

## 2020-12-26 ENCOUNTER — OFFICE VISIT (OUTPATIENT)
Age: 75
End: 2020-12-26

## 2020-12-27 LAB — SARS-COV-2, PCR: NOT DETECTED

## 2020-12-28 ENCOUNTER — ANESTHESIA EVENT (OUTPATIENT)
Dept: OPERATING ROOM | Age: 75
End: 2020-12-28
Payer: COMMERCIAL

## 2020-12-28 ENCOUNTER — ANESTHESIA (OUTPATIENT)
Dept: OPERATING ROOM | Age: 75
End: 2020-12-28
Payer: COMMERCIAL

## 2020-12-28 ENCOUNTER — HOSPITAL ENCOUNTER (OUTPATIENT)
Age: 75
Setting detail: OUTPATIENT SURGERY
Discharge: HOME | End: 2020-12-28
Attending: ORTHOPAEDIC SURGERY | Admitting: ORTHOPAEDIC SURGERY
Payer: COMMERCIAL

## 2020-12-28 VITALS
TEMPERATURE: 97.3 F | HEART RATE: 60 BPM | SYSTOLIC BLOOD PRESSURE: 166 MMHG | BODY MASS INDEX: 28.25 KG/M2 | OXYGEN SATURATION: 97 % | HEIGHT: 67 IN | WEIGHT: 180 LBS | DIASTOLIC BLOOD PRESSURE: 87 MMHG | RESPIRATION RATE: 18 BRPM

## 2020-12-28 VITALS
TEMPERATURE: 94.1 F | SYSTOLIC BLOOD PRESSURE: 93 MMHG | OXYGEN SATURATION: 98 % | DIASTOLIC BLOOD PRESSURE: 50 MMHG | RESPIRATION RATE: 10 BRPM

## 2020-12-28 LAB
ABO/RH: NORMAL
ANTIBODY SCREEN: NORMAL

## 2020-12-28 PROCEDURE — 2709999900 HC NON-CHARGEABLE SUPPLY: Performed by: ORTHOPAEDIC SURGERY

## 2020-12-28 PROCEDURE — 7100000001 HC PACU RECOVERY - ADDTL 15 MIN: Performed by: ORTHOPAEDIC SURGERY

## 2020-12-28 PROCEDURE — 7100000011 HC PHASE II RECOVERY - ADDTL 15 MIN: Performed by: ORTHOPAEDIC SURGERY

## 2020-12-28 PROCEDURE — 3700000000 HC ANESTHESIA ATTENDED CARE: Performed by: ORTHOPAEDIC SURGERY

## 2020-12-28 PROCEDURE — 86850 RBC ANTIBODY SCREEN: CPT

## 2020-12-28 PROCEDURE — 86901 BLOOD TYPING SEROLOGIC RH(D): CPT

## 2020-12-28 PROCEDURE — 2500000003 HC RX 250 WO HCPCS: Performed by: ORTHOPAEDIC SURGERY

## 2020-12-28 PROCEDURE — 64447 NJX AA&/STRD FEMORAL NRV IMG: CPT | Performed by: NURSE ANESTHETIST, CERTIFIED REGISTERED

## 2020-12-28 PROCEDURE — 36415 COLL VENOUS BLD VENIPUNCTURE: CPT

## 2020-12-28 PROCEDURE — 2500000003 HC RX 250 WO HCPCS: Performed by: NURSE ANESTHETIST, CERTIFIED REGISTERED

## 2020-12-28 PROCEDURE — 2580000003 HC RX 258: Performed by: ANESTHESIOLOGY

## 2020-12-28 PROCEDURE — 6360000002 HC RX W HCPCS: Performed by: NURSE ANESTHETIST, CERTIFIED REGISTERED

## 2020-12-28 PROCEDURE — 3600000005 HC SURGERY LEVEL 5 BASE: Performed by: ORTHOPAEDIC SURGERY

## 2020-12-28 PROCEDURE — C1713 ANCHOR/SCREW BN/BN,TIS/BN: HCPCS | Performed by: ORTHOPAEDIC SURGERY

## 2020-12-28 PROCEDURE — 7100000000 HC PACU RECOVERY - FIRST 15 MIN: Performed by: ORTHOPAEDIC SURGERY

## 2020-12-28 PROCEDURE — 6360000002 HC RX W HCPCS: Performed by: ANESTHESIOLOGY

## 2020-12-28 PROCEDURE — C1776 JOINT DEVICE (IMPLANTABLE): HCPCS | Performed by: ORTHOPAEDIC SURGERY

## 2020-12-28 PROCEDURE — 86900 BLOOD TYPING SEROLOGIC ABO: CPT

## 2020-12-28 PROCEDURE — 3600000015 HC SURGERY LEVEL 5 ADDTL 15MIN: Performed by: ORTHOPAEDIC SURGERY

## 2020-12-28 PROCEDURE — 6360000002 HC RX W HCPCS: Performed by: ORTHOPAEDIC SURGERY

## 2020-12-28 PROCEDURE — 97161 PT EVAL LOW COMPLEX 20 MIN: CPT

## 2020-12-28 PROCEDURE — 7100000010 HC PHASE II RECOVERY - FIRST 15 MIN: Performed by: ORTHOPAEDIC SURGERY

## 2020-12-28 PROCEDURE — 3700000001 HC ADD 15 MINUTES (ANESTHESIA): Performed by: ORTHOPAEDIC SURGERY

## 2020-12-28 PROCEDURE — 2720000010 HC SURG SUPPLY STERILE: Performed by: ORTHOPAEDIC SURGERY

## 2020-12-28 DEVICE — CEMENT BNE 40GM HI VISC RADPQ FOR REV SURG: Type: IMPLANTABLE DEVICE | Site: KNEE | Status: FUNCTIONAL

## 2020-12-28 DEVICE — Z DUP USE 2508610 EXTENSION STEM SZ F 5DEG L TIBL KNEE CEM NAT TIB PERSONA: Type: IMPLANTABLE DEVICE | Site: KNEE | Status: FUNCTIONAL

## 2020-12-28 DEVICE — COMPONENT FEM SZ 9 STD L KNEE CO CHROM CEM POST STBL COR: Type: IMPLANTABLE DEVICE | Site: KNEE | Status: FUNCTIONAL

## 2020-12-28 DEVICE — COMPONENT ARTC SURF PS 6-9 EF 12 MM LT TIB FIX BEAR: Type: IMPLANTABLE DEVICE | Site: KNEE | Status: FUNCTIONAL

## 2020-12-28 DEVICE — COMPONENT PAT DIA41MM THK10MM ALL POLYETH CEM CONVENTIONAL: Type: IMPLANTABLE DEVICE | Site: KNEE | Status: FUNCTIONAL

## 2020-12-28 RX ORDER — OXYCODONE HCL 20 MG/1
20 TABLET, FILM COATED, EXTENDED RELEASE ORAL EVERY 12 HOURS
Qty: 10 TABLET | Refills: 0 | Status: SHIPPED | OUTPATIENT
Start: 2020-12-28 | End: 2021-01-02

## 2020-12-28 RX ORDER — SODIUM CHLORIDE, SODIUM LACTATE, POTASSIUM CHLORIDE, CALCIUM CHLORIDE 600; 310; 30; 20 MG/100ML; MG/100ML; MG/100ML; MG/100ML
INJECTION, SOLUTION INTRAVENOUS CONTINUOUS
Status: DISCONTINUED | OUTPATIENT
Start: 2020-12-28 | End: 2020-12-28 | Stop reason: HOSPADM

## 2020-12-28 RX ORDER — PROPOFOL 10 MG/ML
INJECTION, EMULSION INTRAVENOUS PRN
Status: DISCONTINUED | OUTPATIENT
Start: 2020-12-28 | End: 2020-12-28 | Stop reason: SDUPTHER

## 2020-12-28 RX ORDER — ONDANSETRON 2 MG/ML
INJECTION INTRAMUSCULAR; INTRAVENOUS PRN
Status: DISCONTINUED | OUTPATIENT
Start: 2020-12-28 | End: 2020-12-28 | Stop reason: SDUPTHER

## 2020-12-28 RX ORDER — ONDANSETRON 4 MG/1
4 TABLET, FILM COATED ORAL DAILY PRN
Qty: 20 TABLET | Refills: 0 | Status: SHIPPED | OUTPATIENT
Start: 2020-12-28

## 2020-12-28 RX ORDER — RIVAROXABAN 10 MG/1
10 TABLET, FILM COATED ORAL
Qty: 30 TABLET | Refills: 1 | Status: SHIPPED | OUTPATIENT
Start: 2020-12-28

## 2020-12-28 RX ORDER — MIDAZOLAM HYDROCHLORIDE 1 MG/ML
2 INJECTION INTRAMUSCULAR; INTRAVENOUS ONCE
Status: COMPLETED | OUTPATIENT
Start: 2020-12-28 | End: 2020-12-28

## 2020-12-28 RX ORDER — HYDROMORPHONE HYDROCHLORIDE 1 MG/ML
0.5 INJECTION, SOLUTION INTRAMUSCULAR; INTRAVENOUS; SUBCUTANEOUS EVERY 5 MIN PRN
Status: DISCONTINUED | OUTPATIENT
Start: 2020-12-28 | End: 2020-12-28 | Stop reason: HOSPADM

## 2020-12-28 RX ORDER — SUCCINYLCHOLINE CHLORIDE 20 MG/ML
INJECTION INTRAMUSCULAR; INTRAVENOUS PRN
Status: DISCONTINUED | OUTPATIENT
Start: 2020-12-28 | End: 2020-12-28 | Stop reason: SDUPTHER

## 2020-12-28 RX ORDER — HYDRALAZINE HYDROCHLORIDE 20 MG/ML
5 INJECTION INTRAMUSCULAR; INTRAVENOUS EVERY 10 MIN PRN
Status: DISCONTINUED | OUTPATIENT
Start: 2020-12-28 | End: 2020-12-28 | Stop reason: HOSPADM

## 2020-12-28 RX ORDER — METOCLOPRAMIDE HYDROCHLORIDE 5 MG/ML
10 INJECTION INTRAMUSCULAR; INTRAVENOUS
Status: DISCONTINUED | OUTPATIENT
Start: 2020-12-28 | End: 2020-12-28 | Stop reason: HOSPADM

## 2020-12-28 RX ORDER — LIDOCAINE HYDROCHLORIDE 10 MG/ML
INJECTION, SOLUTION EPIDURAL; INFILTRATION; INTRACAUDAL; PERINEURAL PRN
Status: DISCONTINUED | OUTPATIENT
Start: 2020-12-28 | End: 2020-12-28 | Stop reason: SDUPTHER

## 2020-12-28 RX ORDER — ROPIVACAINE HYDROCHLORIDE 5 MG/ML
INJECTION, SOLUTION EPIDURAL; INFILTRATION; PERINEURAL
Status: COMPLETED | OUTPATIENT
Start: 2020-12-28 | End: 2020-12-28

## 2020-12-28 RX ORDER — DIAZEPAM 5 MG/1
5 TABLET ORAL EVERY 8 HOURS PRN
Qty: 30 TABLET | Refills: 0 | Status: SHIPPED | OUTPATIENT
Start: 2020-12-28 | End: 2021-01-07

## 2020-12-28 RX ORDER — MORPHINE SULFATE 4 MG/ML
4 INJECTION, SOLUTION INTRAMUSCULAR; INTRAVENOUS EVERY 5 MIN PRN
Status: DISCONTINUED | OUTPATIENT
Start: 2020-12-28 | End: 2020-12-28 | Stop reason: HOSPADM

## 2020-12-28 RX ORDER — DEXAMETHASONE SODIUM PHOSPHATE 10 MG/ML
INJECTION, SOLUTION INTRAMUSCULAR; INTRAVENOUS PRN
Status: DISCONTINUED | OUTPATIENT
Start: 2020-12-28 | End: 2020-12-28 | Stop reason: SDUPTHER

## 2020-12-28 RX ORDER — MEPERIDINE HYDROCHLORIDE 50 MG/ML
12.5 INJECTION INTRAMUSCULAR; INTRAVENOUS; SUBCUTANEOUS EVERY 5 MIN PRN
Status: DISCONTINUED | OUTPATIENT
Start: 2020-12-28 | End: 2020-12-28 | Stop reason: HOSPADM

## 2020-12-28 RX ORDER — HYDROMORPHONE HYDROCHLORIDE 2 MG/1
2 TABLET ORAL EVERY 4 HOURS PRN
Qty: 10 TABLET | Refills: 0 | Status: SHIPPED | OUTPATIENT
Start: 2020-12-28 | End: 2020-12-31

## 2020-12-28 RX ORDER — HYDROMORPHONE HYDROCHLORIDE 1 MG/ML
0.25 INJECTION, SOLUTION INTRAMUSCULAR; INTRAVENOUS; SUBCUTANEOUS EVERY 5 MIN PRN
Status: DISCONTINUED | OUTPATIENT
Start: 2020-12-28 | End: 2020-12-28 | Stop reason: HOSPADM

## 2020-12-28 RX ORDER — LIDOCAINE HYDROCHLORIDE 10 MG/ML
INJECTION, SOLUTION INFILTRATION; PERINEURAL
Status: COMPLETED | OUTPATIENT
Start: 2020-12-28 | End: 2020-12-28

## 2020-12-28 RX ORDER — OXYCODONE AND ACETAMINOPHEN 10; 325 MG/1; MG/1
1 TABLET ORAL EVERY 4 HOURS PRN
Qty: 42 TABLET | Refills: 0 | Status: SHIPPED | OUTPATIENT
Start: 2020-12-28 | End: 2021-01-04

## 2020-12-28 RX ORDER — ROPIVACAINE/CLONIDIN/KETOROLAC 123-.04/5
SYRINGE (ML) INJECTION PRN
Status: DISCONTINUED | OUTPATIENT
Start: 2020-12-28 | End: 2020-12-28 | Stop reason: ALTCHOICE

## 2020-12-28 RX ORDER — LABETALOL HYDROCHLORIDE 5 MG/ML
5 INJECTION, SOLUTION INTRAVENOUS EVERY 10 MIN PRN
Status: DISCONTINUED | OUTPATIENT
Start: 2020-12-28 | End: 2020-12-28 | Stop reason: HOSPADM

## 2020-12-28 RX ORDER — FENTANYL CITRATE 50 UG/ML
INJECTION, SOLUTION INTRAMUSCULAR; INTRAVENOUS PRN
Status: DISCONTINUED | OUTPATIENT
Start: 2020-12-28 | End: 2020-12-28 | Stop reason: SDUPTHER

## 2020-12-28 RX ORDER — ROCURONIUM BROMIDE 10 MG/ML
INJECTION, SOLUTION INTRAVENOUS PRN
Status: DISCONTINUED | OUTPATIENT
Start: 2020-12-28 | End: 2020-12-28 | Stop reason: SDUPTHER

## 2020-12-28 RX ORDER — DIPHENHYDRAMINE HYDROCHLORIDE 50 MG/ML
12.5 INJECTION INTRAMUSCULAR; INTRAVENOUS
Status: DISCONTINUED | OUTPATIENT
Start: 2020-12-28 | End: 2020-12-28 | Stop reason: HOSPADM

## 2020-12-28 RX ORDER — ENALAPRILAT 2.5 MG/2ML
1.25 INJECTION INTRAVENOUS
Status: DISCONTINUED | OUTPATIENT
Start: 2020-12-28 | End: 2020-12-28 | Stop reason: HOSPADM

## 2020-12-28 RX ORDER — TRANEXAMIC ACID 100 MG/ML
INJECTION, SOLUTION INTRAVENOUS PRN
Status: DISCONTINUED | OUTPATIENT
Start: 2020-12-28 | End: 2020-12-28 | Stop reason: SDUPTHER

## 2020-12-28 RX ORDER — PROMETHAZINE HYDROCHLORIDE 25 MG/ML
6.25 INJECTION, SOLUTION INTRAMUSCULAR; INTRAVENOUS
Status: DISCONTINUED | OUTPATIENT
Start: 2020-12-28 | End: 2020-12-28 | Stop reason: HOSPADM

## 2020-12-28 RX ORDER — MORPHINE SULFATE 4 MG/ML
2 INJECTION, SOLUTION INTRAMUSCULAR; INTRAVENOUS EVERY 5 MIN PRN
Status: DISCONTINUED | OUTPATIENT
Start: 2020-12-28 | End: 2020-12-28 | Stop reason: HOSPADM

## 2020-12-28 RX ADMIN — ROCURONIUM BROMIDE 10 MG: 10 INJECTION, SOLUTION INTRAVENOUS at 09:01

## 2020-12-28 RX ADMIN — SUGAMMADEX 200 MG: 100 INJECTION, SOLUTION INTRAVENOUS at 11:01

## 2020-12-28 RX ADMIN — SODIUM CHLORIDE, SODIUM LACTATE, POTASSIUM CHLORIDE, AND CALCIUM CHLORIDE: 600; 310; 30; 20 INJECTION, SOLUTION INTRAVENOUS at 07:44

## 2020-12-28 RX ADMIN — Medication 2 G: at 09:05

## 2020-12-28 RX ADMIN — TRANEXAMIC ACID 1000 MG: 100 INJECTION, SOLUTION INTRAVENOUS at 10:25

## 2020-12-28 RX ADMIN — LIDOCAINE HYDROCHLORIDE 5 ML: 10 INJECTION, SOLUTION EPIDURAL; INFILTRATION; INTRACAUDAL; PERINEURAL at 09:01

## 2020-12-28 RX ADMIN — SODIUM CHLORIDE, SODIUM LACTATE, POTASSIUM CHLORIDE, AND CALCIUM CHLORIDE: 600; 310; 30; 20 INJECTION, SOLUTION INTRAVENOUS at 08:55

## 2020-12-28 RX ADMIN — ROCURONIUM BROMIDE 40 MG: 10 INJECTION, SOLUTION INTRAVENOUS at 09:05

## 2020-12-28 RX ADMIN — SODIUM CHLORIDE, SODIUM LACTATE, POTASSIUM CHLORIDE, AND CALCIUM CHLORIDE: 600; 310; 30; 20 INJECTION, SOLUTION INTRAVENOUS at 10:14

## 2020-12-28 RX ADMIN — SUCCINYLCHOLINE CHLORIDE 100 MG: 20 INJECTION, SOLUTION INTRAMUSCULAR; INTRAVENOUS at 09:01

## 2020-12-28 RX ADMIN — FENTANYL CITRATE 50 MCG: 50 INJECTION, SOLUTION INTRAMUSCULAR; INTRAVENOUS at 09:30

## 2020-12-28 RX ADMIN — DEXAMETHASONE SODIUM PHOSPHATE 10 MG: 10 INJECTION, SOLUTION INTRAMUSCULAR; INTRAVENOUS at 09:01

## 2020-12-28 RX ADMIN — PROPOFOL 150 MG: 10 INJECTION, EMULSION INTRAVENOUS at 09:01

## 2020-12-28 RX ADMIN — MIDAZOLAM 2 MG: 1 INJECTION INTRAMUSCULAR; INTRAVENOUS at 08:49

## 2020-12-28 RX ADMIN — FENTANYL CITRATE 50 MCG: 50 INJECTION, SOLUTION INTRAMUSCULAR; INTRAVENOUS at 09:01

## 2020-12-28 RX ADMIN — TRANEXAMIC ACID 1000 MG: 100 INJECTION, SOLUTION INTRAVENOUS at 09:05

## 2020-12-28 RX ADMIN — ROPIVACAINE HYDROCHLORIDE 20 ML: 5 INJECTION, SOLUTION EPIDURAL; INFILTRATION; PERINEURAL at 10:59

## 2020-12-28 RX ADMIN — ONDANSETRON HYDROCHLORIDE 4 MG: 2 INJECTION, SOLUTION INTRAMUSCULAR; INTRAVENOUS at 09:01

## 2020-12-28 RX ADMIN — LIDOCAINE HYDROCHLORIDE 1 ML: 10 INJECTION, SOLUTION INFILTRATION; PERINEURAL at 10:59

## 2020-12-28 ASSESSMENT — PAIN SCALES - GENERAL
PAINLEVEL_OUTOF10: 0

## 2020-12-28 ASSESSMENT — LIFESTYLE VARIABLES: SMOKING_STATUS: 0

## 2020-12-28 NOTE — OP NOTE
OPERATIVE NOTE    PREOPERATIVE DIAGNOSIS: Left knee primary osteoarthritis     POSTOPERATIVE DIAGNOSIS:  Left knee primary osteoarthritis     PROCEDURE:  Left Total Knee Arthroplasty     SURGEON:  MD Chrystal Corrales PA-C  The assistant aided in limb position as well as retractor placement.  The assistant was also critical in implantation of the prosthesis.  In addition to this, the assistant was responsible for wound closure.  It was necessary to have the assistant present in order to complete the procedure. ANESTHESIA:  General    ESTIMATED BLOOD LOSS:  250cc. COMPLICATIONS:  None. CONDITION:  Stable. IMPLANTS:    Implant Name Type Inv. Item Serial No.  Lot No. LRB No. Used Action   CEMENT BNE 40GM HI VISC RADPQ FOR REV SURG  CEMENT BNE 40GM HI VISC RADPQ FOR REV SURG  [employer] [id number] Left 1 Implanted   CEMENT BNE 40GM HI VISC RADPQ FOR REV SURG  CEMENT BNE 40GM HI VISC RADPQ FOR REV SURG  [employer] [id number] Left 1 Implanted       HISTORY: 76 yo male presents for a left TKA for end stage osteoarthritis. Patient has failed conservative management including: time, activity modifications, PT, NSAIDs, corticosteriods and viscosupplementation.     PROCEDURE:  The patient was interviewed in the pre-anesthesia area.  The operative limb was then marked with a marking pen.  The patient wasthen administered a regional block per the anesthesia team.  The patient was then taken to the operative suite where General anesthesia was performed by the anesthesia team.  A time out was then called to confirm the administration of 2 gm Ancef as well as the administration of tranexamic acid prior to incision.  The patient was then prepped and draped in standard sterile fashion using ChloraPrep.      Once fully prepped and draped, the limb was reprepped with ChloraPrep.  Sterile marking pen was then used to bryce out the tibia distally as well as the first web space.  The leg was then exsanguinated with Esmarch and tourniquet was inflated to 250 mmHg was placed in a [place] leg arce and a standard midline incision was carried out followed by a medial parapatellar arthrotomy.  The knee did demonstrate a large knee joint effusion with tricompartmental osteoarthrosis.      Attention was then turned to the distal femoral resection.  A drill was used to enter the intramedullary canal just anterior to the PCL insertion.  The extramedullary guide was then placed.  The guide was set at 5 degrees of valgus.  We then made a plus 2 mm distal femoral cut.  We then sized the femur to a size 9 using an anterior referencing guide and penned the femoral block into position.  Care was taken to make sure that the block was perpendicular to the epicondylar axis and parallel to the [employer], thus in external rotation.  Our anterior, posterior as well as chamfer cuts were then made.      Attention was then turned to the tibia.  The PCL retractor was placed posteriorly as the ACL and PCL were resected.  The infrapatellar fat pad was excised and Homans retractor was placed medially and laterally.  An extramedullary tibial guide was then set parallel to the tibia on the AP and lateral views in line with the first web space and second metatarsal, just slightly medial to the medial center of the ankle.  The guide was set at 7 degrees posterior slope.  A 2/10 guide was then used to measure for approximately a 2 mm resection medially and 10 mm resection laterally.  The proximal tibia was then removed en block.  The knee was then brought into extension where the medial and lateral menisci were then excised with a radiofrequency device.  We also performed a medial soft tissue release around the posteromedial corner of the knee.  Curved osteotome was then used to remove any posterior osteophytes off the medial and lateral femoral condyles as well as strip the posterior capsule.  A size F tibial tray was then placed with the central portion of the tibial tray in line with the medial third of the tibial tubercle, thus in external rotation, and pinned into position.     We then placed the femoral component into position, taking care to make sure that the femoral component was flush with the lateral cortex.  The box cutting guide was placed and our standard box cut was made without difficulty.  We then placed a box insert and placed a size 10 trial spacer.  The knee was brought into extension and did achieve full extension.  The knee was also stable in flexion.  There was approximately 2 mm of opening with varus and valgus stress testing.  Intraoperative fluoroscopy at this point confirmed position of our components.     Attention was then turned to the patella.  The patella was everted and resected down to 16 mm.  We then prepped for a size 41 patella implant  to sit superomedially for tracking purposes.     At this point in time, all trial components were removed.  The knee was copiously irrigated with pulsatile lavage.  We then cemented in our size F tibia component with a size 9 femoral component as well as our 41 mm patellar component.  A 10 mm trial spacer was placed with the knee in full extension as we waited on the cement to cure.  Meticulous cement clean up was carried out.  Once the cement was allowed to cure, we then trialed with our trial spacers.  We elected to go with a size 12 mm polyethylene spacer as with this in position the knee achieved full extension and was stable in flexion, as well as having 2 mm of opening with varus/valgus stress testing.  Thus, the trial component was removed.  The knee was once again copiously irrigated with pulsatile lavage.  We then injected the posterior capsule with a local cocktail solution, both posteromedially and posterolaterally.  Care was taken to make sure there was no remaining cement.  A size 12 mm polyethylene spacer was then placed.  The knee was then brought into 30 degrees of flexion.  The tourniquet was deflated and meticulous hemostasis was maintained with electrocautery.  The extensor mechanism was then closed with a #1 Vicryl suture.  The skin was then approximated with a 3-0 Vicryl suture and closed with a Prineo wound closure system.  The patient was placed in a sterile dressing.  The patient was awakened from anesthesia without difficulty and was transferred to the PACU in stable condition.  All sponge, needle and instrument counts were correct at the end of the procedure.     Jerardo Umanzor MD  12/28/2020  9:01 AM

## 2020-12-28 NOTE — ANESTHESIA PROCEDURE NOTES
Peripheral Block    Patient location during procedure: holding area  Staffing  Performed: anesthesiologist   Anesthesiologist: Wil Roger MD  Preanesthetic Checklist  Completed: patient identified, IV checked, site marked, risks and benefits discussed, surgical consent, monitors and equipment checked, pre-op evaluation, timeout performed, anesthesia consent given, oxygen available and patient being monitored  Peripheral Block  Patient position: supine  Prep: ChloraPrep  Patient monitoring: cardiac monitor, continuous pulse ox, frequent blood pressure checks and IV access  Block type: Femoral  Laterality: left  Injection technique: single-shot  Guidance: ultrasound guided  Local infiltration: ropivacaine  Infiltration strength: 0.5 %  Dose: 20 mL  Adductor canal  Provider prep: mask and sterile gloves  Local infiltration: ropivacaine  Needle  Needle type: combined needle/nerve stimulator   Needle gauge: 22 G  Needle length: 10 cm  Needle localization: ultrasound guidance  Assessment  Injection assessment: negative aspiration for heme, no paresthesia on injection and local visualized surrounding nerve on ultrasound  Paresthesia pain: none  Slow fractionated injection: yes  Hemodynamics: stable  Additional Notes  Under ultrasound (\"US\") guidance, a 22 gauge needle was inserted and placed in close proximity to the femoral nerve. Ultrasound was also used to visualize the spread of the anesthetic in close proximity to the nerve being blocked. The nerve appeared anatomically normal, and there were no abnormal pathological findings. A permanent image was recorded.      20 mL 0.5% Ropivacaine injected  Medications Administered  Lidocaine injection 1%, 1 mL  ropivacaine (NAROPIN) injection 0.5%, 20 mL  Reason for block: post-op pain management

## 2020-12-28 NOTE — PROGRESS NOTES
Pt and wife instructed on use of cryo cuff ice pack , encouraged them to use it often to prevent swelling of knee.

## 2020-12-28 NOTE — PROGRESS NOTES
Physical Therapy    Facility/Department: [facility]  Initial Assessment    NAME: Aura Chowdary  : 1945  MRN: [id number]    Date of Service: 2020    Discharge Recommendations:           Assessment   Body structures, Functions, Activity limitations: Decreased functional mobility   Assessment: Patient will benefit from continuing skilled physical therapy to improve mobility  Treatment Diagnosis: Decline in mobility  Prognosis: Good  Decision Making: Low Complexity  REQUIRES PT FOLLOW UP: Yes  Activity Tolerance  Activity Tolerance: Patient Tolerated treatment well       Patient Diagnosis(es): The encounter diagnosis was History of total left knee replacement. has a past medical history of COPD (chronic obstructive pulmonary disease) ([facility] [address] GERD (gastroesophageal reflux disease), Hyperlipidemia, Hypertension, LBBB (left bundle branch block), and Sleep apnea. has a past surgical history that includes Total shoulder arthroplasty (Right, 2020); Knee arthroscopy (Bilateral); Rotator cuff repair (Left); and back surgery ().     Restrictions     Vision/Hearing        Subjective  General  Chart Reviewed: Yes  Patient assessed for rehabilitation services?: Yes  Diagnosis: Left TKA  Follows Commands: Within Functional Limits  Subjective  Subjective: Agrees to work with therapy  Pain Screening  Patient Currently in Pain: Denies          Orientation  Orientation  Overall Orientation Status: Within Normal Limits  Social/Functional History     Cognition        Objective          PROM RLE (degrees)  RLE PROM: WFL  PROM LLE (degrees)  LLE General PROM: Knee - full extension in supine and flexion to 85 in sitting  Strength RLE  Strength RLE: WFL  Strength LLE  Comment: Grossly 3-/5        Bed mobility  Supine to Sit: Modified independent  Sit to Supine: Minimal assistance  Scooting: Modified independent  Transfers  Sit to Stand: Contact guard assistance  Stand to sit: Contact guard assistance  Bed to Chair: Contact guard assistance  Ambulation  Ambulation?: Yes  WB Status: WBAT  Ambulation 1  Device: Rolling Walker  Assistance: Minimal assistance  Quality of Gait: Safe  Distance: 20 feet     Balance  Posture: Good  Sitting - Static: Good  Sitting - Dynamic: Good  Standing - Static: Fair;+  Standing - Dynamic: Fair;-        Plan   Plan  Times per week: 7  Times per day: Daily  Plan weeks: 2  Current Treatment Recommendations: Functional Mobility Training, Transfer Training, Gait Training    G-Code       OutComes Score                                                  AM-PAC Score             Goals  Short term goals  Time Frame for Short term goals: 2 weeks  Short term goal 1: Ambulate 400 feet independently with assistive device  Short term goal 2: Independent with transfers  Short term goal 3:  Independent with bed mobility       Therapy Time   Individual Concurrent Group Co-treatment   Time In           Time Out           Minutes                   Chica Escalante PT    Electronically signed by Chica Escalante PT on 12/28/2020 at 2:05 PM

## 2020-12-28 NOTE — PROGRESS NOTES
CLINICAL PHARMACY NOTE: MEDS TO [address] [place], [place] [address] Patient?: No  Total # of Prescriptions Filled: 6   The following medications were delivered to the patient:  · Hydromorphone 2 mg  · Oxycontin 20 mg  · Percocet 10/325 mg  · Diazepam 5 mg  · Xarelto 10 mg  · Ondansetron 4 mg  Total # of Interventions Completed: 0  Time Spent (min): 60    Additional Documentation:    Handed scripts to patients wife in [place],

## 2020-12-28 NOTE — ANESTHESIA PRE PROCEDURE
Department of Anesthesiology  Preprocedure Note       Name:  Stephan Dugan   Age:  76 y.o.  :  1945                                          MRN:  [id number]         Date:  2020      Surgeon: Glo Vu):  Estelita Kelsey MD    Procedure: Procedure(s):  LEFT TOTAL KNEE ARTHROPLASTY    Medications prior to admission:   Prior to Admission medications    Medication Sig Start Date End Date Taking? Authorizing Provider   amLODIPine (NORVASC) 5 MG tablet Take 5 mg by mouth nightly   Yes Historical Provider, MD   lisinopril (PRINIVIL;ZESTRIL) 20 MG tablet Take 20 mg by mouth nightly   Yes Historical Provider, MD   simvastatin (ZOCOR) 40 MG tablet Take 40 mg by mouth nightly   Yes Historical Provider, MD   tiotropium (SPIRIVA) 18 MCG inhalation capsule Inhale 18 mcg into the lungs nightly   Yes Historical Provider, MD   bisoprolol (ZEBETA) 5 MG tablet Take 5 mg by mouth daily   Yes Historical Provider, MD   budesonide-formoterol (SYMBICORT) 160-4.5 MCG/ACT AERO Inhale 2 puffs into the lungs 2 times daily   Yes Historical Provider, MD   omeprazole (PRILOSEC) 20 MG delayed release capsule Take 20 mg by mouth Daily    Yes Historical Provider, MD   tamsulosin (FLOMAX) 0.4 MG capsule Take 0.4 mg by mouth nightly   Yes Historical Provider, MD   gabapentin (NEURONTIN) 300 MG capsule Take 600 mg by mouth 2 times daily.    Yes Historical Provider, MD   calcium carbonate 600 MG TABS tablet Take 1 tablet by mouth 2 times daily   Yes Historical Provider, MD   Multiple Vitamins-Minerals (MULTIVITAMIN ADULTS 50+) TABS Take 1 tablet by mouth daily   Yes Historical Provider, MD Opal Lopez Oil 300 MG CAPS Take 1 capsule by mouth daily   Yes Historical Provider, MD   Coenzyme Q10 (COQ10) 100 MG CAPS Take 1 capsule by mouth daily   Yes Historical Provider, MD   Garlic 6686 MG CAPS Take 1 capsule by mouth daily   Yes Historical Provider, MD   vitamin D 25 MCG (1000 UT) CAPS Take 1 capsule by mouth daily   Yes Historical Provider, MD ascorbic acid (VITAMIN C) 500 MG tablet Take 500 mg by mouth daily   Yes Historical Provider, MD   aspirin 81 MG EC tablet Take 81 mg by mouth daily   Yes Historical Provider, MD   B Complex-Folic Acid (SUPER B COMPLEX MAXI) TABS Take 1 tablet by mouth 2 times daily   Yes Historical Provider, MD   polyethyl glycol-propyl glycol 0.4-0.3 % (SYSTANE) 0.4-0.3 % ophthalmic solution Place 1 drop into both eyes nightly    Yes Historical Provider, MD   ibuprofen (ADVIL;MOTRIN) 800 MG tablet Take 800 mg by mouth every 6 hours as needed for Pain   Yes Historical Provider, MD   Collagen-Boron-Hyaluronic Acid (MOVE FREE ULTRA TORIA HEALTH) 10-5-3.3 MG TABS Take 1 tablet by mouth daily   Yes Historical Provider, MD   Turmeric 500 MG TABS Take 2 tablets by mouth daily   Yes Historical Provider, MD   acetaminophen (TYLENOL 8 HOUR ARTHRITIS PAIN) 650 MG extended release tablet Take 650 mg by mouth 2 times daily   Yes Historical Provider, MD   S-Adenosylmethionine (WILMA-E) 200 MG TABS Take 1 tablet by mouth daily   Yes Historical Provider, MD   albuterol sulfate HFA (PROVENTIL HFA) 108 (90 Base) MCG/ACT inhaler Inhale 2 puffs into the lungs every 6 hours as needed for Wheezing    Historical Provider, MD       Current medications:    Current Facility-Administered Medications   Medication Dose Route Frequency Provider Last Rate Last Admin    ceFAZolin (ANCEF) 2 g in 0.9% sodium chloride 50 mL IVPB  2 g Intravenous Once Karly Green MD        lactated ringers infusion   Intravenous Continuous Romaine Reid  mL/hr at 12/28/20 0744 New Bag at 12/28/20 0744       Allergies:  No Known Allergies    Problem List:  There is no problem list on file for this patient.       Past Medical History:        Diagnosis Date    COPD (chronic obstructive pulmonary disease) (HCC)     GERD (gastroesophageal reflux disease)     Hyperlipidemia     Hypertension     LBBB (left bundle branch block)     Sleep apnea     CPAP Past Surgical History:        Procedure Laterality Date    BACK SURGERY      LUMBAR (\"disc removal\"    KNEE ARTHROSCOPY Bilateral     ROTATOR CUFF REPAIR Left     SHOULDER ARTHROPLASTY Right 2020       Social History:    Social History     Tobacco Use    Smoking status: Former Smoker     Packs/day: 1.00     Years: 53.00     Pack years: 53.00     Types: Pipe, Cigarettes     Quit date: 2009     Years since quittin.0    Smokeless tobacco: Never Used   Substance Use Topics    Alcohol use: Yes     Alcohol/week: 3.0 standard drinks     Types: 2 Cans of beer, 1 Shots of liquor per week     Comment: DAILY                                Counseling given: Not Answered      Vital Signs (Current):   Vitals:    20 0736   BP: (!) 140/78   Pulse: 64   Resp: 18   Temp: 97.2 °F (36.2 °C)   TempSrc: Temporal   SpO2: 94%   Weight: 180 lb (81.6 kg)   Height: 5' 7\" (1.702 m)                                              BP Readings from Last 3 Encounters:   20 (!) 140/78       NPO Status: Time of last liquid consumption: 0000                        Time of last solid consumption: 0000                        Date of last liquid consumption: 20                        Date of last solid food consumption: 20    BMI:   Wt Readings from Last 3 Encounters:   20 180 lb (81.6 kg)   20 180 lb (81.6 kg)     Body mass index is 28.19 kg/m².     CBC:   Lab Results   Component Value Date    WBC 6.6 2020    RBC 3.97 2020    HGB 13.3 2020    HCT 40.7 2020    .5 2020    RDW 13.5 2020     2020       CMP:   Lab Results   Component Value Date     2020    K 4.1 2020     2020    CO2 25 2020    BUN 16 2020    CREATININE 0.8 2020    GFRAA >59 2020    LABGLOM >60 2020    GLUCOSE 92 2020    CALCIUM 9.4 2020 POC Tests: No results for input(s): POCGLU, POCNA, POCK, POCCL, POCBUN, POCHEMO, POCHCT in the last 72 hours. Coags:   Lab Results   Component Value Date    PROTIME 12.4 12/22/2020    INR 0.93 12/22/2020    APTT 25.4 12/22/2020       HCG (If Applicable): No results found for: PREGTESTUR, PREGSERUM, HCG, HCGQUANT     ABGs: No results found for: PHART, PO2ART, PTN0RSH, WFE8UBO, BEART, K7ZWHQPI     Type & Screen (If Applicable):  No results found for: LABABO, LABRH    Drug/Infectious Status (If Applicable):  No results found for: HIV, HEPCAB    COVID-19 Screening (If Applicable):   Lab Results   Component Value Date    COVID19 Not Detected 12/26/2020         Anesthesia Evaluation  Patient summary reviewed and Nursing notes reviewed no history of anesthetic complications:   Airway: Mallampati: II  TM distance: >3 FB     Mouth opening: > = 3 FB Dental:          Pulmonary:normal exam    (+) COPD:  sleep apnea: on CPAP,      (-) not a current smoker                           Cardiovascular:    (+) hypertension:, hyperlipidemia      ECG reviewed               Beta Blocker:  Dose within 24 Hrs         Neuro/Psych:      (-) seizures and CVA           GI/Hepatic/Renal:   (+) GERD: well controlled,           Endo/Other:    (+) : arthritis:., .    (-) diabetes mellitus               Abdominal:           Vascular: negative vascular ROS. Anesthesia Plan      general and regional     ASA 3     (Adductor canal block)  Induction: intravenous. MIPS: Postoperative opioids intended and Prophylactic antiemetics administered. Anesthetic plan and risks discussed with patient.                       Kimber Smith MD   12/28/2020

## 2021-01-20 DIAGNOSIS — G62.9 PERIPHERAL POLYNEUROPATHY: ICD-10-CM

## 2021-01-20 RX ORDER — GABAPENTIN 300 MG/1
300 CAPSULE ORAL 4 TIMES DAILY
Qty: 120 CAPSULE | Refills: 0 | Status: SHIPPED | OUTPATIENT
Start: 2021-01-20 | End: 2021-02-26 | Stop reason: SDUPTHER

## 2021-02-16 ENCOUNTER — APPOINTMENT (OUTPATIENT)
Dept: VACCINE CLINIC | Facility: HOSPITAL | Age: 76
End: 2021-02-16

## 2021-02-23 ENCOUNTER — IMMUNIZATION (OUTPATIENT)
Dept: VACCINE CLINIC | Facility: HOSPITAL | Age: 76
End: 2021-02-23

## 2021-02-23 PROCEDURE — 0011A: CPT | Performed by: OBSTETRICS & GYNECOLOGY

## 2021-02-23 PROCEDURE — 91301 HC SARSCO02 VAC 100MCG/0.5ML IM: CPT | Performed by: OBSTETRICS & GYNECOLOGY

## 2021-02-24 PROBLEM — J43.2 CENTRILOBULAR EMPHYSEMA: Chronic | Status: ACTIVE | Noted: 2021-02-24

## 2021-02-24 PROBLEM — J47.9 BRONCHIECTASIS WITHOUT COMPLICATION: Status: ACTIVE | Noted: 2021-02-24

## 2021-02-24 PROBLEM — J44.9 COPD, GROUP B, BY GOLD 2017 CLASSIFICATION: Chronic | Status: ACTIVE | Noted: 2019-10-29

## 2021-02-24 PROBLEM — J44.9 COPD, GROUP B, BY GOLD 2017 CLASSIFICATION (HCC): Status: ACTIVE | Noted: 2019-10-29

## 2021-02-24 PROBLEM — G47.33 OBSTRUCTIVE SLEEP APNEA: Chronic | Status: ACTIVE | Noted: 2020-01-20

## 2021-02-24 PROBLEM — E66.09 CLASS 1 OBESITY DUE TO EXCESS CALORIES WITH SERIOUS COMORBIDITY AND BODY MASS INDEX (BMI) OF 32.0 TO 32.9 IN ADULT: Chronic | Status: ACTIVE | Noted: 2020-01-20

## 2021-02-24 PROBLEM — J43.2 CENTRILOBULAR EMPHYSEMA: Status: ACTIVE | Noted: 2021-02-24

## 2021-02-24 PROBLEM — Z87.891 PERSONAL HISTORY OF NICOTINE DEPENDENCE: Chronic | Status: ACTIVE | Noted: 2020-01-20

## 2021-02-24 PROBLEM — J47.9 BRONCHIECTASIS WITHOUT COMPLICATION: Chronic | Status: ACTIVE | Noted: 2021-02-24

## 2021-02-24 PROBLEM — E66.811 CLASS 1 OBESITY DUE TO EXCESS CALORIES WITH SERIOUS COMORBIDITY AND BODY MASS INDEX (BMI) OF 32.0 TO 32.9 IN ADULT: Chronic | Status: ACTIVE | Noted: 2020-01-20

## 2021-02-26 DIAGNOSIS — G62.9 PERIPHERAL POLYNEUROPATHY: ICD-10-CM

## 2021-02-26 RX ORDER — GABAPENTIN 300 MG/1
300 CAPSULE ORAL 4 TIMES DAILY
Qty: 120 CAPSULE | Refills: 0 | Status: SHIPPED | OUTPATIENT
Start: 2021-02-26 | End: 2021-04-06

## 2021-03-03 ENCOUNTER — OFFICE VISIT (OUTPATIENT)
Dept: PULMONOLOGY | Facility: CLINIC | Age: 76
End: 2021-03-03

## 2021-03-03 VITALS
BODY MASS INDEX: 31.72 KG/M2 | SYSTOLIC BLOOD PRESSURE: 148 MMHG | HEART RATE: 66 BPM | HEIGHT: 65 IN | TEMPERATURE: 98.6 F | WEIGHT: 190.4 LBS | DIASTOLIC BLOOD PRESSURE: 82 MMHG | OXYGEN SATURATION: 98 %

## 2021-03-03 DIAGNOSIS — G47.33 OBSTRUCTIVE SLEEP APNEA: Chronic | ICD-10-CM

## 2021-03-03 DIAGNOSIS — J47.9 BRONCHIECTASIS WITHOUT COMPLICATION (HCC): Primary | Chronic | ICD-10-CM

## 2021-03-03 DIAGNOSIS — J43.2 CENTRILOBULAR EMPHYSEMA (HCC): Chronic | ICD-10-CM

## 2021-03-03 DIAGNOSIS — Z87.891 PERSONAL HISTORY OF NICOTINE DEPENDENCE: Chronic | ICD-10-CM

## 2021-03-03 DIAGNOSIS — J44.9 COPD, GROUP B, BY GOLD 2017 CLASSIFICATION (HCC): ICD-10-CM

## 2021-03-03 DIAGNOSIS — E66.09 CLASS 1 OBESITY DUE TO EXCESS CALORIES WITH SERIOUS COMORBIDITY AND BODY MASS INDEX (BMI) OF 32.0 TO 32.9 IN ADULT: ICD-10-CM

## 2021-03-03 PROCEDURE — 99214 OFFICE O/P EST MOD 30 MIN: CPT | Performed by: NURSE PRACTITIONER

## 2021-03-03 RX ORDER — ONDANSETRON 4 MG/1
4 TABLET, FILM COATED ORAL EVERY 8 HOURS PRN
Status: ON HOLD | COMMUNITY
Start: 2020-12-28 | End: 2021-09-29

## 2021-03-03 RX ORDER — SENNOSIDES 8.6 MG
650 CAPSULE ORAL 2 TIMES DAILY
COMMUNITY

## 2021-03-03 NOTE — PATIENT INSTRUCTIONS
"BMI for Adults  What is BMI?  Body mass index (BMI) is a number that is calculated from a person's weight and height. BMI can help estimate how much of a person's weight is composed of fat. BMI does not measure body fat directly. Rather, it is an alternative to procedures that directly measure body fat, which can be difficult and expensive.  BMI can help identify people who may be at higher risk for certain medical problems.  What are BMI measurements used for?  BMI is used as a screening tool to identify possible weight problems. It helps determine whether a person is obese, overweight, a healthy weight, or underweight.  BMI is useful for:  · Identifying a weight problem that may be related to a medical condition or may increase the risk for medical problems.  · Promoting changes, such as changes in diet and exercise, to help reach a healthy weight. BMI screening can be repeated to see if these changes are working.  How is BMI calculated?  BMI involves measuring your weight in relation to your height. Both height and weight are measured, and the BMI is calculated from those numbers. This can be done either in English (U[place] or metric measurements. Note that charts and online BMI calculators are available to help you find your BMI quickly and easily without having to do these calculations yourself.  To calculate your BMI in English (U.S[place] measurements:    1. Measure your weight in pounds (lb).  2. Multiply the number of pounds by 703.  ? For example, for a person who weighs 180 lb, multiply that number by 703, which equals 126,540.  3. Measure your height in inches. Then multiply that number by itself to get a measurement called \"inches squared.\"  ? For example, for a person who is 70 inches tall, the \"inches squared\" measurement is 70 inches x 70 inches, which equals 4,900 inches squared.  4. Divide the total from step 2 (number of lb x 703) by the total from step 3 (inches squared): 126,540 ÷ 4,900 = 25.8. This is " "your BMI.  To calculate your BMI in metric measurements:  1. Measure your weight in kilograms (kg).  2. Measure your height in meters (m). Then multiply that number by itself to get a measurement called \"meters squared.\"  ? For example, for a person who is 1.75 m tall, the \"meters squared\" measurement is 1.75 m x 1.75 m, which is equal to 3.1 meters squared.  3. Divide the number of kilograms (your weight) by the meters squared number. In this example: 70 ÷ 3.1 = 22.6. This is your BMI.  What do the results mean?  BMI charts are used to identify whether you are underweight, normal weight, overweight, or obese. The following guidelines will be used:  · Underweight: BMI less than 18.5.  · Normal weight: BMI between 18.5 and 24.9.  · Overweight: BMI between 25 and 29.9.  · Obese: BMI of 30 or above.  Keep these notes in mind:  · Weight includes both fat and muscle, so someone with a muscular build, such as an athlete, may have a BMI that is higher than 24.9. In cases like these, BMI is not an accurate measure of body fat.  · To determine if excess body fat is the cause of a BMI of 25 or higher, further assessments may need to be done by a health care provider.  · BMI is usually interpreted in the same way for men and women.  Where to find more information  For more information about BMI, including tools to quickly calculate your BMI, go to these websites:  · Centers for Disease Control and Prevention: www.cdc.gov  · American Heart Association: www.heart.org  · National Heart, Lung, and Blood [place]: www.nhlbi.nih.gov  Summary  · Body mass index (BMI) is a number that is calculated from a person's weight and height.  · BMI may help estimate how much of a person's weight is composed of fat. BMI can help identify those who may be at higher risk for certain medical problems.  · BMI can be measured using English measurements or metric measurements.  · BMI charts are used to identify whether you are underweight, normal " weight, overweight, or obese.  This information is not intended to replace advice given to you by your health care provider. Make sure you discuss any questions you have with your health care provider.  Document Revised: 09/09/2020 Document Reviewed: 07/17/2020  Elsevier Patient Education © 2020 [employer]

## 2021-03-23 ENCOUNTER — IMMUNIZATION (OUTPATIENT)
Dept: VACCINE CLINIC | Facility: HOSPITAL | Age: 76
End: 2021-03-23

## 2021-03-23 PROCEDURE — 0012A: CPT | Performed by: OBSTETRICS & GYNECOLOGY

## 2021-03-23 PROCEDURE — 91301 HC SARSCO02 VAC 100MCG/0.5ML IM: CPT | Performed by: OBSTETRICS & GYNECOLOGY

## 2021-04-06 DIAGNOSIS — G62.9 PERIPHERAL POLYNEUROPATHY: ICD-10-CM

## 2021-04-06 RX ORDER — GABAPENTIN 300 MG/1
300 CAPSULE ORAL 4 TIMES DAILY
Qty: 120 CAPSULE | Refills: 0 | Status: SHIPPED | OUTPATIENT
Start: 2021-04-06 | End: 2021-05-07

## 2021-05-07 DIAGNOSIS — G62.9 PERIPHERAL POLYNEUROPATHY: ICD-10-CM

## 2021-05-07 RX ORDER — GABAPENTIN 300 MG/1
300 CAPSULE ORAL 4 TIMES DAILY
Qty: 120 CAPSULE | Refills: 0 | Status: SHIPPED | OUTPATIENT
Start: 2021-05-07 | End: 2021-06-06

## 2021-05-25 DIAGNOSIS — J44.9 CHRONIC OBSTRUCTIVE PULMONARY DISEASE, UNSPECIFIED COPD TYPE (HCC): ICD-10-CM

## 2021-05-25 DIAGNOSIS — J42 CHRONIC BRONCHITIS, UNSPECIFIED CHRONIC BRONCHITIS TYPE (HCC): ICD-10-CM

## 2021-05-26 RX ORDER — TIOTROPIUM BROMIDE INHALATION SPRAY 3.12 UG/1
SPRAY, METERED RESPIRATORY (INHALATION)
Qty: 1 EACH | Refills: 11 | Status: SHIPPED | OUTPATIENT
Start: 2021-05-26 | End: 2022-05-09

## 2021-06-01 ENCOUNTER — OFFICE VISIT (OUTPATIENT)
Dept: INTERNAL MEDICINE | Facility: CLINIC | Age: 76
End: 2021-06-01

## 2021-06-01 VITALS
HEIGHT: 65 IN | BODY MASS INDEX: 31.64 KG/M2 | RESPIRATION RATE: 18 BRPM | OXYGEN SATURATION: 97 % | HEART RATE: 73 BPM | SYSTOLIC BLOOD PRESSURE: 132 MMHG | TEMPERATURE: 98.2 F | WEIGHT: 189.9 LBS | DIASTOLIC BLOOD PRESSURE: 65 MMHG

## 2021-06-01 DIAGNOSIS — J30.1 ALLERGIC RHINITIS DUE TO POLLEN, UNSPECIFIED SEASONALITY: ICD-10-CM

## 2021-06-01 DIAGNOSIS — R05.9 COUGH: ICD-10-CM

## 2021-06-01 DIAGNOSIS — R20.2 PARESTHESIA OF HAND, BILATERAL: ICD-10-CM

## 2021-06-01 DIAGNOSIS — E78.2 MIXED HYPERLIPIDEMIA: ICD-10-CM

## 2021-06-01 DIAGNOSIS — R09.89 ABNORMAL LUNG SOUNDS: Primary | ICD-10-CM

## 2021-06-01 PROCEDURE — 99214 OFFICE O/P EST MOD 30 MIN: CPT | Performed by: FAMILY MEDICINE

## 2021-06-01 RX ORDER — PREDNISONE 10 MG/1
10 TABLET ORAL DAILY
Qty: 5 TABLET | Refills: 0 | Status: SHIPPED | OUTPATIENT
Start: 2021-06-01 | End: 2021-09-08

## 2021-06-01 NOTE — PROGRESS NOTES
Subjective     Chief Complaint   Patient presents with   • Cough     Persistant for 3-4 weeks.        History of Present Illness  Cough paroxysms.  Have been occurring for about one month  Has tried claritin.  Having some allergy symptoms.  Usually in morning on arising.   No sputum production  No fever or chills.   No increased shortness of breath.    Has not had lab drawn for awhile    Having bilateral hand numbness.  It is really bothering him  Onset between 1-2 years.    Occurs when sleeping, reading and playing on tablet and driving.     Patient's PMR from outside medical facility reviewed and noted.    Review of Systems     Otherwise complete ROS reviewed and negative except as mentioned in the HPI.    Past Medical History:   Past Medical History:   Diagnosis Date   • Arthritis    • CAD (coronary artery disease)    • Chronic back pain    • Chronic knee pain     LEFT KNEE   • Class 1 obesity due to excess calories with serious comorbidity and body mass index (BMI) of 32.0 to 32.9 in adult 1/20/2020   • COPD (chronic obstructive pulmonary disease) (CMS/[facility])    • Coronary artery disease involving autologous vein coronary bypass graft without angina pectoris 1/20/2020   • Essential hypertension 10/14/2019   • GERD (gastroesophageal reflux disease)    • Hyperlipidemia    • Hypertension    • Obstructive sleep apnea 1/20/2020   • Sleep apnea      Past Surgical History:  Past Surgical History:   Procedure Laterality Date   • BACK SURGERY     • COLONOSCOPY     • KNEE MENISCAL REPAIR Left 2018   • ROTATOR CUFF REPAIR Right 2000   • SHOULDER SURGERY     • SPINE SURGERY  1981    lower back ruptured disc   • TOTAL SHOULDER ARTHROPLASTY W/ DISTAL CLAVICLE EXCISION Right 1/14/2020    Procedure: RIGHT REVERSE TOTAL SHOULDER  ARTHROPLASTY;  Surgeon: Suman Ventura MD;  Location: [facility];  Service: Orthopedics     Social History:  reports that he quit smoking about 10 years ago. His smoking use included pipe.  He smoked 0.00 packs per day for 50.00 years. He has never used smokeless tobacco. He reports current alcohol use of about 5.0 standard drinks of alcohol per week. He reports that he does not use drugs.    Family History: family history includes Arthritis in his father and mother; Cancer in his brother; Diabetes in his mother and sister; Hypertension in his father and mother; Kidney disease in his mother; Obesity in his sister; Osteoporosis in his mother; Stroke in his father.       Allergies:  No Known Allergies  Medications:  Prior to Admission medications    Medication Sig Start Date End Date Taking? Authorizing Provider   acetaminophen (TYLENOL) 650 MG 8 hr tablet Take 650 mg by mouth.   Yes Cristian Pfeiffer MD   albuterol sulfate  (90 Base) MCG/ACT inhaler Inhale 2 puffs Every 4 (Four) Hours As Needed for Wheezing. 9/15/20  Yes Angelica Mccabe DO   amLODIPine (NORVASC) 5 MG tablet TAKE 1 TABLET BY MOUTH EVERY DAY 12/17/20  Yes Katerine Mcclain APRN   aspirin 81 MG EC tablet Take 81 mg by mouth Daily.   Yes Cristian Pfeiffer MD   B Complex-Folic Acid (Super B Complex Maxi) tablet Take 1 tablet by mouth.   Yes Cristian Pfeiffer MD   bisoprolol (ZEBeta) 5 MG tablet TAKE 1 TABLET BY MOUTH EVERY DAY 12/17/20  Yes Katerine Mcclain APRN   budesonide-formoterol (Symbicort) 80-4.5 MCG/ACT inhaler Inhale 2 puffs 2 (Two) Times a Day. 6/15/20  Yes Katerine Mcclain APRN   calcium carbonate (OS-TASHIA) 600 MG tablet Take 600 mg by mouth 2 (Two) Times a Day.   Yes Cristian Pfeiffer MD   Cholecalciferol (VITAMIN D3) 1000 units capsule Take 2,000 Units by mouth Daily.   Yes Cristian Pfeiffer MD   coenzyme Q10 100 MG capsule Take 100 mg by mouth Daily.   Yes Cristian Pfeiffer MD   Collagen-Boron-Hyaluronic Acid (Move Free [employer]) 10-5-3.3 MG tablet Take 1 tablet by mouth.   Yes Cristian Pfeiffer MD   cyclobenzaprine (FLEXERIL) 10 MG tablet Take 1 tablet by  mouth 3 (Three) Times a Day As Needed for Muscle Spasms. 10/7/19  Yes Katerine Mcclain APRN   fluticasone (FLONASE) 50 MCG/ACT nasal spray 2 sprays into the nostril(s) as directed by provider Daily.   Yes ProviderCristian MD   gabapentin (NEURONTIN) 300 MG capsule TAKE 1 CAPSULE BY MOUTH 4 (FOUR) TIMES A DAY. 5/7/21  Yes Angelica Mccabe DO   Garlic 1000 MG capsule Take 1,000 mg by mouth Daily.   Yes ProviderCristian MD   Glucos-Chondroit-Hyaluron-MSM (GLUCOSAMINE CHONDROITIN JOINT PO) Take 1 tablet/day by mouth Daily.   Yes ProviderCristian MD   ibuprofen (ADVIL,MOTRIN) 800 MG tablet Take 800 mg by mouth Every 6 (Six) Hours As Needed for Mild Pain .   Yes Provider, MD Cristian   Krill Oil 300 MG capsule Take 300 mg by mouth Daily.   Yes ProviderCristian MD   lisinopril (PRINIVIL,ZESTRIL) 20 MG tablet TAKE 1 TABLET BY MOUTH EVERY DAY 12/15/20  Yes Angelica Mccabe DO   meloxicam (MOBIC) 15 MG tablet Take 1 tablet by mouth Daily. 9/15/20  Yes Angelica Mccabe DO   methylPREDNISolone (MEDROL) 4 MG dose pack Take as directed on package instructions. 11/2/20  Yes Katerine Mcclain APRN   Multiple Vitamins-Minerals (OCUVITE ADULT 50+ PO) Take 1 tablet by mouth Daily.   Yes ProviderCristian MD   omeprazole (priLOSEC) 20 MG capsule Take 20 mg by mouth Daily.   Yes ProviderCristian MD   ondansetron (ZOFRAN) 4 MG tablet Take 4 mg by mouth. 12/28/20  Yes Cristian Pfeiffer MD   rivaroxaban (Xarelto) 10 MG tablet Take 10 mg by mouth. 12/28/20  Yes ProviderCristian MD   S-Adenosylmethionine (SULAIMAN-E) 200 MG tablet Take 200 mg by mouth.   Yes ProviderCristian MD   simvastatin (ZOCOR) 80 MG tablet TAKE 1 TABLET BY MOUTH EVERY DAY AT NIGHT 12/17/20  Yes Katerine Mcclain APRN   Spiriva Respimat 2.5 MCG/ACT aerosol solution inhaler INHALE 2 PUFFS BY MOUTH DAILY 5/26/21  Yes Angelica Mccbae DO SUPER B COMPLEX/C PO Take 1 tablet by mouth Every Night.  "  Yes Cristian Pfeiffer MD   tamsulosin (FLOMAX) 0.4 MG capsule 24 hr capsule TAKE 1 CAPSULE BY MOUTH EVERY DAY 12/17/20  Yes Katerine Mcclain APRN   TURMERIC PO Take 1,000 mg by mouth Daily.   Yes Cristian Pfeiffer MD   vitamin C (ASCORBIC ACID) 500 MG tablet Take 500 mg by mouth Daily.   Yes Cristian Pfeiffer MD       Objective     Vital Signs: /65 (BP Location: Left arm, Patient Position: Sitting, Cuff Size: Adult)   Pulse 73   Temp 98.2 °F (36.8 °C) (Skin)   Resp 18   Ht 163.8 cm (64.5\")   Wt 86.1 kg (189 lb 14.4 oz)   SpO2 97%   BMI 32.09 kg/m²   Physical Exam  Vitals and nursing note reviewed.   Constitutional:       General: He is not in acute distress.     Appearance: Normal appearance. He is obese. He is not ill-appearing.   HENT:      Head: Normocephalic and atraumatic.      Right Ear: Ear canal and external ear normal.      Left Ear: Tympanic membrane, ear canal and external ear normal.      Ears:      Comments: right tm with fluid present     Nose: Rhinorrhea present.      Comments: Nasal turbinate edema.     Mouth/Throat:      Mouth: Mucous membranes are moist.      Pharynx: Oropharynx is clear. No oropharyngeal exudate or posterior oropharyngeal erythema.      Comments: Long uvula  Eyes:      Extraocular Movements: Extraocular movements intact.      Conjunctiva/sclera: Conjunctivae normal.      Pupils: Pupils are equal, round, and reactive to light.   Cardiovascular:      Rate and Rhythm: Normal rate and regular rhythm.      Pulses: Normal pulses.      Heart sounds: Normal heart sounds.   Pulmonary:      Effort: Pulmonary effort is normal.      Comments: Right clear  Left base with muffled rales  Abdominal:      General: Bowel sounds are normal.      Palpations: Abdomen is soft.   Musculoskeletal:         General: Normal range of motion.      Cervical back: Normal range of motion and neck supple.   Skin:     General: Skin is warm and dry.      Capillary Refill: " Capillary refill takes less than 2 seconds.   Neurological:      General: No focal deficit present.      Mental Status: He is alert and oriented to person, place, and time.   Psychiatric:         Mood and Affect: Mood normal.         Behavior: Behavior normal.         Patient's Body mass index is 32.09 kg/m².       Results Reviewed:  Glucose   Date Value Ref Range Status   12/22/2020 92 74 - 109 mg/dL Final     BUN   Date Value Ref Range Status   12/22/2020 16 8 - 23 mg/dL Final     Creatinine   Date Value Ref Range Status   12/22/2020 0.8 0.5 - 1.2 mg/dL Final     Sodium   Date Value Ref Range Status   12/22/2020 139 136 - 145 mmol/L Final     Potassium   Date Value Ref Range Status   12/22/2020 4.1 3.5 - 5.0 mmol/L Final     Chloride   Date Value Ref Range Status   12/22/2020 102 98 - 111 mmol/L Final     CO2   Date Value Ref Range Status   12/22/2020 25 22 - 29 mmol/L Final     Calcium   Date Value Ref Range Status   12/22/2020 9.4 8.8 - 10.2 mg/dL Final     ALT (SGPT)   Date Value Ref Range Status   09/18/2020 16 0 - 44 IU/L Final     AST (SGOT)   Date Value Ref Range Status   09/18/2020 12 0 - 40 IU/L Final     WBC   Date Value Ref Range Status   12/22/2020 6.6 4.8 - 10.8 K/uL Final     Hematocrit   Date Value Ref Range Status   12/22/2020 40.7 (L) 42.0 - 52.0 % Final     Platelets   Date Value Ref Range Status   12/22/2020 175 130 - 400 K/uL Final     Triglycerides   Date Value Ref Range Status   09/18/2020 64 0 - 149 mg/dL Final     HDL Cholesterol   Date Value Ref Range Status   09/18/2020 65 >39 mg/dL Final     LDL Chol Calc ([facility])   Date Value Ref Range Status   09/18/2020 82 0 - 99 mg/dL Final         Assessment / Plan     Assessment/Plan:  1. Abnormal lung sounds    - XR Chest PA & Lateral (In Office)  No acute infiltrate per my read    2. Mixed hyperlipidemia    - Comprehensive metabolic panel  - Lipid panel    3. Paresthesia of hand, bilateral    - EMG & Nerve Conduction Test; Future    4. Cough    See  #5    5. Allergic rhinitis due to pollen, unspecified seasonality    Take claritin 10 mg daily  Use flonase bid.  If cough continues then low dose steroid 10 mg for 5 days      Return in about 3 months (around 9/1/2021), or if symptoms worsen or fail to improve. unless patient needs to be seen sooner or acute issues arise.        I have discussed the patient results/orders and and plan/recommendation with them at today's visit.      Angelica Mccabe,    06/01/2021

## 2021-06-02 ENCOUNTER — CLINICAL SUPPORT (OUTPATIENT)
Dept: INTERNAL MEDICINE | Facility: CLINIC | Age: 76
End: 2021-06-02

## 2021-06-02 ENCOUNTER — TELEPHONE (OUTPATIENT)
Dept: INTERNAL MEDICINE | Facility: CLINIC | Age: 76
End: 2021-06-02

## 2021-06-02 DIAGNOSIS — E78.2 MIXED HYPERLIPIDEMIA: Primary | ICD-10-CM

## 2021-06-02 PROCEDURE — 36415 COLL VENOUS BLD VENIPUNCTURE: CPT | Performed by: FAMILY MEDICINE

## 2021-06-02 NOTE — TELEPHONE ENCOUNTER
Attempted to call patient. No answer. Left VM for patient to return call to discuss Xray findings and recommendations from Dr. Mccabe.

## 2021-06-02 NOTE — PROGRESS NOTES
Venipuncture Blood Specimen Collection  Venipuncture performed in left ac by Jessie Paige MA with good hemostasis. Patient tolerated the procedure well without complications.   06/02/21   Jessie Paige MA

## 2021-06-02 NOTE — TELEPHONE ENCOUNTER
----- Message from Angelica Mccabe DO sent at 6/2/2021  7:55 AM CDT -----  No acute infiltrate.  Mild atelectasis   Does he have an incentive spirometer at home.

## 2021-06-03 ENCOUNTER — TELEPHONE (OUTPATIENT)
Dept: INTERNAL MEDICINE | Facility: CLINIC | Age: 76
End: 2021-06-03

## 2021-06-03 LAB
ALBUMIN SERPL-MCNC: 4.2 G/DL (ref 3.7–4.7)
ALBUMIN/GLOB SERPL: 1.6 {RATIO} (ref 1.2–2.2)
ALP SERPL-CCNC: 53 IU/L (ref 48–121)
ALT SERPL-CCNC: 19 IU/L (ref 0–44)
AST SERPL-CCNC: 14 IU/L (ref 0–40)
BILIRUB SERPL-MCNC: 0.5 MG/DL (ref 0–1.2)
BUN SERPL-MCNC: 12 MG/DL (ref 8–27)
BUN/CREAT SERPL: 13 (ref 10–24)
CALCIUM SERPL-MCNC: 9.3 MG/DL (ref 8.6–10.2)
CHLORIDE SERPL-SCNC: 107 MMOL/L (ref 96–106)
CHOLEST SERPL-MCNC: 174 MG/DL (ref 100–199)
CO2 SERPL-SCNC: 24 MMOL/L (ref 20–29)
CREAT SERPL-MCNC: 0.91 MG/DL (ref 0.76–1.27)
GLOBULIN SER CALC-MCNC: 2.6 G/DL (ref 1.5–4.5)
GLUCOSE SERPL-MCNC: 104 MG/DL (ref 65–99)
HDLC SERPL-MCNC: 57 MG/DL
LDLC SERPL CALC-MCNC: 98 MG/DL (ref 0–99)
POTASSIUM SERPL-SCNC: 4.1 MMOL/L (ref 3.5–5.2)
PROT SERPL-MCNC: 6.8 G/DL (ref 6–8.5)
SODIUM SERPL-SCNC: 143 MMOL/L (ref 134–144)
TRIGL SERPL-MCNC: 103 MG/DL (ref 0–149)
VLDLC SERPL CALC-MCNC: 19 MG/DL (ref 5–40)

## 2021-06-03 NOTE — TELEPHONE ENCOUNTER
----- Message from Angelica Mccabe DO sent at 6/3/2021 11:19 AM CDT -----  Cholesterol profile good  Chemistry good

## 2021-06-03 NOTE — TELEPHONE ENCOUNTER
Called and spoke to patient about normal lab results, he voiced understanding. He states he received my vm yesterday regarding his xray findings and recommendations per Dr. Mccabe. He states he does have an incentive spirometer and will use it. He had no further questions.

## 2021-06-04 DIAGNOSIS — G62.9 PERIPHERAL POLYNEUROPATHY: ICD-10-CM

## 2021-06-06 RX ORDER — GABAPENTIN 300 MG/1
300 CAPSULE ORAL 4 TIMES DAILY
Qty: 120 CAPSULE | Refills: 3 | Status: SHIPPED | OUTPATIENT
Start: 2021-06-06 | End: 2021-10-14 | Stop reason: SDUPTHER

## 2021-06-08 DIAGNOSIS — M79.609 PARESTHESIA AND PAIN OF EXTREMITY: Primary | ICD-10-CM

## 2021-06-08 DIAGNOSIS — I10 ESSENTIAL HYPERTENSION: ICD-10-CM

## 2021-06-08 DIAGNOSIS — R20.2 PARESTHESIA AND PAIN OF EXTREMITY: Primary | ICD-10-CM

## 2021-06-08 RX ORDER — LISINOPRIL 20 MG/1
TABLET ORAL
Qty: 90 TABLET | Refills: 1 | Status: SHIPPED | OUTPATIENT
Start: 2021-06-08 | End: 2021-06-22

## 2021-06-08 NOTE — TELEPHONE ENCOUNTER
EMG Scheduling Dept called and stated the order for his procedure tomorrow needs changed to reflect BiLateral Upper.    Needs before tomorrow:   Scheduled: 6/09/21 @ 11:30am

## 2021-06-09 ENCOUNTER — HOSPITAL ENCOUNTER (OUTPATIENT)
Dept: NEUROLOGY | Facility: HOSPITAL | Age: 76
Discharge: HOME | End: 2021-06-09
Admitting: FAMILY MEDICINE

## 2021-06-09 DIAGNOSIS — R20.2 PARESTHESIA OF HAND, BILATERAL: ICD-10-CM

## 2021-06-09 PROCEDURE — 95911 NRV CNDJ TEST 9-10 STUDIES: CPT

## 2021-06-09 PROCEDURE — 95886 MUSC TEST DONE W/N TEST COMP: CPT

## 2021-06-15 ENCOUNTER — TELEPHONE (OUTPATIENT)
Dept: INTERNAL MEDICINE | Facility: CLINIC | Age: 76
End: 2021-06-15

## 2021-06-15 DIAGNOSIS — I10 ESSENTIAL HYPERTENSION: ICD-10-CM

## 2021-06-15 DIAGNOSIS — N40.0 BENIGN PROSTATIC HYPERPLASIA, UNSPECIFIED WHETHER LOWER URINARY TRACT SYMPTOMS PRESENT: ICD-10-CM

## 2021-06-15 DIAGNOSIS — E78.5 HYPERLIPIDEMIA, UNSPECIFIED HYPERLIPIDEMIA TYPE: ICD-10-CM

## 2021-06-15 RX ORDER — BISOPROLOL FUMARATE 5 MG/1
TABLET, FILM COATED ORAL
Qty: 90 TABLET | Refills: 1 | Status: SHIPPED | OUTPATIENT
Start: 2021-06-15 | End: 2022-03-25

## 2021-06-15 RX ORDER — SIMVASTATIN 80 MG
TABLET ORAL
Qty: 90 TABLET | Refills: 1 | Status: SHIPPED | OUTPATIENT
Start: 2021-06-15 | End: 2021-06-22

## 2021-06-15 RX ORDER — AMLODIPINE BESYLATE 5 MG/1
TABLET ORAL
Qty: 90 TABLET | Refills: 1 | Status: SHIPPED | OUTPATIENT
Start: 2021-06-15 | End: 2021-12-21

## 2021-06-15 RX ORDER — TAMSULOSIN HYDROCHLORIDE 0.4 MG/1
CAPSULE ORAL
Qty: 90 CAPSULE | Refills: 1 | Status: SHIPPED | OUTPATIENT
Start: 2021-06-15 | End: 2021-06-22 | Stop reason: SDUPTHER

## 2021-06-15 NOTE — TELEPHONE ENCOUNTER
----- Message from Angelica Mccabe DO sent at 6/15/2021 10:14 AM CDT -----  Bilateral median nerve abnormality  Needs to see surgeon,, neurosurgery or hand surgeon

## 2021-06-15 NOTE — TELEPHONE ENCOUNTER
Called patient to discuss nerve EMG results. Patient voiced understanding and states that he has an appointment with Dr. Mccabe on Thursday and would like to discuss options about referral on that day. There were no other questions.

## 2021-06-22 ENCOUNTER — OFFICE VISIT (OUTPATIENT)
Dept: INTERNAL MEDICINE | Facility: CLINIC | Age: 76
End: 2021-06-22

## 2021-06-22 VITALS
SYSTOLIC BLOOD PRESSURE: 152 MMHG | BODY MASS INDEX: 31.59 KG/M2 | DIASTOLIC BLOOD PRESSURE: 80 MMHG | OXYGEN SATURATION: 98 % | HEART RATE: 58 BPM | RESPIRATION RATE: 17 BRPM | TEMPERATURE: 97.8 F | WEIGHT: 189.6 LBS | HEIGHT: 65 IN

## 2021-06-22 DIAGNOSIS — G56.03 BILATERAL CARPAL TUNNEL SYNDROME: Primary | ICD-10-CM

## 2021-06-22 DIAGNOSIS — I10 ESSENTIAL HYPERTENSION: ICD-10-CM

## 2021-06-22 DIAGNOSIS — E78.5 HYPERLIPIDEMIA, UNSPECIFIED HYPERLIPIDEMIA TYPE: ICD-10-CM

## 2021-06-22 DIAGNOSIS — N40.0 BENIGN PROSTATIC HYPERPLASIA, UNSPECIFIED WHETHER LOWER URINARY TRACT SYMPTOMS PRESENT: ICD-10-CM

## 2021-06-22 PROCEDURE — 99214 OFFICE O/P EST MOD 30 MIN: CPT | Performed by: FAMILY MEDICINE

## 2021-06-22 RX ORDER — DOXYCYCLINE 100 MG/1
100 CAPSULE ORAL 2 TIMES DAILY
Qty: 20 CAPSULE | Refills: 0 | Status: SHIPPED | OUTPATIENT
Start: 2021-06-22 | End: 2021-09-08

## 2021-06-22 RX ORDER — LISINOPRIL 10 MG/1
10 TABLET ORAL DAILY
Qty: 90 TABLET | Refills: 1 | Status: SHIPPED | OUTPATIENT
Start: 2021-06-22 | End: 2021-12-21

## 2021-06-22 RX ORDER — SIMVASTATIN 40 MG
40 TABLET ORAL
Qty: 90 TABLET | Refills: 1 | Status: SHIPPED | OUTPATIENT
Start: 2021-06-22 | End: 2021-12-21

## 2021-06-22 RX ORDER — TAMSULOSIN HYDROCHLORIDE 0.4 MG/1
1 CAPSULE ORAL DAILY
Qty: 90 CAPSULE | Refills: 1 | Status: SHIPPED | OUTPATIENT
Start: 2021-06-22 | End: 2022-03-25 | Stop reason: SDUPTHER

## 2021-06-22 NOTE — PROGRESS NOTES
Subjective     Chief Complaint   Patient presents with   • Tick Removal     Needs to check it out.    • Wrist Pain     Carpel tunnel questions.        History of Present Illness  Patient here to discuss referral for CTS.  Had tick bite right foot at base of 5th toe.  It was swollen and red.  Believes got tick out.   Also needs med list fixed for doses on lisinopril, zocor.  Refill tamsulosin.       Patient's PMR from outside medical facility reviewed and noted.    Review of Systems     Otherwise complete ROS reviewed and negative except as mentioned in the HPI.    Past Medical History:   Past Medical History:   Diagnosis Date   • Arthritis    • CAD (coronary artery disease)    • Chronic back pain    • Chronic knee pain     LEFT KNEE   • Class 1 obesity due to excess calories with serious comorbidity and body mass index (BMI) of 32.0 to 32.9 in adult 1/20/2020   • COPD (chronic obstructive pulmonary disease) (CMS/[facility])    • Coronary artery disease involving autologous vein coronary bypass graft without angina pectoris 1/20/2020   • Essential hypertension 10/14/2019   • GERD (gastroesophageal reflux disease)    • Hyperlipidemia    • Hypertension    • Obstructive sleep apnea 1/20/2020   • Sleep apnea      Past Surgical History:  Past Surgical History:   Procedure Laterality Date   • BACK SURGERY     • COLONOSCOPY     • KNEE MENISCAL REPAIR Left 2018   • ROTATOR CUFF REPAIR Right 2000   • SHOULDER SURGERY     • SPINE SURGERY  1981    lower back ruptured disc   • TOTAL SHOULDER ARTHROPLASTY W/ DISTAL CLAVICLE EXCISION Right 1/14/2020    Procedure: RIGHT REVERSE TOTAL SHOULDER  ARTHROPLASTY;  Surgeon: Suman Ventura MD;  Location: [facility];  Service: Orthopedics     Social History:  reports that he quit smoking about 10 years ago. His smoking use included pipe. He smoked 0.00 packs per day for 50.00 years. He has never used smokeless tobacco. He reports current alcohol use of about 5.0 standard drinks of  alcohol per week. He reports that he does not use drugs.    Family History: family history includes Arthritis in his father and mother; Cancer in his brother; Diabetes in his mother and sister; Hypertension in his father and mother; Kidney disease in his mother; Obesity in his sister; Osteoporosis in his mother; Stroke in his father.       Allergies:  No Known Allergies  Medications:  Prior to Admission medications    Medication Sig Start Date End Date Taking? Authorizing Provider   acetaminophen (TYLENOL) 650 MG 8 hr tablet Take 650 mg by mouth.   Yes Cristian Pfeiffer MD   albuterol sulfate  (90 Base) MCG/ACT inhaler Inhale 2 puffs Every 4 (Four) Hours As Needed for Wheezing. 9/15/20  Yes Angelica Mccabe DO   amLODIPine (NORVASC) 5 MG tablet TAKE 1 TABLET BY MOUTH EVERY DAY 6/15/21  Yes Angelica Mccabe DO   aspirin 81 MG EC tablet Take 81 mg by mouth Daily.   Yes Cristian Pfeiffer MD   B Complex-Folic Acid (Super B Complex Maxi) tablet Take 1 tablet by mouth.   Yes Cristian Pfeiffer MD   bisoprolol (ZEBeta) 5 MG tablet TAKE 1 TABLET BY MOUTH EVERY DAY 6/15/21  Yes Angelica Mccabe DO   budesonide-formoterol (Symbicort) 80-4.5 MCG/ACT inhaler Inhale 2 puffs 2 (Two) Times a Day. 6/15/20  Yes Katerine Mcclain APRN   calcium carbonate (OS-TASHIA) 600 MG tablet Take 600 mg by mouth 2 (Two) Times a Day.   Yes Cristian Pfeiffer MD   Cholecalciferol (VITAMIN D3) 1000 units capsule Take 2,000 Units by mouth Daily.   Yes Cristian Pfeiffer MD   coenzyme Q10 100 MG capsule Take 100 mg by mouth Daily.   Yes Cristian Pfeiffer MD   Collagen-Boron-Hyaluronic Acid (Move Free [employer]) 10-5-3.3 MG tablet Take 1 tablet by mouth.   Yes Cristian Pfeiffer MD   cyclobenzaprine (FLEXERIL) 10 MG tablet Take 1 tablet by mouth 3 (Three) Times a Day As Needed for Muscle Spasms. 10/7/19  Yes Katerine Mcclain APRN   fluticasone (FLONASE) 50 MCG/ACT nasal spray 2 sprays  into the nostril(s) as directed by provider Daily.   Yes ProviderCristian MD   gabapentin (NEURONTIN) 300 MG capsule TAKE 1 CAPSULE BY MOUTH 4 (FOUR) TIMES A DAY. 6/6/21  Yes Angelica Mccabe DO   Garlic 1000 MG capsule Take 1,000 mg by mouth Daily.   Yes ProviderCristian MD   Glucos-Chondroit-Hyaluron-MSM (GLUCOSAMINE CHONDROITIN JOINT PO) Take 1 tablet/day by mouth Daily.   Yes ProviderCristian MD   ibuprofen (ADVIL,MOTRIN) 800 MG tablet Take 800 mg by mouth Every 6 (Six) Hours As Needed for Mild Pain .   Yes Provider, MD Cristian   Krill Oil 300 MG capsule Take 300 mg by mouth Daily.   Yes ProviderCristian MD   lisinopril (PRINIVIL,ZESTRIL) 10 MG tablet Take 1 tablet by mouth Daily. 6/22/21  Yes Angelica Mccabe DO   meloxicam (MOBIC) 15 MG tablet Take 1 tablet by mouth Daily. 9/15/20  Yes Angelica Mccabe DO   methylPREDNISolone (MEDROL) 4 MG dose pack Take as directed on package instructions. 11/2/20  Yes Katerine Mcclain APRN   Multiple Vitamins-Minerals (OCUVITE ADULT 50+ PO) Take 1 tablet by mouth Daily.   Yes ProviderCristian MD   omeprazole (priLOSEC) 20 MG capsule Take 20 mg by mouth Daily.   Yes ProviderCristian MD   ondansetron (ZOFRAN) 4 MG tablet Take 4 mg by mouth. 12/28/20  Yes Cristian Pfeiffer MD   predniSONE (DELTASONE) 10 MG tablet Take 1 tablet by mouth Daily. 6/1/21  Yes Angelica Mccabe DO   rivaroxaban (Xarelto) 10 MG tablet Take 10 mg by mouth. 12/28/20  Yes ProviderCristian MD   S-Adenosylmethionine (SULAIMAN-E) 200 MG tablet Take 200 mg by mouth.   Yes ProviderCristian MD   simvastatin (ZOCOR) 40 MG tablet Take 1 tablet by mouth every night at bedtime. 6/22/21  Yes Angelica Mccabe DO   Spiriva Respimat 2.5 MCG/ACT aerosol solution inhaler INHALE 2 PUFFS BY MOUTH DAILY 5/26/21  Yes Angelica Mccabe DO   SUPER B COMPLEX/C PO Take 1 tablet by mouth Every Night.   Yes Provider, MD Cristian   tamsulosin (FLOMAX) 0.4  "MG capsule 24 hr capsule Take 1 capsule by mouth Daily. 6/22/21  Yes Angelica Mccabe DO   TURMERIC PO Take 1,000 mg by mouth Daily.   Yes Provider, MD Cristian   vitamin C (ASCORBIC ACID) 500 MG tablet Take 500 mg by mouth Daily.   Yes Provider, MD Cristian   lisinopril (PRINIVIL,ZESTRIL) 20 MG tablet TAKE 1 TABLET BY MOUTH EVERY DAY 6/8/21 6/22/21 Yes Angelica Mccabe DO   simvastatin (ZOCOR) 80 MG tablet TAKE 1 TABLET BY MOUTH EVERY DAY AT NIGHT 6/15/21 6/22/21 Yes Angelica Mccabe DO   tamsulosin (FLOMAX) 0.4 MG capsule 24 hr capsule TAKE 1 CAPSULE BY MOUTH EVERY DAY 6/15/21 6/22/21 Yes Angelica Mccabe DO   doxycycline (MONODOX) 100 MG capsule Take 1 capsule by mouth 2 (Two) Times a Day. 6/22/21   Angelica Mccabe DO       Objective     Vital Signs: /80 (BP Location: Left arm, Patient Position: Sitting, Cuff Size: Adult)   Pulse 58   Temp 97.8 °F (36.6 °C) (Skin)   Resp 17   Ht 163.8 cm (64.5\")   Wt 86 kg (189 lb 9.6 oz)   SpO2 98%   BMI 32.04 kg/m²   Physical Exam  Vitals and nursing note reviewed.   Constitutional:       Appearance: Normal appearance.   HENT:      Head: Normocephalic and atraumatic.      Right Ear: External ear normal.      Left Ear: External ear normal.      Nose: Nose normal.      Mouth/Throat:      Mouth: Mucous membranes are moist.   Eyes:      Extraocular Movements: Extraocular movements intact.      Pupils: Pupils are equal, round, and reactive to light.   Cardiovascular:      Rate and Rhythm: Normal rate and regular rhythm.      Pulses: Normal pulses.      Heart sounds: Normal heart sounds.   Pulmonary:      Effort: Pulmonary effort is normal.      Breath sounds: Normal breath sounds.   Abdominal:      General: Bowel sounds are normal.      Palpations: Abdomen is soft.   Musculoskeletal:         General: Normal range of motion.      Cervical back: Normal range of motion and neck supple.   Skin:     General: Skin is warm and dry.      Capillary " Refill: Capillary refill takes less than 2 seconds.      Comments: Right foot base of 5th does with 7 mm area of erythema and open   Oozing serous drainage.   No cellulitis present currently   Neurological:      General: No focal deficit present.      Mental Status: He is alert and oriented to person, place, and time.   Psychiatric:         Mood and Affect: Mood normal.         Patient's Body mass index is 32.04 kg/m².       Results Reviewed:  Glucose   Date Value Ref Range Status   12/22/2020 92 74 - 109 mg/dL Final     BUN   Date Value Ref Range Status   06/02/2021 12 8 - 27 mg/dL Final   12/22/2020 16 8 - 23 mg/dL Final     Creatinine   Date Value Ref Range Status   06/02/2021 0.91 0.76 - 1.27 mg/dL Final   12/22/2020 0.8 0.5 - 1.2 mg/dL Final     Sodium   Date Value Ref Range Status   06/02/2021 143 134 - 144 mmol/L Final   12/22/2020 139 136 - 145 mmol/L Final     Potassium   Date Value Ref Range Status   06/02/2021 4.1 3.5 - 5.2 mmol/L Final   12/22/2020 4.1 3.5 - 5.0 mmol/L Final     Chloride   Date Value Ref Range Status   06/02/2021 107 (H) 96 - 106 mmol/L Final   12/22/2020 102 98 - 111 mmol/L Final     CO2   Date Value Ref Range Status   12/22/2020 25 22 - 29 mmol/L Final     Total CO2   Date Value Ref Range Status   06/02/2021 24 20 - 29 mmol/L Final     Calcium   Date Value Ref Range Status   06/02/2021 9.3 8.6 - 10.2 mg/dL Final   12/22/2020 9.4 8.8 - 10.2 mg/dL Final     ALT (SGPT)   Date Value Ref Range Status   06/02/2021 19 0 - 44 IU/L Final     AST (SGOT)   Date Value Ref Range Status   06/02/2021 14 0 - 40 IU/L Final     WBC   Date Value Ref Range Status   12/22/2020 6.6 4.8 - 10.8 K/uL Final     Hematocrit   Date Value Ref Range Status   12/22/2020 40.7 (L) 42.0 - 52.0 % Final     Platelets   Date Value Ref Range Status   12/22/2020 175 130 - 400 K/uL Final     Triglycerides   Date Value Ref Range Status   06/02/2021 103 0 - 149 mg/dL Final     HDL Cholesterol   Date Value Ref Range Status    06/02/2021 57 >39 mg/dL Final     LDL Chol Calc ([facility])   Date Value Ref Range Status   06/02/2021 98 0 - 99 mg/dL Final         Assessment / Plan     Assessment/Plan:  1. Benign prostatic hyperplasia, unspecified whether lower urinary tract symptoms present    - tamsulosin (FLOMAX) 0.4 MG capsule 24 hr capsule; Take 1 capsule by mouth Daily.  Dispense: 90 capsule; Refill: 1    2. Hyperlipidemia, unspecified hyperlipidemia type    - simvastatin (ZOCOR) 40 MG tablet; Take 1 tablet by mouth every night at bedtime.  Dispense: 90 tablet; Refill: 1    3. Essential hypertension    - lisinopril (PRINIVIL,ZESTRIL) 10 MG tablet; Take 1 tablet by mouth Daily.  Dispense: 90 tablet; Refill: 1    4. Bilateral carpal tunnel syndrome  Referral to Dr Perdomo.    5.  Tick bite.   Doxycycline 100 mg bid 10 days        No follow-ups on file. unless patient needs to be seen sooner or acute issues arise.        I have discussed the patient results/orders and and plan/recommendation with them at today's visit.      Angelica Mccabe,    06/22/2021

## 2021-07-06 ENCOUNTER — OFFICE VISIT (OUTPATIENT)
Dept: NEUROSURGERY | Facility: CLINIC | Age: 76
End: 2021-07-06

## 2021-07-06 ENCOUNTER — PREP FOR SURGERY (OUTPATIENT)
Dept: OTHER | Facility: HOSPITAL | Age: 76
End: 2021-07-06

## 2021-07-06 ENCOUNTER — APPOINTMENT (OUTPATIENT)
Dept: NEUROLOGY | Facility: HOSPITAL | Age: 76
End: 2021-07-06

## 2021-07-06 VITALS
DIASTOLIC BLOOD PRESSURE: 82 MMHG | HEIGHT: 67 IN | WEIGHT: 189 LBS | SYSTOLIC BLOOD PRESSURE: 140 MMHG | BODY MASS INDEX: 29.66 KG/M2

## 2021-07-06 DIAGNOSIS — Z87.891 FORMER SMOKER: ICD-10-CM

## 2021-07-06 DIAGNOSIS — G56.01 RIGHT CARPAL TUNNEL SYNDROME: Primary | ICD-10-CM

## 2021-07-06 DIAGNOSIS — J42 CHRONIC BRONCHITIS, UNSPECIFIED CHRONIC BRONCHITIS TYPE (HCC): ICD-10-CM

## 2021-07-06 DIAGNOSIS — E66.09 CLASS 1 OBESITY DUE TO EXCESS CALORIES WITH SERIOUS COMORBIDITY AND BODY MASS INDEX (BMI) OF 32.0 TO 32.9 IN ADULT: Chronic | ICD-10-CM

## 2021-07-06 DIAGNOSIS — J44.9 CHRONIC OBSTRUCTIVE PULMONARY DISEASE, UNSPECIFIED COPD TYPE (HCC): ICD-10-CM

## 2021-07-06 PROBLEM — E66.3 OVERWEIGHT WITH BODY MASS INDEX (BMI) OF 29 TO 29.9 IN ADULT: Status: ACTIVE | Noted: 2021-07-06

## 2021-07-06 PROCEDURE — 99214 OFFICE O/P EST MOD 30 MIN: CPT | Performed by: NURSE PRACTITIONER

## 2021-07-06 RX ORDER — BUPIVACAINE HCL/0.9 % NACL/PF 0.1 %
2 PLASTIC BAG, INJECTION (ML) EPIDURAL ONCE
Status: CANCELLED | OUTPATIENT
Start: 2021-07-06 | End: 2021-07-06

## 2021-07-06 NOTE — PROGRESS NOTES
Chief complaint:   Chief Complaint   Patient presents with   • Hand Pain     Justin has been referred here today for right pain and numbness, he has had his EMG/NCV and the results are inpatient's chart.        Subjective     HPI: This is a 76-year-old male gentleman who was referred to us by Dr. Frederick Mccabe for right hand numbness and tingling.  He is here to be evaluated today.  The patient states that this started about 3 to 4 months ago.  There was no accident or injury associated with this.  He is not complaining of any neck pain.  He denies any radicular arm pain.  He says the numbness and tingling typically starts in his fingers and comes down to his wrist.  He says this is more prominent whenever he is sleeping.  He says it is not as bothersome to him through the day.  He has been wearing cock-up splints with some improvement but even in spite of that the numbness tingling will wake him up from sleeping.  He is right-hand dominant.  He is retired.  He is .  Denies any tobacco or illicit drug use.  He does drink 2-3 beers a day.  On a scale 0-10 he rates the numbness and tingling out of 10.    Review of Systems   Respiratory: Positive for apnea, cough and shortness of breath.    Genitourinary: Positive for difficulty urinating.   Neurological: Positive for numbness.   Hematological: Bruises/bleeds easily.   All other systems reviewed and are negative.       Past Medical History:   Diagnosis Date   • Arthritis    • CAD (coronary artery disease)    • Chronic back pain    • Chronic knee pain     LEFT KNEE   • Class 1 obesity due to excess calories with serious comorbidity and body mass index (BMI) of 32.0 to 32.9 in adult 1/20/2020   • COPD (chronic obstructive pulmonary disease) (CMS/[facility])    • Coronary artery disease involving autologous vein coronary bypass graft without angina pectoris 1/20/2020   • Essential hypertension 10/14/2019   • GERD (gastroesophageal reflux disease)    • Hyperlipidemia   "  • Hypertension    • Numbness of right hand    • Obstructive sleep apnea 1/20/2020   • Sleep apnea      Past Surgical History:   Procedure Laterality Date   • BACK SURGERY     • COLONOSCOPY     • KNEE MENISCAL REPAIR Left 2018   • ROTATOR CUFF REPAIR Right 2000   • SHOULDER SURGERY     • SPINE SURGERY  1981    lower back ruptured disc   • TOTAL SHOULDER ARTHROPLASTY W/ DISTAL CLAVICLE EXCISION Right 1/14/2020    Procedure: RIGHT REVERSE TOTAL SHOULDER  ARTHROPLASTY;  Surgeon: Suman Ventura MD;  Location: [facility] OR;  Service: Orthopedics     Family History   Problem Relation Age of Onset   • Arthritis Mother    • Diabetes Mother    • Osteoporosis Mother    • Hypertension Mother    • Kidney disease Mother    • Arthritis Father    • Hypertension Father    • Stroke Father    • Diabetes Sister    • Obesity Sister    • Cancer Brother      Social History     Tobacco Use   • Smoking status: Former Smoker     Packs/day: 0.00     Years: 50.00     Pack years: 0.00     Types: Pipe     Quit date: 2011     Years since quitting: 10.5   • Smokeless tobacco: Never Used   Vaping Use   • Vaping Use: Never used   Substance Use Topics   • Alcohol use: Yes     Alcohol/week: 5.0 standard drinks     Types: 5 Cans of beer per week   • Drug use: No     (Not in a hospital admission)    Allergies:  Patient has no known allergies.    Objective      Vital Signs  /82   Ht 170.2 cm (67\")   Wt 85.7 kg (189 lb)   BMI 29.60 kg/m²     Physical Exam  Constitutional:       Appearance: Normal appearance. He is well-developed.   HENT:      Head: Normocephalic.   Eyes:      General: Lids are normal.      Extraocular Movements: EOM normal.      Conjunctiva/sclera: Conjunctivae normal.      Pupils: Pupils are equal, round, and reactive to light.   Cardiovascular:      Rate and Rhythm: Normal rate and regular rhythm.   Pulmonary:      Effort: Pulmonary effort is normal.      Breath sounds: Normal breath sounds.   Musculoskeletal:         " General: Normal range of motion.      Cervical back: Normal range of motion.   Skin:     General: Skin is warm.   Neurological:      Mental Status: He is alert and oriented to person, place, and time.      GCS: GCS eye subscore is 4. GCS verbal subscore is 5. GCS motor subscore is 6.      Cranial Nerves: No cranial nerve deficit.      Sensory: No sensory deficit.      Gait: Gait is intact.      Deep Tendon Reflexes: Strength normal and reflexes are normal and symmetric. Reflexes normal.   Psychiatric:         Speech: Speech normal.         Behavior: Behavior normal.         Thought Content: Thought content normal.         Neurologic Exam     Mental Status   Oriented to person, place, and time.   Attention: normal. Concentration: normal.   Speech: speech is normal   Level of consciousness: alert  Normal comprehension.     Cranial Nerves     CN II   Visual fields full to confrontation.     CN III, IV, VI   Pupils are equal, round, and reactive to light.  Extraocular motions are normal.     CN V   Facial sensation intact.     CN VII   Facial expression full, symmetric.     CN VIII   CN VIII normal.     CN IX, X   CN IX normal.   CN X normal.     CN XI   CN XI normal.     CN XII   CN XII normal.     Motor Exam   Muscle bulk: normal    Strength   Strength 5/5 throughout.     Sensory Exam   Light touch normal.     Gait, Coordination, and Reflexes     Gait  Gait: normal    Reflexes   Reflexes 2+ except as noted.       Imaging review: EMG/NCS of the bilateral upper extremities that was done here at [facility] on June 9, 2021 shows patient does have bilateral carpal tunnel syndrome.  No evidence of cervical radiculopathy          Assessment/Plan: The patient does have classic carpal tunnel syndrome.  I think it would be beneficial for the patient to go through carpal tunnel release surgery.  Dr. Perdomo did review the test results and did come in and discussed this with the patient.  It is felt that the patient  would benefit from having a carpal tunnel release.  He did go over the risk benefits of the procedure with the patient.  Please see his addendum on this patient.  We will get the patient set up for surgery as quickly as possible.  Patient will need clearance from his primary care doctor.  He will also have to undergo Covid-19 testing and if he does test positive this will delay surgery.  He acknowledged understanding.  His questions and concerns were addressed  Patient is a nonsmoker  The patient's Body mass index is 29.6 kg/m².. BMI is above normal parameters. Recommendations include: educational material and nutrition counseling  Advance Care Planning   ACP discussion was held with the patient during this visit. Patient does not have an advance directive, information provided.      Diagnoses and all orders for this visit:    1. Right carpal tunnel syndrome (Primary)  -     Ambulatory Referral to Internal Medicine    2. Class 1 obesity due to excess calories with serious comorbidity and body mass index (BMI) of 32.0 to 32.9 in adult    3. Former smoker          I discussed the patients findings and my recommendations with patient    SALONI Reed  07/06/21  08:54 CDT  Attending addendum: 76-year-old gentleman with a history of bilateral numbness and tingling in his hands.  The right is worse than the left.  He denies any neck pain or upper extremity radicular symptoms.  EMG nerve conduction studies consistent with severe right carpal tunnel syndrome and moderate left carpal tunnel syndrome.  He is used cock-up splint's and medical management without any improvement.  I think it be very reasonable at this point to offer right carpal tunnel release surgery.  We discussed the risks and benefits which include were not limited to infection, bleeding, damage to the median nerve resulting in permanent pain numbness tingling and weakness in the right hand, stroke, coma, and death.  The patient acknowledged  understanding of this.  His questions and concerns were addressed.

## 2021-07-07 RX ORDER — BUDESONIDE AND FORMOTEROL FUMARATE DIHYDRATE 80; 4.5 UG/1; UG/1
2 AEROSOL RESPIRATORY (INHALATION)
Qty: 10.2 G | Refills: 12 | Status: SHIPPED | OUTPATIENT
Start: 2021-07-07 | End: 2021-07-08 | Stop reason: SDUPTHER

## 2021-07-08 DIAGNOSIS — J44.9 CHRONIC OBSTRUCTIVE PULMONARY DISEASE, UNSPECIFIED COPD TYPE (HCC): ICD-10-CM

## 2021-07-08 DIAGNOSIS — J42 CHRONIC BRONCHITIS, UNSPECIFIED CHRONIC BRONCHITIS TYPE (HCC): ICD-10-CM

## 2021-07-08 RX ORDER — BUDESONIDE AND FORMOTEROL FUMARATE DIHYDRATE 80; 4.5 UG/1; UG/1
2 AEROSOL RESPIRATORY (INHALATION)
Qty: 10.2 G | Refills: 12 | Status: SHIPPED | OUTPATIENT
Start: 2021-07-08 | End: 2021-09-08

## 2021-07-23 ENCOUNTER — TELEPHONE (OUTPATIENT)
Dept: NEUROSURGERY | Facility: CLINIC | Age: 76
End: 2021-07-23

## 2021-07-23 NOTE — TELEPHONE ENCOUNTER
PATIENT HAS APPOINTMENT FOR SURGERY CLEARANCE WITH DR ALFRED ON 9/28/21.  HIS SURGERY FOR CARPAL TUNNEL REPAIR IS ON 9/29/21.     PATIENT HOWEVER, TAKES XARELTO 10 MG.     CAN YOU ADVISE IF PATIENT IS ABLE TO HOLD THAT MEDICATION PRIOR TO HIS SURGERY.     WHEN SCHEDULING HIS CLEARANCE APPOINTMENT 9/28 WAS THE FIRST AVAILABLE APPOINTMENT AFTER HIS PREWORK ON 9/22 THAT DR ALFRED COULD SEE HIM.  WE WANT TO MAKE SURE HE HAS AMPLE TIME TO STOP THE ANTICOAG MEDICATIONS.     THANKS.     [facility]  SURGERY SCHEDULER  DR CRAWFORD

## 2021-08-06 ENCOUNTER — OFFICE VISIT (OUTPATIENT)
Dept: INTERNAL MEDICINE | Facility: CLINIC | Age: 76
End: 2021-08-06

## 2021-08-06 VITALS
TEMPERATURE: 98.5 F | RESPIRATION RATE: 18 BRPM | DIASTOLIC BLOOD PRESSURE: 78 MMHG | SYSTOLIC BLOOD PRESSURE: 130 MMHG | WEIGHT: 194.2 LBS | OXYGEN SATURATION: 97 % | HEART RATE: 58 BPM | BODY MASS INDEX: 30.48 KG/M2 | HEIGHT: 67 IN

## 2021-08-06 DIAGNOSIS — H69.83 DYSFUNCTION OF BOTH EUSTACHIAN TUBES: ICD-10-CM

## 2021-08-06 DIAGNOSIS — J30.1 SEASONAL ALLERGIC RHINITIS DUE TO POLLEN: ICD-10-CM

## 2021-08-06 DIAGNOSIS — E78.2 MIXED HYPERLIPIDEMIA: ICD-10-CM

## 2021-08-06 DIAGNOSIS — H92.03 OTALGIA OF BOTH EARS: Primary | ICD-10-CM

## 2021-08-06 PROCEDURE — 99213 OFFICE O/P EST LOW 20 MIN: CPT | Performed by: FAMILY MEDICINE

## 2021-08-06 RX ORDER — AZELASTINE 1 MG/ML
2 SPRAY, METERED NASAL 2 TIMES DAILY
Qty: 30 ML | Refills: 12 | Status: SHIPPED | OUTPATIENT
Start: 2021-08-06 | End: 2023-01-23 | Stop reason: SDUPTHER

## 2021-08-06 RX ORDER — METHYLPREDNISOLONE 4 MG/1
TABLET ORAL
Qty: 1 EACH | Refills: 0 | Status: SHIPPED | OUTPATIENT
Start: 2021-08-06 | End: 2021-09-08

## 2021-08-06 NOTE — PROGRESS NOTES
Subjective     Chief Complaint   Patient presents with   • Ear Problem     Fluid in ear. Symptoms for 3 weeks.        History of Present Illness  Patient is complaining of fluid in his ears for greater than 3 weeks.  It is affecting the right mostly.  Has some discomfort.  Currently is not experiencing any vertigo.  He is not experiencing any increased shortness of breath.  He does have some nasal congestion.  There has been no fever or chills.  Patient's PMR from outside medical facility reviewed and noted.    Review of Systems     Otherwise complete ROS reviewed and negative except as mentioned in the HPI.    Past Medical History:   Past Medical History:   Diagnosis Date   • Arthritis    • CAD (coronary artery disease)    • Chronic back pain    • Chronic knee pain     LEFT KNEE   • Class 1 obesity due to excess calories with serious comorbidity and body mass index (BMI) of 32.0 to 32.9 in adult 1/20/2020   • COPD (chronic obstructive pulmonary disease) (CMS/[facility])    • Coronary artery disease involving autologous vein coronary bypass graft without angina pectoris 1/20/2020   • Essential hypertension 10/14/2019   • GERD (gastroesophageal reflux disease)    • Hyperlipidemia    • Hypertension    • Numbness of right hand    • Obstructive sleep apnea 1/20/2020   • Sleep apnea      Past Surgical History:  Past Surgical History:   Procedure Laterality Date   • BACK SURGERY     • COLONOSCOPY     • KNEE MENISCAL REPAIR Left 2018   • ROTATOR CUFF REPAIR Right 2000   • SHOULDER SURGERY     • SPINE SURGERY  1981    lower back ruptured disc   • TOTAL SHOULDER ARTHROPLASTY W/ DISTAL CLAVICLE EXCISION Right 1/14/2020    Procedure: RIGHT REVERSE TOTAL SHOULDER  ARTHROPLASTY;  Surgeon: Suman Ventura MD;  Location: [facility];  Service: Orthopedics     Social History:  reports that he quit smoking about 10 years ago. His smoking use included pipe. He smoked 0.00 packs per day for 50.00 years. He has never used  smokeless tobacco. He reports current alcohol use of about 5.0 standard drinks of alcohol per week. He reports that he does not use drugs.    Family History: family history includes Arthritis in his father and mother; Cancer in his brother; Diabetes in his mother and sister; Hypertension in his father and mother; Kidney disease in his mother; Obesity in his sister; Osteoporosis in his mother; Stroke in his father.       Allergies:  No Known Allergies  Medications:  Prior to Admission medications    Medication Sig Start Date End Date Taking? Authorizing Provider   acetaminophen (TYLENOL) 650 MG 8 hr tablet Take 650 mg by mouth.   Yes Cristian Pfeiffer MD   albuterol sulfate  (90 Base) MCG/ACT inhaler Inhale 2 puffs Every 4 (Four) Hours As Needed for Wheezing. 9/15/20  Yes Angelica Mccabe DO   amLODIPine (NORVASC) 5 MG tablet TAKE 1 TABLET BY MOUTH EVERY DAY 6/15/21  Yes Angelica Mccabe DO   aspirin 81 MG EC tablet Take 81 mg by mouth Daily.   Yes Cristian Pfeiffer MD   B Complex-Folic Acid (Super B Complex Maxi) tablet Take 1 tablet by mouth.   Yes Cristian Pfeiffer MD   bisoprolol (ZEBeta) 5 MG tablet TAKE 1 TABLET BY MOUTH EVERY DAY 6/15/21  Yes Angelica Mccabe DO   budesonide-formoterol (Symbicort) 80-4.5 MCG/ACT inhaler Inhale 2 puffs 2 (Two) Times a Day. 7/8/21  Yes Angelica Mccabe DO   calcium carbonate (OS-TASHIA) 600 MG tablet Take 600 mg by mouth 2 (Two) Times a Day.   Yes Cristian Pfeiffer MD   Cholecalciferol (VITAMIN D3) 1000 units capsule Take 2,000 Units by mouth Daily.   Yes Cristian Pfeiffer MD   coenzyme Q10 100 MG capsule Take 100 mg by mouth Daily.   Yes Cristian Pfeiffer MD   Collagen-Boron-Hyaluronic Acid (Move Free tydy) 10-5-3.3 MG tablet Take 1 tablet by mouth.   Yes Cristian Pfeiffer MD   cyclobenzaprine (FLEXERIL) 10 MG tablet Take 1 tablet by mouth 3 (Three) Times a Day As Needed for Muscle Spasms. 10/7/19  Yes Johny  SALONI Calzada   doxycycline (MONODOX) 100 MG capsule Take 1 capsule by mouth 2 (Two) Times a Day. 6/22/21  Yes Angelica Mccabe DO   fluticasone (FLONASE) 50 MCG/ACT nasal spray 2 sprays into the nostril(s) as directed by provider Daily.   Yes Provider, MD Cristian   gabapentin (NEURONTIN) 300 MG capsule TAKE 1 CAPSULE BY MOUTH 4 (FOUR) TIMES A DAY. 6/6/21  Yes Angelica Mccabe DO   Garlic 1000 MG capsule Take 1,000 mg by mouth Daily.   Yes Provider, MD Cristian   Glucos-Chondroit-Hyaluron-MSM (GLUCOSAMINE CHONDROITIN JOINT PO) Take 1 tablet/day by mouth Daily.   Yes Provider, MD Cristian   ibuprofen (ADVIL,MOTRIN) 800 MG tablet Take 800 mg by mouth Every 6 (Six) Hours As Needed for Mild Pain .   Yes Provider, MD Cristian   Krill Oil 300 MG capsule Take 300 mg by mouth Daily.   Yes Provider, MD Cristian   lisinopril (PRINIVIL,ZESTRIL) 10 MG tablet Take 1 tablet by mouth Daily. 6/22/21  Yes Angelica Mccabe DO   meloxicam (MOBIC) 15 MG tablet Take 1 tablet by mouth Daily. 9/15/20  Yes Angelica Mccabe DO   methylPREDNISolone (MEDROL) 4 MG dose pack Take as directed on package instructions. 11/2/20  Yes Katerine Mcclain APRN   Multiple Vitamins-Minerals (OCUVITE ADULT 50+ PO) Take 1 tablet by mouth Daily.   Yes Provider, MD Cristian   omeprazole (priLOSEC) 20 MG capsule Take 20 mg by mouth Daily.   Yes Provider, MD Cristian   ondansetron (ZOFRAN) 4 MG tablet Take 4 mg by mouth. 12/28/20  Yes ProviderCristian MD   predniSONE (DELTASONE) 10 MG tablet Take 1 tablet by mouth Daily. 6/1/21  Yes Angelica Mccabe DO   simvastatin (ZOCOR) 40 MG tablet Take 1 tablet by mouth every night at bedtime. 6/22/21  Yes Angelica Mccabe DO   Spiriva Respimat 2.5 MCG/ACT aerosol solution inhaler INHALE 2 PUFFS BY MOUTH DAILY 5/26/21  Yes Angelica Mccabe DO   SUPER B COMPLEX/C PO Take 1 tablet by mouth Every Night.   Yes Provider, MD Cristian   tamsulosin (FLOMAX)  "0.4 MG capsule 24 hr capsule Take 1 capsule by mouth Daily. 6/22/21  Yes Angelica Mccabe    TURMERIC PO Take 1,000 mg by mouth Daily.   Yes Cristian Pfeiffer MD   vitamin C (ASCORBIC ACID) 500 MG tablet Take 500 mg by mouth Daily.   Yes Cristian Pfeiffer MD   rivaroxaban (Xarelto) 10 MG tablet Take 10 mg by mouth. 12/28/20   Cristian Pfeiffer MD   S-Adenosylmethionine (SULAIMAN-E) 200 MG tablet Take 200 mg by mouth.    ProviderCristian MD       Objective     Vital Signs: /78 (BP Location: Left arm, Patient Position: Sitting, Cuff Size: Adult)   Pulse 58   Temp 98.5 °F (36.9 °C) (Skin)   Resp 18   Ht 170.2 cm (67\")   Wt 88.1 kg (194 lb 3.2 oz)   SpO2 97%   BMI 30.42 kg/m²   Physical Exam  Vitals and nursing note reviewed.   Constitutional:       General: He is not in acute distress.     Appearance: Normal appearance. He is not ill-appearing or toxic-appearing.   HENT:      Head: Normocephalic and atraumatic.      Right Ear: Ear canal and external ear normal.      Left Ear: Ear canal and external ear normal.      Ears:      Comments: Bilateral tympanic membranes are dull.  Slightly distended.  No evidence of infection.     Nose: Congestion present.      Mouth/Throat:      Mouth: Mucous membranes are moist.      Pharynx: Oropharynx is clear.   Eyes:      Extraocular Movements: Extraocular movements intact.      Conjunctiva/sclera: Conjunctivae normal.      Pupils: Pupils are equal, round, and reactive to light.   Cardiovascular:      Rate and Rhythm: Normal rate and regular rhythm.      Pulses: Normal pulses.      Heart sounds: Normal heart sounds.   Pulmonary:      Effort: Pulmonary effort is normal.      Breath sounds: Normal breath sounds.   Abdominal:      General: Bowel sounds are normal.      Palpations: Abdomen is soft.   Musculoskeletal:         General: Normal range of motion.      Cervical back: Normal range of motion and neck supple.   Lymphadenopathy:      Cervical: No " cervical adenopathy.   Skin:     General: Skin is warm.      Capillary Refill: Capillary refill takes less than 2 seconds.   Neurological:      General: No focal deficit present.      Mental Status: He is alert and oriented to person, place, and time.   Psychiatric:         Mood and Affect: Mood normal.         Behavior: Behavior normal.         Patient's Body mass index is 30.42 kg/m².     Results Reviewed:  Glucose   Date Value Ref Range Status   12/22/2020 92 74 - 109 mg/dL Final     BUN   Date Value Ref Range Status   06/02/2021 12 8 - 27 mg/dL Final   12/22/2020 16 8 - 23 mg/dL Final     Creatinine   Date Value Ref Range Status   06/02/2021 0.91 0.76 - 1.27 mg/dL Final   12/22/2020 0.8 0.5 - 1.2 mg/dL Final     Sodium   Date Value Ref Range Status   06/02/2021 143 134 - 144 mmol/L Final   12/22/2020 139 136 - 145 mmol/L Final     Potassium   Date Value Ref Range Status   06/02/2021 4.1 3.5 - 5.2 mmol/L Final   12/22/2020 4.1 3.5 - 5.0 mmol/L Final     Chloride   Date Value Ref Range Status   06/02/2021 107 (H) 96 - 106 mmol/L Final   12/22/2020 102 98 - 111 mmol/L Final     CO2   Date Value Ref Range Status   12/22/2020 25 22 - 29 mmol/L Final     Total CO2   Date Value Ref Range Status   06/02/2021 24 20 - 29 mmol/L Final     Calcium   Date Value Ref Range Status   06/02/2021 9.3 8.6 - 10.2 mg/dL Final   12/22/2020 9.4 8.8 - 10.2 mg/dL Final     ALT (SGPT)   Date Value Ref Range Status   06/02/2021 19 0 - 44 IU/L Final     AST (SGOT)   Date Value Ref Range Status   06/02/2021 14 0 - 40 IU/L Final     WBC   Date Value Ref Range Status   12/22/2020 6.6 4.8 - 10.8 K/uL Final     Hematocrit   Date Value Ref Range Status   12/22/2020 40.7 (L) 42.0 - 52.0 % Final     Platelets   Date Value Ref Range Status   12/22/2020 175 130 - 400 K/uL Final     Triglycerides   Date Value Ref Range Status   06/02/2021 103 0 - 149 mg/dL Final     HDL Cholesterol   Date Value Ref Range Status   06/02/2021 57 >39 mg/dL Final     LDL  Chol Calc ([facility])   Date Value Ref Range Status   06/02/2021 98 0 - 99 mg/dL Final         Assessment / Plan     Assessment/Plan:  1. Otalgia of both ears    Tylenol or ibuprofen as needed pain    2. Seasonal allergic rhinitis due to pollen  Astelin 1 drop each nares twice daily  Prednisone taper    3. Dysfunction of both eustachian tubes      4. Mixed hyperlipidemia    - Comprehensive metabolic panel; Future  - Lipid panel; Future        Return if symptoms worsen or fail to improve. unless patient needs to be seen sooner or acute issues arise.        I have discussed the patient results/orders and and plan/recommendation with them at today's visit.      Angelica Mccabe,    08/06/2021

## 2021-08-17 ENCOUNTER — OFFICE VISIT (OUTPATIENT)
Dept: INTERNAL MEDICINE | Facility: CLINIC | Age: 76
End: 2021-08-17

## 2021-08-17 VITALS
DIASTOLIC BLOOD PRESSURE: 84 MMHG | WEIGHT: 190.2 LBS | TEMPERATURE: 98.2 F | OXYGEN SATURATION: 99 % | HEART RATE: 58 BPM | SYSTOLIC BLOOD PRESSURE: 158 MMHG | RESPIRATION RATE: 17 BRPM | BODY MASS INDEX: 29.85 KG/M2 | HEIGHT: 67 IN

## 2021-08-17 DIAGNOSIS — I10 ESSENTIAL HYPERTENSION: ICD-10-CM

## 2021-08-17 DIAGNOSIS — H65.91 FLUID LEVEL BEHIND TYMPANIC MEMBRANE OF RIGHT EAR: Primary | ICD-10-CM

## 2021-08-17 DIAGNOSIS — H92.01 RIGHT EAR PAIN: ICD-10-CM

## 2021-08-17 PROCEDURE — 99213 OFFICE O/P EST LOW 20 MIN: CPT | Performed by: FAMILY MEDICINE

## 2021-08-17 NOTE — PROGRESS NOTES
Subjective     Chief Complaint   Patient presents with   • Earache     Right side. Symptoms persist.        History of Present Illness  Patient continues to complain of right ear discomfort and hearing fluid in ear.   Has continued current medications prescribed last visit.  He has finished the steroids, continues on astelin, flonase and antihistamine.   No fever.   This is driving him crazy.  Patient's PMR from outside medical facility reviewed and noted.    Review of Systems     Otherwise complete ROS reviewed and negative except as mentioned in the HPI.    Past Medical History:   Past Medical History:   Diagnosis Date   • Arthritis    • CAD (coronary artery disease)    • Chronic back pain    • Chronic knee pain     LEFT KNEE   • Class 1 obesity due to excess calories with serious comorbidity and body mass index (BMI) of 32.0 to 32.9 in adult 1/20/2020   • COPD (chronic obstructive pulmonary disease) (CMS/[facility])    • Coronary artery disease involving autologous vein coronary bypass graft without angina pectoris 1/20/2020   • Essential hypertension 10/14/2019   • GERD (gastroesophageal reflux disease)    • Hyperlipidemia    • Hypertension    • Numbness of right hand    • Obstructive sleep apnea 1/20/2020   • Sleep apnea      Past Surgical History:  Past Surgical History:   Procedure Laterality Date   • BACK SURGERY     • COLONOSCOPY     • KNEE MENISCAL REPAIR Left 2018   • ROTATOR CUFF REPAIR Right 2000   • SHOULDER SURGERY     • SPINE SURGERY  1981    lower back ruptured disc   • TOTAL SHOULDER ARTHROPLASTY W/ DISTAL CLAVICLE EXCISION Right 1/14/2020    Procedure: RIGHT REVERSE TOTAL SHOULDER  ARTHROPLASTY;  Surgeon: Suman Ventura MD;  Location: [facility];  Service: Orthopedics     Social History:  reports that he quit smoking about 10 years ago. His smoking use included pipe. He smoked 0.00 packs per day for 50.00 years. He has never used smokeless tobacco. He reports current alcohol use of about  5.0 standard drinks of alcohol per week. He reports that he does not use drugs.    Family History: family history includes Arthritis in his father and mother; Cancer in his brother; Diabetes in his mother and sister; Hypertension in his father and mother; Kidney disease in his mother; Obesity in his sister; Osteoporosis in his mother; Stroke in his father.       Allergies:  No Known Allergies  Medications:  Prior to Admission medications    Medication Sig Start Date End Date Taking? Authorizing Provider   acetaminophen (TYLENOL) 650 MG 8 hr tablet Take 650 mg by mouth.   Yes Cristian Pfeiffer MD   albuterol sulfate  (90 Base) MCG/ACT inhaler Inhale 2 puffs Every 4 (Four) Hours As Needed for Wheezing. 9/15/20  Yes Angelica Mccabe DO   amLODIPine (NORVASC) 5 MG tablet TAKE 1 TABLET BY MOUTH EVERY DAY 6/15/21  Yes Angelica Mccabe DO   aspirin 81 MG EC tablet Take 81 mg by mouth Daily.   Yes Cristian Pfeiffer MD   azelastine (ASTELIN) 0.1 % nasal spray 2 sprays into the nostril(s) as directed by provider 2 (Two) Times a Day. Use in each nostril as directed 8/6/21  Yes Angelica Mccabe DO   B Complex-Folic Acid (Super B Complex Maxi) tablet Take 1 tablet by mouth.   Yes Cristian Pfeiffer MD   bisoprolol (ZEBeta) 5 MG tablet TAKE 1 TABLET BY MOUTH EVERY DAY 6/15/21  Yes Angelica Mccabe DO   budesonide-formoterol (Symbicort) 80-4.5 MCG/ACT inhaler Inhale 2 puffs 2 (Two) Times a Day. 7/8/21  Yes Angelica Mccabe DO   calcium carbonate (OS-TASHIA) 600 MG tablet Take 600 mg by mouth 2 (Two) Times a Day.   Yes Cristian Pfeiffer MD   Cholecalciferol (VITAMIN D3) 1000 units capsule Take 2,000 Units by mouth Daily.   Yes Cristian Pfeiffer MD   coenzyme Q10 100 MG capsule Take 100 mg by mouth Daily.   Yes Cristian Pfeiffer MD   Collagen-Boron-Hyaluronic Acid (Move Free EnWave) 10-5-3.3 MG tablet Take 1 tablet by mouth.   Yes Cristian Pfeiffer MD   cyclobenzaprine  (FLEXERIL) 10 MG tablet Take 1 tablet by mouth 3 (Three) Times a Day As Needed for Muscle Spasms. 10/7/19  Yes Katerine Mcclain APRN   doxycycline (MONODOX) 100 MG capsule Take 1 capsule by mouth 2 (Two) Times a Day. 6/22/21  Yes Angelica Mccabe DO   fluticasone (FLONASE) 50 MCG/ACT nasal spray 2 sprays into the nostril(s) as directed by provider Daily.   Yes ProviderCristian MD   gabapentin (NEURONTIN) 300 MG capsule TAKE 1 CAPSULE BY MOUTH 4 (FOUR) TIMES A DAY. 6/6/21  Yes Angelica Mccabe DO   Garlic 1000 MG capsule Take 1,000 mg by mouth Daily.   Yes ProviderCristian MD   Glucos-Chondroit-Hyaluron-MSM (GLUCOSAMINE CHONDROITIN JOINT PO) Take 1 tablet/day by mouth Daily.   Yes ProviderCristian MD   ibuprofen (ADVIL,MOTRIN) 800 MG tablet Take 800 mg by mouth Every 6 (Six) Hours As Needed for Mild Pain .   Yes ProviderCristian MD   Krill Oil 300 MG capsule Take 300 mg by mouth Daily.   Yes Provider, MD Cristian   lisinopril (PRINIVIL,ZESTRIL) 10 MG tablet Take 1 tablet by mouth Daily. 6/22/21  Yes Angelica Mccabe DO   meloxicam (MOBIC) 15 MG tablet Take 1 tablet by mouth Daily. 9/15/20  Yes Angelica Mccabe DO   methylPREDNISolone (MEDROL) 4 MG dose pack Take as directed on package instructions. 11/2/20  Yes Katerine Mcclain APRN   methylPREDNISolone (MEDROL) 4 MG dose pack Take as directed on package instructions. 8/6/21  Yes Angelica Mccabe DO   Multiple Vitamins-Minerals (OCUVITE ADULT 50+ PO) Take 1 tablet by mouth Daily.   Yes ProviderCristian MD   omeprazole (priLOSEC) 20 MG capsule Take 20 mg by mouth Daily.   Yes ProviderCristian MD   ondansetron (ZOFRAN) 4 MG tablet Take 4 mg by mouth. 12/28/20  Yes Cristian Pfeiffer MD   rivaroxaban (Xarelto) 10 MG tablet Take 10 mg by mouth. 12/28/20  Yes ProviderCristian MD   simvastatin (ZOCOR) 40 MG tablet Take 1 tablet by mouth every night at bedtime. 6/22/21  Yes Angelica Mccabe DO  "  Spiriva Respimat 2.5 MCG/ACT aerosol solution inhaler INHALE 2 PUFFS BY MOUTH DAILY 5/26/21  Yes Angelica Mccabe DO   SUPER B COMPLEX/C PO Take 1 tablet by mouth Every Night.   Yes Cristian Pfeiffer MD   tamsulosin (FLOMAX) 0.4 MG capsule 24 hr capsule Take 1 capsule by mouth Daily. 6/22/21  Yes Angelica Mccabe DO   TURMERIC PO Take 1,000 mg by mouth Daily.   Yes Cristian Pfeiffer MD   vitamin C (ASCORBIC ACID) 500 MG tablet Take 500 mg by mouth Daily.   Yes Cristian Pfeiffer MD   predniSONE (DELTASONE) 10 MG tablet Take 1 tablet by mouth Daily. 6/1/21   Angelica Mccabe DO   S-Adenosylmethionine (SULAIMAN-E) 200 MG tablet Take 200 mg by mouth.    ProviderCristian MD       Objective     Vital Signs: /84 (BP Location: Left arm, Patient Position: Sitting, Cuff Size: Adult)   Pulse 58   Temp 98.2 °F (36.8 °C) (Skin)   Resp 17   Ht 170.2 cm (67\")   Wt 86.3 kg (190 lb 3.2 oz)   SpO2 99%   BMI 29.79 kg/m²   Physical Exam  Vitals and nursing note reviewed.   Constitutional:       General: He is not in acute distress.     Appearance: Normal appearance. He is not ill-appearing, toxic-appearing or diaphoretic.   HENT:      Head: Normocephalic and atraumatic.      Right Ear: Ear canal and external ear normal.      Left Ear: Tympanic membrane, ear canal and external ear normal.      Ears:      Comments: Right TM is intact.   COL 1700  TM is dull.  Mildly distended, unable to fully appreciate ossicle.      Nose: Nose normal.      Mouth/Throat:      Mouth: Mucous membranes are moist.      Pharynx: Oropharynx is clear.   Eyes:      Extraocular Movements: Extraocular movements intact.      Pupils: Pupils are equal, round, and reactive to light.   Cardiovascular:      Rate and Rhythm: Normal rate.   Pulmonary:      Effort: Pulmonary effort is normal.   Musculoskeletal:         General: Normal range of motion.      Cervical back: Normal range of motion.   Skin:     General: Skin is warm and " dry.      Capillary Refill: Capillary refill takes less than 2 seconds.   Neurological:      General: No focal deficit present.      Mental Status: He is alert and oriented to person, place, and time.   Psychiatric:         Mood and Affect: Mood normal.         Behavior: Behavior normal.         Patient's Body mass index is 29.79 kg/m².     Results Reviewed:  Glucose   Date Value Ref Range Status   12/22/2020 92 74 - 109 mg/dL Final     BUN   Date Value Ref Range Status   06/02/2021 12 8 - 27 mg/dL Final   12/22/2020 16 8 - 23 mg/dL Final     Creatinine   Date Value Ref Range Status   06/02/2021 0.91 0.76 - 1.27 mg/dL Final   12/22/2020 0.8 0.5 - 1.2 mg/dL Final     Sodium   Date Value Ref Range Status   06/02/2021 143 134 - 144 mmol/L Final   12/22/2020 139 136 - 145 mmol/L Final     Potassium   Date Value Ref Range Status   06/02/2021 4.1 3.5 - 5.2 mmol/L Final   12/22/2020 4.1 3.5 - 5.0 mmol/L Final     Chloride   Date Value Ref Range Status   06/02/2021 107 (H) 96 - 106 mmol/L Final   12/22/2020 102 98 - 111 mmol/L Final     CO2   Date Value Ref Range Status   12/22/2020 25 22 - 29 mmol/L Final     Total CO2   Date Value Ref Range Status   06/02/2021 24 20 - 29 mmol/L Final     Calcium   Date Value Ref Range Status   06/02/2021 9.3 8.6 - 10.2 mg/dL Final   12/22/2020 9.4 8.8 - 10.2 mg/dL Final     ALT (SGPT)   Date Value Ref Range Status   06/02/2021 19 0 - 44 IU/L Final     AST (SGOT)   Date Value Ref Range Status   06/02/2021 14 0 - 40 IU/L Final     WBC   Date Value Ref Range Status   12/22/2020 6.6 4.8 - 10.8 K/uL Final     Hematocrit   Date Value Ref Range Status   12/22/2020 40.7 (L) 42.0 - 52.0 % Final     Platelets   Date Value Ref Range Status   12/22/2020 175 130 - 400 K/uL Final     Triglycerides   Date Value Ref Range Status   06/02/2021 103 0 - 149 mg/dL Final     HDL Cholesterol   Date Value Ref Range Status   06/02/2021 57 >39 mg/dL Final     LDL Chol Calc ([facility])   Date Value Ref Range Status    06/02/2021 98 0 - 99 mg/dL Final         Assessment / Plan     Assessment/Plan:  1. Fluid level behind tympanic membrane of right ear    - Ambulatory Referral to ENT (Otolaryngology)    2. Essential hypertension      3. Right ear pain      Continue current medications including antihistamine, flonase and astelin.        Return in about 3 months (around 11/17/2021), or if symptoms worsen or fail to improve. unless patient needs to be seen sooner or acute issues arise.        I have discussed the patient results/orders and and plan/recommendation with them at today's visit.      Angelica Mccabe,    08/17/2021

## 2021-08-31 ENCOUNTER — TRANSCRIBE ORDERS (OUTPATIENT)
Dept: LAB | Facility: HOSPITAL | Age: 76
End: 2021-08-31

## 2021-08-31 DIAGNOSIS — Z01.818 PREOPERATIVE TESTING: Primary | ICD-10-CM

## 2021-09-06 ENCOUNTER — LAB (OUTPATIENT)
Dept: LAB | Facility: HOSPITAL | Age: 76
End: 2021-09-06

## 2021-09-06 LAB — SARS-COV-2 ORF1AB RESP QL NAA+PROBE: NOT DETECTED

## 2021-09-06 PROCEDURE — U0004 COV-19 TEST NON-CDC HGH THRU: HCPCS | Performed by: NURSE PRACTITIONER

## 2021-09-06 PROCEDURE — C9803 HOPD COVID-19 SPEC COLLECT: HCPCS | Performed by: NURSE PRACTITIONER

## 2021-09-08 ENCOUNTER — OFFICE VISIT (OUTPATIENT)
Dept: PULMONOLOGY | Facility: CLINIC | Age: 76
End: 2021-09-08

## 2021-09-08 VITALS
DIASTOLIC BLOOD PRESSURE: 74 MMHG | WEIGHT: 191.6 LBS | SYSTOLIC BLOOD PRESSURE: 132 MMHG | HEIGHT: 67 IN | HEART RATE: 72 BPM | OXYGEN SATURATION: 99 % | BODY MASS INDEX: 30.07 KG/M2

## 2021-09-08 DIAGNOSIS — Z87.891 PERSONAL HISTORY OF NICOTINE DEPENDENCE: Chronic | ICD-10-CM

## 2021-09-08 DIAGNOSIS — E66.09 CLASS 1 OBESITY DUE TO EXCESS CALORIES WITH SERIOUS COMORBIDITY AND BODY MASS INDEX (BMI) OF 32.0 TO 32.9 IN ADULT: Chronic | ICD-10-CM

## 2021-09-08 DIAGNOSIS — G47.33 OBSTRUCTIVE SLEEP APNEA: Chronic | ICD-10-CM

## 2021-09-08 DIAGNOSIS — J47.9 BRONCHIECTASIS WITHOUT COMPLICATION (HCC): Chronic | ICD-10-CM

## 2021-09-08 DIAGNOSIS — R09.82 POSTNASAL DRIP: ICD-10-CM

## 2021-09-08 DIAGNOSIS — J44.9 COPD, GROUP B, BY GOLD 2017 CLASSIFICATION (HCC): Primary | Chronic | ICD-10-CM

## 2021-09-08 DIAGNOSIS — J43.2 CENTRILOBULAR EMPHYSEMA (HCC): Chronic | ICD-10-CM

## 2021-09-08 PROCEDURE — 94010 BREATHING CAPACITY TEST: CPT | Performed by: NURSE PRACTITIONER

## 2021-09-08 PROCEDURE — 99214 OFFICE O/P EST MOD 30 MIN: CPT | Performed by: NURSE PRACTITIONER

## 2021-09-08 RX ORDER — BUDESONIDE AND FORMOTEROL FUMARATE DIHYDRATE 160; 4.5 UG/1; UG/1
2 AEROSOL RESPIRATORY (INHALATION)
Qty: 1 EACH | Refills: 0 | Status: SHIPPED | OUTPATIENT
Start: 2021-09-08 | End: 2021-09-27 | Stop reason: SDUPTHER

## 2021-09-08 NOTE — PROCEDURES
Pulmonary Function Test  Performed by: Marla Heller, RRT  Authorized by: Hien Pimentel APRN      Pre Drug % Predicted    FVC: 106%   FEV1: 99%   FEF 25-75%: 69%   FEV1/FVC: 72.62%    Interpretation   Spirometry There is reduced midflow suggesting small airway/airflow obstruction.   Review of FVL curve   Patient's effort is normal.

## 2021-09-16 ENCOUNTER — TELEPHONE (OUTPATIENT)
Dept: INTERNAL MEDICINE | Facility: CLINIC | Age: 76
End: 2021-09-16

## 2021-09-16 ENCOUNTER — PRE-ADMISSION TESTING (OUTPATIENT)
Dept: PREADMISSION TESTING | Facility: HOSPITAL | Age: 76
End: 2021-09-16

## 2021-09-16 VITALS
HEART RATE: 66 BPM | WEIGHT: 194.22 LBS | DIASTOLIC BLOOD PRESSURE: 67 MMHG | BODY MASS INDEX: 31.21 KG/M2 | SYSTOLIC BLOOD PRESSURE: 138 MMHG | OXYGEN SATURATION: 97 % | HEIGHT: 66 IN | RESPIRATION RATE: 16 BRPM

## 2021-09-16 DIAGNOSIS — G56.01 RIGHT CARPAL TUNNEL SYNDROME: ICD-10-CM

## 2021-09-16 LAB
ALBUMIN SERPL-MCNC: 4.3 G/DL (ref 3.5–5.2)
ALBUMIN/GLOB SERPL: 1.6 G/DL
ALP SERPL-CCNC: 71 U/L (ref 39–117)
ALT SERPL W P-5'-P-CCNC: 17 U/L (ref 1–41)
ANION GAP SERPL CALCULATED.3IONS-SCNC: 7 MMOL/L (ref 5–15)
APTT PPP: 25.9 SECONDS (ref 24.1–35)
AST SERPL-CCNC: 17 U/L (ref 1–40)
BASOPHILS # BLD AUTO: 0.06 10*3/MM3 (ref 0–0.2)
BASOPHILS NFR BLD AUTO: 0.9 % (ref 0–1.5)
BILIRUB SERPL-MCNC: 0.3 MG/DL (ref 0–1.2)
BILIRUB UR QL STRIP: NEGATIVE
BUN SERPL-MCNC: 14 MG/DL (ref 8–23)
BUN/CREAT SERPL: 16.7 (ref 7–25)
CALCIUM SPEC-SCNC: 9.1 MG/DL (ref 8.6–10.5)
CHLORIDE SERPL-SCNC: 106 MMOL/L (ref 98–107)
CLARITY UR: CLEAR
CO2 SERPL-SCNC: 27 MMOL/L (ref 22–29)
COLOR UR: ABNORMAL
CREAT SERPL-MCNC: 0.84 MG/DL (ref 0.76–1.27)
DEPRECATED RDW RBC AUTO: 46.5 FL (ref 37–54)
EOSINOPHIL # BLD AUTO: 0.14 10*3/MM3 (ref 0–0.4)
EOSINOPHIL NFR BLD AUTO: 2.1 % (ref 0.3–6.2)
ERYTHROCYTE [DISTWIDTH] IN BLOOD BY AUTOMATED COUNT: 13 % (ref 12.3–15.4)
GFR SERPL CREATININE-BSD FRML MDRD: 89 ML/MIN/1.73
GLOBULIN UR ELPH-MCNC: 2.7 GM/DL
GLUCOSE SERPL-MCNC: 122 MG/DL (ref 65–99)
GLUCOSE UR STRIP-MCNC: NEGATIVE MG/DL
HCT VFR BLD AUTO: 39.7 % (ref 37.5–51)
HGB BLD-MCNC: 13.3 G/DL (ref 13–17.7)
HGB UR QL STRIP.AUTO: NEGATIVE
IMM GRANULOCYTES # BLD AUTO: 0.02 10*3/MM3 (ref 0–0.05)
IMM GRANULOCYTES NFR BLD AUTO: 0.3 % (ref 0–0.5)
INR PPP: 0.99 (ref 0.91–1.09)
KETONES UR QL STRIP: NEGATIVE
LEUKOCYTE ESTERASE UR QL STRIP.AUTO: NEGATIVE
LYMPHOCYTES # BLD AUTO: 1.54 10*3/MM3 (ref 0.7–3.1)
LYMPHOCYTES NFR BLD AUTO: 23.4 % (ref 19.6–45.3)
MCH RBC QN AUTO: 32.9 PG (ref 26.6–33)
MCHC RBC AUTO-ENTMCNC: 33.5 G/DL (ref 31.5–35.7)
MCV RBC AUTO: 98.3 FL (ref 79–97)
MONOCYTES # BLD AUTO: 0.62 10*3/MM3 (ref 0.1–0.9)
MONOCYTES NFR BLD AUTO: 9.4 % (ref 5–12)
NEUTROPHILS NFR BLD AUTO: 4.19 10*3/MM3 (ref 1.7–7)
NEUTROPHILS NFR BLD AUTO: 63.9 % (ref 42.7–76)
NITRITE UR QL STRIP: NEGATIVE
NRBC BLD AUTO-RTO: 0 /100 WBC (ref 0–0.2)
PH UR STRIP.AUTO: 6 [PH] (ref 5–8)
PLATELET # BLD AUTO: 174 10*3/MM3 (ref 140–450)
PMV BLD AUTO: 10.2 FL (ref 6–12)
POTASSIUM SERPL-SCNC: 4.1 MMOL/L (ref 3.5–5.2)
PROT SERPL-MCNC: 7 G/DL (ref 6–8.5)
PROT UR QL STRIP: NEGATIVE
PROTHROMBIN TIME: 12.7 SECONDS (ref 11.9–14.6)
RBC # BLD AUTO: 4.04 10*6/MM3 (ref 4.14–5.8)
SODIUM SERPL-SCNC: 140 MMOL/L (ref 136–145)
SP GR UR STRIP: 1.01 (ref 1–1.03)
UROBILINOGEN UR QL STRIP: ABNORMAL
WBC # BLD AUTO: 6.57 10*3/MM3 (ref 3.4–10.8)

## 2021-09-16 PROCEDURE — 36415 COLL VENOUS BLD VENIPUNCTURE: CPT

## 2021-09-16 PROCEDURE — 85730 THROMBOPLASTIN TIME PARTIAL: CPT

## 2021-09-16 PROCEDURE — 85025 COMPLETE CBC W/AUTO DIFF WBC: CPT

## 2021-09-16 PROCEDURE — 80053 COMPREHEN METABOLIC PANEL: CPT

## 2021-09-16 PROCEDURE — 85610 PROTHROMBIN TIME: CPT

## 2021-09-16 PROCEDURE — 93005 ELECTROCARDIOGRAM TRACING: CPT

## 2021-09-16 PROCEDURE — 81003 URINALYSIS AUTO W/O SCOPE: CPT

## 2021-09-16 PROCEDURE — 93010 ELECTROCARDIOGRAM REPORT: CPT | Performed by: INTERNAL MEDICINE

## 2021-09-16 NOTE — TELEPHONE ENCOUNTER
Called patient to discuss glucose level. Patient voiced understanding and states he will be mindful of his carbohydrate and sugar intake.

## 2021-09-16 NOTE — DISCHARGE INSTRUCTIONS
DAY OF SURGERY INSTRUCTIONS        YOUR SURGEON: DR. VEDA CRAWFORD    PROCEDURE: CARPAL TUNNEL RELEASE, RIGHT    DATE OF SURGERY: September 29, 2021    ARRIVAL TIME: AS DIRECTED BY OFFICE    YOU MAY TAKE THE FOLLOWING MEDICATION(S) THE MORNING OF SURGERY WITH A SIP OF WATER: BISOPROLOL, GABAPENTIN    DO NOT TAKE LISINOPRIL 24 HOURS PRIOR TO SURGERY      ALL OTHER HOME MEDICATION CHECK WITH YOUR PHYSICIAN      DO NOT TAKE ANY ERECTILE DYSFUNCTION MEDICATIONS (EX: CIALIS, VIAGRA) 24 HOURS PRIOR TO SURGERY                      MANAGING PAIN AFTER SURGERY    We know you are probably wondering what your pain will be like after surgery.  Following surgery it is unrealistic to expect you will not have pain.   Pain is how our bodies let us know that something is wrong or cautions us to be careful.  That said, our goal is to make your pain tolerable.    Methods we may use to treat your pain include (oral or IV medications, PCAs, epidurals, nerve blocks, etc.)   While some procedures require IV pain medications for a short time after surgery, transitioning to pain medications by mouth allows for better management of pain.   Your nurse will encourage you to take oral pain medications whenever possible.  IV medications work almost immediately, but only last a short while.  Taking medications by mouth allows for a more constant level of medication in your blood stream for a longer period of time.      Once your pain is out of control it is harder to get back under control.  It is important you are aware when your next dose of pain medication is due.  If you are admitted, your nurse may write the time of your next dose on the white board in your room to help you remember.      We are interested in your pain and encourage you to inform us about aggravating factors during your visit.   Many times a simple repositioning every few hours can make a big difference.    If your physician says it is okay, do not let your pain prevent  you from getting out of bed. Be sure to call your nurse for assistance prior to getting up so you do not fall.      Before surgery, please decide your tolerable pain goal.  These faces help describe the pain ratings we use on a 0-10 scale.   Be prepared to tell us your goal and whether or not you take pain or anxiety medications at home.          BEFORE YOU COME TO THE HOSPITAL  (Pre-op instructions)  • Do not eat, drink, smoke or chew gum after midnight the night before surgery.  This also includes no mints.  • Morning of surgery take only the medicines you have been instructed with a sip of water unless otherwise instructed  by your physician.  • Do not shave, wear makeup or dark nail polish.  • Remove all jewelry including rings.  • Leave anything you consider valuable at home.  • Leave your suitcase in the car until after your surgery.  • Bring the following with you if applicable:  o Picture ID and insurance, Medicare or Medicaid cards  o Co-pay/deductible required by insurance (cash, check, credit card)  o Copy of advance directive, living will or power-of- documents if not brought to PAT  o CPAP or BIPAP mask and tubing  o Relaxation aids ( book, magazine), etc.  o Hearing aids                        ON THE DAY OF SURGERY  · On the day of surgery check in at registration located at the main entrance of the hospital.   ? You will be registered and given a beeper with instructions where to wait in the main lobby.  ? When your beeper lights up and vibrates a member of the Outpatient Surgery staff will meet you at the double doors under the stair steps and escort you to your preoperative room.   · You may have cloth compression devices placed on your legs. These help to prevent blood clots and reduce swelling in your legs.  · An IV may be inserted into one of your veins.  · In the operating room, you may be given one or more of the following:  ? A medicine to help you relax (sedative).  ? A medicine to  "numb the area (local anesthetic).  ? A medicine to make you fall asleep (general anesthetic).  ? A medicine that is injected into an area of your body to numb everything below the injection site (regional anesthetic).  · Your surgical site will be marked or identified.  · You may be given an antibiotic through your IV to help prevent infection.  Contact a health care provider if you:  · Develop a fever of more than 100.4°F (38°C) or other feelings of illness during the 48 hours before your surgery.  · Have symptoms that get worse.  Have questions or concerns about your surgery    General Anesthesia/Surgery, Adult  General anesthesia is the use of medicines to make a person \"go to sleep\" (unconscious) for a medical procedure. General anesthesia must be used for certain procedures, and is often recommended for procedures that:  · Last a long time.  · Require you to be still or in an unusual position.  · Are major and can cause blood loss.  The medicines used for general anesthesia are called general anesthetics. As well as making you unconscious for a certain amount of time, these medicines:  · Prevent pain.  · Control your blood pressure.  · Relax your muscles.  Tell a health care provider about:  · Any allergies you have.  · All medicines you are taking, including vitamins, herbs, eye drops, creams, and over-the-counter medicines.  · Any problems you or family members have had with anesthetic medicines.  · Types of anesthetics you have had in the past.  · Any blood disorders you have.  · Any surgeries you have had.  · Any medical conditions you have.  · Any recent upper respiratory, chest, or ear infections.  · Any history of:  ? Heart or lung conditions, such as heart failure, sleep apnea, asthma, or chronic obstructive pulmonary disease (COPD).  ?  service.  ? Depression or anxiety.  · Any tobacco or drug use, including marijuana or alcohol use.  · Whether you are pregnant or may be pregnant.  What are the " risks?  Generally, this is a safe procedure. However, problems may occur, including:  · Allergic reaction.  · Lung and heart problems.  · Inhaling food or liquid from the stomach into the lungs (aspiration).  · Nerve injury.  · Air in the bloodstream, which can lead to stroke.  · Extreme agitation or confusion (delirium) when you wake up from the anesthetic.  · Waking up during your procedure and being unable to move. This is rare.  These problems are more likely to develop if you are having a major surgery or if you have an advanced or serious medical condition. You can prevent some of these complications by answering all of your health care provider's questions thoroughly and by following all instructions before your procedure.  General anesthesia can cause side effects, including:  · Nausea or vomiting.  · A sore throat from the breathing tube.  · Hoarseness.  · Wheezing or coughing.  · Shaking chills.  · Tiredness.  · Body aches.  · Anxiety.  · Sleepiness or drowsiness.  · Confusion or agitation.  RISKS AND COMPLICATIONS OF SURGERY  Your health care provider will discuss possible risks and complications with you before surgery. Common risks and complications include:    · Problems due to the use of anesthetics.  · Blood loss and replacement (does not apply to minor surgical procedures).  · Temporary increase in pain due to surgery.  · Uncorrected pain or problems that the surgery was meant to correct.  · Infection.  · New damage.    What happens before the procedure?    Medicines  Ask your health care provider about:  · Changing or stopping your regular medicines. This is especially important if you are taking diabetes medicines or blood thinners.  · Taking medicines such as aspirin and ibuprofen. These medicines can thin your blood. Do not take these medicines unless your health care provider tells you to take them.  · Taking over-the-counter medicines, vitamins, herbs, and supplements. Do not take these during  the week before your procedure unless your health care provider approves them.  General instructions  · Starting 3-6 weeks before the procedure, do not use any products that contain nicotine or tobacco, such as cigarettes and e-cigarettes. If you need help quitting, ask your health care provider.  · If you brush your teeth on the morning of the procedure, make sure to spit out all of the toothpaste.  · Tell your health care provider if you become ill or develop a cold, cough, or fever.  · If instructed by your health care provider, bring your sleep apnea device with you on the day of your surgery (if applicable).  · Ask your health care provider if you will be going home the same day, the following day, or after a longer hospital stay.  ? Plan to have someone take you home from the hospital or clinic.  ? Plan to have a responsible adult care for you for at least 24 hours after you leave the hospital or clinic. This is important.  What happens during the procedure?  · You will be given anesthetics through both of the following:  ? A mask placed over your nose and mouth.  ? An IV in one of your veins.  · You may receive a medicine to help you relax (sedative).  · After you are unconscious, a breathing tube may be inserted down your throat to help you breathe. This will be removed before you wake up.  · An anesthesia specialist will stay with you throughout your procedure. He or she will:  ? Keep you comfortable and safe by continuing to give you medicines and adjusting the amount of medicine that you get.  ? Monitor your blood pressure, pulse, and oxygen levels to make sure that the anesthetics do not cause any problems.  The procedure may vary among health care providers and hospitals.  What happens after the procedure?  · Your blood pressure, temperature, heart rate, breathing rate, and blood oxygen level will be monitored until the medicines you were given have worn off.  · You will wake up in a recovery area. You  may wake up slowly.  · If you feel anxious or agitated, you may be given medicine to help you calm down.  · If you will be going home the same day, your health care provider may check to make sure you can walk, drink, and urinate.  · Your health care provider will treat any pain or side effects you have before you go home.  · Do not drive for 24 hours if you were given a sedative.  Summary  · General anesthesia is used to keep you still and prevent pain during a procedure.  · It is important to tell your healthcare provider about your medical history and any surgeries you have had, and previous experience with anesthesia.  · Follow your healthcare provider’s instructions about when to stop eating, drinking, or taking certain medicines before your procedure.  · Plan to have someone take you home from the hospital or clinic.  This information is not intended to replace advice given to you by your health care provider. Make sure you discuss any questions you have with your health care provider.  Document Released: 03/26/2009 Document Revised: 08/03/2018 Document Reviewed: 08/03/2018  [employer] Interactive Patient Education © 2019 [employer]       Fall Prevention in Hospitals, Adult  As a hospital patient, your condition and the treatments you receive can increase your risk for falls. Some additional risk factors for falls in a hospital include:  · Being in an unfamiliar environment.  · Being on bed rest.  · Your surgery.  · Taking certain medicines.  · Your tubing requirements, such as intravenous (IV) therapy or catheters.  It is important that you learn how to decrease fall risks while at the hospital. Below are important tips that can help prevent falls.  SAFETY TIPS FOR PREVENTING FALLS  Talk about your risk of falling.  · Ask your health care provider why you are at risk for falling. Is it your medicine, illness, tubing placement, or something else?  · Make a plan with your health care provider to keep you safe  from falls.  · Ask your health care provider or pharmacist about side effects of your medicines. Some medicines can make you dizzy or affect your coordination.  Ask for help.  · Ask for help before getting out of bed. You may need to press your call button.  · Ask for assistance in getting safely to the toilet.  · Ask for a walker or cane to be put at your bedside. Ask that most of the side rails on your bed be placed up before your health care provider leaves the room.  · Ask family or friends to sit with you.  · Ask for things that are out of your reach, such as your glasses, hearing aids, telephone, bedside table, or call button.  Follow these tips to avoid falling:  · Stay lying or seated, rather than standing, while waiting for help.  · Wear rubber-soled slippers or shoes whenever you walk in the hospital.  · Avoid quick, sudden movements.  ¨ Change positions slowly.  ¨ Sit on the side of your bed before standing.  ¨ Stand up slowly and wait before you start to walk.  · Let your health care provider know if there is a spill on the floor.  · Pay careful attention to the medical equipment, electrical cords, and tubes around you.  · When you need help, use your call button by your bed or in the bathroom. Wait for one of your health care providers to help you.  · If you feel dizzy or unsure of your footing, return to bed and wait for assistance.  · Avoid being distracted by the TV, telephone, or another person in your room.  · Do not lean or support yourself on rolling objects, such as IV poles or bedside tables.     This information is not intended to replace advice given to you by your health care provider. Make sure you discuss any questions you have with your health care provider.     Document Released: 12/15/2001 Document Revised: 01/08/2016 Document Reviewed: 08/25/2013  [employer] Interactive Patient Education ©2016 [employer]       [facility] 4% Patient Instruction Sheet    Chlorhexidine  Before Surgery  Chlorhexidine gluconate (CHG) is a germ-killing (antiseptic) solution that is used to clean the skin. It gets rid of the bacteria that normally live on the skin. Cleaning your skin with CHG before surgery helps lower the risk for infection after surgery.    How to use CHG solution  · You will take 2 showers, one shower the night before surgery, the second shower the morning of surgery before coming to the hospital.  · Use CHG only as told by your health care provider, and follow the instructions on the label.  · Use CHG solution while taking a shower. Follow these steps when using CHG solution (unless your health care provider gives you different instructions):  1. Start the shower.  2. Use your normal soap and shampoo to wash your face and hair.  3. Turn off the shower or move out of the shower stream.  4. Pour the CHG onto a clean washcloth. Do not use any type of brush or rough-edged sponge.  5. Starting at your neck, lather your body down to your toes. Make sure you:  6. Pay special attention to the part of your body where you will be having surgery. Scrub this area for at least 1 minute.  7. Use the full amount of CHG as directed. Usually, this is one half bottle for each shower.  8. Do not use CHG on your head or face. If the solution gets into your ears or eyes, rinse them well with water.  9. Avoid your genital area.  10. Avoid any areas of skin that have broken skin, cuts, or scrapes.  11. Scrub your back and under your arms. Make sure to wash skin folds.  12. Let the lather sit on your skin for 1-2 minutes or as long as told by your health care  provider.  13. Thoroughly rinse your entire body in the shower. Make sure that all body creases and crevices are rinsed well.  14. Dry off with a clean towel. Do not put any substances on your body afterward, such as powder, lotion, or perfume.  15. Put on clean clothes or pajamas.  16. If it is the night before your surgery, sleep in clean  sheets.    What are the risks?  Risks of using CHG include:  · A skin reaction.  · Hearing loss, if CHG gets in your ears.  · Eye injury, if CHG gets in your eyes and is not rinsed out.  · The CHG product catching fire.  Make sure that you avoid smoking and flames after applying CHG to your skin.  Do not use CHG:  · If you have a chlorhexidine allergy or have previously reacted to chlorhexidine.  · On babies younger than 2 months of age.      On the day of surgery, when you are taken to your room in Outpatient Surgery you will be given a CHG prepackaged cloth to wipe the site for your surgery.  How to use CHG prepackaged cloths  · Follow the instructions on the label.  · Use the CHG cloth on clean, dry skin. Follow these steps when using a CHG cloth (unless your health care provider gives you different instructions):  1. Using the CHG cloth, vigorously scrub the part of your body where you will be having surgery. Scrub using a back-and-forth motion for 3 minutes. The area on your body should be completely wet with CHG when you are finished scrubbing.  2. Do not rinse. Discard the cloth and let the area air-dry for 1 minute. Do not put any substances on your body afterward, such as powder, lotion, or perfume.  Contact a health care provider if:  · Your skin gets irritated after scrubbing.  · You have questions about using your solution or cloth.  Get help right away if:  · Your eyes become very red or swollen.  · Your eyes itch badly.  · Your skin itches badly and is red or swollen.  · Your hearing changes.  · You have trouble seeing.  · You have swelling or tingling in your mouth or throat.  · You have trouble breathing.  · You swallow any chlorhexidine.  Summary  · Chlorhexidine gluconate (CHG) is a germ-killing (antiseptic) solution that is used to clean the skin. Cleaning your skin with CHG before surgery helps lower the risk for infection after surgery.  · You may be given CHG to use at home. It may be in a  bottle or in a prepackaged cloth to use on your skin. Carefully follow your health care provider's instructions and the instructions on the product label.  · Do not use CHG if you have a chlorhexidine allergy.  · Contact your health care provider if your skin gets irritated after scrubbing.  This information is not intended to replace advice given to you by your health care provider. Make sure you discuss any questions you have with your health care provider.  Document Released: 09/11/2013 Document Revised: 11/15/2018 Document Reviewed: 11/15/2018  [employer] Interactive Patient Education © 2019 [employer]      PATIENT/FAMILY/RESPONSIBLE PARTY VERBALIZES UNDERSTANDING OF ABOVE EDUCATION.  COPY OF PAIN SCALE GIVEN AND REVIEWED WITH VERBALIZED UNDERSTANDING.

## 2021-09-17 ENCOUNTER — OFFICE VISIT (OUTPATIENT)
Dept: INTERNAL MEDICINE | Facility: CLINIC | Age: 76
End: 2021-09-17

## 2021-09-17 VITALS
DIASTOLIC BLOOD PRESSURE: 70 MMHG | TEMPERATURE: 98.5 F | HEIGHT: 66 IN | BODY MASS INDEX: 30.91 KG/M2 | WEIGHT: 192.3 LBS | HEART RATE: 57 BPM | RESPIRATION RATE: 18 BRPM | OXYGEN SATURATION: 98 % | SYSTOLIC BLOOD PRESSURE: 140 MMHG

## 2021-09-17 DIAGNOSIS — G56.00 CARPAL TUNNEL SYNDROME, UNSPECIFIED LATERALITY: Primary | ICD-10-CM

## 2021-09-17 DIAGNOSIS — R73.9 ELEVATED BLOOD SUGAR: ICD-10-CM

## 2021-09-17 LAB — HBA1C MFR BLD: 5.4 %

## 2021-09-17 PROCEDURE — 99213 OFFICE O/P EST LOW 20 MIN: CPT | Performed by: FAMILY MEDICINE

## 2021-09-17 NOTE — PROGRESS NOTES
Subjective     Chief Complaint   Patient presents with   • Carpal Tunnel     Surgery clearance.        History of Present Illness  Patient is here for clearance for carpal tunnel surgery.  He is doing well.  He has had no new problems.  Specifically he has had no chest pain or shortness of breath.  Patient's PMR from outside medical facility reviewed and noted.    Review of Systems   Otherwise complete ROS reviewed and negative except as mentioned in the HPI.    Past Medical History:   Past Medical History:   Diagnosis Date   • Arthritis    • CAD (coronary artery disease)    • Chronic back pain    • Chronic knee pain     LEFT KNEE   • Class 1 obesity due to excess calories with serious comorbidity and body mass index (BMI) of 32.0 to 32.9 in adult 1/20/2020   • COPD (chronic obstructive pulmonary disease) (CMS/Formerly [facility])    • Coronary artery disease involving autologous vein coronary bypass graft without angina pectoris 1/20/2020   • Elevated cholesterol    • Essential hypertension 10/14/2019   • GERD (gastroesophageal reflux disease)    • Hyperlipidemia    • Hypertension    • Left bundle branch block    • Numbness of right hand    • Obstructive sleep apnea 1/20/2020   • Sleep apnea     CPAP     Past Surgical History:  Past Surgical History:   Procedure Laterality Date   • BACK SURGERY     • COLONOSCOPY     • JOINT REPLACEMENT      LEFT KNEE   • KNEE MENISCAL REPAIR Left 2018   • ROTATOR CUFF REPAIR Right 2000   • SHOULDER SURGERY     • SPINE SURGERY  1981    lower back ruptured disc   • TOTAL SHOULDER ARTHROPLASTY W/ DISTAL CLAVICLE EXCISION Right 1/14/2020    Procedure: RIGHT REVERSE TOTAL SHOULDER  ARTHROPLASTY;  Surgeon: Suman Ventura MD;  Location: [facility];  Service: Orthopedics     Social History:  reports that he quit smoking about 10 years ago. His smoking use included pipe. He smoked 0.00 packs per day for 50.00 years. He has never used smokeless tobacco. He reports current alcohol use of about  5.0 standard drinks of alcohol per week. He reports that he does not use drugs.    Family History: family history includes Arthritis in his father and mother; Cancer in his brother; Diabetes in his mother and sister; Hypertension in his father and mother; Kidney disease in his mother; Obesity in his sister; Osteoporosis in his mother; Stroke in his father.       Allergies:  No Known Allergies  Medications:  Prior to Admission medications    Medication Sig Start Date End Date Taking? Authorizing Provider   acetaminophen (TYLENOL) 650 MG 8 hr tablet Take 650 mg by mouth.   Yes Cristian Pfeiffer MD   albuterol sulfate  (90 Base) MCG/ACT inhaler Inhale 2 puffs Every 4 (Four) Hours As Needed for Wheezing. 9/15/20  Yes Angelica Mccabe DO   amLODIPine (NORVASC) 5 MG tablet TAKE 1 TABLET BY MOUTH EVERY DAY 6/15/21  Yes Angelica Mccabe DO   aspirin 81 MG EC tablet Take 81 mg by mouth Daily.   Yes Cristian Pfeiffer MD   azelastine (ASTELIN) 0.1 % nasal spray 2 sprays into the nostril(s) as directed by provider 2 (Two) Times a Day. Use in each nostril as directed 8/6/21  Yes Angelica Mccabe DO   B Complex-Folic Acid (Super B Complex Maxi) tablet Take 1 tablet by mouth.   Yes Cristian Pfeiffer MD   bisoprolol (ZEBeta) 5 MG tablet TAKE 1 TABLET BY MOUTH EVERY DAY 6/15/21  Yes Angelica Mccabe DO   budesonide-formoterol (Symbicort) 160-4.5 MCG/ACT inhaler Inhale 2 puffs 2 (Two) Times a Day for 30 days. 9/8/21 10/8/21 Yes Hien Pimentel APRN   calcium carbonate (OS-TASHIA) 600 MG tablet Take 600 mg by mouth 2 (Two) Times a Day.   Yes Cristian Pfeiffer MD   Cholecalciferol (VITAMIN D3) 1000 units capsule Take 2,000 Units by mouth Daily.   Yes Cristian Pfeiffer MD   coenzyme Q10 100 MG capsule Take 100 mg by mouth Daily.   Yes Cristian Pfeiffer MD   Collagen-Boron-Hyaluronic Acid (Move Free 60mo) 10-5-3.3 MG tablet Take 1 tablet by mouth.   Yes Cristian Pfeifefr MD    cyclobenzaprine (FLEXERIL) 10 MG tablet Take 1 tablet by mouth 3 (Three) Times a Day As Needed for Muscle Spasms. 10/7/19  Yes Katerine Mcclain APRN   fluticasone (FLONASE) 50 MCG/ACT nasal spray 2 sprays into the nostril(s) as directed by provider Daily.   Yes Cristian Pfeiffer MD   gabapentin (NEURONTIN) 300 MG capsule TAKE 1 CAPSULE BY MOUTH 4 (FOUR) TIMES A DAY. 6/6/21  Yes Angelica Mccabe DO   Garlic 1000 MG capsule Take 1,000 mg by mouth Daily.   Yes ProviderCristian MD   Glucos-Chondroit-Hyaluron-MSM (GLUCOSAMINE CHONDROITIN JOINT PO) Take 1 tablet/day by mouth Daily.   Yes ProviderCristian MD   ibuprofen (ADVIL,MOTRIN) 800 MG tablet Take 800 mg by mouth Every 6 (Six) Hours As Needed for Mild Pain .   Yes Cristian Pfeiffer MD   Krill Oil 300 MG capsule Take 300 mg by mouth Daily.   Yes ProviderCristian MD   lisinopril (PRINIVIL,ZESTRIL) 10 MG tablet Take 1 tablet by mouth Daily. 6/22/21  Yes Angelica Mccabe DO   Multiple Vitamins-Minerals (OCUVITE ADULT 50+ PO) Take 1 tablet by mouth Daily.   Yes ProviderCristian MD   omeprazole (priLOSEC) 20 MG capsule Take 20 mg by mouth Daily.   Yes ProviderCristian MD   ondansetron (ZOFRAN) 4 MG tablet Take 4 mg by mouth. 12/28/20  Yes Cristian Pfeiffer MD   simvastatin (ZOCOR) 40 MG tablet Take 1 tablet by mouth every night at bedtime. 6/22/21  Yes Angelica Mccabe DO   Spiriva Respimat 2.5 MCG/ACT aerosol solution inhaler INHALE 2 PUFFS BY MOUTH DAILY 5/26/21  Yes Angelica Mccabe DO   SUPER B COMPLEX/C PO Take 1 tablet by mouth Every Night.   Yes ProviderCristian MD   tamsulosin (FLOMAX) 0.4 MG capsule 24 hr capsule Take 1 capsule by mouth Daily. 6/22/21  Yes Angelica Mccabe DO   TURMERIC PO Take 1,000 mg by mouth Daily.   Yes ProviderCristian MD   vitamin C (ASCORBIC ACID) 500 MG tablet Take 500 mg by mouth Daily.   Yes ProviderCristian MD       Objective     Vital Signs: /70  "(BP Location: Left arm, Patient Position: Sitting, Cuff Size: Adult)   Pulse 57   Temp 98.5 °F (36.9 °C) (Skin)   Resp 18   Ht 167 cm (65.75\")   Wt 87.2 kg (192 lb 4.8 oz)   SpO2 98%   BMI 31.27 kg/m²   Physical Exam  Vitals and nursing note reviewed.   Constitutional:       Appearance: Normal appearance.   HENT:      Head: Normocephalic and atraumatic.      Right Ear: Tympanic membrane, ear canal and external ear normal.      Left Ear: Tympanic membrane, ear canal and external ear normal.      Nose: Nose normal.      Mouth/Throat:      Mouth: Mucous membranes are moist.   Eyes:      Extraocular Movements: Extraocular movements intact.      Conjunctiva/sclera: Conjunctivae normal.      Pupils: Pupils are equal, round, and reactive to light.   Cardiovascular:      Rate and Rhythm: Normal rate and regular rhythm.      Pulses: Normal pulses.      Heart sounds: Normal heart sounds.   Pulmonary:      Effort: Pulmonary effort is normal.      Breath sounds: Normal breath sounds.   Abdominal:      General: Bowel sounds are normal.      Palpations: Abdomen is soft.   Musculoskeletal:         General: Normal range of motion.      Cervical back: Normal range of motion and neck supple.   Skin:     General: Skin is warm and dry.      Capillary Refill: Capillary refill takes less than 2 seconds.   Neurological:      General: No focal deficit present.      Mental Status: He is alert and oriented to person, place, and time.      Cranial Nerves: No cranial nerve deficit.   Psychiatric:         Mood and Affect: Mood normal.         Behavior: Behavior normal.         Patient's Body mass index is 31.27 kg/m².       Results Reviewed:  Glucose   Date Value Ref Range Status   09/16/2021 122 (H) 65 - 99 mg/dL Final   12/22/2020 92 74 - 109 mg/dL Final     BUN   Date Value Ref Range Status   09/16/2021 14 8 - 23 mg/dL Final   12/22/2020 16 8 - 23 mg/dL Final     Creatinine   Date Value Ref Range Status   09/16/2021 0.84 0.76 - 1.27 " mg/dL Final   12/22/2020 0.8 0.5 - 1.2 mg/dL Final     Sodium   Date Value Ref Range Status   09/16/2021 140 136 - 145 mmol/L Final   12/22/2020 139 136 - 145 mmol/L Final     Potassium   Date Value Ref Range Status   09/16/2021 4.1 3.5 - 5.2 mmol/L Final     Comment:     Slight hemolysis detected by analyzer. Results may be affected.   12/22/2020 4.1 3.5 - 5.0 mmol/L Final     Chloride   Date Value Ref Range Status   09/16/2021 106 98 - 107 mmol/L Final   12/22/2020 102 98 - 111 mmol/L Final     CO2   Date Value Ref Range Status   09/16/2021 27.0 22.0 - 29.0 mmol/L Final   12/22/2020 25 22 - 29 mmol/L Final     Calcium   Date Value Ref Range Status   09/16/2021 9.1 8.6 - 10.5 mg/dL Final   12/22/2020 9.4 8.8 - 10.2 mg/dL Final     ALT (SGPT)   Date Value Ref Range Status   09/16/2021 17 1 - 41 U/L Final     AST (SGOT)   Date Value Ref Range Status   09/16/2021 17 1 - 40 U/L Final     WBC   Date Value Ref Range Status   09/16/2021 6.57 3.40 - 10.80 10*3/mm3 Final   12/22/2020 6.6 4.8 - 10.8 K/uL Final     Hematocrit   Date Value Ref Range Status   09/16/2021 39.7 37.5 - 51.0 % Final   12/22/2020 40.7 (L) 42.0 - 52.0 % Final     Platelets   Date Value Ref Range Status   09/16/2021 174 140 - 450 10*3/mm3 Final   12/22/2020 175 130 - 400 K/uL Final     Triglycerides   Date Value Ref Range Status   06/02/2021 103 0 - 149 mg/dL Final     HDL Cholesterol   Date Value Ref Range Status   06/02/2021 57 >39 mg/dL Final     LDL Chol Calc (NIH)   Date Value Ref Range Status   06/02/2021 98 0 - 99 mg/dL Final     Hemoglobin A1C   Date Value Ref Range Status   09/17/2021 5.4 % Final         Assessment / Plan     Assessment/Plan:  1. Carpal tunnel syndrome, unspecified laterality  Reviewing the patient's chart and examined the patient I believe that he presents average risk for his age and his disease processes.  I see no contraindication at this time for surgical intervention of this problem.    2. Elevated blood sugar    - POC  Glycosylated Hemoglobin (Hb A1C)        No follow-ups on file. unless patient needs to be seen sooner or acute issues arise.        I have discussed the patient results/orders and and plan/recommendation with them at today's visit.      Angelica Mccabe,    09/17/2021

## 2021-09-18 LAB
QT INTERVAL: 466 MS
QTC INTERVAL: 466 MS

## 2021-09-23 ENCOUNTER — TRANSCRIBE ORDERS (OUTPATIENT)
Dept: LAB | Facility: HOSPITAL | Age: 76
End: 2021-09-23

## 2021-09-23 DIAGNOSIS — Z11.59 SCREENING FOR VIRAL DISEASE: Primary | ICD-10-CM

## 2021-09-27 ENCOUNTER — LAB (OUTPATIENT)
Dept: LAB | Facility: HOSPITAL | Age: 76
End: 2021-09-27

## 2021-09-27 ENCOUNTER — TELEPHONE (OUTPATIENT)
Dept: NEUROSURGERY | Facility: CLINIC | Age: 76
End: 2021-09-27

## 2021-09-27 DIAGNOSIS — J44.9 COPD, GROUP B, BY GOLD 2017 CLASSIFICATION (HCC): Chronic | ICD-10-CM

## 2021-09-27 LAB — SARS-COV-2 ORF1AB RESP QL NAA+PROBE: NOT DETECTED

## 2021-09-27 PROCEDURE — U0004 COV-19 TEST NON-CDC HGH THRU: HCPCS | Performed by: NEUROLOGICAL SURGERY

## 2021-09-27 PROCEDURE — C9803 HOPD COVID-19 SPEC COLLECT: HCPCS | Performed by: NEUROLOGICAL SURGERY

## 2021-09-27 RX ORDER — BUDESONIDE AND FORMOTEROL FUMARATE DIHYDRATE 160; 4.5 UG/1; UG/1
2 AEROSOL RESPIRATORY (INHALATION)
Qty: 30.6 G | Refills: 3 | Status: SHIPPED | OUTPATIENT
Start: 2021-09-27 | End: 2022-03-10 | Stop reason: ALTCHOICE

## 2021-09-27 NOTE — TELEPHONE ENCOUNTER
Patient's wife called and questioned whether or not the patient needed to do his breathing treatment the morning of surgery.  She states that he does them morning & night for his COPD and he has trouble if he doesn't do them.  I told her to do it that morning because we want him to be the best he can be on day of surgery and if the treatments are necessary then he should do that.  She expressed understanding.      aury esquivel CMA

## 2021-09-27 NOTE — TELEPHONE ENCOUNTER
Patient called to say the increased dose of Symbicort did help his breathing. He is needing a prescription for 3 months sent to [facility].  Rx Refill Note  Requested Prescriptions     Pending Prescriptions Disp Refills   • budesonide-formoterol (Symbicort) 160-4.5 MCG/ACT inhaler 30.6 g 3     Sig: Inhale 2 puffs 2 (Two) Times a Day.      Last office visit with prescribing clinician: 9/8/2021      Next office visit with prescribing clinician: 3/9/2022            Dax Gooden CMA  09/27/21, 11:40 CDT

## 2021-09-29 ENCOUNTER — HOSPITAL ENCOUNTER (OUTPATIENT)
Facility: HOSPITAL | Age: 76
Setting detail: HOSPITAL OUTPATIENT SURGERY
Discharge: HOME | End: 2021-09-29
Attending: NEUROLOGICAL SURGERY | Admitting: NEUROLOGICAL SURGERY

## 2021-09-29 ENCOUNTER — ANESTHESIA EVENT (OUTPATIENT)
Dept: PERIOP | Facility: HOSPITAL | Age: 76
End: 2021-09-29

## 2021-09-29 ENCOUNTER — ANESTHESIA (OUTPATIENT)
Dept: PERIOP | Facility: HOSPITAL | Age: 76
End: 2021-09-29

## 2021-09-29 VITALS
HEART RATE: 76 BPM | SYSTOLIC BLOOD PRESSURE: 126 MMHG | TEMPERATURE: 97.8 F | OXYGEN SATURATION: 91 % | RESPIRATION RATE: 16 BRPM | DIASTOLIC BLOOD PRESSURE: 73 MMHG

## 2021-09-29 DIAGNOSIS — G56.01 RIGHT CARPAL TUNNEL SYNDROME: ICD-10-CM

## 2021-09-29 LAB
ABO GROUP BLD: NORMAL
BLD GP AB SCN SERPL QL: NEGATIVE
RH BLD: POSITIVE
T&S EXPIRATION DATE: NORMAL

## 2021-09-29 PROCEDURE — 86850 RBC ANTIBODY SCREEN: CPT | Performed by: NURSE PRACTITIONER

## 2021-09-29 PROCEDURE — 86900 BLOOD TYPING SEROLOGIC ABO: CPT | Performed by: NURSE PRACTITIONER

## 2021-09-29 PROCEDURE — 25010000002 CEFAZOLIN PER 500 MG: Performed by: NURSE PRACTITIONER

## 2021-09-29 PROCEDURE — 25010000002 ONDANSETRON PER 1 MG: Performed by: NURSE ANESTHETIST, CERTIFIED REGISTERED

## 2021-09-29 PROCEDURE — 25010000002 FENTANYL CITRATE (PF) 100 MCG/2ML SOLUTION: Performed by: NURSE ANESTHETIST, CERTIFIED REGISTERED

## 2021-09-29 PROCEDURE — 25010000002 PROPOFOL 10 MG/ML EMULSION: Performed by: NURSE ANESTHETIST, CERTIFIED REGISTERED

## 2021-09-29 PROCEDURE — S0260 H&P FOR SURGERY: HCPCS | Performed by: NEUROLOGICAL SURGERY

## 2021-09-29 PROCEDURE — 64721 CARPAL TUNNEL SURGERY: CPT | Performed by: NEUROLOGICAL SURGERY

## 2021-09-29 PROCEDURE — 86901 BLOOD TYPING SEROLOGIC RH(D): CPT | Performed by: NURSE PRACTITIONER

## 2021-09-29 RX ORDER — OXYCODONE AND ACETAMINOPHEN 10; 325 MG/1; MG/1
1 TABLET ORAL ONCE AS NEEDED
Status: DISCONTINUED | OUTPATIENT
Start: 2021-09-29 | End: 2021-09-29 | Stop reason: HOSPADM

## 2021-09-29 RX ORDER — ROCURONIUM BROMIDE 10 MG/ML
INJECTION, SOLUTION INTRAVENOUS AS NEEDED
Status: DISCONTINUED | OUTPATIENT
Start: 2021-09-29 | End: 2021-09-29 | Stop reason: SURG

## 2021-09-29 RX ORDER — EPHEDRINE SULFATE 50 MG/ML
INJECTION, SOLUTION INTRAVENOUS AS NEEDED
Status: DISCONTINUED | OUTPATIENT
Start: 2021-09-29 | End: 2021-09-29 | Stop reason: SURG

## 2021-09-29 RX ORDER — FLUMAZENIL 0.1 MG/ML
0.2 INJECTION INTRAVENOUS AS NEEDED
Status: DISCONTINUED | OUTPATIENT
Start: 2021-09-29 | End: 2021-09-29 | Stop reason: HOSPADM

## 2021-09-29 RX ORDER — SUCCINYLCHOLINE/SOD CL,ISO/PF 200MG/10ML
SYRINGE (ML) INTRAVENOUS AS NEEDED
Status: DISCONTINUED | OUTPATIENT
Start: 2021-09-29 | End: 2021-09-29 | Stop reason: SURG

## 2021-09-29 RX ORDER — LIDOCAINE HYDROCHLORIDE 10 MG/ML
0.5 INJECTION, SOLUTION EPIDURAL; INFILTRATION; INTRACAUDAL; PERINEURAL ONCE AS NEEDED
Status: DISCONTINUED | OUTPATIENT
Start: 2021-09-29 | End: 2021-09-29 | Stop reason: HOSPADM

## 2021-09-29 RX ORDER — LIDOCAINE HYDROCHLORIDE 20 MG/ML
INJECTION, SOLUTION INTRAVENOUS AS NEEDED
Status: DISCONTINUED | OUTPATIENT
Start: 2021-09-29 | End: 2021-09-29 | Stop reason: SURG

## 2021-09-29 RX ORDER — FENTANYL CITRATE 50 UG/ML
25 INJECTION, SOLUTION INTRAMUSCULAR; INTRAVENOUS
Status: DISCONTINUED | OUTPATIENT
Start: 2021-09-29 | End: 2021-09-29 | Stop reason: HOSPADM

## 2021-09-29 RX ORDER — LABETALOL HYDROCHLORIDE 5 MG/ML
5 INJECTION, SOLUTION INTRAVENOUS
Status: DISCONTINUED | OUTPATIENT
Start: 2021-09-29 | End: 2021-09-29 | Stop reason: HOSPADM

## 2021-09-29 RX ORDER — SODIUM CHLORIDE, SODIUM LACTATE, POTASSIUM CHLORIDE, CALCIUM CHLORIDE 600; 310; 30; 20 MG/100ML; MG/100ML; MG/100ML; MG/100ML
1000 INJECTION, SOLUTION INTRAVENOUS CONTINUOUS
Status: DISCONTINUED | OUTPATIENT
Start: 2021-09-29 | End: 2021-09-29 | Stop reason: HOSPADM

## 2021-09-29 RX ORDER — SODIUM CHLORIDE 0.9 % (FLUSH) 0.9 %
10 SYRINGE (ML) INJECTION EVERY 12 HOURS SCHEDULED
Status: DISCONTINUED | OUTPATIENT
Start: 2021-09-29 | End: 2021-09-29 | Stop reason: HOSPADM

## 2021-09-29 RX ORDER — IBUPROFEN 600 MG/1
600 TABLET ORAL ONCE AS NEEDED
Status: DISCONTINUED | OUTPATIENT
Start: 2021-09-29 | End: 2021-09-29 | Stop reason: HOSPADM

## 2021-09-29 RX ORDER — HYDROCODONE BITARTRATE AND ACETAMINOPHEN 7.5; 325 MG/1; MG/1
1 TABLET ORAL EVERY 8 HOURS PRN
Qty: 30 TABLET | Refills: 0 | Status: SHIPPED | OUTPATIENT
Start: 2021-09-29 | End: 2022-04-25

## 2021-09-29 RX ORDER — SODIUM CHLORIDE, SODIUM LACTATE, POTASSIUM CHLORIDE, CALCIUM CHLORIDE 600; 310; 30; 20 MG/100ML; MG/100ML; MG/100ML; MG/100ML
9 INJECTION, SOLUTION INTRAVENOUS CONTINUOUS
Status: DISCONTINUED | OUTPATIENT
Start: 2021-09-29 | End: 2021-09-29 | Stop reason: HOSPADM

## 2021-09-29 RX ORDER — SODIUM CHLORIDE 0.9 % (FLUSH) 0.9 %
10 SYRINGE (ML) INJECTION AS NEEDED
Status: DISCONTINUED | OUTPATIENT
Start: 2021-09-29 | End: 2021-09-29 | Stop reason: HOSPADM

## 2021-09-29 RX ORDER — OXYCODONE AND ACETAMINOPHEN 7.5; 325 MG/1; MG/1
2 TABLET ORAL EVERY 4 HOURS PRN
Status: DISCONTINUED | OUTPATIENT
Start: 2021-09-29 | End: 2021-09-29 | Stop reason: HOSPADM

## 2021-09-29 RX ORDER — SODIUM CHLORIDE 0.9 % (FLUSH) 0.9 %
3 SYRINGE (ML) INJECTION AS NEEDED
Status: DISCONTINUED | OUTPATIENT
Start: 2021-09-29 | End: 2021-09-29 | Stop reason: HOSPADM

## 2021-09-29 RX ORDER — LIDOCAINE HYDROCHLORIDE 40 MG/ML
SOLUTION TOPICAL AS NEEDED
Status: DISCONTINUED | OUTPATIENT
Start: 2021-09-29 | End: 2021-09-29 | Stop reason: SURG

## 2021-09-29 RX ORDER — ONDANSETRON 2 MG/ML
4 INJECTION INTRAMUSCULAR; INTRAVENOUS ONCE AS NEEDED
Status: DISCONTINUED | OUTPATIENT
Start: 2021-09-29 | End: 2021-09-29 | Stop reason: HOSPADM

## 2021-09-29 RX ORDER — MAGNESIUM HYDROXIDE 1200 MG/15ML
LIQUID ORAL AS NEEDED
Status: DISCONTINUED | OUTPATIENT
Start: 2021-09-29 | End: 2021-09-29 | Stop reason: HOSPADM

## 2021-09-29 RX ORDER — ONDANSETRON 2 MG/ML
INJECTION INTRAMUSCULAR; INTRAVENOUS AS NEEDED
Status: DISCONTINUED | OUTPATIENT
Start: 2021-09-29 | End: 2021-09-29 | Stop reason: SURG

## 2021-09-29 RX ORDER — FENTANYL CITRATE 50 UG/ML
INJECTION, SOLUTION INTRAMUSCULAR; INTRAVENOUS AS NEEDED
Status: DISCONTINUED | OUTPATIENT
Start: 2021-09-29 | End: 2021-09-29 | Stop reason: SURG

## 2021-09-29 RX ORDER — NALOXONE HCL 0.4 MG/ML
0.4 VIAL (ML) INJECTION AS NEEDED
Status: DISCONTINUED | OUTPATIENT
Start: 2021-09-29 | End: 2021-09-29 | Stop reason: HOSPADM

## 2021-09-29 RX ORDER — BUPIVACAINE HCL/0.9 % NACL/PF 0.1 %
2 PLASTIC BAG, INJECTION (ML) EPIDURAL ONCE
Status: COMPLETED | OUTPATIENT
Start: 2021-09-29 | End: 2021-09-29

## 2021-09-29 RX ORDER — PROPOFOL 10 MG/ML
VIAL (ML) INTRAVENOUS AS NEEDED
Status: DISCONTINUED | OUTPATIENT
Start: 2021-09-29 | End: 2021-09-29 | Stop reason: SURG

## 2021-09-29 RX ORDER — DEXTROSE MONOHYDRATE 25 G/50ML
12.5 INJECTION, SOLUTION INTRAVENOUS AS NEEDED
Status: DISCONTINUED | OUTPATIENT
Start: 2021-09-29 | End: 2021-09-29 | Stop reason: HOSPADM

## 2021-09-29 RX ADMIN — SODIUM CHLORIDE, POTASSIUM CHLORIDE, SODIUM LACTATE AND CALCIUM CHLORIDE 1000 ML: 600; 310; 30; 20 INJECTION, SOLUTION INTRAVENOUS at 11:23

## 2021-09-29 RX ADMIN — EPHEDRINE SULFATE 10 MG: 50 INJECTION INTRAVENOUS at 14:58

## 2021-09-29 RX ADMIN — Medication 2 G: at 14:38

## 2021-09-29 RX ADMIN — LIDOCAINE HYDROCHLORIDE 1 EACH: 40 SOLUTION TOPICAL at 14:34

## 2021-09-29 RX ADMIN — FENTANYL CITRATE 50 MCG: 50 INJECTION, SOLUTION INTRAMUSCULAR; INTRAVENOUS at 14:34

## 2021-09-29 RX ADMIN — EPHEDRINE SULFATE 5 MG: 50 INJECTION INTRAVENOUS at 15:06

## 2021-09-29 RX ADMIN — ONDANSETRON 4 MG: 2 INJECTION INTRAMUSCULAR; INTRAVENOUS at 15:03

## 2021-09-29 RX ADMIN — Medication 160 MG: at 14:35

## 2021-09-29 RX ADMIN — FENTANYL CITRATE 50 MCG: 50 INJECTION, SOLUTION INTRAMUSCULAR; INTRAVENOUS at 14:50

## 2021-09-29 RX ADMIN — ROCURONIUM BROMIDE 5 MG: 50 INJECTION INTRAVENOUS at 14:34

## 2021-09-29 RX ADMIN — PROPOFOL 150 MG: 10 INJECTION, EMULSION INTRAVENOUS at 14:34

## 2021-09-29 RX ADMIN — LIDOCAINE HYDROCHLORIDE 80 MG: 20 INJECTION, SOLUTION INTRAVENOUS at 14:34

## 2021-09-29 NOTE — ANESTHESIA POSTPROCEDURE EVALUATION
Patient: Justin Robledo    Procedure Summary     Date: 09/29/21 Room / Location:  [facility] OR  /  [facility]    Anesthesia Start: 1430 Anesthesia Stop: 1523    Procedure: CARPAL TUNNEL RELEASE right (Right Wrist) Diagnosis:       Right carpal tunnel syndrome      (Right carpal tunnel syndrome [G56.01])    Surgeons: Luis Perdomo MD Provider: Ned Tinajero CRNA    Anesthesia Type: general ASA Status: 2          Anesthesia Type: general    Vitals  Vitals Value Taken Time   /86 09/29/21 1541   Temp 97.8 °F (36.6 °C) 09/29/21 1540   Pulse 76 09/29/21 1541   Resp 15 09/29/21 1540   SpO2 87 % 09/29/21 1541   Vitals shown include unvalidated device data.        Post Anesthesia Care and Evaluation    Patient location during evaluation: PACU  Patient participation: complete - patient participated  Level of consciousness: awake and alert  Pain management: adequate  Airway patency: patent  Anesthetic complications: No anesthetic complications    Cardiovascular status: acceptable  Respiratory status: acceptable  Hydration status: acceptable    Comments: Blood pressure 126/73, pulse 76, temperature 97.8 °F (36.6 °C), temperature source Temporal, resp. rate 16, SpO2 91 %.    Pt discharged from PACU based on heide score >8

## 2021-09-29 NOTE — ANESTHESIA PROCEDURE NOTES
Airway  Urgency: elective    Date/Time: 9/29/2021 2:37 PM  Airway not difficult    General Information and Staff    Patient location during procedure: OR  CRNA: Maxi Merino CRNA    Indications and Patient Condition  Indications for airway management: airway protection    Preoxygenated: yes  MILS maintained throughout  Mask difficulty assessment: 1 - vent by mask    Final Airway Details  Final airway type: endotracheal airway      Successful airway: ETT  Cuffed: yes   Successful intubation technique: direct laryngoscopy  Facilitating devices/methods: intubating stylet  Endotracheal tube insertion site: oral  Blade: Sugey  Blade size: 3.5  ETT size (mm): 7.5  Cormack-Lehane Classification: grade I - full view of glottis  Placement verified by: chest auscultation and capnometry   Cuff volume (mL): 8  Measured from: lips  ETT/EBT  to lips (cm): 22  Number of attempts at approach: 1  Assessment: lips, teeth, and gum same as pre-op and atraumatic intubation    Additional Comments  Performed by marshal almeida srna

## 2021-09-29 NOTE — ANESTHESIA PREPROCEDURE EVALUATION
Anesthesia Evaluation     Patient summary reviewed   no history of anesthetic complications:  NPO Solid Status: > 8 hours  NPO Liquid Status: > 8 hours           Airway   Mallampati: II  TM distance: >3 FB  Neck ROM: full  No difficulty expected  Dental - normal exam     Pulmonary    (+) a smoker Former, COPD, sleep apnea on CPAP,   Cardiovascular   Exercise tolerance: good (4-7 METS)    ECG reviewed    (+) hypertension, hyperlipidemia,   (-) past MI, CAD, angina, CHF, cardiac stents      Neuro/Psych  (-) seizures, TIA, CVA  GI/Hepatic/Renal/Endo    (+) obesity,  GERD,    (-) liver disease, no renal disease, diabetes    Musculoskeletal     Abdominal    Substance History      OB/GYN          Other                        Anesthesia Plan    ASA 2     general     intravenous induction     Anesthetic plan, all risks, benefits, and alternatives have been provided, discussed and informed consent has been obtained with: patient.

## 2021-09-30 ENCOUNTER — TELEPHONE (OUTPATIENT)
Dept: NEUROSURGERY | Facility: CLINIC | Age: 76
End: 2021-09-30

## 2021-09-30 NOTE — TELEPHONE ENCOUNTER
Patient is status post CTR 9/29/21 and his insurance required a PA to be done for the Norco.  I have completed that and it is in review.  The patient's wife called and requested that we change it to Percocet to see if the insurance would approve that.    I called her back and explained to her that we don't usually give Percocet for CTR and that we are just waiting to see if the insurance will approve the Norco request that I sent in.  She expressed understanding.    She stated the patient is doing good and hasn't even had to take anything today for the pain.  She states they have Percocet and Tramadol at home now and could use that if they needed to.  I told her to just give it until tomorrow to see if the insurance would approve the Norco.  I did check and it is still in review at the time of this call.      aury esquivel CMA

## 2021-10-11 ENCOUNTER — DOCUMENTATION (OUTPATIENT)
Dept: CT IMAGING | Facility: HOSPITAL | Age: 76
End: 2021-10-11

## 2021-10-11 ENCOUNTER — HOSPITAL ENCOUNTER (OUTPATIENT)
Dept: CT IMAGING | Facility: HOSPITAL | Age: 76
Discharge: HOME | End: 2021-10-11
Admitting: NURSE PRACTITIONER

## 2021-10-11 DIAGNOSIS — Z87.891 PERSONAL HISTORY OF NICOTINE DEPENDENCE: Chronic | ICD-10-CM

## 2021-10-11 PROCEDURE — 71271 CT THORAX LUNG CANCER SCR C-: CPT

## 2021-10-11 NOTE — PROGRESS NOTES
Discussed the benefits and risks associated with LDCT, the criteria for eligibility, and the importance of follow-up testing and/or annual screening.

## 2021-10-13 ENCOUNTER — OFFICE VISIT (OUTPATIENT)
Dept: NEUROSURGERY | Facility: CLINIC | Age: 76
End: 2021-10-13

## 2021-10-13 VITALS — HEIGHT: 66 IN | WEIGHT: 192 LBS | BODY MASS INDEX: 30.86 KG/M2

## 2021-10-13 DIAGNOSIS — Z87.891 FORMER SMOKER: ICD-10-CM

## 2021-10-13 DIAGNOSIS — G56.01 RIGHT CARPAL TUNNEL SYNDROME: Primary | ICD-10-CM

## 2021-10-13 DIAGNOSIS — E66.09 CLASS 1 OBESITY DUE TO EXCESS CALORIES WITH SERIOUS COMORBIDITY AND BODY MASS INDEX (BMI) OF 31.0 TO 31.9 IN ADULT: ICD-10-CM

## 2021-10-13 PROCEDURE — 99024 POSTOP FOLLOW-UP VISIT: CPT | Performed by: NURSE PRACTITIONER

## 2021-10-13 RX ORDER — CEPHALEXIN 500 MG/1
500 CAPSULE ORAL 2 TIMES DAILY
Qty: 14 CAPSULE | Refills: 0 | Status: SHIPPED | OUTPATIENT
Start: 2021-10-13 | End: 2022-03-07

## 2021-10-13 NOTE — PROGRESS NOTES
"  Chief complaint:   Chief Complaint   Patient presents with   • Post-op     Justin returns today for suture removal for a right carpal tunnel.         Subjective     HPI: This is a 76 y.o. male patient who went to the operating room on 9/29/2021 for a right carpal tunnel release.  The patient is here in follow up today for postoperative visit.  He says overall he is doing well.  The numbness has subsided and it is no longer waking him up at night.  Does have tenderness around the incision.  He comes in today for a routine postop check and suture removal.  There is some area of pus noted along the abscess.  They deny any drainage from the incision.  He has been keeping his hand limited with use.        Review of Systems   Musculoskeletal: Negative.    Neurological: Negative for numbness.   Psychiatric/Behavioral: Negative.          Objective      Vital Signs  Ht 167 cm (65.75\")   Wt 87.1 kg (192 lb)   BMI 31.23 kg/m²     Physical Exam  Constitutional:       Appearance: He is well-developed.   HENT:      Head: Normocephalic.   Eyes:      Extraocular Movements: EOM normal.      Pupils: Pupils are equal, round, and reactive to light.   Pulmonary:      Effort: Pulmonary effort is normal.   Musculoskeletal:         General: Normal range of motion.      Cervical back: Normal range of motion.   Skin:     General: Skin is warm.      Comments: Erythema noted around the incision on the right hand.  Small area of pus noted.   Neurological:      Mental Status: He is alert and oriented to person, place, and time.      GCS: GCS eye subscore is 4. GCS verbal subscore is 5. GCS motor subscore is 6.      Cranial Nerves: No cranial nerve deficit.      Sensory: No sensory deficit.      Gait: Gait is intact. Gait normal.      Deep Tendon Reflexes: Strength normal and reflexes are normal and symmetric.   Psychiatric:         Speech: Speech normal.         Behavior: Behavior normal.         Thought Content: Thought content normal. "                 Neurologic Exam     Mental Status   Oriented to person, place, and time.   Attention: normal. Concentration: normal.   Speech: speech is normal   Level of consciousness: alert  Normal comprehension.     Cranial Nerves     CN II   Visual fields full to confrontation.     CN III, IV, VI   Pupils are equal, round, and reactive to light.  Extraocular motions are normal.     CN V   Facial sensation intact.     CN VII   Facial expression full, symmetric.     CN VIII   CN VIII normal.     CN IX, X   CN IX normal.   CN X normal.     CN XI   CN XI normal.     CN XII   CN XII normal.     Motor Exam   Muscle bulk: normal    Strength   Strength 5/5 throughout.     Sensory Exam   Light touch normal.     Gait, Coordination, and Reflexes     Gait  Gait: normal    Reflexes   Reflexes 2+ except as noted.       Imaging review: No new imaging          Assessment/Plan: I was able to drain a small area of pus in his hand.  I will place the patient on Keflex for a week.  They are to continue cleaning the incision daily with soap and water and apply antibiotic ointment.  they were told to call us if they had any further problems or concerns or any drainage from the incision or increased redness.  We will have him follow-up with Dr. Perdomo in 8 to 10 weeks.  He was told to call us if any further issues    Patient is a nonsmoker  The patient's Body mass index is 31.23 kg/m².. BMI is above normal parameters. Recommendations include: educational material and nutrition counseling     Advance Care Planning   ACP discussion was held with the patient during this visit. Patient does not have an advance directive, information provided.      Diagnoses and all orders for this visit:    1. Right carpal tunnel syndrome (Primary)    2. Former smoker    3. Class 1 obesity due to excess calories with serious comorbidity and body mass index (BMI) of 31.0 to 31.9 in adult    Other orders  -     cephalexin (KEFLEX) 500 MG capsule; Take 1  capsule by mouth 2 (Two) Times a Day.  Dispense: 14 capsule; Refill: 0        I discussed the patients findings and my recommendations with patient  Ozzy Naidu, SALONI  10/13/21  15:50 CDT

## 2021-10-13 NOTE — PATIENT INSTRUCTIONS
"BMI for Adults  What is BMI?  Body mass index (BMI) is a number that is calculated from a person's weight and height. BMI can help estimate how much of a person's weight is composed of fat. BMI does not measure body fat directly. Rather, it is an alternative to procedures that directly measure body fat, which can be difficult and expensive.  BMI can help identify people who may be at higher risk for certain medical problems.  What are BMI measurements used for?  BMI is used as a screening tool to identify possible weight problems. It helps determine whether a person is obese, overweight, a healthy weight, or underweight.  BMI is useful for:  · Identifying a weight problem that may be related to a medical condition or may increase the risk for medical problems.  · Promoting changes, such as changes in diet and exercise, to help reach a healthy weight. BMI screening can be repeated to see if these changes are working.  How is BMI calculated?  BMI involves measuring your weight in relation to your height. Both height and weight are measured, and the BMI is calculated from those numbers. This can be done either in English ([place] or metric measurements. Note that charts and online BMI calculators are available to help you find your BMI quickly and easily without having to do these calculations yourself.  To calculate your BMI in English ([place] measurements:    1. Measure your weight in pounds (lb).  2. Multiply the number of pounds by 703.  ? For example, for a person who weighs 180 lb, multiply that number by 703, which equals 126,540.  3. Measure your height in inches. Then multiply that number by itself to get a measurement called \"inches squared.\"  ? For example, for a person who is 70 inches tall, the \"inches squared\" measurement is 70 inches x 70 inches, which equals 4,900 inches squared.  4. Divide the total from step 2 (number of lb x 703) by the total from step 3 (inches squared): 126,540 ÷ 4,900 = 25.8. This is " "your BMI.    To calculate your BMI in metric measurements:  1. Measure your weight in kilograms (kg).  2. Measure your height in meters (m). Then multiply that number by itself to get a measurement called \"meters squared.\"  ? For example, for a person who is 1.75 m tall, the \"meters squared\" measurement is 1.75 m x 1.75 m, which is equal to 3.1 meters squared.  3. Divide the number of kilograms (your weight) by the meters squared number. In this example: 70 ÷ 3.1 = 22.6. This is your BMI.  What do the results mean?  BMI charts are used to identify whether you are underweight, normal weight, overweight, or obese. The following guidelines will be used:  · Underweight: BMI less than 18.5.  · Normal weight: BMI between 18.5 and 24.9.  · Overweight: BMI between 25 and 29.9.  · Obese: BMI of 30 or above.  Keep these notes in mind:  · Weight includes both fat and muscle, so someone with a muscular build, such as an athlete, may have a BMI that is higher than 24.9. In cases like these, BMI is not an accurate measure of body fat.  · To determine if excess body fat is the cause of a BMI of 25 or higher, further assessments may need to be done by a health care provider.  · BMI is usually interpreted in the same way for men and women.  Where to find more information  For more information about BMI, including tools to quickly calculate your BMI, go to these websites:  · Centers for Disease Control and Prevention: www.cdc.gov  · American Heart Association: www.heart.org  · National Heart, Lung, and Blood [place]: www.nhlbi.nih.gov  Summary  · Body mass index (BMI) is a number that is calculated from a person's weight and height.  · BMI may help estimate how much of a person's weight is composed of fat. BMI can help identify those who may be at higher risk for certain medical problems.  · BMI can be measured using English measurements or metric measurements.  · BMI charts are used to identify whether you are underweight, normal " "weight, overweight, or obese.  This information is not intended to replace advice given to you by your health care provider. Make sure you discuss any questions you have with your health care provider.  Document Revised: 09/09/2020 Document Reviewed: 07/17/2020  [employer] Patient Education © 2021 [employer]      https://www.nhlbi.nih.gov/files/docs/public/heart/dash_brief.pdf\">   DASH Eating Plan  DASH stands for Dietary Approaches to Stop Hypertension. The DASH eating plan is a healthy eating plan that has been shown to:  · Reduce high blood pressure (hypertension).  · Reduce your risk for type 2 diabetes, heart disease, and stroke.  · Help with weight loss.  What are tips for following this plan?  Reading food labels  · Check food labels for the amount of salt (sodium) per serving. Choose foods with less than 5 percent of the Daily Value of sodium. Generally, foods with less than 300 milligrams (mg) of sodium per serving fit into this eating plan.  · To find whole grains, look for the word \"whole\" as the first word in the ingredient list.  Shopping  · Buy products labeled as \"low-sodium\" or \"no salt added.\"  · Buy fresh foods. Avoid canned foods and pre-made or frozen meals.  Cooking  · Avoid adding salt when cooking. Use salt-free seasonings or herbs instead of table salt or sea salt. Check with your health care provider or pharmacist before using salt substitutes.  · Do not clemente foods. Cook foods using healthy methods such as baking, boiling, grilling, roasting, and broiling instead.  · Cook with heart-healthy oils, such as olive, canola, avocado, soybean, or sunflower oil.  Meal planning    · Eat a balanced diet that includes:  ? 4 or more servings of fruits and 4 or more servings of vegetables each day. Try to fill one-half of your plate with fruits and vegetables.  ? 6-8 servings of whole grains each day.  ? Less than 6 oz (170 g) of lean meat, poultry, or fish each day. A 3-oz (85-g) serving of meat is " about the same size as a deck of cards. One egg equals 1 oz (28 g).  ? 2-3 servings of low-fat dairy each day. One serving is 1 cup (237 mL).  ? 1 serving of nuts, seeds, or beans 5 times each week.  ? 2-3 servings of heart-healthy fats. Healthy fats called omega-3 fatty acids are found in foods such as walnuts, flaxseeds, fortified milks, and eggs. These fats are also found in cold-water fish, such as sardines, salmon, and mackerel.  · Limit how much you eat of:  ? Canned or prepackaged foods.  ? Food that is high in trans fat, such as some fried foods.  ? Food that is high in saturated fat, such as fatty meat.  ? Desserts and other sweets, sugary drinks, and other foods with added sugar.  ? Full-fat dairy products.  · Do not salt foods before eating.  · Do not eat more than 4 egg yolks a week.  · Try to eat at least 2 vegetarian meals a week.  · Eat more home-cooked food and less restaurant, buffet, and fast food.    Lifestyle  · When eating at a restaurant, ask that your food be prepared with less salt or no salt, if possible.  · If you drink alcohol:  ? Limit how much you use to:  § 0-1 drink a day for women who are not pregnant.  § 0-2 drinks a day for men.  ? Be aware of how much alcohol is in your drink. In the [place] one drink equals one 12 oz bottle of beer (355 mL), one 5 oz glass of wine (148 mL), or one 1½ oz glass of hard liquor (44 mL).  General information  · Avoid eating more than 2,300 mg of salt a day. If you have hypertension, you may need to reduce your sodium intake to 1,500 mg a day.  · Work with your health care provider to maintain a healthy body weight or to lose weight. Ask what an ideal weight is for you.  · Get at least 30 minutes of exercise that causes your heart to beat faster (aerobic exercise) most days of the week. Activities may include walking, swimming, or biking.  · Work with your health care provider or dietitian to adjust your eating plan to your individual calorie needs.  What  foods should I eat?  Fruits  All fresh, dried, or frozen fruit. Canned fruit in natural juice (without added sugar).  Vegetables  Fresh or frozen vegetables (raw, steamed, roasted, or grilled). Low-sodium or reduced-sodium tomato and vegetable juice. Low-sodium or reduced-sodium tomato sauce and tomato paste. Low-sodium or reduced-sodium canned vegetables.  Grains  Whole-grain or whole-wheat bread. Whole-grain or whole-wheat pasta. Brown rice. Oatmeal. Quinoa. Bulgur. Whole-grain and low-sodium cereals. Karla bread. Low-fat, low-sodium crackers. Whole-wheat flour tortillas.  Meats and other proteins  Skinless chicken or turkey. Ground chicken or turkey. Pork with fat trimmed off. Fish and seafood. Egg whites. Dried beans, peas, or lentils. Unsalted nuts, nut butters, and seeds. Unsalted canned beans. Lean cuts of beef with fat trimmed off. Low-sodium, lean precooked or cured meat, such as sausages or meat loaves.  Dairy  Low-fat (1%) or fat-free (skim) milk. Reduced-fat, low-fat, or fat-free cheeses. Nonfat, low-sodium ricotta or cottage cheese. Low-fat or nonfat yogurt. Low-fat, low-sodium cheese.  Fats and oils  Soft margarine without trans fats. Vegetable oil. Reduced-fat, low-fat, or light mayonnaise and salad dressings (reduced-sodium). Canola, safflower, olive, avocado, soybean, and sunflower oils. Avocado.  Seasonings and condiments  Herbs. Spices. Seasoning mixes without salt.  Other foods  Unsalted popcorn and pretzels. Fat-free sweets.  The items listed above may not be a complete list of foods and beverages you can eat. Contact a dietitian for more information.  What foods should I avoid?  Fruits  Canned fruit in a light or heavy syrup. Fried fruit. Fruit in cream or butter sauce.  Vegetables  Creamed or fried vegetables. Vegetables in a cheese sauce. Regular canned vegetables (not low-sodium or reduced-sodium). Regular canned tomato sauce and paste (not low-sodium or reduced-sodium). Regular tomato and  vegetable juice (not low-sodium or reduced-sodium). Pickles. Olives.  Grains  Baked goods made with fat, such as croissants, muffins, or some breads. Dry pasta or rice meal packs.  Meats and other proteins  Fatty cuts of meat. Ribs. Fried meat. Agosto. Bologna, salami, and other precooked or cured meats, such as sausages or meat loaves. Fat from the back of a pig (fatback). Bratwurst. Salted nuts and seeds. Canned beans with added salt. Canned or smoked fish. Whole eggs or egg yolks. Chicken or turkey with skin.  Dairy  Whole or 2% milk, cream, and half-and-half. Whole or full-fat cream cheese. Whole-fat or sweetened yogurt. Full-fat cheese. Nondairy creamers. Whipped toppings. Processed cheese and cheese spreads.  Fats and oils  Butter. Stick margarine. Lard. Shortening. Ghee. Agosto fat. Tropical oils, such as coconut, palm kernel, or palm oil.  Seasonings and condiments  Onion salt, garlic salt, seasoned salt, table salt, and sea salt. Worcestershire sauce. Tartar sauce. Barbecue sauce. Teriyaki sauce. Soy sauce, including reduced-sodium. Steak sauce. Canned and packaged gravies. Fish sauce. Oyster sauce. Cocktail sauce. Store-bought horseradish. Ketchup. Mustard. Meat flavorings and tenderizers. Bouillon cubes. Hot sauces. Pre-made or packaged marinades. Pre-made or packaged taco seasonings. Relishes. Regular salad dressings.  Other foods  Salted popcorn and pretzels.  The items listed above may not be a complete list of foods and beverages you should avoid. Contact a dietitian for more information.  Where to find more information  · National Heart, Lung, and Blood [place]: www.nhlbi.nih.gov  · American Heart Association: www.heart.org  · Academy of Nutrition and Dietetics: www.eatright.org  · National Kidney Foundation: www.kidney.org  Summary  · The DASH eating plan is a healthy eating plan that has been shown to reduce high blood pressure (hypertension). It may also reduce your risk for type 2 diabetes, heart  disease, and stroke.  · When on the DASH eating plan, aim to eat more fresh fruits and vegetables, whole grains, lean proteins, low-fat dairy, and heart-healthy fats.  · With the DASH eating plan, you should limit salt (sodium) intake to 2,300 mg a day. If you have hypertension, you may need to reduce your sodium intake to 1,500 mg a day.  · Work with your health care provider or dietitian to adjust your eating plan to your individual calorie needs.  This information is not intended to replace advice given to you by your health care provider. Make sure you discuss any questions you have with your health care provider.  Document Revised: 11/20/2020 Document Reviewed: 11/20/2020  [employer] Patient Education © 2021 [employer]      Advance Care Planning and Advance Directives     You make decisions on a daily basis - decisions about where you want to live, your career, your home, your life. Perhaps one of the most important decisions you face is your choice for future medical care. Take time to talk with your family and your healthcare team and start planning today.  Advance Care Planning is a process that can help you:  · Understand possible future healthcare decisions in light of your own experiences  · Reflect on those decision in light of your goals and values  · Discuss your decisions with those closest to you and the healthcare professionals that care for you  · Make a plan by creating a document that reflects your wishes    Surrogate Decision Maker  In the event of a medical emergency, which has left you unable to communicate or to make your own decisions, you would need someone to make decisions for you.  It is important to discuss your preferences for medical treatment with this person while you are in good health.     Qualities of a surrogate decision maker:  • Willing to take on this role and responsibility  • Knows what you want for future medical care  • Willing to follow your wishes even if they don't  agree with them  • Able to make difficult medical decisions under stressful circumstances    Advance Directives  These are legal documents you can create that will guide your healthcare team and decision maker(s) when needed. These documents can be stored in the electronic medical record.    · Living Will - a legal document to guide your care if you have a terminal condition or a serious illness and are unable to communicate. States vary by statute in document names/types, but most forms may include one or more of the following:        -  Directions regarding life-prolonging treatments        -  Directions regarding artificially provided nutrition/hydration        -  Choosing a healthcare decision maker        -  Direction regarding organ/tissue donation    · Durable Power of  for Healthcare - this document names an -in-fact to make medical decisions for you, but it may also allow this person to make personal and financial decisions for you. Please seek the advice of an  if you need this type of document.    **Advance Directives are not required and no one may discriminate against you if you do not sign one.    Medical Orders  Many states allow specific forms/orders signed by your physician to record your wishes for medical treatment in your current state of health. This form, signed in personal communication with your physician, addresses resuscitation and other medical interventions that you may or may not want.      For more information or to schedule a time with a [facility] Advance Care Planning Facilitator contact: [email or web address] or call [phone] and someone will contact you directly.

## 2021-10-14 DIAGNOSIS — G62.9 PERIPHERAL POLYNEUROPATHY: ICD-10-CM

## 2021-10-14 RX ORDER — GABAPENTIN 300 MG/1
300 CAPSULE ORAL 4 TIMES DAILY
Qty: 120 CAPSULE | Refills: 3 | Status: SHIPPED | OUTPATIENT
Start: 2021-10-14 | End: 2021-11-19 | Stop reason: SDUPTHER

## 2021-10-14 NOTE — TELEPHONE ENCOUNTER
Incoming Refill Request      Medication requested (name and dose): gabapentin (NEURONTIN) 300 MG capsule    Pharmacy where request should be sent: [facility]    Additional details provided by patient:     Best call back number: [phone]    Does the patient have less than a 3 day supply:  [] Yes  [x] No    Ailyn Putnam Rep  10/14/21, 13:09 CDT

## 2021-10-14 NOTE — TELEPHONE ENCOUNTER
Rx Refill Note  Requested Prescriptions     Pending Prescriptions Disp Refills   • gabapentin (NEURONTIN) 300 MG capsule 120 capsule 3     Sig: Take 1 capsule by mouth 4 (Four) Times a Day.   Med last filled: 9/29/21   Last office visit with prescribing clinician: 9/17/2021      Next office visit with prescribing clinician: Visit date not found     THIS LOOKS TO EARLY TO REFILL AND PT HAS NO UDS OR FU APT             Mena Hernandez RN  10/14/21, 15:05 CDT

## 2021-10-15 ENCOUNTER — OFFICE VISIT (OUTPATIENT)
Dept: NEUROSURGERY | Facility: CLINIC | Age: 76
End: 2021-10-15

## 2021-10-15 VITALS — BODY MASS INDEX: 31.23 KG/M2 | HEIGHT: 66 IN

## 2021-10-15 DIAGNOSIS — E66.09 CLASS 1 OBESITY DUE TO EXCESS CALORIES WITH SERIOUS COMORBIDITY AND BODY MASS INDEX (BMI) OF 31.0 TO 31.9 IN ADULT: ICD-10-CM

## 2021-10-15 DIAGNOSIS — G56.01 RIGHT CARPAL TUNNEL SYNDROME: Primary | ICD-10-CM

## 2021-10-15 DIAGNOSIS — L08.9 SOFT TISSUE INFECTION: ICD-10-CM

## 2021-10-15 DIAGNOSIS — Z87.891 FORMER SMOKER: ICD-10-CM

## 2021-10-15 PROCEDURE — 99024 POSTOP FOLLOW-UP VISIT: CPT | Performed by: NURSE PRACTITIONER

## 2021-10-15 RX ORDER — CLINDAMYCIN HYDROCHLORIDE 300 MG/1
300 CAPSULE ORAL 3 TIMES DAILY
Qty: 30 CAPSULE | Refills: 0 | Status: SHIPPED | OUTPATIENT
Start: 2021-10-15 | End: 2022-03-07

## 2021-10-15 NOTE — PATIENT INSTRUCTIONS
"PATIENT TO CONTINUE TO FOLLOW UP WITH HIS PRIMARY CARE PROVIDER FOR YEARLY PHYSICAL EXAMS TO ENSURE COMPLETE HEALTH MAINTENANCE        BMI for Adults  What is BMI?  Body mass index (BMI) is a number that is calculated from a person's weight and height. BMI can help estimate how much of a person's weight is composed of fat. BMI does not measure body fat directly. Rather, it is an alternative to procedures that directly measure body fat, which can be difficult and expensive.  BMI can help identify people who may be at higher risk for certain medical problems.  What are BMI measurements used for?  BMI is used as a screening tool to identify possible weight problems. It helps determine whether a person is obese, overweight, a healthy weight, or underweight.  BMI is useful for:  · Identifying a weight problem that may be related to a medical condition or may increase the risk for medical problems.  · Promoting changes, such as changes in diet and exercise, to help reach a healthy weight. BMI screening can be repeated to see if these changes are working.  How is BMI calculated?  BMI involves measuring your weight in relation to your height. Both height and weight are measured, and the BMI is calculated from those numbers. This can be done either in English ([place] or metric measurements. Note that charts and online BMI calculators are available to help you find your BMI quickly and easily without having to do these calculations yourself.  To calculate your BMI in English ([place] measurements:    1. Measure your weight in pounds (lb).  2. Multiply the number of pounds by 703.  ? For example, for a person who weighs 180 lb, multiply that number by 703, which equals 126,540.  3. Measure your height in inches. Then multiply that number by itself to get a measurement called \"inches squared.\"  ? For example, for a person who is 70 inches tall, the \"inches squared\" measurement is 70 inches x 70 inches, which equals 4,900 inches " "squared.  4. Divide the total from step 2 (number of lb x 703) by the total from step 3 (inches squared): 126,540 ÷ 4,900 = 25.8. This is your BMI.    To calculate your BMI in metric measurements:  1. Measure your weight in kilograms (kg).  2. Measure your height in meters (m). Then multiply that number by itself to get a measurement called \"meters squared.\"  ? For example, for a person who is 1.75 m tall, the \"meters squared\" measurement is 1.75 m x 1.75 m, which is equal to 3.1 meters squared.  3. Divide the number of kilograms (your weight) by the meters squared number. In this example: 70 ÷ 3.1 = 22.6. This is your BMI.  What do the results mean?  BMI charts are used to identify whether you are underweight, normal weight, overweight, or obese. The following guidelines will be used:  · Underweight: BMI less than 18.5.  · Normal weight: BMI between 18.5 and 24.9.  · Overweight: BMI between 25 and 29.9.  · Obese: BMI of 30 or above.  Keep these notes in mind:  · Weight includes both fat and muscle, so someone with a muscular build, such as an athlete, may have a BMI that is higher than 24.9. In cases like these, BMI is not an accurate measure of body fat.  · To determine if excess body fat is the cause of a BMI of 25 or higher, further assessments may need to be done by a health care provider.  · BMI is usually interpreted in the same way for men and women.  Where to find more information  For more information about BMI, including tools to quickly calculate your BMI, go to these websites:  · Centers for Disease Control and Prevention: www.cdc.gov  · American Heart Association: www.heart.org  · National Heart, Lung, and Blood [place]: www.nhlbi.nih.gov  Summary  · Body mass index (BMI) is a number that is calculated from a person's weight and height.  · BMI may help estimate how much of a person's weight is composed of fat. BMI can help identify those who may be at higher risk for certain medical problems.  · BMI " "can be measured using English measurements or metric measurements.  · BMI charts are used to identify whether you are underweight, normal weight, overweight, or obese.  This information is not intended to replace advice given to you by your health care provider. Make sure you discuss any questions you have with your health care provider.  Document Revised: 09/09/2020 Document Reviewed: 07/17/2020  [employer] Patient Education © 2021 [employer]      https://www.nhlbi.nih.gov/files/docs/public/heart/dash_brief.pdf\">   DASH Eating Plan  DASH stands for Dietary Approaches to Stop Hypertension. The DASH eating plan is a healthy eating plan that has been shown to:  · Reduce high blood pressure (hypertension).  · Reduce your risk for type 2 diabetes, heart disease, and stroke.  · Help with weight loss.  What are tips for following this plan?  Reading food labels  · Check food labels for the amount of salt (sodium) per serving. Choose foods with less than 5 percent of the Daily Value of sodium. Generally, foods with less than 300 milligrams (mg) of sodium per serving fit into this eating plan.  · To find whole grains, look for the word \"whole\" as the first word in the ingredient list.  Shopping  · Buy products labeled as \"low-sodium\" or \"no salt added.\"  · Buy fresh foods. Avoid canned foods and pre-made or frozen meals.  Cooking  · Avoid adding salt when cooking. Use salt-free seasonings or herbs instead of table salt or sea salt. Check with your health care provider or pharmacist before using salt substitutes.  · Do not clemente foods. Cook foods using healthy methods such as baking, boiling, grilling, roasting, and broiling instead.  · Cook with heart-healthy oils, such as olive, canola, avocado, soybean, or sunflower oil.  Meal planning    · Eat a balanced diet that includes:  ? 4 or more servings of fruits and 4 or more servings of vegetables each day. Try to fill one-half of your plate with fruits and vegetables.  ? 6-8 " servings of whole grains each day.  ? Less than 6 oz (170 g) of lean meat, poultry, or fish each day. A 3-oz (85-g) serving of meat is about the same size as a deck of cards. One egg equals 1 oz (28 g).  ? 2-3 servings of low-fat dairy each day. One serving is 1 cup (237 mL).  ? 1 serving of nuts, seeds, or beans 5 times each week.  ? 2-3 servings of heart-healthy fats. Healthy fats called omega-3 fatty acids are found in foods such as walnuts, flaxseeds, fortified milks, and eggs. These fats are also found in cold-water fish, such as sardines, salmon, and mackerel.  · Limit how much you eat of:  ? Canned or prepackaged foods.  ? Food that is high in trans fat, such as some fried foods.  ? Food that is high in saturated fat, such as fatty meat.  ? Desserts and other sweets, sugary drinks, and other foods with added sugar.  ? Full-fat dairy products.  · Do not salt foods before eating.  · Do not eat more than 4 egg yolks a week.  · Try to eat at least 2 vegetarian meals a week.  · Eat more home-cooked food and less restaurant, buffet, and fast food.    Lifestyle  · When eating at a restaurant, ask that your food be prepared with less salt or no salt, if possible.  · If you drink alcohol:  ? Limit how much you use to:  § 0-1 drink a day for women who are not pregnant.  § 0-2 drinks a day for men.  ? Be aware of how much alcohol is in your drink. In the [place] one drink equals one 12 oz bottle of beer (355 mL), one 5 oz glass of wine (148 mL), or one 1½ oz glass of hard liquor (44 mL).  General information  · Avoid eating more than 2,300 mg of salt a day. If you have hypertension, you may need to reduce your sodium intake to 1,500 mg a day.  · Work with your health care provider to maintain a healthy body weight or to lose weight. Ask what an ideal weight is for you.  · Get at least 30 minutes of exercise that causes your heart to beat faster (aerobic exercise) most days of the week. Activities may include walking,  swimming, or biking.  · Work with your health care provider or dietitian to adjust your eating plan to your individual calorie needs.  What foods should I eat?  Fruits  All fresh, dried, or frozen fruit. Canned fruit in natural juice (without added sugar).  Vegetables  Fresh or frozen vegetables (raw, steamed, roasted, or grilled). Low-sodium or reduced-sodium tomato and vegetable juice. Low-sodium or reduced-sodium tomato sauce and tomato paste. Low-sodium or reduced-sodium canned vegetables.  Grains  Whole-grain or whole-wheat bread. Whole-grain or whole-wheat pasta. Brown rice. Oatmeal. Quinoa. Bulgur. Whole-grain and low-sodium cereals. Karla bread. Low-fat, low-sodium crackers. Whole-wheat flour tortillas.  Meats and other proteins  Skinless chicken or turkey. Ground chicken or turkey. Pork with fat trimmed off. Fish and seafood. Egg whites. Dried beans, peas, or lentils. Unsalted nuts, nut butters, and seeds. Unsalted canned beans. Lean cuts of beef with fat trimmed off. Low-sodium, lean precooked or cured meat, such as sausages or meat loaves.  Dairy  Low-fat (1%) or fat-free (skim) milk. Reduced-fat, low-fat, or fat-free cheeses. Nonfat, low-sodium ricotta or cottage cheese. Low-fat or nonfat yogurt. Low-fat, low-sodium cheese.  Fats and oils  Soft margarine without trans fats. Vegetable oil. Reduced-fat, low-fat, or light mayonnaise and salad dressings (reduced-sodium). Canola, safflower, olive, avocado, soybean, and sunflower oils. Avocado.  Seasonings and condiments  Herbs. Spices. Seasoning mixes without salt.  Other foods  Unsalted popcorn and pretzels. Fat-free sweets.  The items listed above may not be a complete list of foods and beverages you can eat. Contact a dietitian for more information.  What foods should I avoid?  Fruits  Canned fruit in a light or heavy syrup. Fried fruit. Fruit in cream or butter sauce.  Vegetables  Creamed or fried vegetables. Vegetables in a cheese sauce. Regular canned  vegetables (not low-sodium or reduced-sodium). Regular canned tomato sauce and paste (not low-sodium or reduced-sodium). Regular tomato and vegetable juice (not low-sodium or reduced-sodium). Pickles. Olives.  Grains  Baked goods made with fat, such as croissants, muffins, or some breads. Dry pasta or rice meal packs.  Meats and other proteins  Fatty cuts of meat. Ribs. Fried meat. Agosto. Bologna, salami, and other precooked or cured meats, such as sausages or meat loaves. Fat from the back of a pig (fatback). Bratwurst. Salted nuts and seeds. Canned beans with added salt. Canned or smoked fish. Whole eggs or egg yolks. Chicken or turkey with skin.  Dairy  Whole or 2% milk, cream, and half-and-half. Whole or full-fat cream cheese. Whole-fat or sweetened yogurt. Full-fat cheese. Nondairy creamers. Whipped toppings. Processed cheese and cheese spreads.  Fats and oils  Butter. Stick margarine. Lard. Shortening. Ghee. Agosto fat. Tropical oils, such as coconut, palm kernel, or palm oil.  Seasonings and condiments  Onion salt, garlic salt, seasoned salt, table salt, and sea salt. Worcestershire sauce. Tartar sauce. Barbecue sauce. Teriyaki sauce. Soy sauce, including reduced-sodium. Steak sauce. Canned and packaged gravies. Fish sauce. Oyster sauce. Cocktail sauce. Store-bought horseradish. Ketchup. Mustard. Meat flavorings and tenderizers. Bouillon cubes. Hot sauces. Pre-made or packaged marinades. Pre-made or packaged taco seasonings. Relishes. Regular salad dressings.  Other foods  Salted popcorn and pretzels.  The items listed above may not be a complete list of foods and beverages you should avoid. Contact a dietitian for more information.  Where to find more information  · National Heart, Lung, and Blood [place]: www.nhlbi.nih.gov  · American Heart Association: www.heart.org  · Academy of Nutrition and Dietetics: www.eatright.org  · National Kidney Foundation: www.kidney.org  Summary  · The DASH eating plan is a  healthy eating plan that has been shown to reduce high blood pressure (hypertension). It may also reduce your risk for type 2 diabetes, heart disease, and stroke.  · When on the DASH eating plan, aim to eat more fresh fruits and vegetables, whole grains, lean proteins, low-fat dairy, and heart-healthy fats.  · With the DASH eating plan, you should limit salt (sodium) intake to 2,300 mg a day. If you have hypertension, you may need to reduce your sodium intake to 1,500 mg a day.  · Work with your health care provider or dietitian to adjust your eating plan to your individual calorie needs.  This information is not intended to replace advice given to you by your health care provider. Make sure you discuss any questions you have with your health care provider.  Document Revised: 11/20/2020 Document Reviewed: 11/20/2020  Elsevier Patient Education © 2021 [employer]

## 2021-10-15 NOTE — PROGRESS NOTES
"    Chief complaint:   Chief Complaint   Patient presents with   • Carpal Tunnel     patient is here for follow up wound check (RCTR on 9/29/21).        Subjective     HPI: This is a 76 y.o. male patient who went to the operating room on 9/29/2021 for a right carpal tunnel release.  There was some concern for infection related to the suture abscess.  The patient was started on Keflex.  He comes in today with concern that the incision has opened up more and is getting worse.  He says it is tender along the incision but not painful.  He has been not been using his hand.  He has been keeping it covered.  Denies any karyn drainage from the incision.                              October 15, 2021                                            October 13, 2021  .           Review of Systems   Musculoskeletal: Negative.    Skin: Positive for wound.   Neurological: Negative for numbness.   Psychiatric/Behavioral: Negative.          Objective      Vital Signs  Ht 167 cm (65.75\")   BMI 31.23 kg/m²     Physical Exam  Constitutional:       Appearance: He is well-developed.   HENT:      Head: Normocephalic.   Eyes:      Extraocular Movements: EOM normal.      Pupils: Pupils are equal, round, and reactive to light.   Pulmonary:      Effort: Pulmonary effort is normal.   Musculoskeletal:         General: Normal range of motion.      Cervical back: Normal range of motion.   Skin:     General: Skin is warm.      Comments: Erythema noted around the incision on the right hand.  No drainage.   Neurological:      Mental Status: He is alert and oriented to person, place, and time.      GCS: GCS eye subscore is 4. GCS verbal subscore is 5. GCS motor subscore is 6.      Cranial Nerves: No cranial nerve deficit.      Sensory: No sensory deficit.      Gait: Gait is intact. Gait normal.      Deep Tendon Reflexes: Strength normal and reflexes are normal and symmetric.   Psychiatric:         Speech: Speech normal.         Behavior: Behavior normal. "         Thought Content: Thought content normal.         Neurologic Exam     Mental Status   Oriented to person, place, and time.   Attention: normal. Concentration: normal.   Speech: speech is normal   Level of consciousness: alert  Normal comprehension.     Cranial Nerves     CN II   Visual fields full to confrontation.     CN III, IV, VI   Pupils are equal, round, and reactive to light.  Extraocular motions are normal.     CN V   Facial sensation intact.     CN VII   Facial expression full, symmetric.     CN VIII   CN VIII normal.     CN IX, X   CN IX normal.   CN X normal.     CN XI   CN XI normal.     CN XII   CN XII normal.     Motor Exam   Muscle bulk: normal    Strength   Strength 5/5 throughout.     Sensory Exam   Light touch normal.     Gait, Coordination, and Reflexes     Gait  Gait: normal    Reflexes   Reflexes 2+ except as noted.       Imaging review: No new imaging        Assessment/Plan: I will change the patient's antibiotics from Keflex to clindamycin.  I will follow-up again with him in 1 week to make sure the incision is improving.  He was told to clean it every day with soap and water and keep it covered.  He was told to call us if any further problems or concerns or fever or drainage from the incision    Patient is a nonsmoker  The patient's Body mass index is 31.23 kg/m².. BMI is above normal parameters. Recommendations include: educational material and nutrition counseling   Advance Care Planning   ACP discussion was held with the patient during this visit. Patient does not have an advance directive, information provided.      Diagnoses and all orders for this visit:    1. Right carpal tunnel syndrome (Primary)    2. Former smoker    3. Soft tissue infection    4. Class 1 obesity due to excess calories with serious comorbidity and body mass index (BMI) of 31.0 to 31.9 in adult    Other orders  -     clindamycin (Cleocin) 300 MG capsule; Take 1 capsule by mouth 3 (Three) Times a Day.   Dispense: 30 capsule; Refill: 0        I discussed the patients findings and my recommendations with patient  Ozzy Naidu, APRN  10/15/21  11:54 CDT

## 2021-10-21 ENCOUNTER — OFFICE VISIT (OUTPATIENT)
Dept: NEUROSURGERY | Facility: CLINIC | Age: 76
End: 2021-10-21

## 2021-10-21 VITALS
SYSTOLIC BLOOD PRESSURE: 148 MMHG | BODY MASS INDEX: 31.08 KG/M2 | HEIGHT: 66 IN | DIASTOLIC BLOOD PRESSURE: 72 MMHG | WEIGHT: 193.4 LBS

## 2021-10-21 DIAGNOSIS — G56.01 RIGHT CARPAL TUNNEL SYNDROME: Primary | ICD-10-CM

## 2021-10-21 DIAGNOSIS — E66.09 CLASS 1 OBESITY DUE TO EXCESS CALORIES WITH SERIOUS COMORBIDITY AND BODY MASS INDEX (BMI) OF 31.0 TO 31.9 IN ADULT: ICD-10-CM

## 2021-10-21 DIAGNOSIS — Z87.891 FORMER SMOKER: ICD-10-CM

## 2021-10-21 PROCEDURE — 99024 POSTOP FOLLOW-UP VISIT: CPT | Performed by: NURSE PRACTITIONER

## 2021-10-21 NOTE — PROGRESS NOTES
"    Chief complaint:   Chief Complaint   Patient presents with   • Post-op     Justin returns for a wound check for a right carpal tunnel         Subjective     HPI: This is a 76 y.o. male patient who went to the operating room on 9/29/2021 for a right carpal tunnel release.  There was some concern for infection related to the suture abscess.  He was initially started on Keflex but then was concerned that the incision was looking worse we did swab him over to clindamycin.  He comes in today and says that it is looking much better.  Denies any drainage.  He is on complaining of any drainage from the hand or numbness and tingling.  Denies any pain.  It is no longer waking him up at night    Review of Systems   Musculoskeletal: Negative.    Skin: Negative.    Neurological: Negative for numbness.         Objective      Vital Signs  /72   Ht 167.6 cm (66\")   Wt 87.7 kg (193 lb 6.4 oz)   BMI 31.22 kg/m²     Physical Exam  Constitutional:       Appearance: He is well-developed.   HENT:      Head: Normocephalic.   Eyes:      Extraocular Movements: EOM normal.      Pupils: Pupils are equal, round, and reactive to light.   Pulmonary:      Effort: Pulmonary effort is normal.   Musculoskeletal:         General: Normal range of motion.      Cervical back: Normal range of motion.   Skin:     General: Skin is warm.   Neurological:      Mental Status: He is alert and oriented to person, place, and time.      GCS: GCS eye subscore is 4. GCS verbal subscore is 5. GCS motor subscore is 6.      Cranial Nerves: No cranial nerve deficit.      Sensory: No sensory deficit.      Gait: Gait is intact. Gait normal.      Deep Tendon Reflexes: Strength normal and reflexes are normal and symmetric.   Psychiatric:         Speech: Speech normal.         Behavior: Behavior normal.         Thought Content: Thought content normal.                 Neurologic Exam     Mental Status   Oriented to person, place, and time.   Attention: normal. " Concentration: normal.   Speech: speech is normal   Level of consciousness: alert  Normal comprehension.     Cranial Nerves     CN II   Visual fields full to confrontation.     CN III, IV, VI   Pupils are equal, round, and reactive to light.  Extraocular motions are normal.     CN V   Facial sensation intact.     CN VII   Facial expression full, symmetric.     CN VIII   CN VIII normal.     CN IX, X   CN IX normal.   CN X normal.     CN XI   CN XI normal.     CN XII   CN XII normal.     Motor Exam   Muscle bulk: normal    Strength   Strength 5/5 throughout.     Sensory Exam   Light touch normal.     Gait, Coordination, and Reflexes     Gait  Gait: normal    Reflexes   Reflexes 2+ except as noted.       Imaging review: no new imaging        Assessment/Plan: The patient's incision has healed appropriately.  At this time we will allow him to continue with restrictions for the next week and if he is doing well at that time he can start resuming normal activity.  He will follow-up with Dr. Perdomo in December.  He was told to call us if any further problems or concerns    Patient is a nonsmoker  The patient's Body mass index is 31.22 kg/m².. BMI is above normal parameters. Recommendations include: educational material and nutrition counseling  Advance Care Planning   ACP discussion was held with the patient during this visit. Patient does not have an advance directive, information provided.     Diagnoses and all orders for this visit:    1. Right carpal tunnel syndrome (Primary)    2. Former smoker        I discussed the patients findings and my recommendations with patient  Ozzy Naidu, SALONI  10/21/21  08:33 CDT

## 2021-10-21 NOTE — PATIENT INSTRUCTIONS
"BMI for Adults  What is BMI?  Body mass index (BMI) is a number that is calculated from a person's weight and height. BMI can help estimate how much of a person's weight is composed of fat. BMI does not measure body fat directly. Rather, it is an alternative to procedures that directly measure body fat, which can be difficult and expensive.  BMI can help identify people who may be at higher risk for certain medical problems.  What are BMI measurements used for?  BMI is used as a screening tool to identify possible weight problems. It helps determine whether a person is obese, overweight, a healthy weight, or underweight.  BMI is useful for:  · Identifying a weight problem that may be related to a medical condition or may increase the risk for medical problems.  · Promoting changes, such as changes in diet and exercise, to help reach a healthy weight. BMI screening can be repeated to see if these changes are working.  How is BMI calculated?  BMI involves measuring your weight in relation to your height. Both height and weight are measured, and the BMI is calculated from those numbers. This can be done either in English ([place] or metric measurements. Note that charts and online BMI calculators are available to help you find your BMI quickly and easily without having to do these calculations yourself.  To calculate your BMI in English ([place] measurements:    1. Measure your weight in pounds (lb).  2. Multiply the number of pounds by 703.  ? For example, for a person who weighs 180 lb, multiply that number by 703, which equals 126,540.  3. Measure your height in inches. Then multiply that number by itself to get a measurement called \"inches squared.\"  ? For example, for a person who is 70 inches tall, the \"inches squared\" measurement is 70 inches x 70 inches, which equals 4,900 inches squared.  4. Divide the total from step 2 (number of lb x 703) by the total from step 3 (inches squared): 126,540 ÷ 4,900 = 25.8. This is " "your BMI.    To calculate your BMI in metric measurements:  1. Measure your weight in kilograms (kg).  2. Measure your height in meters (m). Then multiply that number by itself to get a measurement called \"meters squared.\"  ? For example, for a person who is 1.75 m tall, the \"meters squared\" measurement is 1.75 m x 1.75 m, which is equal to 3.1 meters squared.  3. Divide the number of kilograms (your weight) by the meters squared number. In this example: 70 ÷ 3.1 = 22.6. This is your BMI.  What do the results mean?  BMI charts are used to identify whether you are underweight, normal weight, overweight, or obese. The following guidelines will be used:  · Underweight: BMI less than 18.5.  · Normal weight: BMI between 18.5 and 24.9.  · Overweight: BMI between 25 and 29.9.  · Obese: BMI of 30 or above.  Keep these notes in mind:  · Weight includes both fat and muscle, so someone with a muscular build, such as an athlete, may have a BMI that is higher than 24.9. In cases like these, BMI is not an accurate measure of body fat.  · To determine if excess body fat is the cause of a BMI of 25 or higher, further assessments may need to be done by a health care provider.  · BMI is usually interpreted in the same way for men and women.  Where to find more information  For more information about BMI, including tools to quickly calculate your BMI, go to these websites:  · Centers for Disease Control and Prevention: www.cdc.gov  · American Heart Association: www.heart.org  · National Heart, Lung, and Blood [place]: www.nhlbi.nih.gov  Summary  · Body mass index (BMI) is a number that is calculated from a person's weight and height.  · BMI may help estimate how much of a person's weight is composed of fat. BMI can help identify those who may be at higher risk for certain medical problems.  · BMI can be measured using English measurements or metric measurements.  · BMI charts are used to identify whether you are underweight, normal " "weight, overweight, or obese.  This information is not intended to replace advice given to you by your health care provider. Make sure you discuss any questions you have with your health care provider.  Document Revised: 09/09/2020 Document Reviewed: 07/17/2020  [employer] Patient Education © 2021 [employer]      https://www.nhlbi.nih.gov/files/docs/public/heart/dash_brief.pdf\">   DASH Eating Plan  DASH stands for Dietary Approaches to Stop Hypertension. The DASH eating plan is a healthy eating plan that has been shown to:  · Reduce high blood pressure (hypertension).  · Reduce your risk for type 2 diabetes, heart disease, and stroke.  · Help with weight loss.  What are tips for following this plan?  Reading food labels  · Check food labels for the amount of salt (sodium) per serving. Choose foods with less than 5 percent of the Daily Value of sodium. Generally, foods with less than 300 milligrams (mg) of sodium per serving fit into this eating plan.  · To find whole grains, look for the word \"whole\" as the first word in the ingredient list.  Shopping  · Buy products labeled as \"low-sodium\" or \"no salt added.\"  · Buy fresh foods. Avoid canned foods and pre-made or frozen meals.  Cooking  · Avoid adding salt when cooking. Use salt-free seasonings or herbs instead of table salt or sea salt. Check with your health care provider or pharmacist before using salt substitutes.  · Do not clemente foods. Cook foods using healthy methods such as baking, boiling, grilling, roasting, and broiling instead.  · Cook with heart-healthy oils, such as olive, canola, avocado, soybean, or sunflower oil.  Meal planning    · Eat a balanced diet that includes:  ? 4 or more servings of fruits and 4 or more servings of vegetables each day. Try to fill one-half of your plate with fruits and vegetables.  ? 6-8 servings of whole grains each day.  ? Less than 6 oz (170 g) of lean meat, poultry, or fish each day. A 3-oz (85-g) serving of meat is " about the same size as a deck of cards. One egg equals 1 oz (28 g).  ? 2-3 servings of low-fat dairy each day. One serving is 1 cup (237 mL).  ? 1 serving of nuts, seeds, or beans 5 times each week.  ? 2-3 servings of heart-healthy fats. Healthy fats called omega-3 fatty acids are found in foods such as walnuts, flaxseeds, fortified milks, and eggs. These fats are also found in cold-water fish, such as sardines, salmon, and mackerel.  · Limit how much you eat of:  ? Canned or prepackaged foods.  ? Food that is high in trans fat, such as some fried foods.  ? Food that is high in saturated fat, such as fatty meat.  ? Desserts and other sweets, sugary drinks, and other foods with added sugar.  ? Full-fat dairy products.  · Do not salt foods before eating.  · Do not eat more than 4 egg yolks a week.  · Try to eat at least 2 vegetarian meals a week.  · Eat more home-cooked food and less restaurant, buffet, and fast food.    Lifestyle  · When eating at a restaurant, ask that your food be prepared with less salt or no salt, if possible.  · If you drink alcohol:  ? Limit how much you use to:  § 0-1 drink a day for women who are not pregnant.  § 0-2 drinks a day for men.  ? Be aware of how much alcohol is in your drink. In the [place] one drink equals one 12 oz bottle of beer (355 mL), one 5 oz glass of wine (148 mL), or one 1½ oz glass of hard liquor (44 mL).  General information  · Avoid eating more than 2,300 mg of salt a day. If you have hypertension, you may need to reduce your sodium intake to 1,500 mg a day.  · Work with your health care provider to maintain a healthy body weight or to lose weight. Ask what an ideal weight is for you.  · Get at least 30 minutes of exercise that causes your heart to beat faster (aerobic exercise) most days of the week. Activities may include walking, swimming, or biking.  · Work with your health care provider or dietitian to adjust your eating plan to your individual calorie needs.  What  foods should I eat?  Fruits  All fresh, dried, or frozen fruit. Canned fruit in natural juice (without added sugar).  Vegetables  Fresh or frozen vegetables (raw, steamed, roasted, or grilled). Low-sodium or reduced-sodium tomato and vegetable juice. Low-sodium or reduced-sodium tomato sauce and tomato paste. Low-sodium or reduced-sodium canned vegetables.  Grains  Whole-grain or whole-wheat bread. Whole-grain or whole-wheat pasta. Brown rice. Oatmeal. Quinoa. Bulgur. Whole-grain and low-sodium cereals. Karla bread. Low-fat, low-sodium crackers. Whole-wheat flour tortillas.  Meats and other proteins  Skinless chicken or turkey. Ground chicken or turkey. Pork with fat trimmed off. Fish and seafood. Egg whites. Dried beans, peas, or lentils. Unsalted nuts, nut butters, and seeds. Unsalted canned beans. Lean cuts of beef with fat trimmed off. Low-sodium, lean precooked or cured meat, such as sausages or meat loaves.  Dairy  Low-fat (1%) or fat-free (skim) milk. Reduced-fat, low-fat, or fat-free cheeses. Nonfat, low-sodium ricotta or cottage cheese. Low-fat or nonfat yogurt. Low-fat, low-sodium cheese.  Fats and oils  Soft margarine without trans fats. Vegetable oil. Reduced-fat, low-fat, or light mayonnaise and salad dressings (reduced-sodium). Canola, safflower, olive, avocado, soybean, and sunflower oils. Avocado.  Seasonings and condiments  Herbs. Spices. Seasoning mixes without salt.  Other foods  Unsalted popcorn and pretzels. Fat-free sweets.  The items listed above may not be a complete list of foods and beverages you can eat. Contact a dietitian for more information.  What foods should I avoid?  Fruits  Canned fruit in a light or heavy syrup. Fried fruit. Fruit in cream or butter sauce.  Vegetables  Creamed or fried vegetables. Vegetables in a cheese sauce. Regular canned vegetables (not low-sodium or reduced-sodium). Regular canned tomato sauce and paste (not low-sodium or reduced-sodium). Regular tomato and  vegetable juice (not low-sodium or reduced-sodium). Pickles. Olives.  Grains  Baked goods made with fat, such as croissants, muffins, or some breads. Dry pasta or rice meal packs.  Meats and other proteins  Fatty cuts of meat. Ribs. Fried meat. Agosto. Bologna, salami, and other precooked or cured meats, such as sausages or meat loaves. Fat from the back of a pig (fatback). Bratwurst. Salted nuts and seeds. Canned beans with added salt. Canned or smoked fish. Whole eggs or egg yolks. Chicken or turkey with skin.  Dairy  Whole or 2% milk, cream, and half-and-half. Whole or full-fat cream cheese. Whole-fat or sweetened yogurt. Full-fat cheese. Nondairy creamers. Whipped toppings. Processed cheese and cheese spreads.  Fats and oils  Butter. Stick margarine. Lard. Shortening. Ghee. Agosto fat. Tropical oils, such as coconut, palm kernel, or palm oil.  Seasonings and condiments  Onion salt, garlic salt, seasoned salt, table salt, and sea salt. Worcestershire sauce. Tartar sauce. Barbecue sauce. Teriyaki sauce. Soy sauce, including reduced-sodium. Steak sauce. Canned and packaged gravies. Fish sauce. Oyster sauce. Cocktail sauce. Store-bought horseradish. Ketchup. Mustard. Meat flavorings and tenderizers. Bouillon cubes. Hot sauces. Pre-made or packaged marinades. Pre-made or packaged taco seasonings. Relishes. Regular salad dressings.  Other foods  Salted popcorn and pretzels.  The items listed above may not be a complete list of foods and beverages you should avoid. Contact a dietitian for more information.  Where to find more information  · National Heart, Lung, and Blood [place]: www.nhlbi.nih.gov  · American Heart Association: www.heart.org  · Academy of Nutrition and Dietetics: www.eatright.org  · National Kidney Foundation: www.kidney.org  Summary  · The DASH eating plan is a healthy eating plan that has been shown to reduce high blood pressure (hypertension). It may also reduce your risk for type 2 diabetes, heart  disease, and stroke.  · When on the DASH eating plan, aim to eat more fresh fruits and vegetables, whole grains, lean proteins, low-fat dairy, and heart-healthy fats.  · With the DASH eating plan, you should limit salt (sodium) intake to 2,300 mg a day. If you have hypertension, you may need to reduce your sodium intake to 1,500 mg a day.  · Work with your health care provider or dietitian to adjust your eating plan to your individual calorie needs.  This information is not intended to replace advice given to you by your health care provider. Make sure you discuss any questions you have with your health care provider.  Document Revised: 11/20/2020 Document Reviewed: 11/20/2020  [email or web address] Patient Education © 2021 [email or web address]      Advance Care Planning and Advance Directives     You make decisions on a daily basis - decisions about where you want to live, your career, your home, your life. Perhaps one of the most important decisions you face is your choice for future medical care. Take time to talk with your family and your healthcare team and start planning today.  Advance Care Planning is a process that can help you:  · Understand possible future healthcare decisions in light of your own experiences  · Reflect on those decision in light of your goals and values  · Discuss your decisions with those closest to you and the healthcare professionals that care for you  · Make a plan by creating a document that reflects your wishes    Surrogate Decision Maker  In the event of a medical emergency, which has left you unable to communicate or to make your own decisions, you would need someone to make decisions for you.  It is important to discuss your preferences for medical treatment with this person while you are in good health.     Qualities of a surrogate decision maker:  • Willing to take on this role and responsibility  • Knows what you want for future medical care  • Willing to follow your wishes even if they don't  agree with them  • Able to make difficult medical decisions under stressful circumstances    Advance Directives  These are legal documents you can create that will guide your healthcare team and decision maker(s) when needed. These documents can be stored in the electronic medical record.    · Living Will - a legal document to guide your care if you have a terminal condition or a serious illness and are unable to communicate. States vary by statute in document names/types, but most forms may include one or more of the following:        -  Directions regarding life-prolonging treatments        -  Directions regarding artificially provided nutrition/hydration        -  Choosing a healthcare decision maker        -  Direction regarding organ/tissue donation    · Durable Power of  for Healthcare - this document names an -in-fact to make medical decisions for you, but it may also allow this person to make personal and financial decisions for you. Please seek the advice of an  if you need this type of document.    **Advance Directives are not required and no one may discriminate against you if you do not sign one.    Medical Orders  Many states allow specific forms/orders signed by your physician to record your wishes for medical treatment in your current state of health. This form, signed in personal communication with your physician, addresses resuscitation and other medical interventions that you may or may not want.      For more information or to schedule a time with a [facility] Advance Care Planning Facilitator contact: [email or web address] or call [phone] and someone will contact you directly.

## 2021-11-15 ENCOUNTER — OFFICE VISIT (OUTPATIENT)
Dept: NEUROSURGERY | Facility: CLINIC | Age: 76
End: 2021-11-15

## 2021-11-15 VITALS
BODY MASS INDEX: 31.4 KG/M2 | DIASTOLIC BLOOD PRESSURE: 72 MMHG | HEIGHT: 66 IN | SYSTOLIC BLOOD PRESSURE: 140 MMHG | WEIGHT: 195.4 LBS

## 2021-11-15 DIAGNOSIS — Z87.891 FORMER SMOKER: ICD-10-CM

## 2021-11-15 DIAGNOSIS — E66.09 CLASS 1 OBESITY DUE TO EXCESS CALORIES WITH SERIOUS COMORBIDITY AND BODY MASS INDEX (BMI) OF 31.0 TO 31.9 IN ADULT: ICD-10-CM

## 2021-11-15 DIAGNOSIS — G56.01 RIGHT CARPAL TUNNEL SYNDROME: Primary | ICD-10-CM

## 2021-11-15 PROCEDURE — 99024 POSTOP FOLLOW-UP VISIT: CPT | Performed by: NURSE PRACTITIONER

## 2021-11-15 NOTE — PROGRESS NOTES
"    Chief complaint:   Chief Complaint   Patient presents with   • Wound check for a right carpal tunnel     Justin is returning with a \"knot\" in his carpal tunnel incision that is painful to the touch.         Subjective     HPI: This is a 76 y.o. male patient who went to the operating room on 9/29/2021 for a right carpal tunnel release.  He says that he is doing good from a symptom standpoint.  Is not really having any pain in his arm like he did before surgery or numbness and tingling.  He is not having any issues with sleeping at night.  He says he does have 1 spot along the incision line that is hard and is painful especially if he bumps it.  Other than this though he feels like he is doing well.  He denies any drainage from his incision             Review of Systems   Musculoskeletal: Positive for myalgias.   Neurological: Negative.  Negative for numbness.   Psychiatric/Behavioral: Negative.          Objective      Vital Signs  /72   Ht 167.6 cm (66\")   Wt 88.6 kg (195 lb 6.4 oz)   BMI 31.54 kg/m²     Physical Exam  Constitutional:       Appearance: He is well-developed.   HENT:      Head: Normocephalic.   Eyes:      Extraocular Movements: EOM normal.      Pupils: Pupils are equal, round, and reactive to light.   Pulmonary:      Effort: Pulmonary effort is normal.   Musculoskeletal:         General: Normal range of motion.      Cervical back: Normal range of motion.   Skin:     General: Skin is warm.   Neurological:      Mental Status: He is alert and oriented to person, place, and time.      GCS: GCS eye subscore is 4. GCS verbal subscore is 5. GCS motor subscore is 6.      Cranial Nerves: No cranial nerve deficit.      Sensory: No sensory deficit.      Gait: Gait is intact. Gait normal.      Deep Tendon Reflexes: Strength normal and reflexes are normal and symmetric.   Psychiatric:         Speech: Speech normal.         Behavior: Behavior normal.         Thought Content: Thought content normal. "         Neurologic Exam     Mental Status   Oriented to person, place, and time.   Attention: normal. Concentration: normal.   Speech: speech is normal   Level of consciousness: alert  Normal comprehension.     Cranial Nerves     CN II   Visual fields full to confrontation.     CN III, IV, VI   Pupils are equal, round, and reactive to light.  Extraocular motions are normal.     CN V   Facial sensation intact.     CN VII   Facial expression full, symmetric.     CN VIII   CN VIII normal.     CN IX, X   CN IX normal.   CN X normal.     CN XI   CN XI normal.     CN XII   CN XII normal.     Motor Exam   Muscle bulk: normal    Strength   Strength 5/5 throughout.     Sensory Exam   Light touch normal.     Gait, Coordination, and Reflexes     Gait  Gait: normal    Reflexes   Reflexes 2+ except as noted.       Imaging review: no new imaging         Assessment/Plan: I think the patient is dealing with scar tissue.  There is no suture left behind that I can appreciate.  He was told to keep using moisturizers on the incision and to massage it and we will see how he is doing in December when he sees Dr. Perdomo and make further recommendations at that time.  His questions and concerns were addressed    Patient is a nonsmoker  The patient's Body mass index is 31.54 kg/m².. BMI is above normal parameters. Recommendations include: educational material and nutrition counseling    Diagnoses and all orders for this visit:    1. Right carpal tunnel syndrome (Primary)    2. Former smoker    3. Class 1 obesity due to excess calories with serious comorbidity and body mass index (BMI) of 31.0 to 31.9 in adult        I discussed the patients findings and my recommendations with patient  Ozzy Naidu, APRN  11/15/21  11:09 CST

## 2021-11-15 NOTE — PATIENT INSTRUCTIONS
"BMI for Adults  What is BMI?  Body mass index (BMI) is a number that is calculated from a person's weight and height. BMI can help estimate how much of a person's weight is composed of fat. BMI does not measure body fat directly. Rather, it is an alternative to procedures that directly measure body fat, which can be difficult and expensive.  BMI can help identify people who may be at higher risk for certain medical problems.  What are BMI measurements used for?  BMI is used as a screening tool to identify possible weight problems. It helps determine whether a person is obese, overweight, a healthy weight, or underweight.  BMI is useful for:  · Identifying a weight problem that may be related to a medical condition or may increase the risk for medical problems.  · Promoting changes, such as changes in diet and exercise, to help reach a healthy weight. BMI screening can be repeated to see if these changes are working.  How is BMI calculated?  BMI involves measuring your weight in relation to your height. Both height and weight are measured, and the BMI is calculated from those numbers. This can be done either in English ([place] or metric measurements. Note that charts and online BMI calculators are available to help you find your BMI quickly and easily without having to do these calculations yourself.  To calculate your BMI in English ([place] measurements:    1. Measure your weight in pounds (lb).  2. Multiply the number of pounds by 703.  ? For example, for a person who weighs 180 lb, multiply that number by 703, which equals 126,540.  3. Measure your height in inches. Then multiply that number by itself to get a measurement called \"inches squared.\"  ? For example, for a person who is 70 inches tall, the \"inches squared\" measurement is 70 inches x 70 inches, which equals 4,900 inches squared.  4. Divide the total from step 2 (number of lb x 703) by the total from step 3 (inches squared): 126,540 ÷ 4,900 = 25.8. This is " "your BMI.    To calculate your BMI in metric measurements:  1. Measure your weight in kilograms (kg).  2. Measure your height in meters (m). Then multiply that number by itself to get a measurement called \"meters squared.\"  ? For example, for a person who is 1.75 m tall, the \"meters squared\" measurement is 1.75 m x 1.75 m, which is equal to 3.1 meters squared.  3. Divide the number of kilograms (your weight) by the meters squared number. In this example: 70 ÷ 3.1 = 22.6. This is your BMI.  What do the results mean?  BMI charts are used to identify whether you are underweight, normal weight, overweight, or obese. The following guidelines will be used:  · Underweight: BMI less than 18.5.  · Normal weight: BMI between 18.5 and 24.9.  · Overweight: BMI between 25 and 29.9.  · Obese: BMI of 30 or above.  Keep these notes in mind:  · Weight includes both fat and muscle, so someone with a muscular build, such as an athlete, may have a BMI that is higher than 24.9. In cases like these, BMI is not an accurate measure of body fat.  · To determine if excess body fat is the cause of a BMI of 25 or higher, further assessments may need to be done by a health care provider.  · BMI is usually interpreted in the same way for men and women.  Where to find more information  For more information about BMI, including tools to quickly calculate your BMI, go to these websites:  · Centers for Disease Control and Prevention: www.cdc.gov  · American Heart Association: www.heart.org  · National Heart, Lung, and Blood [place]: www.nhlbi.nih.gov  Summary  · Body mass index (BMI) is a number that is calculated from a person's weight and height.  · BMI may help estimate how much of a person's weight is composed of fat. BMI can help identify those who may be at higher risk for certain medical problems.  · BMI can be measured using English measurements or metric measurements.  · BMI charts are used to identify whether you are underweight, normal " "weight, overweight, or obese.  This information is not intended to replace advice given to you by your health care provider. Make sure you discuss any questions you have with your health care provider.  Document Revised: 09/09/2020 Document Reviewed: 07/17/2020  [employer] Patient Education © 2021 [employer]      https://www.nhlbi.nih.gov/files/docs/public/heart/dash_brief.pdf\">   DASH Eating Plan  DASH stands for Dietary Approaches to Stop Hypertension. The DASH eating plan is a healthy eating plan that has been shown to:  · Reduce high blood pressure (hypertension).  · Reduce your risk for type 2 diabetes, heart disease, and stroke.  · Help with weight loss.  What are tips for following this plan?  Reading food labels  · Check food labels for the amount of salt (sodium) per serving. Choose foods with less than 5 percent of the Daily Value of sodium. Generally, foods with less than 300 milligrams (mg) of sodium per serving fit into this eating plan.  · To find whole grains, look for the word \"whole\" as the first word in the ingredient list.  Shopping  · Buy products labeled as \"low-sodium\" or \"no salt added.\"  · Buy fresh foods. Avoid canned foods and pre-made or frozen meals.  Cooking  · Avoid adding salt when cooking. Use salt-free seasonings or herbs instead of table salt or sea salt. Check with your health care provider or pharmacist before using salt substitutes.  · Do not clemente foods. Cook foods using healthy methods such as baking, boiling, grilling, roasting, and broiling instead.  · Cook with heart-healthy oils, such as olive, canola, avocado, soybean, or sunflower oil.  Meal planning    · Eat a balanced diet that includes:  ? 4 or more servings of fruits and 4 or more servings of vegetables each day. Try to fill one-half of your plate with fruits and vegetables.  ? 6-8 servings of whole grains each day.  ? Less than 6 oz (170 g) of lean meat, poultry, or fish each day. A 3-oz (85-g) serving of meat is " about the same size as a deck of cards. One egg equals 1 oz (28 g).  ? 2-3 servings of low-fat dairy each day. One serving is 1 cup (237 mL).  ? 1 serving of nuts, seeds, or beans 5 times each week.  ? 2-3 servings of heart-healthy fats. Healthy fats called omega-3 fatty acids are found in foods such as walnuts, flaxseeds, fortified milks, and eggs. These fats are also found in cold-water fish, such as sardines, salmon, and mackerel.  · Limit how much you eat of:  ? Canned or prepackaged foods.  ? Food that is high in trans fat, such as some fried foods.  ? Food that is high in saturated fat, such as fatty meat.  ? Desserts and other sweets, sugary drinks, and other foods with added sugar.  ? Full-fat dairy products.  · Do not salt foods before eating.  · Do not eat more than 4 egg yolks a week.  · Try to eat at least 2 vegetarian meals a week.  · Eat more home-cooked food and less restaurant, buffet, and fast food.    Lifestyle  · When eating at a restaurant, ask that your food be prepared with less salt or no salt, if possible.  · If you drink alcohol:  ? Limit how much you use to:  § 0-1 drink a day for women who are not pregnant.  § 0-2 drinks a day for men.  ? Be aware of how much alcohol is in your drink. In the [place] one drink equals one 12 oz bottle of beer (355 mL), one 5 oz glass of wine (148 mL), or one 1½ oz glass of hard liquor (44 mL).  General information  · Avoid eating more than 2,300 mg of salt a day. If you have hypertension, you may need to reduce your sodium intake to 1,500 mg a day.  · Work with your health care provider to maintain a healthy body weight or to lose weight. Ask what an ideal weight is for you.  · Get at least 30 minutes of exercise that causes your heart to beat faster (aerobic exercise) most days of the week. Activities may include walking, swimming, or biking.  · Work with your health care provider or dietitian to adjust your eating plan to your individual calorie needs.  What  foods should I eat?  Fruits  All fresh, dried, or frozen fruit. Canned fruit in natural juice (without added sugar).  Vegetables  Fresh or frozen vegetables (raw, steamed, roasted, or grilled). Low-sodium or reduced-sodium tomato and vegetable juice. Low-sodium or reduced-sodium tomato sauce and tomato paste. Low-sodium or reduced-sodium canned vegetables.  Grains  Whole-grain or whole-wheat bread. Whole-grain or whole-wheat pasta. Brown rice. Oatmeal. Quinoa. Bulgur. Whole-grain and low-sodium cereals. Karla bread. Low-fat, low-sodium crackers. Whole-wheat flour tortillas.  Meats and other proteins  Skinless chicken or turkey. Ground chicken or turkey. Pork with fat trimmed off. Fish and seafood. Egg whites. Dried beans, peas, or lentils. Unsalted nuts, nut butters, and seeds. Unsalted canned beans. Lean cuts of beef with fat trimmed off. Low-sodium, lean precooked or cured meat, such as sausages or meat loaves.  Dairy  Low-fat (1%) or fat-free (skim) milk. Reduced-fat, low-fat, or fat-free cheeses. Nonfat, low-sodium ricotta or cottage cheese. Low-fat or nonfat yogurt. Low-fat, low-sodium cheese.  Fats and oils  Soft margarine without trans fats. Vegetable oil. Reduced-fat, low-fat, or light mayonnaise and salad dressings (reduced-sodium). Canola, safflower, olive, avocado, soybean, and sunflower oils. Avocado.  Seasonings and condiments  Herbs. Spices. Seasoning mixes without salt.  Other foods  Unsalted popcorn and pretzels. Fat-free sweets.  The items listed above may not be a complete list of foods and beverages you can eat. Contact a dietitian for more information.  What foods should I avoid?  Fruits  Canned fruit in a light or heavy syrup. Fried fruit. Fruit in cream or butter sauce.  Vegetables  Creamed or fried vegetables. Vegetables in a cheese sauce. Regular canned vegetables (not low-sodium or reduced-sodium). Regular canned tomato sauce and paste (not low-sodium or reduced-sodium). Regular tomato and  vegetable juice (not low-sodium or reduced-sodium). Pickles. Olives.  Grains  Baked goods made with fat, such as croissants, muffins, or some breads. Dry pasta or rice meal packs.  Meats and other proteins  Fatty cuts of meat. Ribs. Fried meat. Agosto. Bologna, salami, and other precooked or cured meats, such as sausages or meat loaves. Fat from the back of a pig (fatback). Bratwurst. Salted nuts and seeds. Canned beans with added salt. Canned or smoked fish. Whole eggs or egg yolks. Chicken or turkey with skin.  Dairy  Whole or 2% milk, cream, and half-and-half. Whole or full-fat cream cheese. Whole-fat or sweetened yogurt. Full-fat cheese. Nondairy creamers. Whipped toppings. Processed cheese and cheese spreads.  Fats and oils  Butter. Stick margarine. Lard. Shortening. Ghee. Agosto fat. Tropical oils, such as coconut, palm kernel, or palm oil.  Seasonings and condiments  Onion salt, garlic salt, seasoned salt, table salt, and sea salt. Worcestershire sauce. Tartar sauce. Barbecue sauce. Teriyaki sauce. Soy sauce, including reduced-sodium. Steak sauce. Canned and packaged gravies. Fish sauce. Oyster sauce. Cocktail sauce. Store-bought horseradish. Ketchup. Mustard. Meat flavorings and tenderizers. Bouillon cubes. Hot sauces. Pre-made or packaged marinades. Pre-made or packaged taco seasonings. Relishes. Regular salad dressings.  Other foods  Salted popcorn and pretzels.  The items listed above may not be a complete list of foods and beverages you should avoid. Contact a dietitian for more information.  Where to find more information  · National Heart, Lung, and Blood [place]: www.nhlbi.nih.gov  · American Heart Association: www.heart.org  · Academy of Nutrition and Dietetics: www.eatright.org  · National Kidney Foundation: www.kidney.org  Summary  · The DASH eating plan is a healthy eating plan that has been shown to reduce high blood pressure (hypertension). It may also reduce your risk for type 2 diabetes, heart  disease, and stroke.  · When on the DASH eating plan, aim to eat more fresh fruits and vegetables, whole grains, lean proteins, low-fat dairy, and heart-healthy fats.  · With the DASH eating plan, you should limit salt (sodium) intake to 2,300 mg a day. If you have hypertension, you may need to reduce your sodium intake to 1,500 mg a day.  · Work with your health care provider or dietitian to adjust your eating plan to your individual calorie needs.  This information is not intended to replace advice given to you by your health care provider. Make sure you discuss any questions you have with your health care provider.  Document Revised: 11/20/2020 Document Reviewed: 11/20/2020  [employer] Patient Education © 2021 [employer]      Advance Care Planning and Advance Directives     You make decisions on a daily basis - decisions about where you want to live, your career, your home, your life. Perhaps one of the most important decisions you face is your choice for future medical care. Take time to talk with your family and your healthcare team and start planning today.  Advance Care Planning is a process that can help you:  · Understand possible future healthcare decisions in light of your own experiences  · Reflect on those decision in light of your goals and values  · Discuss your decisions with those closest to you and the healthcare professionals that care for you  · Make a plan by creating a document that reflects your wishes    Surrogate Decision Maker  In the event of a medical emergency, which has left you unable to communicate or to make your own decisions, you would need someone to make decisions for you.  It is important to discuss your preferences for medical treatment with this person while you are in good health.     Qualities of a surrogate decision maker:  • Willing to take on this role and responsibility  • Knows what you want for future medical care  • Willing to follow your wishes even if they don't  agree with them  • Able to make difficult medical decisions under stressful circumstances    Advance Directives  These are legal documents you can create that will guide your healthcare team and decision maker(s) when needed. These documents can be stored in the electronic medical record.    · Living Will - a legal document to guide your care if you have a terminal condition or a serious illness and are unable to communicate. States vary by statute in document names/types, but most forms may include one or more of the following:        -  Directions regarding life-prolonging treatments        -  Directions regarding artificially provided nutrition/hydration        -  Choosing a healthcare decision maker        -  Direction regarding organ/tissue donation    · Durable Power of  for Healthcare - this document names an -in-fact to make medical decisions for you, but it may also allow this person to make personal and financial decisions for you. Please seek the advice of an  if you need this type of document.    **Advance Directives are not required and no one may discriminate against you if you do not sign one.    Medical Orders  Many states allow specific forms/orders signed by your physician to record your wishes for medical treatment in your current state of health. This form, signed in personal communication with your physician, addresses resuscitation and other medical interventions that you may or may not want.      For more information or to schedule a time with a [facility] Advance Care Planning Facilitator contact: [email or web address] or call [phone] and someone will contact you directly.

## 2021-11-19 DIAGNOSIS — G62.9 PERIPHERAL POLYNEUROPATHY: ICD-10-CM

## 2021-11-19 RX ORDER — GABAPENTIN 300 MG/1
300 CAPSULE ORAL 4 TIMES DAILY
Qty: 120 CAPSULE | Refills: 3 | Status: SHIPPED | OUTPATIENT
Start: 2021-11-19 | End: 2022-03-08 | Stop reason: SDUPTHER

## 2021-11-19 NOTE — TELEPHONE ENCOUNTER
Rx Refill Note  Requested Prescriptions     Pending Prescriptions Disp Refills   • gabapentin (NEURONTIN) 300 MG capsule 120 capsule 3     Sig: Take 1 capsule by mouth 4 (Four) Times a Day.   Med last filled: 10/14/21   Last office visit with prescribing clinician: 9/17/2021      Next office visit with prescribing clinician: Visit date not found                  Mena Hernandez RN  11/19/21, 10:30 CST

## 2021-11-19 NOTE — TELEPHONE ENCOUNTER
Caller: Justin Robledo    Relationship: Self    Best call back number: [phone]     Requested Prescriptions:   Requested Prescriptions     Pending Prescriptions Disp Refills   • gabapentin (NEURONTIN) 300 MG capsule 120 capsule 3     Sig: Take 1 capsule by mouth 4 (Four) Times a Day.        Pharmacy where request should be sent: [facility]  [place] - [address] - [phone]  - [phone]     Additional details provided by patient: REQUESTING ASAP     Does the patient have less than a 3 day supply:  [x] Yes  [] No    Ailyn Burton Rep   11/19/21 10:22 CST

## 2021-12-13 ENCOUNTER — TELEPHONE (OUTPATIENT)
Dept: NEUROSURGERY | Facility: CLINIC | Age: 76
End: 2021-12-13

## 2021-12-13 NOTE — TELEPHONE ENCOUNTER
Left voicemail asking patient to call me back and let me know if he can come in at 12 noon tomorrow instead of 4:45 pm.  I also called his wife, Loly, who states they will be here tomorrow at 12 noon.      aury esquivel CMA

## 2021-12-14 ENCOUNTER — OFFICE VISIT (OUTPATIENT)
Dept: NEUROSURGERY | Facility: CLINIC | Age: 76
End: 2021-12-14

## 2021-12-14 VITALS — HEIGHT: 66 IN | WEIGHT: 193.4 LBS | BODY MASS INDEX: 31.08 KG/M2

## 2021-12-14 DIAGNOSIS — G56.01 RIGHT CARPAL TUNNEL SYNDROME: Primary | ICD-10-CM

## 2021-12-14 DIAGNOSIS — Z87.891 FORMER SMOKER: ICD-10-CM

## 2021-12-14 DIAGNOSIS — E66.09 CLASS 1 OBESITY DUE TO EXCESS CALORIES WITH SERIOUS COMORBIDITY AND BODY MASS INDEX (BMI) OF 31.0 TO 31.9 IN ADULT: ICD-10-CM

## 2021-12-14 PROCEDURE — 99024 POSTOP FOLLOW-UP VISIT: CPT | Performed by: NEUROLOGICAL SURGERY

## 2021-12-14 NOTE — PATIENT INSTRUCTIONS
"PATIENT TO CONTINUE TO FOLLOW UP WITH HIS PRIMARY CARE PROVIDER FOR YEARLY PHYSICAL EXAMS TO ENSURE COMPLETE HEALTH MAINTENANCE        BMI for Adults  What is BMI?  Body mass index (BMI) is a number that is calculated from a person's weight and height. BMI can help estimate how much of a person's weight is composed of fat. BMI does not measure body fat directly. Rather, it is an alternative to procedures that directly measure body fat, which can be difficult and expensive.  BMI can help identify people who may be at higher risk for certain medical problems.  What are BMI measurements used for?  BMI is used as a screening tool to identify possible weight problems. It helps determine whether a person is obese, overweight, a healthy weight, or underweight.  BMI is useful for:  · Identifying a weight problem that may be related to a medical condition or may increase the risk for medical problems.  · Promoting changes, such as changes in diet and exercise, to help reach a healthy weight. BMI screening can be repeated to see if these changes are working.  How is BMI calculated?  BMI involves measuring your weight in relation to your height. Both height and weight are measured, and the BMI is calculated from those numbers. This can be done either in English ([place] or metric measurements. Note that charts and online BMI calculators are available to help you find your BMI quickly and easily without having to do these calculations yourself.  To calculate your BMI in English ([place] measurements:    1. Measure your weight in pounds (lb).  2. Multiply the number of pounds by 703.  ? For example, for a person who weighs 180 lb, multiply that number by 703, which equals 126,540.  3. Measure your height in inches. Then multiply that number by itself to get a measurement called \"inches squared.\"  ? For example, for a person who is 70 inches tall, the \"inches squared\" measurement is 70 inches x 70 inches, which equals 4,900 inches " "squared.  4. Divide the total from step 2 (number of lb x 703) by the total from step 3 (inches squared): 126,540 ÷ 4,900 = 25.8. This is your BMI.    To calculate your BMI in metric measurements:  1. Measure your weight in kilograms (kg).  2. Measure your height in meters (m). Then multiply that number by itself to get a measurement called \"meters squared.\"  ? For example, for a person who is 1.75 m tall, the \"meters squared\" measurement is 1.75 m x 1.75 m, which is equal to 3.1 meters squared.  3. Divide the number of kilograms (your weight) by the meters squared number. In this example: 70 ÷ 3.1 = 22.6. This is your BMI.  What do the results mean?  BMI charts are used to identify whether you are underweight, normal weight, overweight, or obese. The following guidelines will be used:  · Underweight: BMI less than 18.5.  · Normal weight: BMI between 18.5 and 24.9.  · Overweight: BMI between 25 and 29.9.  · Obese: BMI of 30 or above.  Keep these notes in mind:  · Weight includes both fat and muscle, so someone with a muscular build, such as an athlete, may have a BMI that is higher than 24.9. In cases like these, BMI is not an accurate measure of body fat.  · To determine if excess body fat is the cause of a BMI of 25 or higher, further assessments may need to be done by a health care provider.  · BMI is usually interpreted in the same way for men and women.  Where to find more information  For more information about BMI, including tools to quickly calculate your BMI, go to these websites:  · Centers for Disease Control and Prevention: www.cdc.gov  · American Heart Association: www.heart.org  · National Heart, Lung, and Blood [place]: www.nhlbi.nih.gov  Summary  · Body mass index (BMI) is a number that is calculated from a person's weight and height.  · BMI may help estimate how much of a person's weight is composed of fat. BMI can help identify those who may be at higher risk for certain medical problems.  · BMI " "can be measured using English measurements or metric measurements.  · BMI charts are used to identify whether you are underweight, normal weight, overweight, or obese.  This information is not intended to replace advice given to you by your health care provider. Make sure you discuss any questions you have with your health care provider.  Document Revised: 09/09/2020 Document Reviewed: 07/17/2020  [employer] Patient Education © 2021 [employer]      https://www.nhlbi.nih.gov/files/docs/public/heart/dash_brief.pdf\">   DASH Eating Plan  DASH stands for Dietary Approaches to Stop Hypertension. The DASH eating plan is a healthy eating plan that has been shown to:  · Reduce high blood pressure (hypertension).  · Reduce your risk for type 2 diabetes, heart disease, and stroke.  · Help with weight loss.  What are tips for following this plan?  Reading food labels  · Check food labels for the amount of salt (sodium) per serving. Choose foods with less than 5 percent of the Daily Value of sodium. Generally, foods with less than 300 milligrams (mg) of sodium per serving fit into this eating plan.  · To find whole grains, look for the word \"whole\" as the first word in the ingredient list.  Shopping  · Buy products labeled as \"low-sodium\" or \"no salt added.\"  · Buy fresh foods. Avoid canned foods and pre-made or frozen meals.  Cooking  · Avoid adding salt when cooking. Use salt-free seasonings or herbs instead of table salt or sea salt. Check with your health care provider or pharmacist before using salt substitutes.  · Do not clemente foods. Cook foods using healthy methods such as baking, boiling, grilling, roasting, and broiling instead.  · Cook with heart-healthy oils, such as olive, canola, avocado, soybean, or sunflower oil.  Meal planning    · Eat a balanced diet that includes:  ? 4 or more servings of fruits and 4 or more servings of vegetables each day. Try to fill one-half of your plate with fruits and vegetables.  ? 6-8 " servings of whole grains each day.  ? Less than 6 oz (170 g) of lean meat, poultry, or fish each day. A 3-oz (85-g) serving of meat is about the same size as a deck of cards. One egg equals 1 oz (28 g).  ? 2-3 servings of low-fat dairy each day. One serving is 1 cup (237 mL).  ? 1 serving of nuts, seeds, or beans 5 times each week.  ? 2-3 servings of heart-healthy fats. Healthy fats called omega-3 fatty acids are found in foods such as walnuts, flaxseeds, fortified milks, and eggs. These fats are also found in cold-water fish, such as sardines, salmon, and mackerel.  · Limit how much you eat of:  ? Canned or prepackaged foods.  ? Food that is high in trans fat, such as some fried foods.  ? Food that is high in saturated fat, such as fatty meat.  ? Desserts and other sweets, sugary drinks, and other foods with added sugar.  ? Full-fat dairy products.  · Do not salt foods before eating.  · Do not eat more than 4 egg yolks a week.  · Try to eat at least 2 vegetarian meals a week.  · Eat more home-cooked food and less restaurant, buffet, and fast food.    Lifestyle  · When eating at a restaurant, ask that your food be prepared with less salt or no salt, if possible.  · If you drink alcohol:  ? Limit how much you use to:  § 0-1 drink a day for women who are not pregnant.  § 0-2 drinks a day for men.  ? Be aware of how much alcohol is in your drink. In the [place] one drink equals one 12 oz bottle of beer (355 mL), one 5 oz glass of wine (148 mL), or one 1½ oz glass of hard liquor (44 mL).  General information  · Avoid eating more than 2,300 mg of salt a day. If you have hypertension, you may need to reduce your sodium intake to 1,500 mg a day.  · Work with your health care provider to maintain a healthy body weight or to lose weight. Ask what an ideal weight is for you.  · Get at least 30 minutes of exercise that causes your heart to beat faster (aerobic exercise) most days of the week. Activities may include walking,  swimming, or biking.  · Work with your health care provider or dietitian to adjust your eating plan to your individual calorie needs.  What foods should I eat?  Fruits  All fresh, dried, or frozen fruit. Canned fruit in natural juice (without added sugar).  Vegetables  Fresh or frozen vegetables (raw, steamed, roasted, or grilled). Low-sodium or reduced-sodium tomato and vegetable juice. Low-sodium or reduced-sodium tomato sauce and tomato paste. Low-sodium or reduced-sodium canned vegetables.  Grains  Whole-grain or whole-wheat bread. Whole-grain or whole-wheat pasta. Brown rice. Oatmeal. Quinoa. Bulgur. Whole-grain and low-sodium cereals. Karla bread. Low-fat, low-sodium crackers. Whole-wheat flour tortillas.  Meats and other proteins  Skinless chicken or turkey. Ground chicken or turkey. Pork with fat trimmed off. Fish and seafood. Egg whites. Dried beans, peas, or lentils. Unsalted nuts, nut butters, and seeds. Unsalted canned beans. Lean cuts of beef with fat trimmed off. Low-sodium, lean precooked or cured meat, such as sausages or meat loaves.  Dairy  Low-fat (1%) or fat-free (skim) milk. Reduced-fat, low-fat, or fat-free cheeses. Nonfat, low-sodium ricotta or cottage cheese. Low-fat or nonfat yogurt. Low-fat, low-sodium cheese.  Fats and oils  Soft margarine without trans fats. Vegetable oil. Reduced-fat, low-fat, or light mayonnaise and salad dressings (reduced-sodium). Canola, safflower, olive, avocado, soybean, and sunflower oils. Avocado.  Seasonings and condiments  Herbs. Spices. Seasoning mixes without salt.  Other foods  Unsalted popcorn and pretzels. Fat-free sweets.  The items listed above may not be a complete list of foods and beverages you can eat. Contact a dietitian for more information.  What foods should I avoid?  Fruits  Canned fruit in a light or heavy syrup. Fried fruit. Fruit in cream or butter sauce.  Vegetables  Creamed or fried vegetables. Vegetables in a cheese sauce. Regular canned  vegetables (not low-sodium or reduced-sodium). Regular canned tomato sauce and paste (not low-sodium or reduced-sodium). Regular tomato and vegetable juice (not low-sodium or reduced-sodium). Pickles. Olives.  Grains  Baked goods made with fat, such as croissants, muffins, or some breads. Dry pasta or rice meal packs.  Meats and other proteins  Fatty cuts of meat. Ribs. Fried meat. Agosto. Bologna, salami, and other precooked or cured meats, such as sausages or meat loaves. Fat from the back of a pig (fatback). Bratwurst. Salted nuts and seeds. Canned beans with added salt. Canned or smoked fish. Whole eggs or egg yolks. Chicken or turkey with skin.  Dairy  Whole or 2% milk, cream, and half-and-half. Whole or full-fat cream cheese. Whole-fat or sweetened yogurt. Full-fat cheese. Nondairy creamers. Whipped toppings. Processed cheese and cheese spreads.  Fats and oils  Butter. Stick margarine. Lard. Shortening. Ghee. Agosto fat. Tropical oils, such as coconut, palm kernel, or palm oil.  Seasonings and condiments  Onion salt, garlic salt, seasoned salt, table salt, and sea salt. Worcestershire sauce. Tartar sauce. Barbecue sauce. Teriyaki sauce. Soy sauce, including reduced-sodium. Steak sauce. Canned and packaged gravies. Fish sauce. Oyster sauce. Cocktail sauce. Store-bought horseradish. Ketchup. Mustard. Meat flavorings and tenderizers. Bouillon cubes. Hot sauces. Pre-made or packaged marinades. Pre-made or packaged taco seasonings. Relishes. Regular salad dressings.  Other foods  Salted popcorn and pretzels.  The items listed above may not be a complete list of foods and beverages you should avoid. Contact a dietitian for more information.  Where to find more information  · National Heart, Lung, and Blood [place]: www.nhlbi.nih.gov  · American Heart Association: www.heart.org  · Academy of Nutrition and Dietetics: www.eatright.org  · National Kidney Foundation: www.kidney.org  Summary  · The DASH eating plan is a  healthy eating plan that has been shown to reduce high blood pressure (hypertension). It may also reduce your risk for type 2 diabetes, heart disease, and stroke.  · When on the DASH eating plan, aim to eat more fresh fruits and vegetables, whole grains, lean proteins, low-fat dairy, and heart-healthy fats.  · With the DASH eating plan, you should limit salt (sodium) intake to 2,300 mg a day. If you have hypertension, you may need to reduce your sodium intake to 1,500 mg a day.  · Work with your health care provider or dietitian to adjust your eating plan to your individual calorie needs.  This information is not intended to replace advice given to you by your health care provider. Make sure you discuss any questions you have with your health care provider.  Document Revised: 11/20/2020 Document Reviewed: 11/20/2020  [employer]vier Patient Education © 2021 [employer]

## 2021-12-14 NOTE — PROGRESS NOTES
SUBJECTIVE:  Patient ID: Justin Robledo is a 76 y.o. male is here today for follow-up.    Chief Complaint: Recurrent  Chief Complaint   Patient presents with   • Carpal Tunnel     patient is here for a follow up wound check: RCTR on 9/29/21; patient is doing well except for some continued tenderness over the incision.       HPI  76-year-old gentleman went to the operating for right carpal tunnel release on September 29, 2021.  He is here in follow-up.  He is discomfort numbness and tingling in the right hand is resolved.  He still does have some tenderness in the palm with activity but overall he says that the hand is much better he is very satisfied with results of surgery.  He does have some mild left carpal tunnel syndrome in the left hand.    The following portions of the patient's history were reviewed and updated as appropriate: allergies, current medications, past family history, past medical history, past social history, past surgical history and problem list.    OBJECTIVE:    Review of Systems   All other systems reviewed and are negative.         Physical Exam  Eyes:      Extraocular Movements: EOM normal.      Pupils: Pupils are equal, round, and reactive to light.   Neurological:      Mental Status: He is oriented to person, place, and time.      Coordination: Finger-Nose-Finger Test normal.      Gait: Gait is intact.      Deep Tendon Reflexes: Strength normal.   Psychiatric:         Speech: Speech normal.         Neurologic Exam     Mental Status   Oriented to person, place, and time.   Attention: normal.   Speech: speech is normal   Level of consciousness: alert  Knowledge: good.     Cranial Nerves     CN II   Visual fields full to confrontation.     CN III, IV, VI   Pupils are equal, round, and reactive to light.  Extraocular motions are normal.     CN V   Facial sensation intact.     CN VII   Facial expression full, symmetric.     CN VIII   CN VIII normal.     CN IX, X   CN IX normal.   CN X  normal.     CN XI   CN XI normal.     CN XII   CN XII normal.     Motor Exam   Muscle bulk: normal  Overall muscle tone: normal  Right arm pronator drift: absent  Left arm pronator drift: absent    Strength   Strength 5/5 throughout.     Sensory Exam   Light touch normal.   Pinprick normal.     Gait, Coordination, and Reflexes     Gait  Gait: normal    Coordination   Finger to nose coordination: normal    Tremor   Resting tremor: absent  Intention tremor: absent  Action tremor: absent    Reflexes   Reflexes 2+ except as noted.       Independent Review of Radiographic Studies:       ASSESSMENT/PLAN:  The patient is doing well after his right carpal tunnel release.  His symptoms are much better.  His left hand is not nearly as uncomfortable as his right hand and at this point he does not feel he is ready for any surgical intervention.  I be happy to see him again in the future should his symptoms get worse.      1. Right carpal tunnel syndrome    2. Former smoker    3. Class 1 obesity due to excess calories with serious comorbidity and body mass index (BMI) of 31.0 to 31.9 in adult        The patient's Body mass index is 31.22 kg/m².. BMI is above normal parameters. Recommendations include: educational material    No follow-ups on file.      Luis Perdomo MD

## 2021-12-19 DIAGNOSIS — I10 ESSENTIAL HYPERTENSION: ICD-10-CM

## 2021-12-19 DIAGNOSIS — E78.5 HYPERLIPIDEMIA, UNSPECIFIED HYPERLIPIDEMIA TYPE: ICD-10-CM

## 2021-12-20 NOTE — TELEPHONE ENCOUNTER
Rx Refill Note  Requested Prescriptions     Pending Prescriptions Disp Refills   • amLODIPine (NORVASC) 5 MG tablet [Pharmacy Med Name: AMLODIPINE BESYLATE 5 MG TAB] 90 tablet 1     Sig: TAKE 1 TABLET BY MOUTH EVERY DAY   • simvastatin (ZOCOR) 40 MG tablet [Pharmacy Med Name: SIMVASTATIN 40 MG TABLET] 90 tablet 1     Sig: TAKE 1 TABLET BY MOUTH EVERYDAY AT BEDTIME   • lisinopril (PRINIVIL,ZESTRIL) 10 MG tablet [Pharmacy Med Name: LISINOPRIL 10 MG TABLET] 90 tablet 1     Sig: TAKE 1 TABLET BY MOUTH EVERY DAY   Med last filled:   amlodipine: 6/15/21   simvastatin: 6/22/21   lisinopril: 6/22/21   Last office visit with prescribing clinician: 9/17/2021      Next office visit with prescribing clinician: Visit date not found            Mena Hernandez RN  12/20/21, 07:55 CST

## 2021-12-21 RX ORDER — LISINOPRIL 10 MG/1
TABLET ORAL
Qty: 90 TABLET | Refills: 1 | Status: SHIPPED | OUTPATIENT
Start: 2021-12-21 | End: 2022-01-03 | Stop reason: SDUPTHER

## 2021-12-21 RX ORDER — SIMVASTATIN 40 MG
TABLET ORAL
Qty: 90 TABLET | Refills: 1 | Status: SHIPPED | OUTPATIENT
Start: 2021-12-21 | End: 2022-06-03

## 2021-12-21 RX ORDER — AMLODIPINE BESYLATE 5 MG/1
TABLET ORAL
Qty: 90 TABLET | Refills: 1 | Status: SHIPPED | OUTPATIENT
Start: 2021-12-21 | End: 2022-01-03 | Stop reason: SDUPTHER

## 2022-01-03 DIAGNOSIS — I10 ESSENTIAL HYPERTENSION: ICD-10-CM

## 2022-01-03 RX ORDER — LISINOPRIL 10 MG/1
10 TABLET ORAL DAILY
Qty: 90 TABLET | Refills: 0 | Status: SHIPPED | OUTPATIENT
Start: 2022-01-03 | End: 2022-06-03

## 2022-01-03 RX ORDER — AMLODIPINE BESYLATE 5 MG/1
5 TABLET ORAL DAILY
Qty: 90 TABLET | Refills: 0 | Status: SHIPPED | OUTPATIENT
Start: 2022-01-03 | End: 2022-04-03

## 2022-02-21 PROBLEM — Z87.891 FORMER SMOKER: Status: RESOLVED | Noted: 2021-07-06 | Resolved: 2022-02-21

## 2022-02-25 ENCOUNTER — OFFICE VISIT (OUTPATIENT)
Dept: INTERNAL MEDICINE | Facility: CLINIC | Age: 77
End: 2022-02-25

## 2022-02-25 VITALS
DIASTOLIC BLOOD PRESSURE: 90 MMHG | BODY MASS INDEX: 32.3 KG/M2 | WEIGHT: 201 LBS | HEART RATE: 63 BPM | TEMPERATURE: 98.6 F | HEIGHT: 66 IN | OXYGEN SATURATION: 99 % | SYSTOLIC BLOOD PRESSURE: 162 MMHG | RESPIRATION RATE: 16 BRPM

## 2022-02-25 DIAGNOSIS — Z12.5 SCREENING FOR PROSTATE CANCER: Primary | ICD-10-CM

## 2022-02-25 DIAGNOSIS — N48.9 PENIS DISORDER: ICD-10-CM

## 2022-02-25 PROCEDURE — 99213 OFFICE O/P EST LOW 20 MIN: CPT | Performed by: NURSE PRACTITIONER

## 2022-02-25 NOTE — PROGRESS NOTES
Subjective     Chief Complaint   Patient presents with   • Prostate issues       History of Present Illness  Pt comes in today with issues with his penis. States he is having issues with his penis retracting and causing discomfort. He is having to pull out the retraction and the discomfort goes away. No redness near the end of the penis. No discharge. He is having issues with nocturia and weak stream despite Flomax. Some erectile dysfunction. States only gets semi-hard. He sustained an injury of his penis in 2005.     Review of Systems   Otherwise complete ROS reviewed and negative except as mentioned in the HPI.    Past Medical History:   Past Medical History:   Diagnosis Date   • Arthritis    • CAD (coronary artery disease)    • Chronic back pain    • Chronic knee pain     LEFT KNEE   • Class 1 obesity due to excess calories with serious comorbidity and body mass index (BMI) of 32.0 to 32.9 in adult 1/20/2020   • COPD (chronic obstructive pulmonary disease) ([facility])    • Coronary artery disease involving autologous vein coronary bypass graft without angina pectoris 1/20/2020   • Elevated cholesterol    • Essential hypertension 10/14/2019   • GERD (gastroesophageal reflux disease)    • Hyperlipidemia    • Hypertension    • Left bundle branch block    • Numbness of right hand    • Obstructive sleep apnea 1/20/2020   • Sleep apnea     CPAP     Past Surgical History:  Past Surgical History:   Procedure Laterality Date   • BACK SURGERY     • CARPAL TUNNEL RELEASE Right 9/29/2021    Procedure: CARPAL TUNNEL RELEASE right;  Surgeon: Luis Perdomo MD;  Location: [facility];  Service: Neurosurgery;  Laterality: Right;   • COLONOSCOPY     • JOINT REPLACEMENT      LEFT KNEE   • KNEE MENISCAL REPAIR Left 2018   • ROTATOR CUFF REPAIR Right 2000   • SHOULDER SURGERY     • SPINE SURGERY  1981    lower back ruptured disc   • TOTAL SHOULDER ARTHROPLASTY W/ DISTAL CLAVICLE EXCISION Right 1/14/2020    Procedure: RIGHT  REVERSE TOTAL SHOULDER  ARTHROPLASTY;  Surgeon: Suman Ventura MD;  Location: [facility];  Service: Orthopedics     Social History:  reports that he quit smoking about 11 years ago. His smoking use included pipe. He smoked 0.00 packs per day for 50.00 years. He has never used smokeless tobacco. He reports current alcohol use of about 5.0 standard drinks of alcohol per week. He reports that he does not use drugs.    Family History: family history includes Arthritis in his father and mother; Cancer in his brother; Diabetes in his mother and sister; Hypertension in his father and mother; Kidney disease in his mother; Obesity in his sister; Osteoporosis in his mother; Stroke in his father.       Allergies:  No Known Allergies  Medications:  Prior to Admission medications    Medication Sig Start Date End Date Taking? Authorizing Provider   acetaminophen (TYLENOL) 650 MG 8 hr tablet Take 650 mg by mouth Every 8 (Eight) Hours As Needed for Moderate Pain .   Yes ProviderCristian MD   albuterol sulfate  (90 Base) MCG/ACT inhaler Inhale 2 puffs Every 4 (Four) Hours As Needed for Wheezing. 9/15/20  Yes Angelica Mccabe DO   amLODIPine (NORVASC) 5 MG tablet Take 1 tablet by mouth Daily for 90 days. 1/3/22 4/3/22 Yes Katerine Mcclain APRN   aspirin 81 MG EC tablet Take 81 mg by mouth Daily.   Yes ProviderCristian MD   azelastine (ASTELIN) 0.1 % nasal spray 2 sprays into the nostril(s) as directed by provider 2 (Two) Times a Day. Use in each nostril as directed 8/6/21  Yes Angelica Mccabe DO   bisoprolol (ZEBeta) 5 MG tablet TAKE 1 TABLET BY MOUTH EVERY DAY 6/15/21  Yes Angelica Mccabe DO   budesonide-formoterol (Symbicort) 160-4.5 MCG/ACT inhaler Inhale 2 puffs 2 (Two) Times a Day. 9/27/21  Yes Hien Pimentel APRN   calcium carbonate (OS-TASHIA) 600 MG tablet Take 600 mg by mouth 2 (Two) Times a Day.   Yes ProviderCristian MD   cephalexin (KEFLEX) 500 MG capsule Take 1 capsule  by mouth 2 (Two) Times a Day. 10/13/21  Yes Ozzy Naidu APRN   Cholecalciferol (VITAMIN D3) 1000 units capsule Take 2,000 Units by mouth Daily.   Yes Cristian Pfeiffer MD   coenzyme Q10 100 MG capsule Take 100 mg by mouth Daily.   Yes Cristian Pfeiffer MD   Collagen-Boron-Hyaluronic Acid ([employer]) 10-5-3.3 MG tablet Take 1 tablet by mouth.   Yes Cristian Pfeiffer MD   fluticasone (FLONASE) 50 MCG/ACT nasal spray 2 sprays into the nostril(s) as directed by provider Daily.   Yes Cristian Pfeiffer MD   gabapentin (NEURONTIN) 300 MG capsule Take 1 capsule by mouth 4 (Four) Times a Day. 11/19/21  Yes Angelica Mccabe DO   Garlic 1000 MG capsule Take 1,000 mg by mouth Daily.   Yes ProviderCristian MD   ibuprofen (ADVIL,MOTRIN) 800 MG tablet Take 800 mg by mouth Every 6 (Six) Hours As Needed for Mild Pain .   Yes ProviderCristian MD   Krill Oil 300 MG capsule Take 300 mg by mouth Daily.   Yes ProviderCristian MD   lisinopril (PRINIVIL,ZESTRIL) 10 MG tablet Take 1 tablet by mouth Daily for 90 days. 1/3/22 4/3/22 Yes Katerine Mcclain APRN   Multiple Vitamins-Minerals (OCUVITE ADULT 50+ PO) Take 1 tablet by mouth Daily.   Yes ProviderCristian MD   omeprazole (priLOSEC) 20 MG capsule Take 20 mg by mouth Daily.   Yes Cristian Pfeiffer MD   simvastatin (ZOCOR) 40 MG tablet TAKE 1 TABLET BY MOUTH EVERYDAY AT BEDTIME 12/21/21  Yes Angelica Mccabe DO   Spiriva Respimat 2.5 MCG/ACT aerosol solution inhaler INHALE 2 PUFFS BY MOUTH DAILY 5/26/21  Yes Angelica Mccabe DO   SUPER B COMPLEX/C PO Take 1 tablet by mouth Every Night.   Yes Cristian Pfeiffer MD   tamsulosin (FLOMAX) 0.4 MG capsule 24 hr capsule Take 1 capsule by mouth Daily. 6/22/21  Yes Angelica Mccabe DO   TURMERIC PO Take 1,000 mg by mouth Daily.   Yes Cristian Pfeiffer MD   vitamin C (ASCORBIC ACID) 500 MG tablet Take 500 mg by mouth Daily.   Yes Provider, Historical,  "MD   clindamycin (Cleocin) 300 MG capsule Take 1 capsule by mouth 3 (Three) Times a Day. 10/15/21   Ozzy Naidu APRN   HYDROcodone-acetaminophen (NORCO) 7.5-325 MG per tablet Take 1 tablet by mouth Every 8 (Eight) Hours As Needed for Moderate Pain . 9/29/21   Luis Perdomo MD       Objective     Vital Signs: /90 (BP Location: Right arm, Patient Position: Sitting, Cuff Size: Large Adult)   Pulse 63   Temp 98.6 °F (37 °C) (Infrared)   Resp 16   Ht 167.6 cm (65.98\")   Wt 91.2 kg (201 lb)   SpO2 99%   BMI 32.46 kg/m²   Physical Exam  Vitals reviewed.   Constitutional:       Appearance: He is well-developed.   HENT:      Head: Normocephalic and atraumatic.   Eyes:      Pupils: Pupils are equal, round, and reactive to light.   Neck:      Vascular: No JVD.   Cardiovascular:      Rate and Rhythm: Normal rate and regular rhythm.   Pulmonary:      Effort: Pulmonary effort is normal.      Comments: Diminished bilaterally.  Abdominal:      General: Bowel sounds are normal.      Palpations: Abdomen is soft.   Genitourinary:     Testes: Normal.      Comments: Penis with excessive dorsal skin. No retraction noted. No balanitis. On the left side of the shart some bb sized subcutaneous nodule. No bend or turn in his penid.   Musculoskeletal:         General: No deformity.      Cervical back: Normal range of motion and neck supple.   Lymphadenopathy:      Cervical: No cervical adenopathy.   Skin:     General: Skin is warm and dry.   Neurological:      Mental Status: He is alert and oriented to person, place, and time.   Psychiatric:         Behavior: Behavior normal.         Thought Content: Thought content normal.         Judgment: Judgment normal.       Results Reviewed:  Glucose   Date Value Ref Range Status   09/16/2021 122 (H) 65 - 99 mg/dL Final   12/22/2020 92 74 - 109 mg/dL Final     BUN   Date Value Ref Range Status   09/16/2021 14 8 - 23 mg/dL Final   12/22/2020 16 8 - 23 mg/dL Final     Creatinine "   Date Value Ref Range Status   09/16/2021 0.84 0.76 - 1.27 mg/dL Final   12/22/2020 0.8 0.5 - 1.2 mg/dL Final     Sodium   Date Value Ref Range Status   09/16/2021 140 136 - 145 mmol/L Final   12/22/2020 139 136 - 145 mmol/L Final     Potassium   Date Value Ref Range Status   09/16/2021 4.1 3.5 - 5.2 mmol/L Final     Comment:     Slight hemolysis detected by analyzer. Results may be affected.   12/22/2020 4.1 3.5 - 5.0 mmol/L Final     Chloride   Date Value Ref Range Status   09/16/2021 106 98 - 107 mmol/L Final   12/22/2020 102 98 - 111 mmol/L Final     CO2   Date Value Ref Range Status   09/16/2021 27.0 22.0 - 29.0 mmol/L Final   12/22/2020 25 22 - 29 mmol/L Final     Calcium   Date Value Ref Range Status   09/16/2021 9.1 8.6 - 10.5 mg/dL Final   12/22/2020 9.4 8.8 - 10.2 mg/dL Final     ALT (SGPT)   Date Value Ref Range Status   09/16/2021 17 1 - 41 U/L Final     AST (SGOT)   Date Value Ref Range Status   09/16/2021 17 1 - 40 U/L Final     WBC   Date Value Ref Range Status   09/16/2021 6.57 3.40 - 10.80 10*3/mm3 Final   12/22/2020 6.6 4.8 - 10.8 K/uL Final     Hematocrit   Date Value Ref Range Status   09/16/2021 39.7 37.5 - 51.0 % Final   12/22/2020 40.7 (L) 42.0 - 52.0 % Final     Platelets   Date Value Ref Range Status   09/16/2021 174 140 - 450 10*3/mm3 Final   12/22/2020 175 130 - 400 K/uL Final     Triglycerides   Date Value Ref Range Status   06/02/2021 103 0 - 149 mg/dL Final     HDL Cholesterol   Date Value Ref Range Status   06/02/2021 57 >39 mg/dL Final     LDL Chol Calc (NIH)   Date Value Ref Range Status   06/02/2021 98 0 - 99 mg/dL Final     Hemoglobin A1C   Date Value Ref Range Status   09/17/2021 5.4 % Final         Assessment / Plan     Assessment/Plan:  Diagnoses and all orders for this visit:    1. Screening for prostate cancer (Primary)  -     PSA SCREENING  -     Ambulatory Referral to Urology    2. Penis disorder  -     Ambulatory Referral to Urology      Return for Annual physical. unless  patient needs to be seen sooner or acute issues arise.    Code Status: Full.     I have discussed the patient results/orders and and plan/recommendation with them at today's visit.      Katerine Mcclain, APRN   02/25/2022

## 2022-02-27 LAB — PSA SERPL-MCNC: 0.8 NG/ML (ref 0–4)

## 2022-03-07 ENCOUNTER — OFFICE VISIT (OUTPATIENT)
Dept: PULMONOLOGY | Facility: CLINIC | Age: 77
End: 2022-03-07

## 2022-03-07 VITALS
DIASTOLIC BLOOD PRESSURE: 84 MMHG | HEART RATE: 62 BPM | SYSTOLIC BLOOD PRESSURE: 148 MMHG | OXYGEN SATURATION: 98 % | HEIGHT: 66 IN | WEIGHT: 195.8 LBS | BODY MASS INDEX: 31.47 KG/M2

## 2022-03-07 DIAGNOSIS — J47.9 BRONCHIECTASIS WITHOUT COMPLICATION: Primary | Chronic | ICD-10-CM

## 2022-03-07 DIAGNOSIS — J43.2 CENTRILOBULAR EMPHYSEMA: Chronic | ICD-10-CM

## 2022-03-07 DIAGNOSIS — G47.33 OBSTRUCTIVE SLEEP APNEA: Chronic | ICD-10-CM

## 2022-03-07 DIAGNOSIS — E66.09 CLASS 1 OBESITY DUE TO EXCESS CALORIES WITH SERIOUS COMORBIDITY AND BODY MASS INDEX (BMI) OF 31.0 TO 31.9 IN ADULT: Chronic | ICD-10-CM

## 2022-03-07 DIAGNOSIS — Z87.891 PERSONAL HISTORY OF NICOTINE DEPENDENCE: Chronic | ICD-10-CM

## 2022-03-07 DIAGNOSIS — J44.9 COPD, GROUP B, BY GOLD 2017 CLASSIFICATION: Chronic | ICD-10-CM

## 2022-03-07 PROCEDURE — 99214 OFFICE O/P EST MOD 30 MIN: CPT | Performed by: NURSE PRACTITIONER

## 2022-03-08 DIAGNOSIS — Z12.11 ENCOUNTER FOR SCREENING COLONOSCOPY: Primary | ICD-10-CM

## 2022-03-08 DIAGNOSIS — G62.9 PERIPHERAL POLYNEUROPATHY: ICD-10-CM

## 2022-03-08 RX ORDER — GABAPENTIN 300 MG/1
300 CAPSULE ORAL 4 TIMES DAILY
Qty: 120 CAPSULE | Refills: 3 | Status: SHIPPED | OUTPATIENT
Start: 2022-03-08 | End: 2022-06-07

## 2022-03-10 DIAGNOSIS — J44.9 COPD, GROUP B, BY GOLD 2017 CLASSIFICATION: Chronic | ICD-10-CM

## 2022-03-10 NOTE — TELEPHONE ENCOUNTER
He is calling for a script for the Breztri.      Rx Refill Note  Requested Prescriptions     Pending Prescriptions Disp Refills   • Budeson-Glycopyrrol-Formoterol (BREZTRI) 160-9-4.8 MCG/ACT aerosol inhaler 10.7 g 11     Sig: Inhale 2 puffs 2 (Two) Times a Day.      Last office visit with prescribing clinician: 3/7/2022      Next office visit with prescribing clinician: 10/17/2022            Marla Whipple CMA  03/10/22, 10:49 CST

## 2022-03-15 NOTE — PROGRESS NOTES
Subjective    Mr. Robledo is 76 y.o. male    Chief Complaint: Erectile Dysfunction    History of Present Illness  76-year-old male new patient with hypertension and hyperlipidemia referred for worsening erectile dysfunction refractory to PDE inhibitor therapy along with loss of penile length and girth.  He is having difficulty voiding due to having trouble finding his penis at times.  Overall LUTS are acceptable on tamsulosin.  He has failed all PDE inhibitors.    The following portions of the patient's history were reviewed and updated as appropriate: allergies, current medications, past family history, past medical history, past social history, past surgical history and problem list.    Review of Systems      Current Outpatient Medications:   •  acetaminophen (TYLENOL) 650 MG 8 hr tablet, Take 650 mg by mouth Every 8 (Eight) Hours As Needed for Moderate Pain ., Disp: , Rfl:   •  albuterol sulfate  (90 Base) MCG/ACT inhaler, Inhale 2 puffs Every 4 (Four) Hours As Needed for Wheezing., Disp: 18 g, Rfl: 6  •  amLODIPine (NORVASC) 5 MG tablet, Take 1 tablet by mouth Daily for 90 days., Disp: 90 tablet, Rfl: 0  •  aspirin 81 MG EC tablet, Take 81 mg by mouth Daily., Disp: , Rfl:   •  azelastine (ASTELIN) 0.1 % nasal spray, 2 sprays into the nostril(s) as directed by provider 2 (Two) Times a Day. Use in each nostril as directed, Disp: 30 mL, Rfl: 12  •  bisoprolol (ZEBeta) 5 MG tablet, TAKE 1 TABLET BY MOUTH EVERY DAY, Disp: 90 tablet, Rfl: 1  •  Budeson-Glycopyrrol-Formoterol (BREZTRI) 160-9-4.8 MCG/ACT aerosol inhaler, Inhale 2 puffs 2 (Two) Times a Day., Disp: 10.7 g, Rfl: 11  •  calcium carbonate (OS-TASHIA) 600 MG tablet, Take 600 mg by mouth 2 (Two) Times a Day., Disp: , Rfl:   •  Cholecalciferol (VITAMIN D3) 1000 units capsule, Take 2,000 Units by mouth Daily., Disp: , Rfl:   •  coenzyme Q10 100 MG capsule, Take 100 mg by mouth Daily., Disp: , Rfl:   •  Collagen-Boron-Hyaluronic Acid ([employer]) 10-5-3.3 MG tablet, Take 1 tablet by mouth., Disp: , Rfl:   •  fluticasone (FLONASE) 50 MCG/ACT nasal spray, 2 sprays into the nostril(s) as directed by provider Daily., Disp: , Rfl:   •  gabapentin (NEURONTIN) 300 MG capsule, Take 1 capsule by mouth 4 (Four) Times a Day., Disp: 120 capsule, Rfl: 3  •  Garlic 1000 MG capsule, Take 1,000 mg by mouth Daily., Disp: , Rfl:   •  HYDROcodone-acetaminophen (NORCO) 7.5-325 MG per tablet, Take 1 tablet by mouth Every 8 (Eight) Hours As Needed for Moderate Pain ., Disp: 30 tablet, Rfl: 0  •  ibuprofen (ADVIL,MOTRIN) 800 MG tablet, Take 800 mg by mouth Every 6 (Six) Hours As Needed for Mild Pain ., Disp: , Rfl:   •  Krill Oil 300 MG capsule, Take 300 mg by mouth Daily., Disp: , Rfl:   •  lisinopril (PRINIVIL,ZESTRIL) 10 MG tablet, Take 1 tablet by mouth Daily for 90 days., Disp: 90 tablet, Rfl: 0  •  Multiple Vitamins-Minerals (OCUVITE ADULT 50+ PO), Take 1 tablet by mouth Daily., Disp: , Rfl:   •  omeprazole (priLOSEC) 20 MG capsule, Take 20 mg by mouth Daily., Disp: , Rfl:   •  simvastatin (ZOCOR) 40 MG tablet, TAKE 1 TABLET BY MOUTH EVERYDAY AT BEDTIME, Disp: 90 tablet, Rfl: 1  •  Spiriva Respimat 2.5 MCG/ACT aerosol solution inhaler, INHALE 2 PUFFS BY MOUTH DAILY, Disp: 1 each, Rfl: 11  •  SUPER B COMPLEX/C PO, Take 1 tablet by mouth Every Night., Disp: , Rfl:   •  tamsulosin (FLOMAX) 0.4 MG capsule 24 hr capsule, Take 1 capsule by mouth Daily., Disp: 90 capsule, Rfl: 1  •  TURMERIC PO, Take 1,000 mg by mouth Daily., Disp: , Rfl:   •  vitamin C (ASCORBIC ACID) 500 MG tablet, Take 500 mg by mouth Daily., Disp: , Rfl:     Past Medical History:   Diagnosis Date   • Arthritis    • CAD (coronary artery disease)    • Chronic back pain    • Chronic knee pain     LEFT KNEE   • Class 1 obesity due to excess calories with serious comorbidity and body mass index (BMI) of 32.0 to 32.9 in adult 1/20/2020   • COPD (chronic obstructive pulmonary disease) ([facility])    • Coronary  artery disease involving autologous vein coronary bypass graft without angina pectoris 2020   • Elevated cholesterol    • Essential hypertension 10/14/2019   • GERD (gastroesophageal reflux disease)    • Hyperlipidemia    • Hypertension    • Left bundle branch block    • Numbness of right hand    • Obstructive sleep apnea 2020   • Sleep apnea     CPAP       Past Surgical History:   Procedure Laterality Date   • BACK SURGERY     • CARPAL TUNNEL RELEASE Right 2021    Procedure: CARPAL TUNNEL RELEASE right;  Surgeon: Luis Perdomo MD;  Location: [facility];  Service: Neurosurgery;  Laterality: Right;   • COLONOSCOPY     • JOINT REPLACEMENT      LEFT KNEE   • KNEE MENISCAL REPAIR Left 2018   • ROTATOR CUFF REPAIR Right    • SHOULDER SURGERY     • SPINE SURGERY  1981    lower back ruptured disc   • TOTAL SHOULDER ARTHROPLASTY W/ DISTAL CLAVICLE EXCISION Right 2020    Procedure: RIGHT REVERSE TOTAL SHOULDER  ARTHROPLASTY;  Surgeon: Suman Ventura MD;  Location: [facility];  Service: Orthopedics       Social History     Socioeconomic History   • Marital status:    Tobacco Use   • Smoking status: Former Smoker     Packs/day: 0.00     Years: 50.00     Pack years: 0.00     Types: Pipe     Quit date:      Years since quittin.2   • Smokeless tobacco: Never Used   Vaping Use   • Vaping Use: Never used   Substance and Sexual Activity   • Alcohol use: Yes     Alcohol/week: 5.0 standard drinks     Types: 5 Cans of beer per week   • Drug use: No   • Sexual activity: Defer       Family History   Problem Relation Age of Onset   • Arthritis Mother    • Diabetes Mother    • Osteoporosis Mother    • Hypertension Mother    • Kidney disease Mother    • Arthritis Father    • Hypertension Father    • Stroke Father    • Diabetes Sister    • Obesity Sister    • Cancer Brother        Objective    There were no vitals taken for this visit.    Physical Exam  Penis normal but somewhat concealed due  to his abdominal obesity.  Testicles normal bilaterally.      Results for orders placed or performed in visit on 02/25/22   PSA SCREENING    Specimen: Blood    Blood  Release to bryanna   Result Value Ref Range    PSA 0.8 0.0 - 4.0 ng/mL     Assessment and Plan    Diagnoses and all orders for this visit:    1. Erectile dysfunction, unspecified erectile dysfunction type (Primary)  -     POC Urinalysis Dipstick, Multipro  -     Case Request; Standing  -     COVID PRE-OP / PRE-PROCEDURE SCREENING ORDER (NO ISOLATION) - Swab, Nasopharynx; Future  -     CBC (No Diff); Future  -     Basic Metabolic Panel; Future  -     Case Request    Other orders  -     Follow Anesthesia Guidelines / Standing Orders; Future  -     Obtain Informed Consent; Future  -     Provide NPO Instructions to Patient; Future  -     Chlorhexidine Skin Prep; Future      Worsening organic erectile dysfunction refractory to PDE inhibitor therapy.  We had a long discussion regarding other treatment options including penile injections, intraurethral medications, ALEKSANDRA, and penile implant.  We discussed that a malleable penile implant would be an excellent option for him given his hidden penis and would allow him to find his penis easier as well as treat his ED.  Patient would like to proceed next month with malleable penile implant.  We discussed risks of the procedure including but not limited to infection, bleeding, need for additional procedures, injury to urethra and/or adjacent structures, device migration, chronic pain, complications of anesthesia.  We also discussed that penile length with an implant would be no longer than current flaccid stretched penile length.  Patient voices understanding and provided informed consent to proceed.      This document has been signed by MIRIAM Moon MD on March 25, 2022 15:54 CDT

## 2022-03-24 ENCOUNTER — OFFICE VISIT (OUTPATIENT)
Dept: UROLOGY | Facility: CLINIC | Age: 77
End: 2022-03-24

## 2022-03-24 VITALS — HEIGHT: 66 IN | TEMPERATURE: 98.2 F | WEIGHT: 190 LBS | BODY MASS INDEX: 30.53 KG/M2

## 2022-03-24 DIAGNOSIS — N52.9 ERECTILE DYSFUNCTION, UNSPECIFIED ERECTILE DYSFUNCTION TYPE: Primary | ICD-10-CM

## 2022-03-24 LAB
BILIRUB BLD-MCNC: NEGATIVE MG/DL
CLARITY, POC: CLEAR
COLOR UR: YELLOW
GLUCOSE UR STRIP-MCNC: NEGATIVE MG/DL
KETONES UR QL: NEGATIVE
LEUKOCYTE EST, POC: NEGATIVE
NITRITE UR-MCNC: NEGATIVE MG/ML
PH UR: 7 [PH] (ref 5–8)
PROT UR STRIP-MCNC: NEGATIVE MG/DL
RBC # UR STRIP: NEGATIVE /UL
SP GR UR: 1.02 (ref 1–1.03)
UROBILINOGEN UR QL: NORMAL

## 2022-03-24 PROCEDURE — 99204 OFFICE O/P NEW MOD 45 MIN: CPT | Performed by: UROLOGY

## 2022-03-24 PROCEDURE — 81001 URINALYSIS AUTO W/SCOPE: CPT | Performed by: UROLOGY

## 2022-03-25 DIAGNOSIS — I10 ESSENTIAL HYPERTENSION: ICD-10-CM

## 2022-03-25 DIAGNOSIS — N40.0 BENIGN PROSTATIC HYPERPLASIA, UNSPECIFIED WHETHER LOWER URINARY TRACT SYMPTOMS PRESENT: ICD-10-CM

## 2022-03-25 PROBLEM — N52.9 ERECTILE DYSFUNCTION: Status: ACTIVE | Noted: 2022-03-25

## 2022-03-25 RX ORDER — TAMSULOSIN HYDROCHLORIDE 0.4 MG/1
1 CAPSULE ORAL DAILY
Qty: 180 CAPSULE | Refills: 1 | Status: SHIPPED | OUTPATIENT
Start: 2022-03-25 | End: 2022-05-06 | Stop reason: SDUPTHER

## 2022-03-25 RX ORDER — BISOPROLOL FUMARATE 5 MG/1
TABLET, FILM COATED ORAL
Qty: 150 TABLET | Refills: 0 | Status: SHIPPED | OUTPATIENT
Start: 2022-03-25 | End: 2022-08-29

## 2022-03-25 NOTE — TELEPHONE ENCOUNTER
Incoming Refill Request      Medication requested (name and dose): tamsulosin (FLOMAX) 0.4 MG capsule 24 hr capsule     Pharmacy where request should be sent: [facility]    Additional details provided by patient: Pt saw Dr. Moon yesterday and he advised tahat pt increase dose to 0.4 mg 2x a day.     Best call back number: [phone]    Does the patient have less than a 3 day supply:  [x] Yes  [] No    Ailyn Putnam Rep  03/25/22, 08:53 CDT

## 2022-03-28 NOTE — PROGRESS NOTES
Chief Complaint   Patient presents with   • Colonoscopy     Had colon 5 years ago in [place]       PCP: Katerine Mcclain APRN  REFER: Katerine Mcclain*    Subjective     HPI    Justin Robledo is a 76 y.o. male who presents to office for preventative maintenance.  There is  a personal history of colon polyps (per patient, no records to review).  There is not a history of colon cancer.  He does not have complaints of nausea/vomiting, change in bowels, weight loss, no BRBPR, no melena.  There is not a family history of colon cancer in first degree relative.  There is not a family history of colon polyps in first degree relative.  His last colonoscopy-5 years ago, [place] .  Bowels do move on regular basis.      Past Medical History:   Diagnosis Date   • Arthritis    • CAD (coronary artery disease)    • Chronic back pain    • Chronic knee pain     LEFT KNEE   • Class 1 obesity due to excess calories with serious comorbidity and body mass index (BMI) of 32.0 to 32.9 in adult 1/20/2020   • COPD (chronic obstructive pulmonary disease) ([facility])    • Coronary artery disease involving autologous vein coronary bypass graft without angina pectoris 1/20/2020   • Elevated cholesterol    • Essential hypertension 10/14/2019   • GERD (gastroesophageal reflux disease)    • Hyperlipidemia    • Hypertension    • Left bundle branch block    • Numbness of right hand    • Obstructive sleep apnea 1/20/2020   • Sleep apnea     CPAP     Past Surgical History:   Procedure Laterality Date   • BACK SURGERY     • CARPAL TUNNEL RELEASE Right 9/29/2021    Procedure: CARPAL TUNNEL RELEASE right;  Surgeon: Luis Perdomo MD;  Location: [facility];  Service: Neurosurgery;  Laterality: Right;   • COLONOSCOPY     • JOINT REPLACEMENT      LEFT KNEE   • KNEE MENISCAL REPAIR Left 2018   • ROTATOR CUFF REPAIR Right 2000   • SHOULDER SURGERY     • SPINE SURGERY  1981    lower back ruptured disc   • TOTAL SHOULDER ARTHROPLASTY W/ DISTAL  CLAVICLE EXCISION Right 1/14/2020    Procedure: RIGHT REVERSE TOTAL SHOULDER  ARTHROPLASTY;  Surgeon: Suman Ventura MD;  Location: [facility];  Service: Orthopedics     Outpatient Medications Marked as Taking for the 3/29/22 encounter (Office Visit) with Gwyn Juarez APRN   Medication Sig Dispense Refill   • acetaminophen (TYLENOL) 650 MG 8 hr tablet Take 650 mg by mouth Every 8 (Eight) Hours As Needed for Moderate Pain .     • albuterol sulfate  (90 Base) MCG/ACT inhaler Inhale 2 puffs Every 4 (Four) Hours As Needed for Wheezing. 18 g 6   • amLODIPine (NORVASC) 5 MG tablet Take 1 tablet by mouth Daily for 90 days. 90 tablet 0   • aspirin 81 MG EC tablet Take 81 mg by mouth Daily.     • azelastine (ASTELIN) 0.1 % nasal spray 2 sprays into the nostril(s) as directed by provider 2 (Two) Times a Day. Use in each nostril as directed 30 mL 12   • bisoprolol (ZEBeta) 5 MG tablet TAKE ONE TABLET BY MOUTH EVERY  tablet 0   • Budeson-Glycopyrrol-Formoterol (BREZTRI) 160-9-4.8 MCG/ACT aerosol inhaler Inhale 2 puffs 2 (Two) Times a Day. 10.7 g 11   • calcium carbonate (OS-TASHIA) 600 MG tablet Take 600 mg by mouth 2 (Two) Times a Day.     • Cholecalciferol (VITAMIN D3) 1000 units capsule Take 2,000 Units by mouth Daily.     • coenzyme Q10 100 MG capsule Take 100 mg by mouth Daily.     • Collagen-Boron-Hyaluronic Acid (Move Free [employer] [facility]) 10-5-3.3 MG tablet Take 1 tablet by mouth.     • fluticasone (FLONASE) 50 MCG/ACT nasal spray 2 sprays into the nostril(s) as directed by provider Daily.     • gabapentin (NEURONTIN) 300 MG capsule Take 1 capsule by mouth 4 (Four) Times a Day. 120 capsule 3   • Garlic 1000 MG capsule Take 1,000 mg by mouth Daily.     • HYDROcodone-acetaminophen (NORCO) 7.5-325 MG per tablet Take 1 tablet by mouth Every 8 (Eight) Hours As Needed for Moderate Pain . 30 tablet 0   • ibuprofen (ADVIL,MOTRIN) 800 MG tablet Take 800 mg by mouth Every 6 (Six) Hours As Needed for  Mild Pain .     • Krill Oil 300 MG capsule Take 300 mg by mouth Daily.     • lisinopril (PRINIVIL,ZESTRIL) 10 MG tablet Take 1 tablet by mouth Daily for 90 days. 90 tablet 0   • Multiple Vitamins-Minerals (OCUVITE ADULT 50+ PO) Take 1 tablet by mouth Daily.     • omeprazole (priLOSEC) 20 MG capsule Take 20 mg by mouth Daily.     • simvastatin (ZOCOR) 40 MG tablet TAKE 1 TABLET BY MOUTH EVERYDAY AT BEDTIME 90 tablet 1   • Spiriva Respimat 2.5 MCG/ACT aerosol solution inhaler INHALE 2 PUFFS BY MOUTH DAILY 1 each 11   • SUPER B COMPLEX/C PO Take 1 tablet by mouth Every Night.     • tamsulosin (FLOMAX) 0.4 MG capsule 24 hr capsule Take 1 capsule by mouth Daily. (Patient taking differently: Take 1 capsule by mouth 2 (Two) Times a Day.) 180 capsule 1   • TURMERIC PO Take 1,000 mg by mouth Daily.     • vitamin C (ASCORBIC ACID) 500 MG tablet Take 500 mg by mouth Daily.       No Known Allergies  Social History     Socioeconomic History   • Marital status:    Tobacco Use   • Smoking status: Former Smoker     Packs/day: 0.00     Years: 50.00     Pack years: 0.00     Types: Pipe     Quit date:      Years since quittin.2   • Smokeless tobacco: Never Used   Vaping Use   • Vaping Use: Never used   Substance and Sexual Activity   • Alcohol use: Yes     Alcohol/week: 5.0 standard drinks     Types: 5 Cans of beer per week     Comment: daily   • Drug use: No   • Sexual activity: Defer     Review of Systems   Constitutional: Negative for unexpected weight change.   Respiratory: Negative for shortness of breath.    Cardiovascular: Negative for chest pain.   Gastrointestinal: Negative for abdominal pain and anal bleeding.     Objective   Vitals:    22 1242   BP: 140/80   Pulse: 65   Temp: 97 °F (36.1 °C)   SpO2: 96%     Physical Exam  Constitutional:       Appearance: Normal appearance. He is well-developed.   Eyes:      General: No scleral icterus.  Cardiovascular:      Rate and Rhythm: Regular rhythm.      Heart  sounds: Normal heart sounds. No murmur heard.  Pulmonary:      Effort: Pulmonary effort is normal. No accessory muscle usage.      Breath sounds: Normal breath sounds.   Abdominal:      General: Bowel sounds are normal. There is no distension.      Palpations: Abdomen is soft. There is no mass.      Tenderness: There is no abdominal tenderness. There is no guarding or rebound.   Skin:     General: Skin is warm and dry.      Coloration: Skin is not jaundiced.   Neurological:      Mental Status: He is alert.   Psychiatric:         Behavior: Behavior is cooperative.       Imaging Results (Most Recent)     None        Body mass index is 31.96 kg/m².    Assessment/Plan   Diagnoses and all orders for this visit:    1. History of colon polyps (Primary)  -     Case Request; Standing  -     Case Request      COLONOSCOPY WITH ANESTHESIA (N/A)    Miralax prep     Patient is to continue all blood pressure and cardiac medications prior to procedure and has been advised to take medications morning of procedure   Pt verbalized understanding       Advised pt to stop use of NSAIDs, Fish Oil, and MV 5 days prior to procedure, per Dr Ribera protocol.  Tylenol based products are ok to take.  Pt verbalized understanding.     All risks, benefits, alternatives, and indications of colonoscopy procedure have been discussed with the patient. Risks to include perforation of the colon requiring possible surgery or colostomy, risk of bleeding from biopsies or removal of colon tissue, possibility of missing a colon polyp or cancer, or adverse drug reaction.  Benefits to include the diagnosis and management of disease of the colon and rectum. Alternatives to include barium enema, radiographic evaluation, lab testing or no intervention. He verbalizes understanding and agrees.         Gwyn Juarez, APRN  03/31/22        There are no Patient Instructions on file for this visit.

## 2022-03-29 ENCOUNTER — OFFICE VISIT (OUTPATIENT)
Dept: GASTROENTEROLOGY | Facility: CLINIC | Age: 77
End: 2022-03-29

## 2022-03-29 VITALS
WEIGHT: 198 LBS | BODY MASS INDEX: 31.82 KG/M2 | SYSTOLIC BLOOD PRESSURE: 140 MMHG | DIASTOLIC BLOOD PRESSURE: 80 MMHG | TEMPERATURE: 97 F | HEART RATE: 65 BPM | OXYGEN SATURATION: 96 % | HEIGHT: 66 IN

## 2022-03-29 DIAGNOSIS — Z86.010 HISTORY OF COLON POLYPS: Primary | ICD-10-CM

## 2022-03-29 PROCEDURE — S0285 CNSLT BEFORE SCREEN COLONOSC: HCPCS | Performed by: NURSE PRACTITIONER

## 2022-03-30 PROBLEM — Z86.0100 HISTORY OF COLON POLYPS: Status: ACTIVE | Noted: 2022-03-30

## 2022-03-30 PROBLEM — Z86.010 HISTORY OF COLON POLYPS: Status: ACTIVE | Noted: 2022-03-30

## 2022-04-11 DIAGNOSIS — J44.9 COPD, GROUP B, BY GOLD 2017 CLASSIFICATION: Primary | ICD-10-CM

## 2022-04-11 RX ORDER — ALBUTEROL SULFATE 90 UG/1
2 AEROSOL, METERED RESPIRATORY (INHALATION) EVERY 4 HOURS PRN
Qty: 18 G | Refills: 6 | Status: SHIPPED | OUTPATIENT
Start: 2022-04-11

## 2022-04-11 NOTE — TELEPHONE ENCOUNTER
Rx Refill Note  Requested Prescriptions     Pending Prescriptions Disp Refills   • albuterol sulfate  (90 Base) MCG/ACT inhaler 18 g 6     Sig: Inhale 2 puffs Every 4 (Four) Hours As Needed for Wheezing.      Last office visit with prescribing clinician: 3/7/2022      Next office visit with prescribing clinician: 10/17/2022            Ailyn Ayala Rep  04/11/22, 09:03 CDT

## 2022-04-11 NOTE — TELEPHONE ENCOUNTER
----- Message from Justin Robledo sent at 4/10/2022  7:33 PM CDT -----  Regarding: Refill request  I need a new prescription for my rescue inhaler Albuteral Sulfate HFA sent to [facility] in [facility]   Thank you

## 2022-04-13 ENCOUNTER — ANESTHESIA EVENT (OUTPATIENT)
Dept: GASTROENTEROLOGY | Facility: HOSPITAL | Age: 77
End: 2022-04-13

## 2022-04-13 ENCOUNTER — ANESTHESIA (OUTPATIENT)
Dept: GASTROENTEROLOGY | Facility: HOSPITAL | Age: 77
End: 2022-04-13

## 2022-04-13 ENCOUNTER — HOSPITAL ENCOUNTER (OUTPATIENT)
Facility: HOSPITAL | Age: 77
Setting detail: HOSPITAL OUTPATIENT SURGERY
Discharge: HOME | End: 2022-04-13
Attending: INTERNAL MEDICINE | Admitting: INTERNAL MEDICINE

## 2022-04-13 VITALS
TEMPERATURE: 97.1 F | HEIGHT: 66 IN | RESPIRATION RATE: 16 BRPM | OXYGEN SATURATION: 96 % | WEIGHT: 192 LBS | HEART RATE: 64 BPM | DIASTOLIC BLOOD PRESSURE: 80 MMHG | BODY MASS INDEX: 30.86 KG/M2 | SYSTOLIC BLOOD PRESSURE: 148 MMHG

## 2022-04-13 DIAGNOSIS — Z86.010 HISTORY OF COLON POLYPS: ICD-10-CM

## 2022-04-13 PROCEDURE — 25010000002 PROPOFOL 10 MG/ML EMULSION

## 2022-04-13 PROCEDURE — 45385 COLONOSCOPY W/LESION REMOVAL: CPT | Performed by: INTERNAL MEDICINE

## 2022-04-13 RX ORDER — PROPOFOL 10 MG/ML
VIAL (ML) INTRAVENOUS AS NEEDED
Status: DISCONTINUED | OUTPATIENT
Start: 2022-04-13 | End: 2022-04-13 | Stop reason: SURG

## 2022-04-13 RX ORDER — LIDOCAINE HYDROCHLORIDE 20 MG/ML
INJECTION, SOLUTION EPIDURAL; INFILTRATION; INTRACAUDAL; PERINEURAL AS NEEDED
Status: DISCONTINUED | OUTPATIENT
Start: 2022-04-13 | End: 2022-04-13 | Stop reason: SURG

## 2022-04-13 RX ORDER — SODIUM CHLORIDE 0.9 % (FLUSH) 0.9 %
10 SYRINGE (ML) INJECTION AS NEEDED
Status: DISCONTINUED | OUTPATIENT
Start: 2022-04-13 | End: 2022-04-13 | Stop reason: HOSPADM

## 2022-04-13 RX ORDER — BUDESONIDE AND FORMOTEROL FUMARATE DIHYDRATE 160; 4.5 UG/1; UG/1
2 AEROSOL RESPIRATORY (INHALATION)
COMMUNITY
End: 2022-04-20 | Stop reason: SDUPTHER

## 2022-04-13 RX ORDER — SODIUM CHLORIDE 9 MG/ML
500 INJECTION, SOLUTION INTRAVENOUS CONTINUOUS PRN
Status: DISCONTINUED | OUTPATIENT
Start: 2022-04-13 | End: 2022-04-13 | Stop reason: HOSPADM

## 2022-04-13 RX ORDER — LIDOCAINE HYDROCHLORIDE 10 MG/ML
0.5 INJECTION, SOLUTION EPIDURAL; INFILTRATION; INTRACAUDAL; PERINEURAL ONCE AS NEEDED
Status: DISCONTINUED | OUTPATIENT
Start: 2022-04-13 | End: 2022-04-13 | Stop reason: HOSPADM

## 2022-04-13 RX ADMIN — PROPOFOL 150 MG: 10 INJECTION, EMULSION INTRAVENOUS at 09:12

## 2022-04-13 RX ADMIN — LIDOCAINE HYDROCHLORIDE 100 MG: 20 INJECTION, SOLUTION EPIDURAL; INFILTRATION; INTRACAUDAL; PERINEURAL at 09:12

## 2022-04-13 RX ADMIN — SODIUM CHLORIDE 500 ML: 9 INJECTION, SOLUTION INTRAVENOUS at 08:15

## 2022-04-13 NOTE — ANESTHESIA PREPROCEDURE EVALUATION
Anesthesia Evaluation     Patient summary reviewed   no history of anesthetic complications:  NPO Solid Status: > 8 hours  NPO Liquid Status: > 2 hours           Airway   Mallampati: II  TM distance: >3 FB  Neck ROM: full  No difficulty expected  Dental - normal exam     Pulmonary    (+) a smoker Former, COPD, sleep apnea on CPAP,   Cardiovascular   Exercise tolerance: good (4-7 METS)    ECG reviewed    (+) hypertension, hyperlipidemia,   (-) past MI, CAD, angina, CHF, cardiac stents      Neuro/Psych  (-) seizures, TIA, CVA  GI/Hepatic/Renal/Endo    (+) obesity,  GERD,    (-) liver disease, no renal disease, diabetes    Musculoskeletal     Abdominal    Substance History      OB/GYN          Other                          Anesthesia Plan    ASA 2     MAC     intravenous induction     Anesthetic plan, all risks, benefits, and alternatives have been provided, discussed and informed consent has been obtained with: patient.

## 2022-04-13 NOTE — ANESTHESIA POSTPROCEDURE EVALUATION
"Patient: Justin Robledo    Procedure Summary     Date: 04/13/22 Room / Location: [facility] ENDOSCOPY 6 / [facility]    Anesthesia Start: 0906 Anesthesia Stop: 0926    Procedure: COLONOSCOPY WITH ANESTHESIA (N/A ) Diagnosis:       History of colon polyps      (History of colon polyps [Z86.010])    Surgeons: Cortes Ribera DO Provider: Aayush Gilmore CRNA    Anesthesia Type: MAC ASA Status: 2          Anesthesia Type: MAC    Vitals  Vitals Value Taken Time   /80 04/13/22 0941   Temp     Pulse 63 04/13/22 0941   Resp 16 04/13/22 0940   SpO2 96 % 04/13/22 0940   Vitals shown include unvalidated device data.        Post Anesthesia Care and Evaluation    Patient location during evaluation: PHASE II  Patient participation: complete - patient participated  Level of consciousness: awake  Pain score: 0  Pain management: adequate  Airway patency: patent  Anesthetic complications: No anesthetic complications  PONV Status: none  Cardiovascular status: acceptable  Respiratory status: acceptable  Hydration status: acceptable    Comments: /80   Pulse 64   Temp 97.1 °F (36.2 °C) (Temporal)   Resp 16   Ht 167.6 cm (66\")   Wt 87.1 kg (192 lb)   SpO2 96%   BMI 30.99 kg/m²         "

## 2022-04-14 ENCOUNTER — TELEPHONE (OUTPATIENT)
Dept: GASTROENTEROLOGY | Facility: CLINIC | Age: 77
End: 2022-04-14

## 2022-04-20 DIAGNOSIS — J43.2 CENTRILOBULAR EMPHYSEMA: ICD-10-CM

## 2022-04-20 DIAGNOSIS — J44.9 COPD, GROUP B, BY GOLD 2017 CLASSIFICATION: Primary | ICD-10-CM

## 2022-04-20 RX ORDER — BUDESONIDE AND FORMOTEROL FUMARATE DIHYDRATE 160; 4.5 UG/1; UG/1
2 AEROSOL RESPIRATORY (INHALATION)
Qty: 10.2 G | Refills: 11 | Status: SHIPPED | OUTPATIENT
Start: 2022-04-20 | End: 2023-01-23 | Stop reason: SDUPTHER

## 2022-04-20 NOTE — TELEPHONE ENCOUNTER
Patient's wife is requesting refills on his Symbicort. She states he is no longer on the Breztri. It was too expensive and went back to Spiriva and Symbicort.   Rx Refill Note  Requested Prescriptions     Pending Prescriptions Disp Refills   • budesonide-formoterol (SYMBICORT) 160-4.5 MCG/ACT inhaler 10.2 g 11     Sig: Inhale 2 puffs 2 (Two) Times a Day.      Last office visit with prescribing clinician: 3/7/2022      Next office visit with prescribing clinician: 10/17/2022            Dax Gooden CMA  04/20/22, 11:25 CDT

## 2022-04-25 ENCOUNTER — PRE-ADMISSION TESTING (OUTPATIENT)
Dept: PREADMISSION TESTING | Facility: HOSPITAL | Age: 77
End: 2022-04-25

## 2022-04-25 ENCOUNTER — LAB (OUTPATIENT)
Dept: LAB | Facility: HOSPITAL | Age: 77
End: 2022-04-25

## 2022-04-25 VITALS
BODY MASS INDEX: 31.64 KG/M2 | SYSTOLIC BLOOD PRESSURE: 138 MMHG | DIASTOLIC BLOOD PRESSURE: 67 MMHG | WEIGHT: 196.87 LBS | HEIGHT: 66 IN | RESPIRATION RATE: 18 BRPM | HEART RATE: 73 BPM | OXYGEN SATURATION: 95 %

## 2022-04-25 DIAGNOSIS — N52.9 ERECTILE DYSFUNCTION, UNSPECIFIED ERECTILE DYSFUNCTION TYPE: ICD-10-CM

## 2022-04-25 LAB — SARS-COV-2 ORF1AB RESP QL NAA+PROBE: NOT DETECTED

## 2022-04-25 PROCEDURE — C9803 HOPD COVID-19 SPEC COLLECT: HCPCS

## 2022-04-25 PROCEDURE — 85027 COMPLETE CBC AUTOMATED: CPT | Performed by: UROLOGY

## 2022-04-25 PROCEDURE — 93010 ELECTROCARDIOGRAM REPORT: CPT | Performed by: EMERGENCY MEDICINE

## 2022-04-25 PROCEDURE — 93005 ELECTROCARDIOGRAM TRACING: CPT | Performed by: UROLOGY

## 2022-04-25 PROCEDURE — 80048 BASIC METABOLIC PNL TOTAL CA: CPT | Performed by: UROLOGY

## 2022-04-25 PROCEDURE — U0004 COV-19 TEST NON-CDC HGH THRU: HCPCS

## 2022-04-25 RX ORDER — AMLODIPINE BESYLATE 5 MG/1
5 TABLET ORAL DAILY
COMMUNITY
End: 2022-06-03

## 2022-04-25 RX ORDER — IBUPROFEN 200 MG
400 TABLET ORAL EVERY 6 HOURS PRN
COMMUNITY

## 2022-04-26 LAB
QT INTERVAL: 458 MS
QTC INTERVAL: 480 MS

## 2022-04-26 PROCEDURE — S0260 H&P FOR SURGERY: HCPCS | Performed by: UROLOGY

## 2022-04-27 ENCOUNTER — ANESTHESIA (OUTPATIENT)
Dept: PERIOP | Facility: HOSPITAL | Age: 77
End: 2022-04-27

## 2022-04-27 ENCOUNTER — ANESTHESIA EVENT (OUTPATIENT)
Dept: PERIOP | Facility: HOSPITAL | Age: 77
End: 2022-04-27

## 2022-04-27 ENCOUNTER — HOSPITAL ENCOUNTER (OUTPATIENT)
Facility: HOSPITAL | Age: 77
Setting detail: HOSPITAL OUTPATIENT SURGERY
Discharge: HOME | End: 2022-04-27
Attending: UROLOGY | Admitting: UROLOGY

## 2022-04-27 VITALS
TEMPERATURE: 97.2 F | HEART RATE: 66 BPM | DIASTOLIC BLOOD PRESSURE: 77 MMHG | SYSTOLIC BLOOD PRESSURE: 154 MMHG | RESPIRATION RATE: 16 BRPM | OXYGEN SATURATION: 96 %

## 2022-04-27 DIAGNOSIS — N52.9 ERECTILE DYSFUNCTION, UNSPECIFIED ERECTILE DYSFUNCTION TYPE: ICD-10-CM

## 2022-04-27 PROCEDURE — 25010000002 VANCOMYCIN 1 G RECONSTITUTED SOLUTION 1 EACH VIAL: Performed by: UROLOGY

## 2022-04-27 PROCEDURE — 25010000002 CEFAZOLIN PER 500 MG: Performed by: NURSE ANESTHETIST, CERTIFIED REGISTERED

## 2022-04-27 PROCEDURE — 25010000002 FENTANYL CITRATE (PF) 100 MCG/2ML SOLUTION: Performed by: NURSE ANESTHETIST, CERTIFIED REGISTERED

## 2022-04-27 PROCEDURE — 25010000002 GENTAMICIN PER 80 MG: Performed by: UROLOGY

## 2022-04-27 PROCEDURE — 54400 INSERT SEMI-RIGID PROSTHESIS: CPT | Performed by: UROLOGY

## 2022-04-27 PROCEDURE — 25010000002 PROPOFOL 10 MG/ML EMULSION: Performed by: NURSE ANESTHETIST, CERTIFIED REGISTERED

## 2022-04-27 PROCEDURE — 25010000002 ONDANSETRON PER 1 MG: Performed by: NURSE ANESTHETIST, CERTIFIED REGISTERED

## 2022-04-27 PROCEDURE — C1813 PROSTHESIS, PENILE, INFLATAB: HCPCS | Performed by: UROLOGY

## 2022-04-27 DEVICE — MALLEABLE PENILE PROSTHESIS
Type: IMPLANTABLE DEVICE | Site: PENIS | Status: FUNCTIONAL
Brand: GENESIS

## 2022-04-27 RX ORDER — LIDOCAINE HYDROCHLORIDE 10 MG/ML
0.5 INJECTION, SOLUTION EPIDURAL; INFILTRATION; INTRACAUDAL; PERINEURAL ONCE AS NEEDED
Status: DISCONTINUED | OUTPATIENT
Start: 2022-04-27 | End: 2022-04-27 | Stop reason: HOSPADM

## 2022-04-27 RX ORDER — OXYCODONE AND ACETAMINOPHEN 7.5; 325 MG/1; MG/1
2 TABLET ORAL EVERY 4 HOURS PRN
Status: DISCONTINUED | OUTPATIENT
Start: 2022-04-27 | End: 2022-04-27 | Stop reason: HOSPADM

## 2022-04-27 RX ORDER — SODIUM CHLORIDE 0.9 % (FLUSH) 0.9 %
3-10 SYRINGE (ML) INJECTION AS NEEDED
Status: DISCONTINUED | OUTPATIENT
Start: 2022-04-27 | End: 2022-04-27 | Stop reason: HOSPADM

## 2022-04-27 RX ORDER — NALOXONE HCL 0.4 MG/ML
0.4 VIAL (ML) INJECTION AS NEEDED
Status: DISCONTINUED | OUTPATIENT
Start: 2022-04-27 | End: 2022-04-27 | Stop reason: HOSPADM

## 2022-04-27 RX ORDER — ONDANSETRON 4 MG/1
4 TABLET, ORALLY DISINTEGRATING ORAL EVERY 6 HOURS PRN
Qty: 6 TABLET | Refills: 1 | Status: SHIPPED | OUTPATIENT
Start: 2022-04-27

## 2022-04-27 RX ORDER — DOCUSATE SODIUM 100 MG/1
100 CAPSULE, LIQUID FILLED ORAL 2 TIMES DAILY
Qty: 60 CAPSULE | Refills: 1 | Status: SHIPPED | OUTPATIENT
Start: 2022-04-27

## 2022-04-27 RX ORDER — LABETALOL HYDROCHLORIDE 5 MG/ML
5 INJECTION, SOLUTION INTRAVENOUS
Status: DISCONTINUED | OUTPATIENT
Start: 2022-04-27 | End: 2022-04-27 | Stop reason: HOSPADM

## 2022-04-27 RX ORDER — PHENYLEPHRINE HCL IN 0.9% NACL 1 MG/10 ML
SYRINGE (ML) INTRAVENOUS AS NEEDED
Status: DISCONTINUED | OUTPATIENT
Start: 2022-04-27 | End: 2022-04-27 | Stop reason: SURG

## 2022-04-27 RX ORDER — ONDANSETRON 2 MG/ML
4 INJECTION INTRAMUSCULAR; INTRAVENOUS ONCE AS NEEDED
Status: DISCONTINUED | OUTPATIENT
Start: 2022-04-27 | End: 2022-04-27 | Stop reason: HOSPADM

## 2022-04-27 RX ORDER — SODIUM CHLORIDE, SODIUM LACTATE, POTASSIUM CHLORIDE, CALCIUM CHLORIDE 600; 310; 30; 20 MG/100ML; MG/100ML; MG/100ML; MG/100ML
1000 INJECTION, SOLUTION INTRAVENOUS CONTINUOUS
Status: DISCONTINUED | OUTPATIENT
Start: 2022-04-27 | End: 2022-04-27 | Stop reason: HOSPADM

## 2022-04-27 RX ORDER — NAPROXEN 250 MG/1
250 TABLET ORAL 2 TIMES DAILY WITH MEALS
Qty: 60 TABLET | Refills: 0 | Status: SHIPPED | OUTPATIENT
Start: 2022-04-27

## 2022-04-27 RX ORDER — LIDOCAINE HYDROCHLORIDE 20 MG/ML
INJECTION, SOLUTION EPIDURAL; INFILTRATION; INTRACAUDAL; PERINEURAL AS NEEDED
Status: DISCONTINUED | OUTPATIENT
Start: 2022-04-27 | End: 2022-04-27 | Stop reason: SURG

## 2022-04-27 RX ORDER — HYDROCODONE BITARTRATE AND ACETAMINOPHEN 5; 325 MG/1; MG/1
1 TABLET ORAL EVERY 4 HOURS PRN
Qty: 18 TABLET | Refills: 0 | Status: SHIPPED | OUTPATIENT
Start: 2022-04-27

## 2022-04-27 RX ORDER — PROPOFOL 10 MG/ML
VIAL (ML) INTRAVENOUS AS NEEDED
Status: DISCONTINUED | OUTPATIENT
Start: 2022-04-27 | End: 2022-04-27 | Stop reason: SURG

## 2022-04-27 RX ORDER — DROPERIDOL 2.5 MG/ML
0.62 INJECTION, SOLUTION INTRAMUSCULAR; INTRAVENOUS ONCE AS NEEDED
Status: DISCONTINUED | OUTPATIENT
Start: 2022-04-27 | End: 2022-04-27 | Stop reason: HOSPADM

## 2022-04-27 RX ORDER — FENTANYL CITRATE 50 UG/ML
INJECTION, SOLUTION INTRAMUSCULAR; INTRAVENOUS AS NEEDED
Status: DISCONTINUED | OUTPATIENT
Start: 2022-04-27 | End: 2022-04-27 | Stop reason: SURG

## 2022-04-27 RX ORDER — SULFAMETHOXAZOLE AND TRIMETHOPRIM 800; 160 MG/1; MG/1
1 TABLET ORAL 2 TIMES DAILY
Qty: 28 TABLET | Refills: 0 | Status: SHIPPED | OUTPATIENT
Start: 2022-04-27 | End: 2022-05-11

## 2022-04-27 RX ORDER — SODIUM CHLORIDE 0.9 % (FLUSH) 0.9 %
3 SYRINGE (ML) INJECTION EVERY 12 HOURS SCHEDULED
Status: DISCONTINUED | OUTPATIENT
Start: 2022-04-27 | End: 2022-04-27 | Stop reason: HOSPADM

## 2022-04-27 RX ORDER — NEOSTIGMINE METHYLSULFATE 5 MG/5 ML
SYRINGE (ML) INTRAVENOUS AS NEEDED
Status: DISCONTINUED | OUTPATIENT
Start: 2022-04-27 | End: 2022-04-27 | Stop reason: SURG

## 2022-04-27 RX ORDER — FENTANYL CITRATE 50 UG/ML
25 INJECTION, SOLUTION INTRAMUSCULAR; INTRAVENOUS
Status: DISCONTINUED | OUTPATIENT
Start: 2022-04-27 | End: 2022-04-27 | Stop reason: HOSPADM

## 2022-04-27 RX ORDER — SODIUM CHLORIDE, SODIUM LACTATE, POTASSIUM CHLORIDE, CALCIUM CHLORIDE 600; 310; 30; 20 MG/100ML; MG/100ML; MG/100ML; MG/100ML
100 INJECTION, SOLUTION INTRAVENOUS CONTINUOUS
Status: DISCONTINUED | OUTPATIENT
Start: 2022-04-27 | End: 2022-04-27 | Stop reason: HOSPADM

## 2022-04-27 RX ORDER — IBUPROFEN 600 MG/1
600 TABLET ORAL ONCE AS NEEDED
Status: DISCONTINUED | OUTPATIENT
Start: 2022-04-27 | End: 2022-04-27 | Stop reason: HOSPADM

## 2022-04-27 RX ORDER — ONDANSETRON 2 MG/ML
INJECTION INTRAMUSCULAR; INTRAVENOUS AS NEEDED
Status: DISCONTINUED | OUTPATIENT
Start: 2022-04-27 | End: 2022-04-27 | Stop reason: SURG

## 2022-04-27 RX ORDER — CEFAZOLIN SODIUM 1 G/3ML
INJECTION, POWDER, FOR SOLUTION INTRAMUSCULAR; INTRAVENOUS AS NEEDED
Status: DISCONTINUED | OUTPATIENT
Start: 2022-04-27 | End: 2022-04-27 | Stop reason: SURG

## 2022-04-27 RX ORDER — OXYCODONE AND ACETAMINOPHEN 10; 325 MG/1; MG/1
1 TABLET ORAL ONCE AS NEEDED
Status: DISCONTINUED | OUTPATIENT
Start: 2022-04-27 | End: 2022-04-27 | Stop reason: HOSPADM

## 2022-04-27 RX ORDER — ACETAMINOPHEN 500 MG
1000 TABLET ORAL ONCE
Status: COMPLETED | OUTPATIENT
Start: 2022-04-27 | End: 2022-04-27

## 2022-04-27 RX ORDER — BACITRACIN ZINC 500 [USP'U]/G
OINTMENT TOPICAL AS NEEDED
Status: DISCONTINUED | OUTPATIENT
Start: 2022-04-27 | End: 2022-04-27 | Stop reason: HOSPADM

## 2022-04-27 RX ORDER — ROCURONIUM BROMIDE 10 MG/ML
INJECTION, SOLUTION INTRAVENOUS AS NEEDED
Status: DISCONTINUED | OUTPATIENT
Start: 2022-04-27 | End: 2022-04-27 | Stop reason: SURG

## 2022-04-27 RX ORDER — SODIUM CHLORIDE 0.9 % (FLUSH) 0.9 %
3 SYRINGE (ML) INJECTION AS NEEDED
Status: DISCONTINUED | OUTPATIENT
Start: 2022-04-27 | End: 2022-04-27 | Stop reason: HOSPADM

## 2022-04-27 RX ORDER — HYDROCODONE BITARTRATE AND ACETAMINOPHEN 5; 325 MG/1; MG/1
1 TABLET ORAL ONCE AS NEEDED
Status: DISCONTINUED | OUTPATIENT
Start: 2022-04-27 | End: 2022-04-27 | Stop reason: HOSPADM

## 2022-04-27 RX ORDER — MAGNESIUM HYDROXIDE 1200 MG/15ML
LIQUID ORAL AS NEEDED
Status: DISCONTINUED | OUTPATIENT
Start: 2022-04-27 | End: 2022-04-27 | Stop reason: HOSPADM

## 2022-04-27 RX ORDER — BUPIVACAINE HYDROCHLORIDE 5 MG/ML
INJECTION, SOLUTION PERINEURAL AS NEEDED
Status: DISCONTINUED | OUTPATIENT
Start: 2022-04-27 | End: 2022-04-27 | Stop reason: HOSPADM

## 2022-04-27 RX ORDER — FLUMAZENIL 0.1 MG/ML
0.2 INJECTION INTRAVENOUS AS NEEDED
Status: DISCONTINUED | OUTPATIENT
Start: 2022-04-27 | End: 2022-04-27 | Stop reason: HOSPADM

## 2022-04-27 RX ADMIN — PROPOFOL 150 MG: 10 INJECTION, EMULSION INTRAVENOUS at 07:11

## 2022-04-27 RX ADMIN — GENTAMICIN SULFATE 450 MG: 40 INJECTION, SOLUTION INTRAMUSCULAR; INTRAVENOUS at 06:32

## 2022-04-27 RX ADMIN — Medication 100 MCG: at 07:46

## 2022-04-27 RX ADMIN — ROCURONIUM BROMIDE 30 MG: 10 SOLUTION INTRAVENOUS at 07:11

## 2022-04-27 RX ADMIN — GLYCOPYRROLATE 0.4 MG: 0.2 INJECTION, SOLUTION INTRAMUSCULAR; INTRAVENOUS at 08:01

## 2022-04-27 RX ADMIN — Medication 100 MCG: at 07:57

## 2022-04-27 RX ADMIN — Medication 3 MG: at 08:01

## 2022-04-27 RX ADMIN — Medication 100 MCG: at 07:37

## 2022-04-27 RX ADMIN — Medication 100 MCG: at 07:24

## 2022-04-27 RX ADMIN — SODIUM CHLORIDE, POTASSIUM CHLORIDE, SODIUM LACTATE AND CALCIUM CHLORIDE 1000 ML: 600; 310; 30; 20 INJECTION, SOLUTION INTRAVENOUS at 05:59

## 2022-04-27 RX ADMIN — FENTANYL CITRATE 100 MCG: 50 INJECTION, SOLUTION INTRAMUSCULAR; INTRAVENOUS at 07:11

## 2022-04-27 RX ADMIN — ONDANSETRON 4 MG: 2 INJECTION INTRAMUSCULAR; INTRAVENOUS at 08:01

## 2022-04-27 RX ADMIN — ACETAMINOPHEN 1000 MG: 500 TABLET ORAL at 06:59

## 2022-04-27 RX ADMIN — CEFAZOLIN 2 G: 330 INJECTION, POWDER, FOR SOLUTION INTRAMUSCULAR; INTRAVENOUS at 07:20

## 2022-04-27 RX ADMIN — LIDOCAINE HYDROCHLORIDE 100 MG: 20 INJECTION, SOLUTION EPIDURAL; INFILTRATION; INTRACAUDAL; PERINEURAL at 07:11

## 2022-04-27 NOTE — ANESTHESIA POSTPROCEDURE EVALUATION
Patient: Justin Robledo    Procedure Summary     Date: 04/27/22 Room / Location:  [facility]  /  [facility]    Anesthesia Start: 0708 Anesthesia Stop: 0816    Procedure: MALLEABLE PENILE PROSTHESIS PLACEMENT (N/A Penis) Diagnosis:       Erectile dysfunction, unspecified erectile dysfunction type      (Erectile dysfunction, unspecified erectile dysfunction type [N52.9])    Surgeons: Ignacio Moon MD Provider: Rubia Cartagena CRNA    Anesthesia Type: general ASA Status: 2          Anesthesia Type: general    Vitals  Vitals Value Taken Time   /70 04/27/22 0846   Temp 97.2 °F (36.2 °C) 04/27/22 0835   Pulse 64 04/27/22 0848   Resp 16 04/27/22 0835   SpO2 93 % 04/27/22 0848   Vitals shown include unvalidated device data.        Post Anesthesia Care and Evaluation    Patient location during evaluation: PHASE II  Patient participation: complete - patient participated  Level of consciousness: awake and awake and alert  Pain score: 0  Pain management: adequate  Airway patency: patent  Anesthetic complications: No anesthetic complications  PONV Status: none  Cardiovascular status: acceptable  Respiratory status: acceptable  Hydration status: acceptable    Comments: Patient discharged according to acceptable Ramya score per RN assessment. See nursing records for further information.     Blood pressure 138/67, pulse 65, temperature 97.2 °F (36.2 °C), resp. rate 16, SpO2 97 %.

## 2022-04-27 NOTE — ANESTHESIA PROCEDURE NOTES
Airway  Urgency: elective    Date/Time: 4/27/2022 7:13 AM  Airway not difficult    General Information and Staff    Patient location during procedure: OR  CRNA/CAA: Rubia Cartagena CRNA    Indications and Patient Condition  Indications for airway management: airway protection    Preoxygenated: yes  Mask difficulty assessment: 2 - vent by mask + OA or adjuvant +/- NMBA    Final Airway Details  Final airway type: endotracheal airway      Successful airway: ETT  Cuffed: yes   Successful intubation technique: direct laryngoscopy  Facilitating devices/methods: Bougie  Endotracheal tube insertion site: oral  Blade: Alicea  Blade size: 2  ETT size (mm): 7.5  Cormack-Lehane Classification: grade IIb - view of arytenoids or posterior of glottis only  Placement verified by: chest auscultation and capnometry   Cuff volume (mL): 8  Measured from: teeth  ETT/EBT  to teeth (cm): 23  Number of attempts at approach: 1  Assessment: lips, teeth, and gum same as pre-op and atraumatic intubation    Additional Comments  Atraumatic

## 2022-04-27 NOTE — ANESTHESIA PREPROCEDURE EVALUATION
Anesthesia Evaluation     Patient summary reviewed   no history of anesthetic complications:  NPO Solid Status: > 8 hours  NPO Liquid Status: > 2 hours           Airway   Mallampati: II  TM distance: >3 FB  Neck ROM: full  No difficulty expected  Dental - normal exam     Pulmonary    (+) a smoker Former, COPD, sleep apnea on CPAP,   Cardiovascular   Exercise tolerance: good (4-7 METS)    ECG reviewed    (+) hypertension, hyperlipidemia,   (-) past MI, CAD, angina, CHF, cardiac stents      Neuro/Psych  (-) seizures, TIA, CVA  GI/Hepatic/Renal/Endo    (+) obesity,  GERD,    (-) liver disease, no renal disease, diabetes    Musculoskeletal     Abdominal   (+) obese,    Substance History      OB/GYN          Other   arthritis,                        Anesthesia Plan    ASA 2     general     intravenous induction     Anesthetic plan, all risks, benefits, and alternatives have been provided, discussed and informed consent has been obtained with: patient.

## 2022-05-02 NOTE — PROGRESS NOTES
Subjective    Mr. Robledo is 76 y.o. male    Chief Complaint: Post implant protrusion.  History of Present Illness  Patient is 76-year-old gentleman who presents for postop check he had penile prosthesis 04/27/2022, Dr. Moon.  He does had questions he has an area of swelling.  There is no protrusion of the prosthesis through the skin as originally suspected.  No pus or drainage.  Ventral distal portion of the penis and the area of the foreskin there is some swelling or puffiness of the skin.  No exquisite tenderness there is also bruising of the shaft glans and some of the scrotum.    The following portions of the patient's history were reviewed and updated as appropriate: allergies, current medications, past family history, past medical history, past social history, past surgical history and problem list.    Review of Systems   Constitutional: Negative for chills and fever.   Gastrointestinal: Negative for abdominal pain, anal bleeding and blood in stool.   Genitourinary: Positive for penile swelling, scrotal swelling and testicular pain. Negative for decreased urine volume, difficulty urinating, dysuria, enuresis, flank pain, frequency, genital sores, hematuria, penile discharge, penile pain and urgency.         Current Outpatient Medications:   •  acetaminophen (TYLENOL) 650 MG 8 hr tablet, Take 650 mg by mouth 2 (Two) Times a Day., Disp: , Rfl:   •  albuterol sulfate  (90 Base) MCG/ACT inhaler, Inhale 2 puffs Every 4 (Four) Hours As Needed for Wheezing., Disp: 18 g, Rfl: 6  •  amLODIPine (NORVASC) 5 MG tablet, Take 5 mg by mouth Daily., Disp: , Rfl:   •  aspirin 81 MG EC tablet, Take 81 mg by mouth Daily., Disp: , Rfl:   •  azelastine (ASTELIN) 0.1 % nasal spray, 2 sprays into the nostril(s) as directed by provider 2 (Two) Times a Day. Use in each nostril as directed, Disp: 30 mL, Rfl: 12  •  bisoprolol (ZEBeta) 5 MG tablet, TAKE ONE TABLET BY MOUTH EVERY DAY (Patient taking differently: Take 5 mg by  mouth Daily.), Disp: 150 tablet, Rfl: 0  •  budesonide-formoterol (SYMBICORT) 160-4.5 MCG/ACT inhaler, Inhale 2 puffs 2 (Two) Times a Day., Disp: 10.2 g, Rfl: 11  •  calcium carbonate (OS-TASHIA) 600 MG tablet, Take 600 mg by mouth 2 (Two) Times a Day., Disp: , Rfl:   •  Cholecalciferol (VITAMIN D3) 1000 units capsule, Take 2,000 Units by mouth Daily., Disp: , Rfl:   •  coenzyme Q10 100 MG capsule, Take 100 mg by mouth Daily., Disp: , Rfl:   •  Collagen-Boron-Hyaluronic Acid (Move Free [employer]) 10-5-3.3 MG tablet, Take 1 tablet by mouth Daily., Disp: , Rfl:   •  docusate sodium (Colace) 100 MG capsule, Take 1 capsule by mouth 2 (Two) Times a Day., Disp: 60 capsule, Rfl: 1  •  fluticasone (FLONASE) 50 MCG/ACT nasal spray, 2 sprays into the nostril(s) as directed by provider Daily., Disp: , Rfl:   •  gabapentin (NEURONTIN) 300 MG capsule, Take 1 capsule by mouth 4 (Four) Times a Day. (Patient taking differently: Take 600 mg by mouth 2 (Two) Times a Day.), Disp: 120 capsule, Rfl: 3  •  Garlic 1000 MG capsule, Take 1,000 mg by mouth Daily., Disp: , Rfl:   •  HYDROcodone-acetaminophen (NORCO) 5-325 MG per tablet, Take 1 tablet by mouth Every 4 (Four) Hours As Needed for Severe Pain  (postop pain)., Disp: 18 tablet, Rfl: 0  •  ibuprofen (ADVIL,MOTRIN) 200 MG tablet, Take 400 mg by mouth Every 6 (Six) Hours As Needed for Mild Pain ., Disp: , Rfl:   •  Krill Oil 300 MG capsule, Take 300 mg by mouth Daily., Disp: , Rfl:   •  Multiple Vitamins-Minerals (OCUVITE ADULT 50+ PO), Take 1 tablet by mouth Daily., Disp: , Rfl:   •  naproxen (NAPROSYN) 250 MG tablet, Take 1 tablet by mouth 2 (Two) Times a Day With Meals., Disp: 60 tablet, Rfl: 0  •  omeprazole (priLOSEC) 20 MG capsule, Take 20 mg by mouth Daily., Disp: , Rfl:   •  ondansetron ODT (Zofran ODT) 4 MG disintegrating tablet, Place 1 tablet on the tongue Every 6 (Six) Hours As Needed for Nausea., Disp: 6 tablet, Rfl: 1  •  simvastatin (ZOCOR) 40 MG tablet, TAKE 1  TABLET BY MOUTH EVERYDAY AT BEDTIME (Patient taking differently: Take 40 mg by mouth Every Night.), Disp: 90 tablet, Rfl: 1  •  Spiriva Respimat 2.5 MCG/ACT aerosol solution inhaler, INHALE 2 PUFFS BY MOUTH DAILY (Patient taking differently: Inhale 2 puffs Daily.), Disp: 1 each, Rfl: 11  •  sulfamethoxazole-trimethoprim (BACTRIM DS,SEPTRA DS) 800-160 MG per tablet, Take 1 tablet by mouth 2 (Two) Times a Day for 14 days., Disp: 28 tablet, Rfl: 0  •  SUPER B COMPLEX/C PO, Take 1 tablet by mouth Every Night., Disp: , Rfl:   •  tamsulosin (FLOMAX) 0.4 MG capsule 24 hr capsule, Take 1 capsule by mouth Daily. (Patient taking differently: Take 2 capsules by mouth Every Night.), Disp: 180 capsule, Rfl: 1  •  vitamin C (ASCORBIC ACID) 500 MG tablet, Take 500 mg by mouth Daily., Disp: , Rfl:   •  lisinopril (PRINIVIL,ZESTRIL) 10 MG tablet, Take 1 tablet by mouth Daily for 90 days., Disp: 90 tablet, Rfl: 0    Past Medical History:   Diagnosis Date   • Arthritis    • BPH with obstruction/lower urinary tract symptoms    • CAD (coronary artery disease)    • Chronic back pain    • Class 1 obesity due to excess calories with serious comorbidity and body mass index (BMI) of 32.0 to 32.9 in adult 01/20/2020   • COPD (chronic obstructive pulmonary disease) ([facility])    • Coronary artery disease involving autologous vein coronary bypass graft without angina pectoris 01/20/2020   • Elevated cholesterol    • Essential hypertension 10/14/2019   • GERD (gastroesophageal reflux disease)    • Hyperlipidemia    • Hypertension    • Left bundle branch block    • Obstructive sleep apnea 01/20/2020    pt uses a cpap machine at home   • Sleep apnea     CPAP       Past Surgical History:   Procedure Laterality Date   • CARPAL TUNNEL RELEASE Right 09/29/2021    Procedure: CARPAL TUNNEL RELEASE right;  Surgeon: Luis Perdomo MD;  Location: [facility];  Service: Neurosurgery;  Laterality: Right;   • COLONOSCOPY     • COLONOSCOPY N/A 04/13/2022     "Procedure: COLONOSCOPY WITH ANESTHESIA;  Surgeon: Cortes Ribera DO;  Location:  [facility];  Service: Gastroenterology;  Laterality: N/A;  preop; hx of poylps   postop; polyps   PCP Katerine Mcclain    • KNEE ARTHROPLASTY Right    • KNEE MENISCAL REPAIR Left    • PENILE PROSTHESIS IMPLANT N/A 2022    Procedure: MALLEABLE PENILE PROSTHESIS PLACEMENT;  Surgeon: Ignacio Moon MD;  Location:  [facility];  Service: Urology;  Laterality: N/A;   • ROTATOR CUFF REPAIR Right    • SPINE SURGERY  1981    lower back ruptured disc   • TOTAL SHOULDER ARTHROPLASTY W/ DISTAL CLAVICLE EXCISION Right 2020    Procedure: RIGHT REVERSE TOTAL SHOULDER  ARTHROPLASTY;  Surgeon: Suman Ventura MD;  Location:  [facility];  Service: Orthopedics       Social History     Socioeconomic History   • Marital status:    Tobacco Use   • Smoking status: Former Smoker     Years: 50.00     Types: Pipe     Quit date:      Years since quittin.3   • Smokeless tobacco: Never Used   Vaping Use   • Vaping Use: Never used   Substance and Sexual Activity   • Alcohol use: Yes     Alcohol/week: 5.0 standard drinks     Types: 5 Cans of beer per week     Comment: daily   • Drug use: No   • Sexual activity: Defer       Family History   Problem Relation Age of Onset   • Arthritis Mother    • Diabetes Mother    • Osteoporosis Mother    • Hypertension Mother    • Kidney disease Mother    • Arthritis Father    • Hypertension Father    • Stroke Father    • Diabetes Sister    • Obesity Sister    • Cancer Brother    • Colon cancer Neg Hx    • Colon polyps Neg Hx        Objective    Temp 98.6 °F (37 °C)   Ht 168.6 cm (66.38\")   Wt 86.2 kg (190 lb)   BMI 30.32 kg/m²     Physical Exam  Genitourinary:     Comments: There is some bruising associated with the glans however there is no obvious abnormality some bruising in the shaft but no swelling of the shaft scrotum incisions appear well-healed no pus drainage or evidence " of infection there is a bulbous area involving the ventral portion of the distal shaft appears fluid.            Results for orders placed or performed in visit on 04/25/22   COVID-19,APTIMA PANTHER,[facility] IN-HOUSE,NP/OP/NASAL SWAB IN UTM/VTM/SALINE/LIQUID AMIES TRANSPORT MEDIA/NP WASH OR ASPIRATE, 24 HR TAT - Swab, Nasopharynx    Specimen: Nasopharynx; Swab   Result Value Ref Range    COVID19 Not Detected Not Detected - Ref. Range     Assessment and Plan    Diagnoses and all orders for this visit:    1. Postop check (Primary)    I examined the patient and reassured her that I see nothing that requires any emergent treatment.  He has normal bruising pattern involving the shaft and some of the glans some areas on the scrotum that have surface bruising which is expected.  The area of swelling I think is just normal postop swelling and appears to be just fluid-filled not infection he can apply ice as needed elevate and he can follow-up as scheduled.

## 2022-05-03 ENCOUNTER — OFFICE VISIT (OUTPATIENT)
Dept: UROLOGY | Facility: CLINIC | Age: 77
End: 2022-05-03

## 2022-05-03 VITALS — HEIGHT: 66 IN | TEMPERATURE: 98.6 F | BODY MASS INDEX: 30.53 KG/M2 | WEIGHT: 190 LBS

## 2022-05-03 DIAGNOSIS — Z09 POSTOP CHECK: Primary | ICD-10-CM

## 2022-05-03 PROCEDURE — 99024 POSTOP FOLLOW-UP VISIT: CPT | Performed by: PHYSICIAN ASSISTANT

## 2022-05-06 ENCOUNTER — TELEPHONE (OUTPATIENT)
Dept: UROLOGY | Facility: CLINIC | Age: 77
End: 2022-05-06

## 2022-05-06 DIAGNOSIS — N40.0 BENIGN PROSTATIC HYPERPLASIA, UNSPECIFIED WHETHER LOWER URINARY TRACT SYMPTOMS PRESENT: ICD-10-CM

## 2022-05-06 RX ORDER — TAMSULOSIN HYDROCHLORIDE 0.4 MG/1
2 CAPSULE ORAL NIGHTLY
Qty: 60 CAPSULE | Refills: 5 | Status: SHIPPED | OUTPATIENT
Start: 2022-05-06 | End: 2022-11-09 | Stop reason: SDUPTHER

## 2022-05-06 NOTE — TELEPHONE ENCOUNTER
----- Message from Ignacio Moon MD sent at 5/6/2022 10:24 AM CDT -----  2 flomax is fine,     ----- Message -----  From: Angelica Jacome MA  Sent: 5/6/2022   9:50 AM CDT  To: Ignacio Moon MD    Pt called and stated that you told him to switch to 2 a day of the flomax.  I didn't see where you had said that was fine  was going to call him in more meds but wanted to check first

## 2022-05-09 DIAGNOSIS — J42 CHRONIC BRONCHITIS, UNSPECIFIED CHRONIC BRONCHITIS TYPE: ICD-10-CM

## 2022-05-09 DIAGNOSIS — J44.9 CHRONIC OBSTRUCTIVE PULMONARY DISEASE, UNSPECIFIED COPD TYPE: ICD-10-CM

## 2022-05-09 RX ORDER — TIOTROPIUM BROMIDE INHALATION SPRAY 3.12 UG/1
2 SPRAY, METERED RESPIRATORY (INHALATION)
Qty: 4 G | Refills: 11 | Status: SHIPPED | OUTPATIENT
Start: 2022-05-09 | End: 2023-03-23 | Stop reason: SDUPTHER

## 2022-05-09 NOTE — TELEPHONE ENCOUNTER
Rx Refill Note  Requested Prescriptions     Pending Prescriptions Disp Refills   • Spiriva Respimat 2.5 MCG/ACT aerosol solution inhaler [Pharmacy Med Name: Spiriva Respimat 2.5 mcg/actuation solution for inhalation] 4 g 4     Sig: INHALE TWO PUFFS BY MOUTH INTO THE LUNGS DAILY      Last office visit with prescribing clinician: 2/25/22      Next office visit with prescribing clinician: Visit date not found            Mena Hernandez RN  05/09/22, 09:31 CDT

## 2022-05-17 NOTE — PROGRESS NOTES
Subjective    Mr. Robledo is 76 y.o. male    Chief Complaint: Postoperative visit    History of Present Illness  76-year-old male established patient follow-up after placement of Coloplast Kanwal malleable penile implant 4/27/2022 for erectile dysfunction refractory to PDE inhibitor therapy in the setting of hypertension and hyperlipidemia.  He was noted intraoperatively to have narrowing at the right proximal lateral penile base consistent with history of Peyronie's disease.  He denies pain in his feeling much better.  He is having some worsening LUTS on tamsulosin 0.8 mg p.o. nightly.        The following portions of the patient's history were reviewed and updated as appropriate: allergies, current medications, past family history, past medical history, past social history, past surgical history and problem list.    Review of Systems      Current Outpatient Medications:   •  acetaminophen (TYLENOL) 650 MG 8 hr tablet, Take 650 mg by mouth 2 (Two) Times a Day., Disp: , Rfl:   •  albuterol sulfate  (90 Base) MCG/ACT inhaler, Inhale 2 puffs Every 4 (Four) Hours As Needed for Wheezing., Disp: 18 g, Rfl: 6  •  amLODIPine (NORVASC) 5 MG tablet, Take 5 mg by mouth Daily., Disp: , Rfl:   •  aspirin 81 MG EC tablet, Take 81 mg by mouth Daily., Disp: , Rfl:   •  azelastine (ASTELIN) 0.1 % nasal spray, 2 sprays into the nostril(s) as directed by provider 2 (Two) Times a Day. Use in each nostril as directed, Disp: 30 mL, Rfl: 12  •  bisoprolol (ZEBeta) 5 MG tablet, TAKE ONE TABLET BY MOUTH EVERY DAY (Patient taking differently: Take 5 mg by mouth Daily.), Disp: 150 tablet, Rfl: 0  •  budesonide-formoterol (SYMBICORT) 160-4.5 MCG/ACT inhaler, Inhale 2 puffs 2 (Two) Times a Day., Disp: 10.2 g, Rfl: 11  •  calcium carbonate (OS-TASHIA) 600 MG tablet, Take 600 mg by mouth 2 (Two) Times a Day., Disp: , Rfl:   •  Cholecalciferol (VITAMIN D3) 1000 units capsule, Take 2,000 Units by mouth Daily., Disp: , Rfl:   •  coenzyme Q10  100 MG capsule, Take 100 mg by mouth Daily., Disp: , Rfl:   •  Collagen-Boron-Hyaluronic Acid (Move Free RallyCause) 10-5-3.3 MG tablet, Take 1 tablet by mouth Daily., Disp: , Rfl:   •  docusate sodium (Colace) 100 MG capsule, Take 1 capsule by mouth 2 (Two) Times a Day., Disp: 60 capsule, Rfl: 1  •  fluticasone (FLONASE) 50 MCG/ACT nasal spray, 2 sprays into the nostril(s) as directed by provider Daily., Disp: , Rfl:   •  gabapentin (NEURONTIN) 300 MG capsule, Take 1 capsule by mouth 4 (Four) Times a Day. (Patient taking differently: Take 600 mg by mouth 2 (Two) Times a Day.), Disp: 120 capsule, Rfl: 3  •  Garlic 1000 MG capsule, Take 1,000 mg by mouth Daily., Disp: , Rfl:   •  HYDROcodone-acetaminophen (NORCO) 5-325 MG per tablet, Take 1 tablet by mouth Every 4 (Four) Hours As Needed for Severe Pain  (postop pain)., Disp: 18 tablet, Rfl: 0  •  ibuprofen (ADVIL,MOTRIN) 200 MG tablet, Take 400 mg by mouth Every 6 (Six) Hours As Needed for Mild Pain ., Disp: , Rfl:   •  Krill Oil 300 MG capsule, Take 300 mg by mouth Daily., Disp: , Rfl:   •  lisinopril (PRINIVIL,ZESTRIL) 10 MG tablet, Take 1 tablet by mouth Daily for 90 days., Disp: 90 tablet, Rfl: 0  •  Multiple Vitamins-Minerals (OCUVITE ADULT 50+ PO), Take 1 tablet by mouth Daily., Disp: , Rfl:   •  naproxen (NAPROSYN) 250 MG tablet, Take 1 tablet by mouth 2 (Two) Times a Day With Meals., Disp: 60 tablet, Rfl: 0  •  omeprazole (priLOSEC) 20 MG capsule, Take 20 mg by mouth Daily., Disp: , Rfl:   •  ondansetron ODT (Zofran ODT) 4 MG disintegrating tablet, Place 1 tablet on the tongue Every 6 (Six) Hours As Needed for Nausea., Disp: 6 tablet, Rfl: 1  •  simvastatin (ZOCOR) 40 MG tablet, TAKE 1 TABLET BY MOUTH EVERYDAY AT BEDTIME (Patient taking differently: Take 40 mg by mouth Every Night.), Disp: 90 tablet, Rfl: 1  •  SUPER B COMPLEX/C PO, Take 1 tablet by mouth Every Night., Disp: , Rfl:   •  tamsulosin (FLOMAX) 0.4 MG capsule 24 hr capsule, Take 2 capsules  by mouth Every Night., Disp: 60 capsule, Rfl: 5  •  tiotropium bromide monohydrate (Spiriva Respimat) 2.5 MCG/ACT aerosol solution inhaler, Inhale 2 puffs Daily., Disp: 4 g, Rfl: 11  •  vitamin C (ASCORBIC ACID) 500 MG tablet, Take 500 mg by mouth Daily., Disp: , Rfl:     Past Medical History:   Diagnosis Date   • Arthritis    • BPH with obstruction/lower urinary tract symptoms    • CAD (coronary artery disease)    • Chronic back pain    • Class 1 obesity due to excess calories with serious comorbidity and body mass index (BMI) of 32.0 to 32.9 in adult 01/20/2020   • COPD (chronic obstructive pulmonary disease) ([facility])    • Coronary artery disease involving autologous vein coronary bypass graft without angina pectoris 01/20/2020   • Elevated cholesterol    • Essential hypertension 10/14/2019   • GERD (gastroesophageal reflux disease)    • Hyperlipidemia    • Hypertension    • Left bundle branch block    • Obstructive sleep apnea 01/20/2020    pt uses a cpap machine at home   • Sleep apnea     CPAP       Past Surgical History:   Procedure Laterality Date   • CARPAL TUNNEL RELEASE Right 09/29/2021    Procedure: CARPAL TUNNEL RELEASE right;  Surgeon: Luis Perdomo MD;  Location: [facility] OR;  Service: Neurosurgery;  Laterality: Right;   • COLONOSCOPY     • COLONOSCOPY N/A 04/13/2022    Procedure: COLONOSCOPY WITH ANESTHESIA;  Surgeon: Cortes Ribera DO;  Location: [facility] ENDOSCOPY;  Service: Gastroenterology;  Laterality: N/A;  preop; hx of poylps   postop; polyps   PCP Katerine Mcclain    • KNEE ARTHROPLASTY Right    • KNEE MENISCAL REPAIR Left 2018   • PENILE PROSTHESIS IMPLANT N/A 4/27/2022    Procedure: MALLEABLE PENILE PROSTHESIS PLACEMENT;  Surgeon: Ignacio Moon MD;  Location: [facility] OR;  Service: Urology;  Laterality: N/A;   • ROTATOR CUFF REPAIR Right 2000   • SPINE SURGERY  1981    lower back ruptured disc   • TOTAL SHOULDER ARTHROPLASTY W/ DISTAL CLAVICLE EXCISION Right 01/14/2020    Procedure: RIGHT  REVERSE TOTAL SHOULDER  ARTHROPLASTY;  Surgeon: Suman Ventura MD;  Location:  [facility] OR;  Service: Orthopedics       Social History     Socioeconomic History   • Marital status:    Tobacco Use   • Smoking status: Former Smoker     Years: 50.00     Types: Pipe     Quit date:      Years since quittin.3   • Smokeless tobacco: Never Used   Vaping Use   • Vaping Use: Never used   Substance and Sexual Activity   • Alcohol use: Yes     Alcohol/week: 5.0 standard drinks     Types: 5 Cans of beer per week     Comment: daily   • Drug use: No   • Sexual activity: Defer       Family History   Problem Relation Age of Onset   • Arthritis Mother    • Diabetes Mother    • Osteoporosis Mother    • Hypertension Mother    • Kidney disease Mother    • Arthritis Father    • Hypertension Father    • Stroke Father    • Diabetes Sister    • Obesity Sister    • Cancer Brother    • Colon cancer Neg Hx    • Colon polyps Neg Hx        Objective    There were no vitals taken for this visit.    Physical Exam  Penoscrotal incision healing well, no signs of infection.  Penile implant in appropriate position with cylinder tips at mid glans.      Results for orders placed or performed in visit on 22   COVID-19,APTIMA PANTHER,[facility] IN-HOUSE,NP/OP/NASAL SWAB IN UTM/VTM/SALINE/LIQUID AMIES TRANSPORT MEDIA/NP WASH OR ASPIRATE, 24 HR TAT - Swab, Nasopharynx    Specimen: Nasopharynx; Swab   Result Value Ref Range    COVID19 Not Detected Not Detected - Ref. Range     Assessment and Plan    Diagnoses and all orders for this visit:    1. Erectile dysfunction due to arterial insufficiency (Primary)    2. Benign prostatic hyperplasia, unspecified whether lower urinary tract symptoms present  -     finasteride (PROSCAR) 5 MG tablet; Take 1 tablet by mouth Daily.  Dispense: 90 tablet; Refill: 3    3. Peyronie's disease      Healing well after malleable penile implant.  He may begin intercourse in 3 to 4 weeks as discomfort allows.  I  recommended adding finasteride to his tamsulosin for his worsening LUTS.  He will follow-up with me in my [place] [facility] in 6 months or sooner as needed.      This document has been signed by MIRIAM Moon MD on May 20, 2022 14:44 CDT

## 2022-05-19 ENCOUNTER — OFFICE VISIT (OUTPATIENT)
Dept: UROLOGY | Facility: CLINIC | Age: 77
End: 2022-05-19

## 2022-05-19 DIAGNOSIS — N40.0 BENIGN PROSTATIC HYPERPLASIA, UNSPECIFIED WHETHER LOWER URINARY TRACT SYMPTOMS PRESENT: ICD-10-CM

## 2022-05-19 DIAGNOSIS — N48.6 PEYRONIE'S DISEASE: ICD-10-CM

## 2022-05-19 DIAGNOSIS — N52.01 ERECTILE DYSFUNCTION DUE TO ARTERIAL INSUFFICIENCY: Primary | ICD-10-CM

## 2022-05-19 PROCEDURE — 99024 POSTOP FOLLOW-UP VISIT: CPT | Performed by: UROLOGY

## 2022-05-19 RX ORDER — FINASTERIDE 5 MG/1
5 TABLET, FILM COATED ORAL DAILY
Qty: 90 TABLET | Refills: 3 | Status: SHIPPED | OUTPATIENT
Start: 2022-05-19 | End: 2022-12-08 | Stop reason: SDUPTHER

## 2022-05-23 ENCOUNTER — TELEPHONE (OUTPATIENT)
Dept: INTERNAL MEDICINE | Facility: CLINIC | Age: 77
End: 2022-05-23

## 2022-05-23 RX ORDER — DOXYCYCLINE 100 MG/1
100 CAPSULE ORAL 2 TIMES DAILY
Qty: 20 CAPSULE | Refills: 0 | Status: SHIPPED | OUTPATIENT
Start: 2022-05-23 | End: 2022-10-17

## 2022-05-23 NOTE — TELEPHONE ENCOUNTER
Pt wife found a tick on him, she pulled it off on Saturday, (she made sure she got the head out), it was a little tiny one.  She said it is red and has rash around it, but it's oblong shape.   She dos have doxycycline at home,  But he will need prescription also.

## 2022-06-03 DIAGNOSIS — I10 ESSENTIAL HYPERTENSION: ICD-10-CM

## 2022-06-03 DIAGNOSIS — E78.5 HYPERLIPIDEMIA, UNSPECIFIED HYPERLIPIDEMIA TYPE: ICD-10-CM

## 2022-06-03 DIAGNOSIS — I10 ESSENTIAL (PRIMARY) HYPERTENSION: ICD-10-CM

## 2022-06-03 RX ORDER — AMLODIPINE BESYLATE 5 MG/1
TABLET ORAL
Qty: 90 TABLET | Refills: 0 | Status: SHIPPED | OUTPATIENT
Start: 2022-06-03 | End: 2022-09-06

## 2022-06-03 RX ORDER — LISINOPRIL 10 MG/1
TABLET ORAL
Qty: 90 TABLET | Refills: 0 | Status: SHIPPED | OUTPATIENT
Start: 2022-06-03 | End: 2022-09-06

## 2022-06-03 RX ORDER — SIMVASTATIN 40 MG
40 TABLET ORAL NIGHTLY
Qty: 90 TABLET | Refills: 1 | Status: SHIPPED | OUTPATIENT
Start: 2022-06-03 | End: 2022-11-21

## 2022-06-03 NOTE — TELEPHONE ENCOUNTER
Rx Refill Note  Requested Prescriptions     Pending Prescriptions Disp Refills   • simvastatin (ZOCOR) 40 MG tablet [Pharmacy Med Name: simvastatin 40 mg tablet] 90 tablet 0     Sig: TAKE ONE TABLET BY MOUTH AT BEDTIME      Last office visit with prescribing clinician:     2/25/22  Next office visit with prescribing clinician: Visit date not found            Mena Hernandez RN  06/03/22, 15:26 CDT

## 2022-06-03 NOTE — TELEPHONE ENCOUNTER
Addended by: Faith Hurtado on: 3/18/2021 01:27 PM     Modules accepted: Orders Rx Refill Note  Requested Prescriptions     Pending Prescriptions Disp Refills   • amLODIPine (NORVASC) 5 MG tablet [Pharmacy Med Name: amlodipine 5 mg tablet] 90 tablet 0     Sig: TAKE ONE TABLET BY MOUTH DAILY   • lisinopril (PRINIVIL,ZESTRIL) 10 MG tablet [Pharmacy Med Name: lisinopril 10 mg tablet] 90 tablet 0     Sig: TAKE ONE TABLET BY MOUTH DAILY      Last office visit with prescribing clinician: 2/25/2022      Next office visit with prescribing clinician: Visit date not found            Mena Hernandez RN  06/03/22, 15:27 CDT

## 2022-06-07 ENCOUNTER — OFFICE VISIT (OUTPATIENT)
Dept: INTERNAL MEDICINE | Facility: CLINIC | Age: 77
End: 2022-06-07

## 2022-06-07 VITALS
DIASTOLIC BLOOD PRESSURE: 82 MMHG | WEIGHT: 190 LBS | HEART RATE: 70 BPM | TEMPERATURE: 98.3 F | SYSTOLIC BLOOD PRESSURE: 162 MMHG | BODY MASS INDEX: 30.53 KG/M2 | HEIGHT: 66 IN | RESPIRATION RATE: 16 BRPM | OXYGEN SATURATION: 97 %
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
## 2022-06-07 DIAGNOSIS — G62.9 PERIPHERAL POLYNEUROPATHY: Primary | ICD-10-CM

## 2022-06-07 DIAGNOSIS — S30.861A INSECT BITE OF ABDOMINAL WALL, INITIAL ENCOUNTER: ICD-10-CM

## 2022-06-07 PROCEDURE — 99214 OFFICE O/P EST MOD 30 MIN: CPT | Performed by: NURSE PRACTITIONER

## 2022-06-07 RX ORDER — GABAPENTIN 400 MG/1
400 CAPSULE ORAL 4 TIMES DAILY
Qty: 120 CAPSULE | Refills: 1 | Status: SHIPPED | OUTPATIENT
Start: 2022-06-07 | End: 2022-07-07

## 2022-06-07 NOTE — PROGRESS NOTES
Subjective     Chief Complaint   Patient presents with   • Insect Bite       History of Present Illness  Pt comes in today with a bite on his side. States he has completed the doxycycline. About 3 days ago, he developed another bite on his left flank area. Yesterday, it turned black. He put polysporin and a bandaid and the blackness has persisted. It continues to itch. He has not been out in the woods, has been working in his shop. He sees a fair amount of spiders in his shop.     Pt is having worsening left leg pain/tingling. He carries a history of neuropathy maintained on gabapentin. He is not interested in proceeding with additional back imaging or surgery.     Review of Systems   Otherwise complete ROS reviewed and negative except as mentioned in the HPI.    Past Medical History:   Past Medical History:   Diagnosis Date   • Arthritis    • BPH with obstruction/lower urinary tract symptoms    • CAD (coronary artery disease)    • Chronic back pain    • Class 1 obesity due to excess calories with serious comorbidity and body mass index (BMI) of 32.0 to 32.9 in adult 01/20/2020   • COPD (chronic obstructive pulmonary disease) ([facility])    • Coronary artery disease involving autologous vein coronary bypass graft without angina pectoris 01/20/2020   • Elevated cholesterol    • Essential hypertension 10/14/2019   • GERD (gastroesophageal reflux disease)    • Hyperlipidemia    • Hypertension    • Left bundle branch block    • Obstructive sleep apnea 01/20/2020    pt uses a cpap machine at home   • Sleep apnea     CPAP     Past Surgical History:  Past Surgical History:   Procedure Laterality Date   • CARPAL TUNNEL RELEASE Right 09/29/2021    Procedure: CARPAL TUNNEL RELEASE right;  Surgeon: Luis Perdomo MD;  Location: [facility] OR;  Service: Neurosurgery;  Laterality: Right;   • COLONOSCOPY     • COLONOSCOPY N/A 04/13/2022    Procedure: COLONOSCOPY WITH ANESTHESIA;  Surgeon: Cortes Ribera DO;  Location: [facility]  ENDOSCOPY;  Service: Gastroenterology;  Laterality: N/A;  preop; hx of poylps   postop; polyps   PCP Katerine Mcclain    • KNEE ARTHROPLASTY Right    • KNEE MENISCAL REPAIR Left 2018   • PENILE PROSTHESIS IMPLANT N/A 4/27/2022    Procedure: MALLEABLE PENILE PROSTHESIS PLACEMENT;  Surgeon: Ignacio Moon MD;  Location:  [facility] OR;  Service: Urology;  Laterality: N/A;   • ROTATOR CUFF REPAIR Right 2000   • SPINE SURGERY  1981    lower back ruptured disc   • TOTAL SHOULDER ARTHROPLASTY W/ DISTAL CLAVICLE EXCISION Right 01/14/2020    Procedure: RIGHT REVERSE TOTAL SHOULDER  ARTHROPLASTY;  Surgeon: Suman Ventura MD;  Location:  [facility] OR;  Service: Orthopedics     Social History:  reports that he quit smoking about 11 years ago. His smoking use included pipe. He has a 25.00 pack-year smoking history. He has never used smokeless tobacco. He reports current alcohol use of about 5.0 standard drinks of alcohol per week. He reports that he does not use drugs.    Family History: family history includes Arthritis in his father and mother; Cancer in his brother; Diabetes in his mother and sister; Hypertension in his father and mother; Kidney disease in his mother; Obesity in his sister; Osteoporosis in his mother; Stroke in his father.       Allergies:  No Known Allergies  Medications:  Prior to Admission medications    Medication Sig Start Date End Date Taking? Authorizing Provider   albuterol sulfate  (90 Base) MCG/ACT inhaler Inhale 2 puffs Every 4 (Four) Hours As Needed for Wheezing. 4/11/22  Yes Hien Pimentel APRN   amLODIPine (NORVASC) 5 MG tablet TAKE ONE TABLET BY MOUTH DAILY 6/3/22  Yes Katerine Mcclain APRN   aspirin 81 MG EC tablet Take 81 mg by mouth Daily.   Yes Provider, MD Cristian   azelastine (ASTELIN) 0.1 % nasal spray 2 sprays into the nostril(s) as directed by provider 2 (Two) Times a Day. Use in each nostril as directed 8/6/21  Yes Angelica Mccabe DO   bisoprolol (ZEBeta)  5 MG tablet TAKE ONE TABLET BY MOUTH EVERY DAY  Patient taking differently: Take 5 mg by mouth Daily. 3/25/22  Yes Katerine Mcclain APRN   budesonide-formoterol (SYMBICORT) 160-4.5 MCG/ACT inhaler Inhale 2 puffs 2 (Two) Times a Day. 4/20/22  Yes Hien Pimentel APRN   calcium carbonate (OS-TASHIA) 600 MG tablet Take 600 mg by mouth 2 (Two) Times a Day.   Yes Cristian Pfeiffer MD   Cholecalciferol (VITAMIN D3) 1000 units capsule Take 2,000 Units by mouth Daily.   Yes Cristian Pfeiffer MD   coenzyme Q10 100 MG capsule Take 100 mg by mouth Daily.   Yes ProviderCristian MD   Collagen-Boron-Hyaluronic Acid (Move [employer] Emprende) 10-5-3.3 MG tablet Take 1 tablet by mouth Daily.   Yes Cristian Pfeiffer MD   docusate sodium (Colace) 100 MG capsule Take 1 capsule by mouth 2 (Two) Times a Day. 4/27/22  Yes Ignacio Moon MD   finasteride (PROSCAR) 5 MG tablet Take 1 tablet by mouth Daily. 5/19/22  Yes Ignacio Moon MD   fluticasone (FLONASE) 50 MCG/ACT nasal spray 2 sprays into the nostril(s) as directed by provider Daily.   Yes ProviderCristian MD   gabapentin (NEURONTIN) 300 MG capsule Take 1 capsule by mouth 4 (Four) Times a Day.  Patient taking differently: Take 600 mg by mouth 2 (Two) Times a Day. 3/8/22  Yes Katerine Mcclain APRN   Garlic 1000 MG capsule Take 1,000 mg by mouth Daily.   Yes ProviderCristian MD   Krill Oil 300 MG capsule Take 300 mg by mouth Daily.   Yes Cristian Pfeiffer MD   lisinopril (PRINIVIL,ZESTRIL) 10 MG tablet TAKE ONE TABLET BY MOUTH DAILY 6/3/22  Yes Katerine Mcclain APRN   Multiple Vitamins-Minerals (OCUVITE ADULT 50+ PO) Take 1 tablet by mouth Daily.   Yes Cristian Pfeiffer MD   naproxen (NAPROSYN) 250 MG tablet Take 1 tablet by mouth 2 (Two) Times a Day With Meals. 4/27/22  Yes Ignacio Moon MD   omeprazole (priLOSEC) 20 MG capsule Take 20 mg by mouth Daily.   Yes Provider, MD Cristian   simvastatin (ZOCOR) 40  "MG tablet Take 1 tablet by mouth Every Night. 6/3/22  Yes Katerine Mcclain APRN   SUPER B COMPLEX/C PO Take 1 tablet by mouth Every Night.   Yes Cristian Pfeiffer MD   tamsulosin (FLOMAX) 0.4 MG capsule 24 hr capsule Take 2 capsules by mouth Every Night. 5/6/22  Yes Ignacio Moon MD   tiotropium bromide monohydrate (Spiriva Respimat) 2.5 MCG/ACT aerosol solution inhaler Inhale 2 puffs Daily. 5/9/22  Yes Katerine Mcclain APRN   vitamin C (ASCORBIC ACID) 500 MG tablet Take 500 mg by mouth Daily.   Yes Cristian Pfeiffer MD   acetaminophen (TYLENOL) 650 MG 8 hr tablet Take 650 mg by mouth 2 (Two) Times a Day.    Cristian Pfeiffer MD   doxycycline (MONODOX) 100 MG capsule Take 1 capsule by mouth 2 (Two) Times a Day. 5/23/22   Katerine Mcclain APRN   HYDROcodone-acetaminophen (NORCO) 5-325 MG per tablet Take 1 tablet by mouth Every 4 (Four) Hours As Needed for Severe Pain  (postop pain). 4/27/22   Ignacio Moon MD   ibuprofen (ADVIL,MOTRIN) 200 MG tablet Take 400 mg by mouth Every 6 (Six) Hours As Needed for Mild Pain .    ProviderCristian MD   ondansetron ODT (Zofran ODT) 4 MG disintegrating tablet Place 1 tablet on the tongue Every 6 (Six) Hours As Needed for Nausea. 4/27/22   Ignacio Moon MD       Objective     Vital Signs: /82   Pulse 70   Temp 98.3 °F (36.8 °C)   Resp 16   Ht 168.6 cm (66.38\")   Wt 86.2 kg (190 lb)   SpO2 97%   BMI 30.32 kg/m²   Physical Exam  Vitals reviewed.   Constitutional:       Appearance: He is well-developed.   HENT:      Head: Normocephalic and atraumatic.   Eyes:      Pupils: Pupils are equal, round, and reactive to light.   Neck:      Vascular: No JVD.   Cardiovascular:      Rate and Rhythm: Normal rate and regular rhythm.   Pulmonary:      Effort: Pulmonary effort is normal.   Abdominal:      General: Bowel sounds are normal.      Palpations: Abdomen is soft.   Musculoskeletal:         General: No deformity.      " Cervical back: Normal range of motion and neck supple.   Lymphadenopathy:      Cervical: No cervical adenopathy.   Skin:     General: Skin is warm and dry.      Findings: Rash ( left lower abdominal bite olga with black center. ) present.   Neurological:      Mental Status: He is alert and oriented to person, place, and time.   Psychiatric:         Behavior: Behavior normal.         Thought Content: Thought content normal.         Judgment: Judgment normal.       Results Reviewed:  Glucose   Date Value Ref Range Status   04/25/2022 145 (H) 65 - 99 mg/dL Final   12/22/2020 92 74 - 109 mg/dL Final     BUN   Date Value Ref Range Status   04/25/2022 12 8 - 23 mg/dL Final   12/22/2020 16 8 - 23 mg/dL Final     Creatinine   Date Value Ref Range Status   04/25/2022 0.93 0.76 - 1.27 mg/dL Final   12/22/2020 0.8 0.5 - 1.2 mg/dL Final     Sodium   Date Value Ref Range Status   04/25/2022 141 136 - 145 mmol/L Final   12/22/2020 139 136 - 145 mmol/L Final     Potassium   Date Value Ref Range Status   04/25/2022 4.0 3.5 - 5.2 mmol/L Final   12/22/2020 4.1 3.5 - 5.0 mmol/L Final     Chloride   Date Value Ref Range Status   04/25/2022 107 98 - 107 mmol/L Final   12/22/2020 102 98 - 111 mmol/L Final     CO2   Date Value Ref Range Status   04/25/2022 24.0 22.0 - 29.0 mmol/L Final   12/22/2020 25 22 - 29 mmol/L Final     Calcium   Date Value Ref Range Status   04/25/2022 9.3 8.6 - 10.5 mg/dL Final   12/22/2020 9.4 8.8 - 10.2 mg/dL Final     ALT (SGPT)   Date Value Ref Range Status   09/16/2021 17 1 - 41 U/L Final     AST (SGOT)   Date Value Ref Range Status   09/16/2021 17 1 - 40 U/L Final     WBC   Date Value Ref Range Status   04/25/2022 4.76 3.40 - 10.80 10*3/mm3 Final   12/22/2020 6.6 4.8 - 10.8 K/uL Final     Hematocrit   Date Value Ref Range Status   04/25/2022 39.9 37.5 - 51.0 % Final   12/22/2020 40.7 (L) 42.0 - 52.0 % Final     Platelets   Date Value Ref Range Status   04/25/2022 159 140 - 450 10*3/mm3 Final   12/22/2020 175  130 - 400 K/uL Final     Triglycerides   Date Value Ref Range Status   06/02/2021 103 0 - 149 mg/dL Final     HDL Cholesterol   Date Value Ref Range Status   06/02/2021 57 >39 mg/dL Final     LDL Chol Calc (NIH)   Date Value Ref Range Status   06/02/2021 98 0 - 99 mg/dL Final     Hemoglobin A1C   Date Value Ref Range Status   09/17/2021 5.4 % Final         Assessment / Plan     Assessment/Plan:  Diagnoses and all orders for this visit:    1. Peripheral polyneuropathy (Primary)  -     gabapentin (NEURONTIN) 400 MG capsule; Take 1 capsule by mouth 4 (Four) Times a Day.  Dispense: 120 capsule; Refill: 1    2. Insect bite of abdominal wall, initial encounter  -     mupirocin (BACTROBAN) 2 % ointment; Apply 1 application topically to the appropriate area as directed 3 (Three) Times a Day.  Dispense: 22 g; Refill: 1      Return in about 3 months (around 9/7/2022). unless patient needs to be seen sooner or acute issues arise.    Code Status: Full.     I have discussed the patient results/orders and and plan/recommendation with them at today's visit.      Katerine Mcclain, APRN   06/07/2022

## 2022-06-08 DIAGNOSIS — G62.9 PERIPHERAL POLYNEUROPATHY: ICD-10-CM

## 2022-06-08 RX ORDER — GABAPENTIN 300 MG/1
CAPSULE ORAL
Qty: 120 CAPSULE | Refills: 3 | OUTPATIENT
Start: 2022-06-08

## 2022-06-08 NOTE — TELEPHONE ENCOUNTER
Rx Refill Note  Requested Prescriptions     Refused Prescriptions Disp Refills   • gabapentin (NEURONTIN) 300 MG capsule [Pharmacy Med Name: gabapentin 300 mg capsule] 120 capsule 3     Sig: TAKE ONE CAPSULE BY MOUTH FOUR TIMES DAILY      Last office visit with prescribing clinician: 6/7/2022      Next office visit with prescribing clinician: 9/6/2022     Med changed at appt on 06/07/2022.       Jessie Paige MA  06/08/22, 08:29 CDT

## 2022-07-06 DIAGNOSIS — G62.9 PERIPHERAL POLYNEUROPATHY: ICD-10-CM

## 2022-07-07 RX ORDER — GABAPENTIN 400 MG/1
CAPSULE ORAL
Qty: 120 CAPSULE | Refills: 1 | Status: SHIPPED | OUTPATIENT
Start: 2022-07-07 | End: 2022-09-06

## 2022-07-22 ENCOUNTER — HOSPITAL ENCOUNTER (EMERGENCY)
Facility: HOSPITAL | Age: 77
Discharge: HOME | End: 2022-07-22
Attending: EMERGENCY MEDICINE | Admitting: EMERGENCY MEDICINE

## 2022-07-22 VITALS
TEMPERATURE: 98.8 F | OXYGEN SATURATION: 94 % | RESPIRATION RATE: 16 BRPM | WEIGHT: 196 LBS | DIASTOLIC BLOOD PRESSURE: 74 MMHG | HEART RATE: 71 BPM | HEIGHT: 66 IN | BODY MASS INDEX: 31.5 KG/M2 | SYSTOLIC BLOOD PRESSURE: 130 MMHG

## 2022-07-22 DIAGNOSIS — S60.562A INSECT BITE OF LEFT HAND, INITIAL ENCOUNTER: Primary | ICD-10-CM

## 2022-07-22 DIAGNOSIS — T78.40XA ALLERGIC REACTION, INITIAL ENCOUNTER: ICD-10-CM
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
## 2022-07-22 PROCEDURE — 99282 EMERGENCY DEPT VISIT SF MDM: CPT

## 2022-07-22 RX ORDER — CETIRIZINE HYDROCHLORIDE 10 MG/1
10 TABLET ORAL DAILY
Qty: 5 TABLET | Refills: 0 | Status: SHIPPED | OUTPATIENT
Start: 2022-07-22 | End: 2022-10-17

## 2022-07-22 RX ORDER — PREDNISONE 20 MG/1
20 TABLET ORAL 2 TIMES DAILY
Qty: 10 TABLET | Refills: 0 | Status: SHIPPED | OUTPATIENT
Start: 2022-07-22 | End: 2022-07-27

## 2022-07-22 RX ORDER — FAMOTIDINE 20 MG/1
20 TABLET, FILM COATED ORAL 2 TIMES DAILY
Qty: 6 TABLET | Refills: 0 | Status: SHIPPED | OUTPATIENT
Start: 2022-07-22 | End: 2022-07-25

## 2022-07-23 ENCOUNTER — NURSE TRIAGE (OUTPATIENT)
Dept: CALL CENTER | Facility: HOSPITAL | Age: 77
End: 2022-07-23

## 2022-07-23 NOTE — ED PROVIDER NOTES
Subjective   Patient with insect bite to the left hand swollen and pruritic.  No evidence of cellulitis abscess range of motion intact.      Insect Bite  Contact animal:  Insect  Location:  Hand  Hand injury location:  L hand and dorsum of L hand  Pain details:     Quality:  Itching (Swelling)    Severity:  Mild    Progression:  Unchanged  Tetanus status:  Up to date  Relieved by:  Nothing  Worsened by:  Nothing  Ineffective treatments:  Cold compresses  Associated symptoms: no fever and no numbness    Upper Extremity Issue  Associated symptoms: no fatigue and no fever        Review of Systems   Constitutional: Negative.  Negative for chills, fatigue and fever.   HENT: Negative.  Negative for congestion.    Respiratory: Negative.  Negative for cough, chest tightness and stridor.    Cardiovascular: Negative.  Negative for chest pain.   Gastrointestinal: Negative.  Negative for abdominal distention, abdominal pain, nausea and vomiting.   Endocrine: Negative.    Genitourinary: Negative.  Negative for difficulty urinating and flank pain.   Musculoskeletal: Negative.    Skin: Negative.  Negative for color change.   Neurological: Negative.  Negative for dizziness, numbness and headaches.   All other systems reviewed and are negative.      Past Medical History:   Diagnosis Date   • Arthritis    • BPH with obstruction/lower urinary tract symptoms    • CAD (coronary artery disease)    • Chronic back pain    • Class 1 obesity due to excess calories with serious comorbidity and body mass index (BMI) of 32.0 to 32.9 in adult 01/20/2020   • COPD (chronic obstructive pulmonary disease) (Formerly [facility])    • Coronary artery disease involving autologous vein coronary bypass graft without angina pectoris 01/20/2020   • Elevated cholesterol    • Essential hypertension 10/14/2019   • GERD (gastroesophageal reflux disease)    • Hyperlipidemia    • Hypertension    • Left bundle branch block    • Obstructive sleep apnea 01/20/2020    pt uses a cpap  machine at home   • Sleep apnea     CPAP       No Known Allergies    Past Surgical History:   Procedure Laterality Date   • CARPAL TUNNEL RELEASE Right 2021    Procedure: CARPAL TUNNEL RELEASE right;  Surgeon: Luis Perdomo MD;  Location:  [facility] OR;  Service: Neurosurgery;  Laterality: Right;   • COLONOSCOPY     • COLONOSCOPY N/A 2022    Procedure: COLONOSCOPY WITH ANESTHESIA;  Surgeon: Cortes Ribera DO;  Location:  [facility];  Service: Gastroenterology;  Laterality: N/A;  preop; hx of poylps   postop; polyps   PCP Katerine Mcclain    • KNEE ARTHROPLASTY Right    • KNEE MENISCAL REPAIR Left    • PENILE PROSTHESIS IMPLANT N/A 2022    Procedure: MALLEABLE PENILE PROSTHESIS PLACEMENT;  Surgeon: Ignacio Moon MD;  Location:  [facility] OR;  Service: Urology;  Laterality: N/A;   • ROTATOR CUFF REPAIR Right    • SPINE SURGERY  1981    lower back ruptured disc   • TOTAL SHOULDER ARTHROPLASTY W/ DISTAL CLAVICLE EXCISION Right 2020    Procedure: RIGHT REVERSE TOTAL SHOULDER  ARTHROPLASTY;  Surgeon: Suman Ventura MD;  Location:  [facility] OR;  Service: Orthopedics       Family History   Problem Relation Age of Onset   • Arthritis Mother    • Diabetes Mother    • Osteoporosis Mother    • Hypertension Mother    • Kidney disease Mother    • Arthritis Father    • Hypertension Father    • Stroke Father    • Diabetes Sister    • Obesity Sister    • Cancer Brother    • Colon cancer Neg Hx    • Colon polyps Neg Hx        Social History     Socioeconomic History   • Marital status:    Tobacco Use   • Smoking status: Former Smoker     Packs/day: 0.50     Years: 50.00     Pack years: 25.00     Types: Pipe     Quit date:      Years since quittin.5   • Smokeless tobacco: Never Used   Vaping Use   • Vaping Use: Never used   Substance and Sexual Activity   • Alcohol use: Yes     Alcohol/week: 5.0 standard drinks     Types: 5 Cans of beer per week     Comment: daily   • Drug  use: No   • Sexual activity: Defer           Objective   Physical Exam  Vitals and nursing note reviewed. Exam conducted with a chaperone present.   Constitutional:       General: He is awake.      Appearance: Normal appearance. He is well-developed. He is not ill-appearing.   HENT:      Head: Normocephalic and atraumatic.   Eyes:      General: Lids are normal.      Conjunctiva/sclera: Conjunctivae normal.      Pupils: Pupils are equal, round, and reactive to light.   Cardiovascular:      Rate and Rhythm: Normal rate.      Chest Wall: PMI is not displaced.      Pulses: Normal pulses.   Pulmonary:      Effort: Pulmonary effort is normal.      Breath sounds: No decreased breath sounds.   Abdominal:      General: Abdomen is flat. Bowel sounds are normal.      Tenderness: There is no abdominal tenderness.   Musculoskeletal:         General: Normal range of motion.      Cervical back: Full passive range of motion without pain and normal range of motion.      Comments: Patient is swelling of the dorsum of the left hand urticaria no cellulitis no abscess no induration the fingers intact does not involve swelling circumferentially range of motion of the wrist over the metacarpophalangeal joints are intact.   Skin:     General: Skin is warm and dry.      Capillary Refill: Capillary refill takes less than 2 seconds.   Neurological:      General: No focal deficit present.      Mental Status: He is alert and oriented to person, place, and time.      Cranial Nerves: Cranial nerves are intact.      Motor: Motor function is intact.      Deep Tendon Reflexes: Reflexes are normal and symmetric.   Psychiatric:         Behavior: Behavior is cooperative.         Procedures           ED Course  ED Course as of 07/22/22 2045 Fri Jul 22, 2022 2039 Insect bite [TS]      ED Course User Index  [TS] Abimael Christian MD                                           [facility]    Final diagnoses:   Insect bite of left hand, initial encounter   Allergic  reaction, initial encounter       ED Disposition  ED Disposition     ED Disposition   Discharge    Condition   Stable    Comment   --             No follow-up provider specified.       Medication List      New Prescriptions    cetirizine 10 MG tablet  Commonly known as: zyrTEC  Take 1 tablet by mouth Daily for 5 days.     famotidine 20 MG tablet  Commonly known as: PEPCID  Take 1 tablet by mouth 2 (Two) Times a Day for 3 days.     predniSONE 20 MG tablet  Commonly known as: DELTASONE  Take 1 tablet by mouth 2 (Two) Times a Day for 5 days.           Where to Get Your Medications      These medications were sent to [facility] - [address] [phone]  - [phone] [address] [address]    Phone: [phone]   · cetirizine 10 MG tablet  · famotidine 20 MG tablet  · predniSONE 20 MG tablet          Abimael Christian MD  07/22/22 1103

## 2022-07-23 NOTE — TELEPHONE ENCOUNTER
Reason for Disposition  • [1] Hives, itching, or swelling elsewhere on body (i.e., not a site of stings) AND [2] started over 2 hours after sting  (Exception: only at site of sting)    Additional Information  • Negative: [1] Life-threatening reaction (anaphylaxis) in the past to sting AND [2] < 2 hours since sting  • Negative: Attacked by swarm of bees now  • Negative: Passed out (i.e., lost consciousness, collapsed and was not responding)  • Negative: Wheezing, stridor, or difficulty breathing  • Negative: [1] Hoarseness or cough AND [2] sudden onset following sting  • Negative: [1] Tightness in the throat or chest AND [2] sudden onset following sting  • Negative: [1] Swollen tongue or difficulty swallowing AND [2] sudden onset following sting  • Negative: Sounds like a life-threatening emergency to the triager  • Negative: Not a bee, wasp, hornet, or yellow jacket sting  • Negative: Ring stuck on swollen finger (or toe) following bee sting  • Negative: [1] Hives, itching, or swelling elsewhere on body (i.e., not a site of sting) AND [2] started within 2 hours of sting (Exception: only at site of sting)  • Negative: [1] Vomiting or abdominal cramps AND [2] started within 2 hours of sting  • Negative: [1] Gave epinephrine shot AND [2] no symptoms now  • Negative: Sting inside the mouth  • Negative: Sting on eyeball (e.g., cornea)  • Negative: Patient sounds very sick or weak to the triager  • Negative: More than 50 stings  • Negative: [1] Fever AND [2] red area  • Negative: [1] Fever AND [2] area is very tender to touch  • Negative: [1] Red streak or red line AND [2] length > 2 inches (5 cm)  • Negative: [1] Red or very tender (to touch) area AND [2] started over 24 hours after the sting  • Negative: [1] Red or very tender (to touch) area AND [2] getting larger over 48 hours after the sting  • Negative: Swelling is huge (e.g., > 4 inches or 10 cm, spreads beyond wrist or ankle)    Answer Assessment - Initial  "Assessment Questions  1. TYPE: \"What type of sting was it?\" (bee, yellow jacket, etc.)       Red wasp  2. ONSET: \"When did it occur?\"       yesterday  3. LOCATION: \"Where is the sting located?\"  \"How many stings?\"      hand  4. SWELLING SIZE: \"How big is the swelling?\" (e.g., inches or cm)      Unable to determine  5. REDNESS: \"Is the area red or pink?\" If Yes, ask: \"What size is area of redness?\" (e.g., inches or cm). \"When did the redness start?\"      yesterday  6. PAIN: \"Is there any pain?\" If Yes, ask: \"How bad is it?\"  (Scale 1-10; or mild, moderate, severe)      moderate  7. ITCHING: \"Is there any itching?\" If Yes, ask: \"How bad is it?\"       na  8. RESPIRATORY DISTRESS: \"Describe your breathing.\"      na  9. PRIOR REACTIONS: \"Have you had any severe allergic reactions to stings in the past?\" if yes, ask: \"What happened?\"      denies  10. OTHER SYMPTOMS: \"Do you have any other symptoms?\" (e.g., abdominal pain, face or tongue swelling, new rash elsewhere, vomiting)        Now has lime size lumps on his forearm, not at the sting site  11. PREGNANCY: \"Is there any chance you are pregnant?\" \"When was your last menstrual period?\"        na    Protocols used: BEE OR YELLOW JACKET STING-ADULT-AH      "

## 2022-07-25 NOTE — TELEPHONE ENCOUNTER
Called Mrs. Robledo to see how Justin was and she said that he is better. She said that she is not very happy with us because she called on Friday about this and no one got back to them . I don't see a telephone encounter about it.  I asked the girls and they said all that was mentioned was that he had a rash and she wanted to know how to send a picture to Sherice about it. They were offered appointment , but refused to come into the clinic that day on Friday.     She also stated they went to the ER twice this weekend because of it. Once to [facility] and she said they received horrible care there and then to [facility] were they received excellent care. He is now taken care of and not having any issues. I apologized for the care they received at [facility].

## 2022-07-27 NOTE — TELEPHONE ENCOUNTER
Called pt to check on him. Pt and pt wife states that he is doing much better. Pt made an appointment with you for 7/29/22 to follow up on rash.

## 2022-07-29 ENCOUNTER — OFFICE VISIT (OUTPATIENT)
Dept: INTERNAL MEDICINE | Facility: CLINIC | Age: 77
End: 2022-07-29

## 2022-07-29 VITALS
WEIGHT: 191 LBS | SYSTOLIC BLOOD PRESSURE: 131 MMHG | BODY MASS INDEX: 30.7 KG/M2 | HEIGHT: 66 IN | OXYGEN SATURATION: 97 % | DIASTOLIC BLOOD PRESSURE: 69 MMHG | TEMPERATURE: 98.1 F | HEART RATE: 63 BPM | RESPIRATION RATE: 17 BRPM

## 2022-07-29 DIAGNOSIS — S50.862A INSECT BITE OF LEFT FOREARM, INITIAL ENCOUNTER: Primary | ICD-10-CM
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
## 2022-07-29 PROCEDURE — 99213 OFFICE O/P EST LOW 20 MIN: CPT | Performed by: NURSE PRACTITIONER

## 2022-07-29 RX ORDER — GABAPENTIN 300 MG/1
300 CAPSULE ORAL 4 TIMES DAILY
COMMUNITY
Start: 2022-06-08

## 2022-07-29 RX ORDER — CEPHALEXIN 500 MG/1
CAPSULE ORAL
COMMUNITY
Start: 2022-07-23 | End: 2022-10-17

## 2022-07-29 NOTE — PROGRESS NOTES
Subjective     Chief Complaint   Patient presents with   • Hospital Follow Up Visit     ER       History of Present Illness  The patient presents for hospital ER follow-up of insect bite/sting.     Insect Bite/Sting  The patient reports that 2 weeks ago he was bitten on the left wrist by a red wasp. He presented to [facility] ER where he was given Zyrtec, Pepcid, and a prednisone prescription. The following day, he presented to [place] ER due to worsening symptoms with increased swelling and redness and concern for cellulitis. He was given antibiotics in the ER and discharged with Keflex. He has 3 days  of Keflex remaining. He notes minimal mild residual erythema. He denies any concerns today. He denies any fevers or chills, arm swelling, shortness of breath, or medication reaction since discharge from the ER. He specifically denies GI upset from the antibiotic.     Review of Systems   Constitutional: Negative for activity change, appetite change, chills and fever.   HENT: Negative for hearing loss, nosebleeds, tinnitus and trouble swallowing.    Eyes: Negative for visual disturbance.   Respiratory: Negative for cough, chest tightness, shortness of breath and wheezing.    Cardiovascular: Negative for chest pain, palpitations and leg swelling.   Gastrointestinal: Negative for abdominal distention, abdominal pain, blood in stool, constipation, diarrhea, nausea and vomiting.   Endocrine: Negative for cold intolerance, heat intolerance, polydipsia, polyphagia and polyuria.   Genitourinary: Negative for decreased urine volume, difficulty urinating, dysuria, flank pain, frequency and hematuria.   Musculoskeletal: Negative for arthralgias, joint swelling and myalgias.   Skin: Negative for rash.   Allergic/Immunologic: Negative for immunocompromised state.   Neurological: Negative for dizziness, syncope, weakness, light-headedness and headaches.   Hematological: Negative for adenopathy. Does not bruise/bleed easily.    Psychiatric/Behavioral: Negative for confusion and sleep disturbance. The patient is not nervous/anxious.         Otherwise complete ROS reviewed and negative except as mentioned in the HPI.    Past Medical History:   Past Medical History:   Diagnosis Date   • Arthritis    • BPH with obstruction/lower urinary tract symptoms    • CAD (coronary artery disease)    • Chronic back pain    • Class 1 obesity due to excess calories with serious comorbidity and body mass index (BMI) of 32.0 to 32.9 in adult 01/20/2020   • COPD (chronic obstructive pulmonary disease) ([facility])    • Coronary artery disease involving autologous vein coronary bypass graft without angina pectoris 01/20/2020   • Elevated cholesterol    • Essential hypertension 10/14/2019   • GERD (gastroesophageal reflux disease)    • Hyperlipidemia    • Hypertension    • Left bundle branch block    • Obstructive sleep apnea 01/20/2020    pt uses a cpap machine at home   • Sleep apnea     CPAP     Past Surgical History:  Past Surgical History:   Procedure Laterality Date   • CARPAL TUNNEL RELEASE Right 09/29/2021    Procedure: CARPAL TUNNEL RELEASE right;  Surgeon: Luis Perdomo MD;  Location:  [facility] OR;  Service: Neurosurgery;  Laterality: Right;   • COLONOSCOPY     • COLONOSCOPY N/A 04/13/2022    Procedure: COLONOSCOPY WITH ANESTHESIA;  Surgeon: Cortes Ribera DO;  Location:  [facility] ENDOSCOPY;  Service: Gastroenterology;  Laterality: N/A;  preop; hx of poylps   postop; polyps   PCP Katerine Mcclain    • KNEE ARTHROPLASTY Right    • KNEE MENISCAL REPAIR Left 2018   • PENILE PROSTHESIS IMPLANT N/A 4/27/2022    Procedure: MALLEABLE PENILE PROSTHESIS PLACEMENT;  Surgeon: Ignacio Moon MD;  Location:  [facility] OR;  Service: Urology;  Laterality: N/A;   • ROTATOR CUFF REPAIR Right 2000   • SPINE SURGERY  1981    lower back ruptured disc   • TOTAL SHOULDER ARTHROPLASTY W/ DISTAL CLAVICLE EXCISION Right 01/14/2020    Procedure: RIGHT REVERSE TOTAL SHOULDER   ARTHROPLASTY;  Surgeon: Suman Ventura MD;  Location: [facility];  Service: Orthopedics     Social History:  reports that he quit smoking about 11 years ago. His smoking use included pipe. He has a 25.00 pack-year smoking history. He has never used smokeless tobacco. He reports current alcohol use of about 5.0 standard drinks of alcohol per week. He reports that he does not use drugs.    Family History: family history includes Arthritis in his father and mother; Cancer in his brother; Diabetes in his mother and sister; Hypertension in his father and mother; Kidney disease in his mother; Obesity in his sister; Osteoporosis in his mother; Stroke in his father.      Allergies:  No Known Allergies  Medications:  Prior to Admission medications    Medication Sig Start Date End Date Taking? Authorizing Provider   acetaminophen (TYLENOL) 650 MG 8 hr tablet Take 650 mg by mouth 2 (Two) Times a Day.    Provider, MD Cristian   albuterol sulfate  (90 Base) MCG/ACT inhaler Inhale 2 puffs Every 4 (Four) Hours As Needed for Wheezing. 4/11/22   Hien Pimentel APRN   amLODIPine (NORVASC) 5 MG tablet TAKE ONE TABLET BY MOUTH DAILY 6/3/22   Katerine Mcclain APRN   aspirin 81 MG EC tablet Take 81 mg by mouth Daily.    ProviderCristian MD   azelastine (ASTELIN) 0.1 % nasal spray 2 sprays into the nostril(s) as directed by provider 2 (Two) Times a Day. Use in each nostril as directed 8/6/21   Angelica Mccabe DO   bisoprolol (ZEBeta) 5 MG tablet TAKE ONE TABLET BY MOUTH EVERY DAY  Patient taking differently: Take 5 mg by mouth Daily. 3/25/22   Katerine Mcclain APRN   budesonide-formoterol (SYMBICORT) 160-4.5 MCG/ACT inhaler Inhale 2 puffs 2 (Two) Times a Day. 4/20/22   Hien Pimentel APRN   calcium carbonate (OS-TASHIA) 600 MG tablet Take 600 mg by mouth 2 (Two) Times a Day.    ProviderCristian MD   cephalexin (KEFLEX) 500 MG capsule TAKE ONE CAPSULE BY MOUTH FOUR TIMES DAILY FOR 7 DAYS  7/23/22   Cristian Pfeiffer MD   cetirizine (zyrTEC) 10 MG tablet Take 1 tablet by mouth Daily for 5 days. 7/22/22 7/27/22  Abimael Christian MD   Cholecalciferol (VITAMIN D3) 1000 units capsule Take 2,000 Units by mouth Daily.    Cristian Pfeiffer MD   coenzyme Q10 100 MG capsule Take 100 mg by mouth Daily.    Cristian Pfeiffer MD   Collagen-Boron-Hyaluronic Acid (Move Free Cranberry Chic) 10-5-3.3 MG tablet Take 1 tablet by mouth Daily.    Cristian Pfeiffer MD   docusate sodium (Colace) 100 MG capsule Take 1 capsule by mouth 2 (Two) Times a Day. 4/27/22   Ignacio Moon MD   doxycycline (MONODOX) 100 MG capsule Take 1 capsule by mouth 2 (Two) Times a Day. 5/23/22   Katerine Mcclain APRN   finasteride (PROSCAR) 5 MG tablet Take 1 tablet by mouth Daily. 5/19/22   Ignacio Moon MD   fluticasone (FLONASE) 50 MCG/ACT nasal spray 2 sprays into the nostril(s) as directed by provider Daily.    Cristian Pfeiffer MD   gabapentin (NEURONTIN) 300 MG capsule Take 300 mg by mouth 4 (Four) Times a Day. 6/8/22   Cristian Pfeiffer MD   gabapentin (NEURONTIN) 400 MG capsule TAKE ONE CAPSULE BY MOUTH FOUR TIMES DAILY 7/7/22   Katerine Mcclain APRN   Garlic 1000 MG capsule Take 1,000 mg by mouth Daily.    Cristian Pfeiffer MD   HYDROcodone-acetaminophen (NORCO) 5-325 MG per tablet Take 1 tablet by mouth Every 4 (Four) Hours As Needed for Severe Pain  (postop pain). 4/27/22   Ignacio Moon MD   ibuprofen (ADVIL,MOTRIN) 200 MG tablet Take 400 mg by mouth Every 6 (Six) Hours As Needed for Mild Pain .    Cristian Pfeiffer MD   Krill Oil 300 MG capsule Take 300 mg by mouth Daily.    Cristian Pfeiffer MD   lisinopril (PRINIVIL,ZESTRIL) 10 MG tablet TAKE ONE TABLET BY MOUTH DAILY 6/3/22   Katerine Mcclain APRN   Multiple Vitamins-Minerals (OCUVITE ADULT 50+ PO) Take 1 tablet by mouth Daily.    Provider, Historical, MD   mupirocin (BACTROBAN) 2 % ointment Apply 1  application topically to the appropriate area as directed 3 (Three) Times a Day. 6/7/22   Katerine Mcclain APRN   naproxen (NAPROSYN) 250 MG tablet Take 1 tablet by mouth 2 (Two) Times a Day With Meals. 4/27/22   Ignacio Moon MD   omeprazole (priLOSEC) 20 MG capsule Take 20 mg by mouth Daily.    ProviderCristian MD   ondansetron ODT (Zofran ODT) 4 MG disintegrating tablet Place 1 tablet on the tongue Every 6 (Six) Hours As Needed for Nausea. 4/27/22   Ignacio Moon MD   simvastatin (ZOCOR) 40 MG tablet Take 1 tablet by mouth Every Night. 6/3/22   Katerine Mcclain APRN   SUPER B COMPLEX/C PO Take 1 tablet by mouth Every Night.    ProviderCristian MD   tamsulosin (FLOMAX) 0.4 MG capsule 24 hr capsule Take 2 capsules by mouth Every Night. 5/6/22   Ignacio Moon MD   tiotropium bromide monohydrate (Spiriva Respimat) 2.5 MCG/ACT aerosol solution inhaler Inhale 2 puffs Daily. 5/9/22   Katerine Mcclain APRN   vitamin C (ASCORBIC ACID) 500 MG tablet Take 500 mg by mouth Daily.    ProviderCristian MD       PHQ-9 Depression Screening  Little interest or pleasure in doing things?     Feeling down, depressed, or hopeless?     Trouble falling or staying asleep, or sleeping too much?     Feeling tired or having little energy?     Poor appetite or overeating?     Feeling bad about yourself - or that you are a failure or have let yourself or your family down?     Trouble concentrating on things, such as reading the newspaper or watching television?     Moving or speaking so slowly that other people could have noticed? Or the opposite - being so fidgety or restless that you have been moving around a lot more than usual?     Thoughts that you would be better off dead, or of hurting yourself in some way?     PHQ-9 Total Score     If you checked off any problems, how difficult have these problems made it for you to do your work, take care of things at home, or get along with other  "people?                Objective     Vital Signs: /69   Pulse 63   Temp 98.1 °F (36.7 °C)   Resp 17   Ht 167.6 cm (66\")   Wt 86.6 kg (191 lb)   SpO2 97%   BMI 30.83 kg/m²   Physical Exam  Vitals reviewed.   Constitutional:       Appearance: He is well-developed.   HENT:      Head: Normocephalic and atraumatic.   Eyes:      Pupils: Pupils are equal, round, and reactive to light.   Neck:      Vascular: No JVD.   Cardiovascular:      Rate and Rhythm: Normal rate and regular rhythm.   Pulmonary:      Effort: Pulmonary effort is normal.   Abdominal:      General: Bowel sounds are normal.      Palpations: Abdomen is soft.   Musculoskeletal:         General: No deformity.      Cervical back: Normal range of motion and neck supple.   Lymphadenopathy:      Cervical: No cervical adenopathy.   Skin:     General: Skin is warm and dry.   Neurological:      Mental Status: He is alert and oriented to person, place, and time.   Psychiatric:         Behavior: Behavior normal.         Thought Content: Thought content normal.         Judgment: Judgment normal.         Results Reviewed:  Glucose   Date Value Ref Range Status   04/25/2022 145 (H) 65 - 99 mg/dL Final   12/22/2020 92 74 - 109 mg/dL Final     BUN   Date Value Ref Range Status   04/25/2022 12 8 - 23 mg/dL Final   12/22/2020 16 8 - 23 mg/dL Final     Creatinine   Date Value Ref Range Status   04/25/2022 0.93 0.76 - 1.27 mg/dL Final   12/22/2020 0.8 0.5 - 1.2 mg/dL Final     Sodium   Date Value Ref Range Status   04/25/2022 141 136 - 145 mmol/L Final   12/22/2020 139 136 - 145 mmol/L Final     Potassium   Date Value Ref Range Status   04/25/2022 4.0 3.5 - 5.2 mmol/L Final   12/22/2020 4.1 3.5 - 5.0 mmol/L Final     Chloride   Date Value Ref Range Status   04/25/2022 107 98 - 107 mmol/L Final   12/22/2020 102 98 - 111 mmol/L Final     CO2   Date Value Ref Range Status   04/25/2022 24.0 22.0 - 29.0 mmol/L Final   12/22/2020 25 22 - 29 mmol/L Final     Calcium "   Date Value Ref Range Status   04/25/2022 9.3 8.6 - 10.5 mg/dL Final   12/22/2020 9.4 8.8 - 10.2 mg/dL Final     ALT (SGPT)   Date Value Ref Range Status   09/16/2021 17 1 - 41 U/L Final     AST (SGOT)   Date Value Ref Range Status   09/16/2021 17 1 - 40 U/L Final     WBC   Date Value Ref Range Status   04/25/2022 4.76 3.40 - 10.80 10*3/mm3 Final   12/22/2020 6.6 4.8 - 10.8 K/uL Final     Hematocrit   Date Value Ref Range Status   04/25/2022 39.9 37.5 - 51.0 % Final   12/22/2020 40.7 (L) 42.0 - 52.0 % Final     Platelets   Date Value Ref Range Status   04/25/2022 159 140 - 450 10*3/mm3 Final   12/22/2020 175 130 - 400 K/uL Final     Triglycerides   Date Value Ref Range Status   06/02/2021 103 0 - 149 mg/dL Final     HDL Cholesterol   Date Value Ref Range Status   06/02/2021 57 >39 mg/dL Final     LDL Chol Calc (NIH)   Date Value Ref Range Status   06/02/2021 98 0 - 99 mg/dL Final     Hemoglobin A1C   Date Value Ref Range Status   09/17/2021 5.4 % Final         Assessment / Plan     Assessment/Plan:  1. Insect bite of left forearm, initial encounter       - Healing well with minimal residual redness.        - Complete course of Keflex.       Return keep. unless patient needs to be seen sooner or acute issues arise.      I have discussed the patient results/orders and and plan/recommendation with them at today's visit.      Transcribed from ambient dictation for SALONI Kaur by Eli Belle.  07/29/22   15:55 CDT    Patient verbalized consent to the visit recording.

## 2022-08-29 DIAGNOSIS — I10 ESSENTIAL HYPERTENSION: ICD-10-CM

## 2022-08-29 RX ORDER — BISOPROLOL FUMARATE 5 MG/1
TABLET, FILM COATED ORAL
Qty: 150 TABLET | Refills: 0 | Status: SHIPPED | OUTPATIENT
Start: 2022-08-29 | End: 2023-01-12

## 2022-08-29 NOTE — TELEPHONE ENCOUNTER
Rx Refill Note  Requested Prescriptions     Pending Prescriptions Disp Refills   • bisoprolol (ZEBeta) 5 MG tablet [Pharmacy Med Name: bisoprolol fumarate 5 mg tablet] 150 tablet 0     Sig: TAKE ONE TABLET BY MOUTH DAILY      Last office visit with prescribing clinician: 7/29/2022      Next office visit with prescribing clinician: 9/6/2022            Mena Hernandez RN  08/29/22, 10:14 CDT

## 2022-09-06 ENCOUNTER — OFFICE VISIT (OUTPATIENT)
Dept: INTERNAL MEDICINE | Facility: CLINIC | Age: 77
End: 2022-09-06

## 2022-09-06 VITALS
BODY MASS INDEX: 31.5 KG/M2 | HEIGHT: 66 IN | TEMPERATURE: 97.7 F | OXYGEN SATURATION: 97 % | WEIGHT: 196 LBS | DIASTOLIC BLOOD PRESSURE: 82 MMHG | RESPIRATION RATE: 15 BRPM | SYSTOLIC BLOOD PRESSURE: 156 MMHG | HEART RATE: 67 BPM

## 2022-09-06 DIAGNOSIS — I10 ESSENTIAL HYPERTENSION: ICD-10-CM

## 2022-09-06 DIAGNOSIS — Z23 NEED FOR PNEUMOCOCCAL VACCINATION: Primary | ICD-10-CM

## 2022-09-06 DIAGNOSIS — G62.9 PERIPHERAL POLYNEUROPATHY: ICD-10-CM

## 2022-09-06 DIAGNOSIS — E78.5 HYPERLIPIDEMIA, UNSPECIFIED HYPERLIPIDEMIA TYPE: ICD-10-CM

## 2022-09-06 DIAGNOSIS — I10 ESSENTIAL (PRIMARY) HYPERTENSION: ICD-10-CM

## 2022-09-06 PROCEDURE — 99214 OFFICE O/P EST MOD 30 MIN: CPT | Performed by: NURSE PRACTITIONER

## 2022-09-06 PROCEDURE — 90471 IMMUNIZATION ADMIN: CPT | Performed by: NURSE PRACTITIONER

## 2022-09-06 PROCEDURE — 90677 PCV20 VACCINE IM: CPT | Performed by: NURSE PRACTITIONER

## 2022-09-06 RX ORDER — AMLODIPINE BESYLATE 5 MG/1
TABLET ORAL
Qty: 90 TABLET | Refills: 1 | Status: SHIPPED | OUTPATIENT
Start: 2022-09-06 | End: 2023-01-17

## 2022-09-06 RX ORDER — LISINOPRIL 10 MG/1
TABLET ORAL
Qty: 90 TABLET | Refills: 1 | Status: SHIPPED | OUTPATIENT
Start: 2022-09-06 | End: 2022-12-15

## 2022-09-06 RX ORDER — GABAPENTIN 400 MG/1
CAPSULE ORAL
Qty: 120 CAPSULE | Refills: 1 | Status: SHIPPED | OUTPATIENT
Start: 2022-09-06 | End: 2022-11-10

## 2022-09-06 NOTE — PROGRESS NOTES
Subjective     Chief Complaint   Patient presents with   • Hypertension   • Hyperlipidemia       History of Present Illness  Patient is a 78yo white male that presents for a 3 month follow up. Patient states he is feeling well today he has no complaints. He is slightly hypertensive in office but states he always is when he is here. He states he does take his BP regularly at home and it has been running good as far as he knows, his wife usually monitors it. He denies any new cough or shortness of breath. He states he gets slightly winded when exercising but rebounds quickly. Patient states he is using his CPAP regularly. States that his inhalers have helped him when he does.  Denies chest pain, abdominal pain, nausea vomiting or diarrhea. States he does need a pneumonia shot today.     Patient's PMR from outside medical facility reviewed and noted.    Review of Systems   Otherwise complete ROS reviewed and negative except as mentioned in the HPI.    Past Medical History:   Past Medical History:   Diagnosis Date   • Arthritis    • BPH with obstruction/lower urinary tract symptoms    • CAD (coronary artery disease)    • Chronic back pain    • Class 1 obesity due to excess calories with serious comorbidity and body mass index (BMI) of 32.0 to 32.9 in adult 01/20/2020   • COPD (chronic obstructive pulmonary disease) ([facility])    • Coronary artery disease involving autologous vein coronary bypass graft without angina pectoris 01/20/2020   • Elevated cholesterol    • Essential hypertension 10/14/2019   • GERD (gastroesophageal reflux disease)    • Hyperlipidemia    • Hypertension    • Left bundle branch block    • Obstructive sleep apnea 01/20/2020    pt uses a cpap machine at home   • Sleep apnea     CPAP     Past Surgical History:  Past Surgical History:   Procedure Laterality Date   • CARPAL TUNNEL RELEASE Right 09/29/2021    Procedure: CARPAL TUNNEL RELEASE right;  Surgeon: Luis Perdomo MD;  Location: [facility]  OR;  Service: Neurosurgery;  Laterality: Right;   • COLONOSCOPY     • COLONOSCOPY N/A 04/13/2022    Procedure: COLONOSCOPY WITH ANESTHESIA;  Surgeon: Cortes Ribera DO;  Location:  [facility];  Service: Gastroenterology;  Laterality: N/A;  preop; hx of poylps   postop; polyps   PCP Katerine Mcclain    • KNEE ARTHROPLASTY Right    • KNEE MENISCAL REPAIR Left 2018   • PENILE PROSTHESIS IMPLANT N/A 4/27/2022    Procedure: MALLEABLE PENILE PROSTHESIS PLACEMENT;  Surgeon: Ignacio Moon MD;  Location:  [facility] OR;  Service: Urology;  Laterality: N/A;   • ROTATOR CUFF REPAIR Right 2000   • SPINE SURGERY  1981    lower back ruptured disc   • TOTAL SHOULDER ARTHROPLASTY W/ DISTAL CLAVICLE EXCISION Right 01/14/2020    Procedure: RIGHT REVERSE TOTAL SHOULDER  ARTHROPLASTY;  Surgeon: Suman Ventura MD;  Location:  [facility] OR;  Service: Orthopedics     Social History:  reports that he quit smoking about 11 years ago. His smoking use included pipe. He has a 25.00 pack-year smoking history. He has never used smokeless tobacco. He reports current alcohol use of about 5.0 standard drinks of alcohol per week. He reports that he does not use drugs.    Family History: family history includes Arthritis in his father and mother; Cancer in his brother; Diabetes in his mother and sister; Hypertension in his father and mother; Kidney disease in his mother; Obesity in his sister; Osteoporosis in his mother; Stroke in his father.      Allergies:  No Known Allergies  Medications:  Prior to Admission medications    Medication Sig Start Date End Date Taking? Authorizing Provider   acetaminophen (TYLENOL) 650 MG 8 hr tablet Take 650 mg by mouth 2 (Two) Times a Day.   Yes Provider, MD Cristian   albuterol sulfate  (90 Base) MCG/ACT inhaler Inhale 2 puffs Every 4 (Four) Hours As Needed for Wheezing. 4/11/22  Yes Hien Pimentel APRN   amLODIPine (NORVASC) 5 MG tablet TAKE ONE TABLET BY MOUTH DAILY 9/6/22  Yes Johny  SALONI Calzada   aspirin 81 MG EC tablet Take 81 mg by mouth Daily.   Yes ProviderCristian MD   azelastine (ASTELIN) 0.1 % nasal spray 2 sprays into the nostril(s) as directed by provider 2 (Two) Times a Day. Use in each nostril as directed 8/6/21  Yes Angelica Mccabe DO   bisoprolol (ZEBeta) 5 MG tablet TAKE ONE TABLET BY MOUTH DAILY 8/29/22  Yes Katerine Mcclain APRN   budesonide-formoterol (SYMBICORT) 160-4.5 MCG/ACT inhaler Inhale 2 puffs 2 (Two) Times a Day. 4/20/22  Yes Hien Pimentel APRN   calcium carbonate (OS-TASHIA) 600 MG tablet Take 600 mg by mouth 2 (Two) Times a Day.   Yes ProviderCristian MD   Cholecalciferol (VITAMIN D3) 1000 units capsule Take 2,000 Units by mouth Daily.   Yes ProviderCristian MD   coenzyme Q10 100 MG capsule Take 100 mg by mouth Daily.   Yes Provider, MD Cristian   Collagen-Boron-Hyaluronic Acid (Move Free [employer]) 10-5-3.3 MG tablet Take 1 tablet by mouth Daily.   Yes ProviderCristian MD   docusate sodium (Colace) 100 MG capsule Take 1 capsule by mouth 2 (Two) Times a Day. 4/27/22  Yes Ignacio Moon MD   finasteride (PROSCAR) 5 MG tablet Take 1 tablet by mouth Daily. 5/19/22  Yes Ignacio Moon MD   gabapentin (NEURONTIN) 400 MG capsule TAKE ONE CAPSULE BY MOUTH FOUR TIMES DAILY 9/6/22  Yes Katerine Mcclain APRN   ibuprofen (ADVIL,MOTRIN) 200 MG tablet Take 400 mg by mouth Every 6 (Six) Hours As Needed for Mild Pain .   Yes ProviderCristian MD   Krill Oil 300 MG capsule Take 300 mg by mouth Daily.   Yes ProviderCristian MD   lisinopril (PRINIVIL,ZESTRIL) 10 MG tablet TAKE ONE TABLET BY MOUTH DAILY 9/6/22  Yes Katerine Mcclain APRN   Multiple Vitamins-Minerals (OCUVITE ADULT 50+ PO) Take 1 tablet by mouth Daily.   Yes ProviderCristian MD   naproxen (NAPROSYN) 250 MG tablet Take 1 tablet by mouth 2 (Two) Times a Day With Meals. 4/27/22  Yes Ignacio Moon MD   omeprazole (priLOSEC) 20  MG capsule Take 20 mg by mouth Daily.   Yes Cristian Pfeiffer MD   ondansetron ODT (Zofran ODT) 4 MG disintegrating tablet Place 1 tablet on the tongue Every 6 (Six) Hours As Needed for Nausea. 4/27/22  Yes Ignacio Moon MD   simvastatin (ZOCOR) 40 MG tablet Take 1 tablet by mouth Every Night. 6/3/22  Yes Katerine Mcclain APRN   SUPER B COMPLEX/C PO Take 1 tablet by mouth Every Night.   Yes Cristian Pfeiffer MD   tamsulosin (FLOMAX) 0.4 MG capsule 24 hr capsule Take 2 capsules by mouth Every Night. 5/6/22  Yes Ignacio Moon MD   tiotropium bromide monohydrate (Spiriva Respimat) 2.5 MCG/ACT aerosol solution inhaler Inhale 2 puffs Daily. 5/9/22  Yes Katerine Mcclain APRN   vitamin C (ASCORBIC ACID) 500 MG tablet Take 500 mg by mouth Daily.   Yes Cristian Pfeiffer MD   cephalexin (KEFLEX) 500 MG capsule TAKE ONE CAPSULE BY MOUTH FOUR TIMES DAILY FOR 7 DAYS 7/23/22   Cristian Pfeiffer MD   cetirizine (zyrTEC) 10 MG tablet Take 1 tablet by mouth Daily for 5 days. 7/22/22 7/27/22  Abimael Christian MD   doxycycline (MONODOX) 100 MG capsule Take 1 capsule by mouth 2 (Two) Times a Day. 5/23/22   Katerine Mcclain APRN   fluticasone (FLONASE) 50 MCG/ACT nasal spray 2 sprays into the nostril(s) as directed by provider Daily.    Cristian Pfeiffer MD   gabapentin (NEURONTIN) 300 MG capsule Take 300 mg by mouth 4 (Four) Times a Day. 6/8/22   Cristian Pfeiffer MD   Garlic 1000 MG capsule Take 1,000 mg by mouth Daily.    Cristian Pfeiffer MD   HYDROcodone-acetaminophen (NORCO) 5-325 MG per tablet Take 1 tablet by mouth Every 4 (Four) Hours As Needed for Severe Pain  (postop pain). 4/27/22   Ignacio Moon MD   mupirocin (BACTROBAN) 2 % ointment Apply 1 application topically to the appropriate area as directed 3 (Three) Times a Day. 6/7/22   Katerine Mcclain APRN   amLODIPine (NORVASC) 5 MG tablet TAKE ONE TABLET BY MOUTH DAILY 6/3/22 9/6/22  Katerine Mcclain  "SALONI Rodriguez   gabapentin (NEURONTIN) 400 MG capsule TAKE ONE CAPSULE BY MOUTH FOUR TIMES DAILY 7/7/22 9/6/22  Katerine Mcclain APRN   lisinopril (PRINIVIL,ZESTRIL) 10 MG tablet TAKE ONE TABLET BY MOUTH DAILY 6/3/22 9/6/22  Katerine Mcclain APRN       Objective     Vital Signs: /82   Pulse 67   Temp 97.7 °F (36.5 °C)   Resp 15   Ht 167.6 cm (66\")   Wt 88.9 kg (196 lb)   SpO2 97%   BMI 31.64 kg/m²   Physical Exam  Vitals reviewed.   Constitutional:       Appearance: He is well-developed. He is obese.   HENT:      Head: Normocephalic and atraumatic.   Eyes:      Pupils: Pupils are equal, round, and reactive to light.   Neck:      Vascular: No JVD.   Cardiovascular:      Rate and Rhythm: Normal rate and regular rhythm.      Pulses: Normal pulses.      Heart sounds: Normal heart sounds.   Pulmonary:      Effort: Pulmonary effort is normal.      Breath sounds: Normal breath sounds.   Abdominal:      General: Bowel sounds are normal.      Palpations: Abdomen is soft.   Musculoskeletal:         General: No deformity. Normal range of motion.      Cervical back: Normal range of motion and neck supple.   Lymphadenopathy:      Cervical: No cervical adenopathy.   Skin:     General: Skin is warm and dry.   Neurological:      General: No focal deficit present.      Mental Status: He is alert and oriented to person, place, and time.   Psychiatric:         Mood and Affect: Mood normal.         Behavior: Behavior normal.         Thought Content: Thought content normal.         Judgment: Judgment normal.       Results Reviewed:  Glucose   Date Value Ref Range Status   04/25/2022 145 (H) 65 - 99 mg/dL Final   12/22/2020 92 74 - 109 mg/dL Final     BUN   Date Value Ref Range Status   04/25/2022 12 8 - 23 mg/dL Final   12/22/2020 16 8 - 23 mg/dL Final     Creatinine   Date Value Ref Range Status   04/25/2022 0.93 0.76 - 1.27 mg/dL Final   12/22/2020 0.8 0.5 - 1.2 mg/dL Final     Sodium   Date Value Ref Range " Status   04/25/2022 141 136 - 145 mmol/L Final   12/22/2020 139 136 - 145 mmol/L Final     Potassium   Date Value Ref Range Status   04/25/2022 4.0 3.5 - 5.2 mmol/L Final   12/22/2020 4.1 3.5 - 5.0 mmol/L Final     Chloride   Date Value Ref Range Status   04/25/2022 107 98 - 107 mmol/L Final   12/22/2020 102 98 - 111 mmol/L Final     CO2   Date Value Ref Range Status   04/25/2022 24.0 22.0 - 29.0 mmol/L Final   12/22/2020 25 22 - 29 mmol/L Final     Calcium   Date Value Ref Range Status   04/25/2022 9.3 8.6 - 10.5 mg/dL Final   12/22/2020 9.4 8.8 - 10.2 mg/dL Final     ALT (SGPT)   Date Value Ref Range Status   09/16/2021 17 1 - 41 U/L Final     AST (SGOT)   Date Value Ref Range Status   09/16/2021 17 1 - 40 U/L Final     WBC   Date Value Ref Range Status   04/25/2022 4.76 3.40 - 10.80 10*3/mm3 Final   12/22/2020 6.6 4.8 - 10.8 K/uL Final     Hematocrit   Date Value Ref Range Status   04/25/2022 39.9 37.5 - 51.0 % Final   12/22/2020 40.7 (L) 42.0 - 52.0 % Final     Platelets   Date Value Ref Range Status   04/25/2022 159 140 - 450 10*3/mm3 Final   12/22/2020 175 130 - 400 K/uL Final     Triglycerides   Date Value Ref Range Status   06/02/2021 103 0 - 149 mg/dL Final     HDL Cholesterol   Date Value Ref Range Status   06/02/2021 57 >39 mg/dL Final     LDL Chol Calc (NIH)   Date Value Ref Range Status   06/02/2021 98 0 - 99 mg/dL Final     Hemoglobin A1C   Date Value Ref Range Status   09/17/2021 5.4 % Final         Assessment / Plan     Assessment/Plan:  Diagnoses and all orders for this visit:    1. Need for pneumococcal vaccination (Primary)  -     Pneumococcal Conjugate Vaccine 20-Valent (PCV20)              No follow-ups on file. unless patient needs to be seen sooner or acute issues arise.    Code Status: Full.     I have discussed the patient results/orders and and plan/recommendation with them at today's visit.      Katerine Mcclain, APRN   09/06/2022

## 2022-09-06 NOTE — TELEPHONE ENCOUNTER
Rx Refill Note  Requested Prescriptions     Pending Prescriptions Disp Refills   • lisinopril (PRINIVIL,ZESTRIL) 10 MG tablet [Pharmacy Med Name: lisinopril 10 mg tablet] 90 tablet 0     Sig: TAKE ONE TABLET BY MOUTH DAILY   • amLODIPine (NORVASC) 5 MG tablet [Pharmacy Med Name: amlodipine 5 mg tablet] 90 tablet 0     Sig: TAKE ONE TABLET BY MOUTH DAILY      Last office visit with prescribing clinician: 7/29/2022      Next office visit with prescribing clinician: 9/6/2022            Jessie Paige MA  09/06/22, 08:04 CDT

## 2022-09-11 ENCOUNTER — DOCUMENTATION (OUTPATIENT)
Dept: INTERNAL MEDICINE | Facility: CLINIC | Age: 77
End: 2022-09-11

## 2022-09-11 ENCOUNTER — NURSE TRIAGE (OUTPATIENT)
Dept: CALL CENTER | Facility: HOSPITAL | Age: 77
End: 2022-09-11

## 2022-09-11 NOTE — TELEPHONE ENCOUNTER
Tested positive home test today  Wife is requesting Paxlovid sent in to [facility].    Anh GUALLPA on call. On call provider informed of symptoms and positive COVID results.  Requesting Paxlovid. [facility].  Provider took patient call back number   She will speak with patients regular provider    Reason for Disposition  • HIGH RISK for severe COVID complications (e.g., weak immune system, age > 64 years, obesity with BMI > 25, pregnant, chronic lung disease or other chronic medical condition)  (Exception: Already seen by PCP and no new or worsening symptoms.)    Additional Information  • Negative: SEVERE difficulty breathing (e.g., struggling for each breath, speaks in single words)  • Negative: Difficult to awaken or acting confused (e.g., disoriented, slurred speech)  • Negative: Bluish (or gray) lips or face now  • Negative: Shock suspected (e.g., cold/pale/clammy skin, too weak to stand, low BP, rapid pulse)  • Negative: Sounds like a life-threatening emergency to the triager  • Negative: [1] Diagnosed or suspected COVID-19 AND [2] symptoms lasting 3 or more weeks  • Negative: [1] COVID-19 exposure AND [2] no symptoms  • Negative: COVID-19 vaccine reaction suspected (e.g., fever, headache, muscle aches) occurring 1 to 3 days after getting vaccine  • Negative: COVID-19 vaccine, questions about  • Negative: [1] Lives with someone known to have influenza (flu test positive) AND [2] flu-like symptoms (e.g., cough, runny nose, sore throat, SOB; with or without fever)  • Negative: [1] Adult with possible COVID-19 symptoms AND [2] triager concerned about severity of symptoms or other causes  • Negative: COVID-19 and breastfeeding, questions about  • Negative: SEVERE or constant chest pain or pressure  (Exception: Mild central chest pain, present only when coughing.)  • Negative: MODERATE difficulty breathing (e.g., speaks in phrases, SOB even at rest, pulse 100-120)  •  "Negative: [1] Headache AND [2] stiff neck (can't touch chin to chest)  • Negative: Oxygen level (e.g., pulse oximetry) 90 percent or lower  • Negative: Chest pain or pressure  • Negative: Patient sounds very sick or weak to the triager  • Negative: MILD difficulty breathing (e.g., minimal/no SOB at rest, SOB with walking, pulse <100)  • Negative: Fever > 103 F (39.4 C)  • Negative: [1] Fever > 101 F (38.3 C) AND [2] age > 60 years  • Negative: [1] Fever > 100.0 F (37.8 C) AND [2] bedridden (e.g., nursing home patient, CVA, chronic illness, recovering from surgery)    Answer Assessment - Initial Assessment Questions  1. COVID-19 DIAGNOSIS: \"Who made your COVID-19 diagnosis?\" \"Was it confirmed by a positive lab test or self-test?\" If not diagnosed by a doctor (or NP/PA), ask \"Are there lots of cases (community spread) where you live?\" Note: See public health department website, if unsure.  Home test  2. COVID-19 EXPOSURE: \"Was there any known exposure to COVID before the symptoms began?\" CDC Definition of close contact: within 6 feet (2 meters) for a total of 15 minutes or more over a 24-hour period.     Wife has COVID  3. ONSET: \"When did the COVID-19 symptoms start?\"     Last night  4. WORST SYMPTOM: \"What is your worst symptom?\" (e.g., cough, fever, shortness of breath, muscle aches)  Cough head congestion sore throat  5. COUGH: \"Do you have a cough?\" If Yes, ask: \"How bad is the cough?\"     cough  6. FEVER: \"Do you have a fever?\" If Yes, ask: \"What is your temperature, how was it measured, and when did it start?\"  Denies T  98  7. RESPIRATORY STATUS: \"Describe your breathing?\" (e.g., shortness of breath, wheezing, unable to speak)    Chronic COPD  8. BETTER-SAME-WORSE: \"Are you getting better, staying the same or getting worse compared to yesterday?\"  If getting worse, ask, \"In what way?\"      *No Answer*  9. HIGH RISK DISEASE: \"Do you have any chronic medical problems?\" (e.g., asthma, heart or lung disease, weak " "immune system, obesity, etc.)  COPD   HTN   High cholesterol  10. VACCINE: \"Have you had the COVID-19 vaccine?\" If Yes, ask: \"Which one, how many shots, when did you get it?\"       yes  11. BOOSTER: \"Have you received your COVID-19 booster?\" If Yes, ask: \"Which one and when did you get it?\"  yes  12. PREGNANCY: \"Is there any chance you are pregnant?\" \"When was your last menstrual period?\"       na  13. OTHER SYMPTOMS: \"Do you have any other symptoms?\"  (e.g., chills, fatigue, headache, loss of smell or taste, muscle pain, sore throat)   sore throat    Head congestion  A little cough  14. O2 SATURATION MONITOR:  \"Do you use an oxygen saturation monitor (pulse oximeter) at home?\" If Yes, ask \"What is your reading (oxygen level) today?\" \"What is your usual oxygen saturation reading?\" (e.g., 95%)  94%RA    Protocols used: CORONAVIRUS (COVID-19) DIAGNOSED OR SUSPECTED-ADULT-AH      "

## 2022-09-21 PROBLEM — Z91.09 ENVIRONMENTAL ALLERGIES: Status: ACTIVE | Noted: 2022-09-21

## 2022-09-21 PROBLEM — Z91.09 ENVIRONMENTAL ALLERGIES: Chronic | Status: ACTIVE | Noted: 2022-09-21

## 2022-10-14 ENCOUNTER — HOSPITAL ENCOUNTER (OUTPATIENT)
Dept: CT IMAGING | Facility: HOSPITAL | Age: 77
Discharge: HOME | End: 2022-10-14
Admitting: NURSE PRACTITIONER

## 2022-10-14 DIAGNOSIS — Z87.891 PERSONAL HISTORY OF NICOTINE DEPENDENCE: Chronic | ICD-10-CM

## 2022-10-14 PROCEDURE — 71271 CT THORAX LUNG CANCER SCR C-: CPT

## 2022-10-17 ENCOUNTER — OFFICE VISIT (OUTPATIENT)
Dept: PULMONOLOGY | Facility: CLINIC | Age: 77
End: 2022-10-17

## 2022-10-17 VITALS
OXYGEN SATURATION: 96 % | HEIGHT: 66 IN | DIASTOLIC BLOOD PRESSURE: 78 MMHG | WEIGHT: 194 LBS | BODY MASS INDEX: 31.18 KG/M2 | SYSTOLIC BLOOD PRESSURE: 142 MMHG | HEART RATE: 74 BPM

## 2022-10-17 DIAGNOSIS — Z87.891 PERSONAL HISTORY OF NICOTINE DEPENDENCE: Chronic | ICD-10-CM

## 2022-10-17 DIAGNOSIS — G47.33 OBSTRUCTIVE SLEEP APNEA: Chronic | ICD-10-CM

## 2022-10-17 DIAGNOSIS — R91.8 LUNG INFILTRATE: ICD-10-CM

## 2022-10-17 DIAGNOSIS — J44.9 COPD, GROUP B, BY GOLD 2017 CLASSIFICATION: Chronic | ICD-10-CM

## 2022-10-17 DIAGNOSIS — J43.2 CENTRILOBULAR EMPHYSEMA: Chronic | ICD-10-CM

## 2022-10-17 DIAGNOSIS — E66.09 CLASS 1 OBESITY DUE TO EXCESS CALORIES WITH SERIOUS COMORBIDITY AND BODY MASS INDEX (BMI) OF 31.0 TO 31.9 IN ADULT: Chronic | ICD-10-CM

## 2022-10-17 DIAGNOSIS — Z91.09 ENVIRONMENTAL ALLERGIES: Chronic | ICD-10-CM

## 2022-10-17 DIAGNOSIS — J47.9 BRONCHIECTASIS WITHOUT COMPLICATION: Primary | Chronic | ICD-10-CM

## 2022-10-17 PROCEDURE — 99214 OFFICE O/P EST MOD 30 MIN: CPT | Performed by: NURSE PRACTITIONER

## 2022-10-17 PROCEDURE — 94010 BREATHING CAPACITY TEST: CPT | Performed by: NURSE PRACTITIONER

## 2022-10-17 RX ORDER — ALBUTEROL SULFATE 90 UG/1
2 AEROSOL, METERED RESPIRATORY (INHALATION) EVERY 4 HOURS PRN
Qty: 18 G | Refills: 5 | Status: SHIPPED | OUTPATIENT
Start: 2022-10-17 | End: 2022-11-16

## 2022-10-17 RX ORDER — AMOXICILLIN AND CLAVULANATE POTASSIUM 875; 125 MG/1; MG/1
1 TABLET, FILM COATED ORAL 2 TIMES DAILY
Qty: 20 TABLET | Refills: 0 | Status: SHIPPED | OUTPATIENT
Start: 2022-10-17 | End: 2022-10-27

## 2022-10-17 NOTE — PROCEDURES
Pulmonary Function Test  Performed by: Marla Heller, RRT  Authorized by: Hien Pimentel APRN      Pre Drug % Predicted    FVC: 80%   FEV1: 81%   FEF 25-75%: 98%   FEV1/FVC: 76%    Interpretation   Spirometry   Spirometry shows normal results.   Review of FVL curve   Patient's effort is normal.

## 2022-11-01 ENCOUNTER — TELEPHONE (OUTPATIENT)
Dept: PULMONOLOGY | Facility: CLINIC | Age: 77
End: 2022-11-01

## 2022-11-01 DIAGNOSIS — R91.8 LUNG INFILTRATE: Primary | ICD-10-CM

## 2022-11-01 DIAGNOSIS — J47.9 BRONCHIECTASIS WITHOUT COMPLICATION: ICD-10-CM

## 2022-11-01 RX ORDER — PREDNISONE 20 MG/1
40 TABLET ORAL DAILY
Qty: 10 TABLET | Refills: 0 | Status: SHIPPED | OUTPATIENT
Start: 2022-11-01 | End: 2022-11-06

## 2022-11-01 NOTE — TELEPHONE ENCOUNTER
Patient called stating he had finished the antibiotics and while he has improved he is not back to 100%.  He states he still has a productive cough but it does not have any color to it.  He also has not had any fever. He is using all of his inhalers as instructed. He uses [facility] in [facility] and is not allergic to any medication that he is aware of.  Please advise.

## 2022-11-01 NOTE — TELEPHONE ENCOUNTER
Patient notified and voiced understanding.      From Hien: I would not recommend any additional antibiotics at this time. Continue to use as needed albuterol every 4 hours as needed for the cough. Full dose mucinex twice daily. I will send in a short course of steroid to help with cough.

## 2022-11-09 DIAGNOSIS — N40.0 BENIGN PROSTATIC HYPERPLASIA, UNSPECIFIED WHETHER LOWER URINARY TRACT SYMPTOMS PRESENT: ICD-10-CM

## 2022-11-09 RX ORDER — TAMSULOSIN HYDROCHLORIDE 0.4 MG/1
2 CAPSULE ORAL NIGHTLY
Qty: 60 CAPSULE | Refills: 5 | Status: SHIPPED | OUTPATIENT
Start: 2022-11-09 | End: 2022-12-08 | Stop reason: SDUPTHER

## 2022-11-09 NOTE — TELEPHONE ENCOUNTER
Rx Refill Note  Requested Prescriptions     Pending Prescriptions Disp Refills   • tamsulosin (FLOMAX) 0.4 MG capsule 24 hr capsule 60 capsule 5     Sig: Take 2 capsules by mouth Every Night.      Last office visit with prescribing clinician: 9/6/2022      Next office visit with prescribing clinician: Visit date not found       Sherice patient out of medication.        Jessie Paige MA  11/09/22, 10:11 CST

## 2022-11-09 NOTE — TELEPHONE ENCOUNTER
Pt is out of FLOMAX and takes 2 per day.  Dr Moon sent in previously and needs a refill.  Please send in to [facility] [place].

## 2022-11-10 DIAGNOSIS — G62.9 PERIPHERAL POLYNEUROPATHY: ICD-10-CM

## 2022-11-10 RX ORDER — GABAPENTIN 400 MG/1
CAPSULE ORAL
Qty: 120 CAPSULE | Refills: 1 | Status: SHIPPED | OUTPATIENT
Start: 2022-11-10 | End: 2023-01-17

## 2022-11-10 NOTE — TELEPHONE ENCOUNTER
Rx Refill Note  Requested Prescriptions     Pending Prescriptions Disp Refills   • gabapentin (NEURONTIN) 400 MG capsule [Pharmacy Med Name: gabapentin 400 mg capsule] 120 capsule 1     Sig: TAKE ONE CAPSULE BY MOUTH FOUR TIMES DAILY      Last office visit with prescribing clinician: 9/6/2022      Next office visit with prescribing clinician: Visit date not found     UDS: None on file    DATE OF LAST REFILL: 09/06/2022             Jessie Paige MA  11/10/22, 08:17 CST

## 2022-11-21 DIAGNOSIS — E78.5 HYPERLIPIDEMIA, UNSPECIFIED HYPERLIPIDEMIA TYPE: ICD-10-CM

## 2022-11-21 RX ORDER — SIMVASTATIN 40 MG
TABLET ORAL
Qty: 90 TABLET | Refills: 1 | Status: SHIPPED | OUTPATIENT
Start: 2022-11-21 | End: 2023-03-13

## 2022-11-21 NOTE — TELEPHONE ENCOUNTER
Rx Refill Note  Requested Prescriptions     Pending Prescriptions Disp Refills   • simvastatin (ZOCOR) 40 MG tablet [Pharmacy Med Name: simvastatin 40 mg tablet] 90 tablet 1     Sig: TAKE ONE TABLET BY MOUTH EVERY NIGHT      Last office visit with prescribing clinician: 9/6/2022      Next office visit with prescribing clinician: Visit date not found            Mena Hernandez RN  11/21/22, 08:31 CST

## 2022-11-30 ENCOUNTER — TELEPHONE (OUTPATIENT)
Dept: UROLOGY | Facility: CLINIC | Age: 77
End: 2022-11-30

## 2022-11-30 NOTE — TELEPHONE ENCOUNTER
Called patient to try to reschedule his appointment on 12/8/22 with Dr. Moon at the [facility] due to Dr. Moon being out of the office at that time.  I told him at the time we have 2 other openings that day.  I said we would cancel his current appointment but we are expecting a call back from him to reschedule it.

## 2022-12-02 ENCOUNTER — OFFICE VISIT (OUTPATIENT)
Dept: INTERNAL MEDICINE | Facility: CLINIC | Age: 77
End: 2022-12-02

## 2022-12-02 VITALS
BODY MASS INDEX: 31.5 KG/M2 | SYSTOLIC BLOOD PRESSURE: 137 MMHG | TEMPERATURE: 98.7 F | HEART RATE: 63 BPM | OXYGEN SATURATION: 97 % | RESPIRATION RATE: 17 BRPM | DIASTOLIC BLOOD PRESSURE: 88 MMHG | HEIGHT: 66 IN | WEIGHT: 196 LBS

## 2022-12-02 DIAGNOSIS — G89.29 CHRONIC RIGHT-SIDED LOW BACK PAIN WITHOUT SCIATICA: Primary | ICD-10-CM

## 2022-12-02 DIAGNOSIS — M54.50 CHRONIC RIGHT-SIDED LOW BACK PAIN WITHOUT SCIATICA: Primary | ICD-10-CM

## 2022-12-02 PROCEDURE — 99213 OFFICE O/P EST LOW 20 MIN: CPT | Performed by: NURSE PRACTITIONER

## 2022-12-02 NOTE — PROGRESS NOTES
Subjective     Chief Complaint   Patient presents with   • Back Pain       History of Present Illness    Justin Robledo presents today for evaluation of chronic right-sided low back pain. He notes he has experienced this for several years, but it is progressively worsening. The patient reports that this is limiting what he is able to do. He denies experiencing pain that radiates into his hips, legs, or buttocks. He has tried taking Advil to help manage his pain. The patient states his pain worsens when standing for long periods of time. He denies feeling his legs buckle. The patient has not tried to use a TENS unit. He would like to avoid surgery if at all possible. The patient reports having undergone spinal surgery approximately 42 years ago.    Patient's PMR from outside medical facility reviewed and noted.    Review of Systems   Constitutional: Negative for activity change, appetite change, chills and fever.   HENT: Negative for hearing loss, nosebleeds, tinnitus and trouble swallowing.    Eyes: Negative for visual disturbance.   Respiratory: Negative for cough, chest tightness, shortness of breath and wheezing.    Cardiovascular: Negative for chest pain, palpitations and leg swelling.   Gastrointestinal: Negative for abdominal distention, abdominal pain, blood in stool, constipation, diarrhea, nausea and vomiting.   Endocrine: Negative for cold intolerance, heat intolerance, polydipsia, polyphagia and polyuria.   Genitourinary: Negative for decreased urine volume, difficulty urinating, dysuria, flank pain, frequency and hematuria.   Musculoskeletal: Positive for back pain. Negative for arthralgias, joint swelling and myalgias.   Skin: Negative for rash.   Allergic/Immunologic: Negative for immunocompromised state.   Neurological: Negative for dizziness, syncope, weakness, light-headedness and headaches.   Hematological: Negative for adenopathy. Does not bruise/bleed easily.   Psychiatric/Behavioral:  Negative for confusion and sleep disturbance. The patient is not nervous/anxious.         Otherwise complete ROS reviewed and negative except as mentioned in the HPI.    Past Medical History:   Past Medical History:   Diagnosis Date   • Arthritis    • BPH with obstruction/lower urinary tract symptoms    • CAD (coronary artery disease)    • Chronic back pain    • Class 1 obesity due to excess calories with serious comorbidity and body mass index (BMI) of 32.0 to 32.9 in adult 01/20/2020   • COPD (chronic obstructive pulmonary disease) (HCC)    • Coronary artery disease involving autologous vein coronary bypass graft without angina pectoris 01/20/2020   • Elevated cholesterol    • Environmental allergies 9/21/2022   • Essential hypertension 10/14/2019   • GERD (gastroesophageal reflux disease)    • Hyperlipidemia    • Hypertension    • Left bundle branch block    • Obstructive sleep apnea 01/20/2020    pt uses a cpap machine at home   • Sleep apnea     CPAP     Past Surgical History:  Past Surgical History:   Procedure Laterality Date   • CARPAL TUNNEL RELEASE Right 09/29/2021    Procedure: CARPAL TUNNEL RELEASE right;  Surgeon: Luis Perdomo MD;  Location: [facility] OR;  Service: Neurosurgery;  Laterality: Right;   • COLONOSCOPY     • COLONOSCOPY N/A 04/13/2022    Procedure: COLONOSCOPY WITH ANESTHESIA;  Surgeon: Cortes Ribera DO;  Location:  [facility];  Service: Gastroenterology;  Laterality: N/A;  preop; hx of poylps   postop; polyps   PCP Katerine Mcclain    • KNEE ARTHROPLASTY Right    • KNEE MENISCAL REPAIR Left 2018   • PENILE PROSTHESIS IMPLANT N/A 4/27/2022    Procedure: MALLEABLE PENILE PROSTHESIS PLACEMENT;  Surgeon: Ignacio Moon MD;  Location:  [facility] OR;  Service: Urology;  Laterality: N/A;   • ROTATOR CUFF REPAIR Right 2000   • SPINE SURGERY  1981    lower back ruptured disc   • TOTAL SHOULDER ARTHROPLASTY W/ DISTAL CLAVICLE EXCISION Right 01/14/2020    Procedure: RIGHT REVERSE TOTAL  SHOULDER  ARTHROPLASTY;  Surgeon: Suman Ventura MD;  Location: [facility];  Service: Orthopedics     Social History:  reports that he quit smoking about 11 years ago. His smoking use included pipe and cigarettes. He has a 25.00 pack-year smoking history. He has never used smokeless tobacco. He reports current alcohol use of about 5.0 standard drinks per week. He reports that he does not use drugs.    Family History: family history includes Arthritis in his father and mother; Cancer in his brother; Diabetes in his mother and sister; Hypertension in his father and mother; Kidney disease in his mother; Obesity in his sister; Osteoporosis in his mother; Stroke in his father.      Allergies:  No Known Allergies  Medications:  Prior to Admission medications    Medication Sig Start Date End Date Taking? Authorizing Provider   acetaminophen (TYLENOL) 650 MG 8 hr tablet Take 650 mg by mouth 2 (Two) Times a Day.   Yes Cristian Pfeiffer MD   albuterol sulfate  (90 Base) MCG/ACT inhaler Inhale 2 puffs Every 4 (Four) Hours As Needed for Wheezing. 4/11/22  Yes Hien Pimentel APRN   amLODIPine (NORVASC) 5 MG tablet TAKE ONE TABLET BY MOUTH DAILY 9/6/22  Yes Katerine Mcclain APRN   aspirin 81 MG EC tablet Take 81 mg by mouth Daily.   Yes Cristian Pfeiffer MD   azelastine (ASTELIN) 0.1 % nasal spray 2 sprays into the nostril(s) as directed by provider 2 (Two) Times a Day. Use in each nostril as directed 8/6/21  Yes Angelica Mccabe DO   bisoprolol (ZEBeta) 5 MG tablet TAKE ONE TABLET BY MOUTH DAILY 8/29/22  Yes Katerine Mcclain APRN   budesonide-formoterol (SYMBICORT) 160-4.5 MCG/ACT inhaler Inhale 2 puffs 2 (Two) Times a Day. 4/20/22  Yes Hien Pimentel APRN   calcium carbonate (OS-TASHIA) 600 MG tablet Take 600 mg by mouth 2 (Two) Times a Day.   Yes Cristian Pfeiffer MD   Cholecalciferol (VITAMIN D3) 1000 units capsule Take 2,000 Units by mouth Daily.   Yes Cristian Pfeiffer MD    coenzyme Q10 100 MG capsule Take 100 mg by mouth Daily.   Yes Cristian Pfeiffer MD   Collagen-Boron-Hyaluronic Acid (Move Free [employer]) 10-5-3.3 MG tablet Take 1 tablet by mouth Daily.   Yes Cristian Pfeiffer MD   docusate sodium (Colace) 100 MG capsule Take 1 capsule by mouth 2 (Two) Times a Day. 4/27/22  Yes Ignacio Moon MD   finasteride (PROSCAR) 5 MG tablet Take 1 tablet by mouth Daily. 5/19/22  Yes Ignacio Moon MD   fluticasone (FLONASE) 50 MCG/ACT nasal spray 2 sprays into the nostril(s) as directed by provider Daily.   Yes Cristian Pfeiffer MD   gabapentin (NEURONTIN) 300 MG capsule Take 300 mg by mouth 4 (Four) Times a Day. 6/8/22  Yes Cristian Pfeiffer MD   gabapentin (NEURONTIN) 400 MG capsule TAKE ONE CAPSULE BY MOUTH FOUR TIMES DAILY 11/10/22  Yes Katerine Mcclain APRN   Garlic 1000 MG capsule Take 1,000 mg by mouth Daily.   Yes Cristian Pfeiffer MD   HYDROcodone-acetaminophen (NORCO) 5-325 MG per tablet Take 1 tablet by mouth Every 4 (Four) Hours As Needed for Severe Pain  (postop pain). 4/27/22  Yes Ignacio Moon MD   ibuprofen (ADVIL,MOTRIN) 200 MG tablet Take 400 mg by mouth Every 6 (Six) Hours As Needed for Mild Pain .   Yes Cristian Pfeiffer MD   Krill Oil 300 MG capsule Take 300 mg by mouth Daily.   Yes Cristian Pfeiffer MD   lisinopril (PRINIVIL,ZESTRIL) 10 MG tablet TAKE ONE TABLET BY MOUTH DAILY 9/6/22  Yes Katerine Mcclain APRN   Multiple Vitamins-Minerals (OCUVITE ADULT 50+ PO) Take 1 tablet by mouth Daily.   Yes Cristian Pfeiffer MD   mupirocin (BACTROBAN) 2 % ointment Apply 1 application topically to the appropriate area as directed 3 (Three) Times a Day. 6/7/22  Yes Katerine Mcclain APRN   naproxen (NAPROSYN) 250 MG tablet Take 1 tablet by mouth 2 (Two) Times a Day With Meals. 4/27/22  Yes Ignacio Moon MD   omeprazole (priLOSEC) 20 MG capsule Take 20 mg by mouth Daily.   Yes Provider, MD Cristian  "  ondansetron ODT (Zofran ODT) 4 MG disintegrating tablet Place 1 tablet on the tongue Every 6 (Six) Hours As Needed for Nausea. 4/27/22  Yes Ignacio Moon MD   simvastatin (ZOCOR) 40 MG tablet TAKE ONE TABLET BY MOUTH EVERY NIGHT 11/21/22  Yes Katerine Mcclain APRN   SUPER B COMPLEX/C PO Take 1 tablet by mouth Every Night.   Yes ProviderCristian MD   tamsulosin (FLOMAX) 0.4 MG capsule 24 hr capsule Take 2 capsules by mouth Every Night. 11/9/22  Yes Anh Saini APRN   tiotropium bromide monohydrate (Spiriva Respimat) 2.5 MCG/ACT aerosol solution inhaler Inhale 2 puffs Daily. 5/9/22  Yes Katerine Mcclain APRN   vitamin C (ASCORBIC ACID) 500 MG tablet Take 500 mg by mouth Daily.   Yes Provider, MD Cristian   cetirizine (zyrTEC) 10 MG tablet Take 1 tablet by mouth Daily for 5 days. 7/22/22 10/17/22  Abimael Christian MD       Objective     Vital Signs: /88   Pulse 63   Temp 98.7 °F (37.1 °C)   Resp 17   Ht 166.4 cm (65.5\")   Wt 88.9 kg (196 lb)   SpO2 97%   BMI 32.12 kg/m²   Physical Exam  Constitutional:       Appearance: He is well-developed.   HENT:      Head: Normocephalic and atraumatic.   Eyes:      Conjunctiva/sclera: Conjunctivae normal.      Pupils: Pupils are equal, round, and reactive to light.   Neck:      Vascular: No JVD.   Cardiovascular:      Rate and Rhythm: Normal rate and regular rhythm.      Heart sounds: Normal heart sounds. No murmur heard.    No friction rub. No gallop.   Pulmonary:      Effort: Pulmonary effort is normal. No respiratory distress.      Breath sounds: Normal breath sounds. No wheezing or rales.   Chest:      Chest wall: No tenderness.   Abdominal:      General: Bowel sounds are normal. There is no distension.      Palpations: Abdomen is soft.      Tenderness: There is no abdominal tenderness. There is no guarding or rebound.   Musculoskeletal:         General: No tenderness or deformity. Normal range of motion.      Cervical back: Neck " supple.   Skin:     General: Skin is warm and dry.      Findings: No rash.   Neurological:      Mental Status: He is alert and oriented to person, place, and time.      Cranial Nerves: No cranial nerve deficit.      Motor: No abnormal muscle tone.      Deep Tendon Reflexes: Reflexes normal.   Psychiatric:         Behavior: Behavior normal.         Thought Content: Thought content normal.         Judgment: Judgment normal.         Results Reviewed:  Glucose   Date Value Ref Range Status   04/25/2022 145 (H) 65 - 99 mg/dL Final   12/22/2020 92 74 - 109 mg/dL Final     BUN   Date Value Ref Range Status   04/25/2022 12 8 - 23 mg/dL Final   12/22/2020 16 8 - 23 mg/dL Final     Creatinine   Date Value Ref Range Status   04/25/2022 0.93 0.76 - 1.27 mg/dL Final   12/22/2020 0.8 0.5 - 1.2 mg/dL Final     Sodium   Date Value Ref Range Status   04/25/2022 141 136 - 145 mmol/L Final   12/22/2020 139 136 - 145 mmol/L Final     Potassium   Date Value Ref Range Status   04/25/2022 4.0 3.5 - 5.2 mmol/L Final   12/22/2020 4.1 3.5 - 5.0 mmol/L Final     Chloride   Date Value Ref Range Status   04/25/2022 107 98 - 107 mmol/L Final   12/22/2020 102 98 - 111 mmol/L Final     CO2   Date Value Ref Range Status   04/25/2022 24.0 22.0 - 29.0 mmol/L Final   12/22/2020 25 22 - 29 mmol/L Final     Calcium   Date Value Ref Range Status   04/25/2022 9.3 8.6 - 10.5 mg/dL Final   12/22/2020 9.4 8.8 - 10.2 mg/dL Final     ALT (SGPT)   Date Value Ref Range Status   09/16/2021 17 1 - 41 U/L Final     AST (SGOT)   Date Value Ref Range Status   09/16/2021 17 1 - 40 U/L Final     WBC   Date Value Ref Range Status   04/25/2022 4.76 3.40 - 10.80 10*3/mm3 Final   12/22/2020 6.6 4.8 - 10.8 K/uL Final     Hematocrit   Date Value Ref Range Status   04/25/2022 39.9 37.5 - 51.0 % Final   12/22/2020 40.7 (L) 42.0 - 52.0 % Final     Platelets   Date Value Ref Range Status   04/25/2022 159 140 - 450 10*3/mm3 Final   12/22/2020 175 130 - 400 K/uL Final      Triglycerides   Date Value Ref Range Status   06/02/2021 103 0 - 149 mg/dL Final     HDL Cholesterol   Date Value Ref Range Status   06/02/2021 57 >39 mg/dL Final     LDL Chol Calc (NIH)   Date Value Ref Range Status   06/02/2021 98 0 - 99 mg/dL Final     Hemoglobin A1C   Date Value Ref Range Status   09/17/2021 5.4 % Final         Assessment / Plan     Assessment/Plan:  Diagnoses and all orders for this visit:    1. Chronic right-sided low back pain without sciatica (Primary)  -     XR Spine Lumbar 2 or 3 View (In Office)  -     Ambulatory Referral to Physical Therapy Evaluate and treat; Stretching, Strengthening; Full weight bearing    The patient will have a lumbar spine x-ray obtained. Results show multilevel degeneration. Will try Physical therapy.     Return in about 3 months (around 3/13/2023). unless patient needs to be seen sooner or acute issues arise.    Code Status: Full     I have discussed the patient results/orders and and plan/recommendation with them at today's visit.      SALONI Kaur   12/02/2022    Transcribed from ambient dictation for SALONI Kaur by Katerine Amato.  12/02/22   11:40 CST    Patient or patient representative verbalized consent to the visit recording.  I have personally performed the services described in this document as transcribed by the above individual, and it is both accurate and complete.  SALONI Kaur  12/5/2022  10:34 CST

## 2022-12-05 NOTE — PROGRESS NOTES
Subjective    Mr. Robledo is 77 y.o. male    Chief Complaint: Erectile Dysfunction    History of Present Illness    77-year-old male established patient follow-up for enlarged prostate and erectile dysfunction.  His LUTS are well controlled on tamsulosin 0.8 mg and finasteride.  He had placement of Coloplast Kanwal malleable penile implant 4/27/2022 for erectile dysfunction refractory to PDE inhibitor therapy in the setting of hypertension and hyperlipidemia.    He is pleased with his implant.      The following portions of the patient's history were reviewed and updated as appropriate: allergies, current medications, past family history, past medical history, past social history, past surgical history and problem list.    Review of Systems      Current Outpatient Medications:   •  acetaminophen (TYLENOL) 650 MG 8 hr tablet, Take 650 mg by mouth 2 (Two) Times a Day., Disp: , Rfl:   •  albuterol sulfate  (90 Base) MCG/ACT inhaler, Inhale 2 puffs Every 4 (Four) Hours As Needed for Wheezing., Disp: 18 g, Rfl: 6  •  amLODIPine (NORVASC) 5 MG tablet, TAKE ONE TABLET BY MOUTH DAILY, Disp: 90 tablet, Rfl: 1  •  aspirin 81 MG EC tablet, Take 81 mg by mouth Daily., Disp: , Rfl:   •  azelastine (ASTELIN) 0.1 % nasal spray, 2 sprays into the nostril(s) as directed by provider 2 (Two) Times a Day. Use in each nostril as directed, Disp: 30 mL, Rfl: 12  •  bisoprolol (ZEBeta) 5 MG tablet, TAKE ONE TABLET BY MOUTH DAILY, Disp: 150 tablet, Rfl: 0  •  budesonide-formoterol (SYMBICORT) 160-4.5 MCG/ACT inhaler, Inhale 2 puffs 2 (Two) Times a Day., Disp: 10.2 g, Rfl: 11  •  calcium carbonate (OS-TASHIA) 600 MG tablet, Take 600 mg by mouth 2 (Two) Times a Day., Disp: , Rfl:   •  Cholecalciferol (VITAMIN D3) 1000 units capsule, Take 2,000 Units by mouth Daily., Disp: , Rfl:   •  coenzyme Q10 100 MG capsule, Take 100 mg by mouth Daily., Disp: , Rfl:   •  Collagen-Boron-Hyaluronic Acid (Move Free [employer]) 10-5-3.3 MG  tablet, Take 1 tablet by mouth Daily., Disp: , Rfl:   •  docusate sodium (Colace) 100 MG capsule, Take 1 capsule by mouth 2 (Two) Times a Day., Disp: 60 capsule, Rfl: 1  •  finasteride (PROSCAR) 5 MG tablet, Take 1 tablet by mouth Daily., Disp: 90 tablet, Rfl: 3  •  fluticasone (FLONASE) 50 MCG/ACT nasal spray, 2 sprays into the nostril(s) as directed by provider Daily., Disp: , Rfl:   •  gabapentin (NEURONTIN) 300 MG capsule, Take 300 mg by mouth 4 (Four) Times a Day., Disp: , Rfl:   •  gabapentin (NEURONTIN) 400 MG capsule, TAKE ONE CAPSULE BY MOUTH FOUR TIMES DAILY, Disp: 120 capsule, Rfl: 1  •  Garlic 1000 MG capsule, Take 1,000 mg by mouth Daily., Disp: , Rfl:   •  HYDROcodone-acetaminophen (NORCO) 5-325 MG per tablet, Take 1 tablet by mouth Every 4 (Four) Hours As Needed for Severe Pain  (postop pain)., Disp: 18 tablet, Rfl: 0  •  ibuprofen (ADVIL,MOTRIN) 200 MG tablet, Take 400 mg by mouth Every 6 (Six) Hours As Needed for Mild Pain ., Disp: , Rfl:   •  Krill Oil 300 MG capsule, Take 300 mg by mouth Daily., Disp: , Rfl:   •  lisinopril (PRINIVIL,ZESTRIL) 10 MG tablet, TAKE ONE TABLET BY MOUTH DAILY, Disp: 90 tablet, Rfl: 1  •  Multiple Vitamins-Minerals (OCUVITE ADULT 50+ PO), Take 1 tablet by mouth Daily., Disp: , Rfl:   •  mupirocin (BACTROBAN) 2 % ointment, Apply 1 application topically to the appropriate area as directed 3 (Three) Times a Day., Disp: 22 g, Rfl: 1  •  naproxen (NAPROSYN) 250 MG tablet, Take 1 tablet by mouth 2 (Two) Times a Day With Meals., Disp: 60 tablet, Rfl: 0  •  omeprazole (priLOSEC) 20 MG capsule, Take 20 mg by mouth Daily., Disp: , Rfl:   •  ondansetron ODT (Zofran ODT) 4 MG disintegrating tablet, Place 1 tablet on the tongue Every 6 (Six) Hours As Needed for Nausea., Disp: 6 tablet, Rfl: 1  •  simvastatin (ZOCOR) 40 MG tablet, TAKE ONE TABLET BY MOUTH EVERY NIGHT, Disp: 90 tablet, Rfl: 1  •  SUPER B COMPLEX/C PO, Take 1 tablet by mouth Every Night., Disp: , Rfl:   •  tamsulosin  (FLOMAX) 0.4 MG capsule 24 hr capsule, Take 2 capsules by mouth Every Night., Disp: 180 capsule, Rfl: 3  •  tiotropium bromide monohydrate (Spiriva Respimat) 2.5 MCG/ACT aerosol solution inhaler, Inhale 2 puffs Daily., Disp: 4 g, Rfl: 11  •  vitamin C (ASCORBIC ACID) 500 MG tablet, Take 500 mg by mouth Daily., Disp: , Rfl:   •  cetirizine (zyrTEC) 10 MG tablet, Take 1 tablet by mouth Daily for 5 days., Disp: 5 tablet, Rfl: 0    Past Medical History:   Diagnosis Date   • Arthritis    • BPH with obstruction/lower urinary tract symptoms    • CAD (coronary artery disease)    • Chronic back pain    • Class 1 obesity due to excess calories with serious comorbidity and body mass index (BMI) of 32.0 to 32.9 in adult 01/20/2020   • COPD (chronic obstructive pulmonary disease) ([facility])    • Coronary artery disease involving autologous vein coronary bypass graft without angina pectoris 01/20/2020   • Elevated cholesterol    • Environmental allergies 9/21/2022   • Essential hypertension 10/14/2019   • GERD (gastroesophageal reflux disease)    • Hyperlipidemia    • Hypertension    • Left bundle branch block    • Obstructive sleep apnea 01/20/2020    pt uses a cpap machine at home   • Sleep apnea     CPAP       Past Surgical History:   Procedure Laterality Date   • CARPAL TUNNEL RELEASE Right 09/29/2021    Procedure: CARPAL TUNNEL RELEASE right;  Surgeon: Luis Perdomo MD;  Location: [facility] OR;  Service: Neurosurgery;  Laterality: Right;   • COLONOSCOPY     • COLONOSCOPY N/A 04/13/2022    Procedure: COLONOSCOPY WITH ANESTHESIA;  Surgeon: Cortes Ribera DO;  Location: [facility] ENDOSCOPY;  Service: Gastroenterology;  Laterality: N/A;  preop; hx of poylps   postop; polyps   PCP Katerine Mcclain    • KNEE ARTHROPLASTY Right    • KNEE MENISCAL REPAIR Left 2018   • PENILE PROSTHESIS IMPLANT N/A 4/27/2022    Procedure: MALLEABLE PENILE PROSTHESIS PLACEMENT;  Surgeon: Ignacio Moon MD;  Location: [facility] OR;  Service: Urology;   "Laterality: N/A;   • ROTATOR CUFF REPAIR Right    • SPINE SURGERY  1981    lower back ruptured disc   • TOTAL SHOULDER ARTHROPLASTY W/ DISTAL CLAVICLE EXCISION Right 2020    Procedure: RIGHT REVERSE TOTAL SHOULDER  ARTHROPLASTY;  Surgeon: Suman Ventura MD;  Location: [facility];  Service: Orthopedics       Social History     Socioeconomic History   • Marital status:    Tobacco Use   • Smoking status: Former     Packs/day: 0.50     Years: 50.00     Pack years: 25.00     Types: Pipe, Cigarettes     Quit date:      Years since quittin.9   • Smokeless tobacco: Never   Vaping Use   • Vaping Use: Never used   Substance and Sexual Activity   • Alcohol use: Yes     Alcohol/week: 5.0 standard drinks     Types: 5 Cans of beer per week     Comment: daily   • Drug use: No   • Sexual activity: Defer       Family History   Problem Relation Age of Onset   • Arthritis Mother    • Diabetes Mother    • Osteoporosis Mother    • Hypertension Mother    • Kidney disease Mother    • Arthritis Father    • Hypertension Father    • Stroke Father    • Diabetes Sister    • Obesity Sister    • Cancer Brother    • Colon cancer Neg Hx    • Colon polyps Neg Hx        Objective    Temp 97.8 °F (36.6 °C)   Ht 167.6 cm (66\")   Wt 90.7 kg (200 lb)   BMI 32.28 kg/m²     Physical Exam        Results for orders placed or performed in visit on 22   POC Urinalysis Dipstick, Multipro    Specimen: Urine   Result Value Ref Range    Color Yellow Yellow, Straw, Dark Yellow, Marta    Clarity, UA Clear Clear    Glucose, UA Negative Negative mg/dL    Bilirubin Negative Negative    Ketones, UA Trace (A) Negative    Specific Gravity  1.025 1.005 - 1.030    Blood, UA Negative Negative    pH, Urine 5.0 5.0 - 8.0    Protein, POC Negative Negative mg/dL    Urobilinogen, UA Normal Normal, 0.2 E.U./dL    Nitrite, UA Negative Negative    Leukocytes Negative Negative     Assessment and Plan    Diagnoses and all orders for this " visit:    1. Benign prostatic hyperplasia, unspecified whether lower urinary tract symptoms present (Primary)  -     tamsulosin (FLOMAX) 0.4 MG capsule 24 hr capsule; Take 2 capsules by mouth Every Night.  Dispense: 180 capsule; Refill: 3  -     finasteride (PROSCAR) 5 MG tablet; Take 1 tablet by mouth Daily.  Dispense: 90 tablet; Refill: 3    2. Erectile dysfunction due to arterial insufficiency  -     POC Urinalysis Dipstick, Multipro      LUTS well-controlled on combination therapy.  Continue finasteride and tamsulosin.  Follow-up with me in 1 year in my [facility] or sooner as needed.      This document has been signed by MIRIAM Moon MD on December 8, 2022 16:54 CST

## 2022-12-08 ENCOUNTER — OFFICE VISIT (OUTPATIENT)
Dept: UROLOGY | Facility: CLINIC | Age: 77
End: 2022-12-08

## 2022-12-08 VITALS — TEMPERATURE: 97.8 F | WEIGHT: 200 LBS | BODY MASS INDEX: 32.14 KG/M2 | HEIGHT: 66 IN

## 2022-12-08 DIAGNOSIS — N52.01 ERECTILE DYSFUNCTION DUE TO ARTERIAL INSUFFICIENCY: ICD-10-CM

## 2022-12-08 DIAGNOSIS — N40.0 BENIGN PROSTATIC HYPERPLASIA, UNSPECIFIED WHETHER LOWER URINARY TRACT SYMPTOMS PRESENT: Primary | ICD-10-CM

## 2022-12-08 LAB
BILIRUB BLD-MCNC: NEGATIVE MG/DL
CLARITY, POC: CLEAR
COLOR UR: YELLOW
GLUCOSE UR STRIP-MCNC: NEGATIVE MG/DL
KETONES UR QL: ABNORMAL
LEUKOCYTE EST, POC: NEGATIVE
NITRITE UR-MCNC: NEGATIVE MG/ML
PH UR: 5 [PH] (ref 5–8)
PROT UR STRIP-MCNC: NEGATIVE MG/DL
RBC # UR STRIP: NEGATIVE /UL
SP GR UR: 1.02 (ref 1–1.03)
UROBILINOGEN UR QL: NORMAL

## 2022-12-08 PROCEDURE — 99213 OFFICE O/P EST LOW 20 MIN: CPT | Performed by: UROLOGY

## 2022-12-08 PROCEDURE — 81003 URINALYSIS AUTO W/O SCOPE: CPT | Performed by: UROLOGY

## 2022-12-08 RX ORDER — TAMSULOSIN HYDROCHLORIDE 0.4 MG/1
2 CAPSULE ORAL NIGHTLY
Qty: 180 CAPSULE | Refills: 3 | Status: SHIPPED | OUTPATIENT
Start: 2022-12-08

## 2022-12-08 RX ORDER — FINASTERIDE 5 MG/1
5 TABLET, FILM COATED ORAL DAILY
Qty: 90 TABLET | Refills: 3 | Status: SHIPPED | OUTPATIENT
Start: 2022-12-08

## 2022-12-15 DIAGNOSIS — I10 ESSENTIAL HYPERTENSION: ICD-10-CM

## 2022-12-15 RX ORDER — LISINOPRIL 10 MG/1
TABLET ORAL
Qty: 90 TABLET | Refills: 1 | Status: SHIPPED | OUTPATIENT
Start: 2022-12-15

## 2022-12-15 NOTE — TELEPHONE ENCOUNTER
Rx Refill Note  Requested Prescriptions     Pending Prescriptions Disp Refills   • lisinopril (PRINIVIL,ZESTRIL) 10 MG tablet [Pharmacy Med Name: lisinopril 10 mg tablet] 90 tablet 1     Sig: TAKE ONE TABLET BY MOUTH DAILY      Last office visit with prescribing clinician: 12/2/2022   Next office visit with prescribing clinician: 3/3/2023                         Would you like a call back once the refill request has been completed: [] Yes [] No    If the office needs to give you a call back, can they leave a voicemail: [] Yes [] No    Mena Hernandez RN  12/15/22, 10:46 CST

## 2022-12-20 PROBLEM — R91.8 LUNG INFILTRATE: Status: ACTIVE | Noted: 2022-12-20

## 2022-12-20 NOTE — PROGRESS NOTES
Chief Complaint  Bronchiectasis without complication    Subjective    History of Present Illness     Justin Robledo presents to [facility] for:    History of Present Illness  Management of his COPD, emphysema, bronchiectasis and recent pneumonia.  He is obese.  Most recent FEV1 81. He is a former smoker.  He is a 1 pack/day smoker for 50 years.  He quit in 2011.  He has shortness of breath and productive coughing daily.  He is on Spiriva and Symbicort.  He has a rescue inhaler and nebulizer he can use when needed.  He has not needed them much lately.  He is on CPAP for sleep apnea.  He is compliant with this and benefiting from it.  He alternates between Flonase and Astelin for his allergies and postnasal drainage.  When I saw him recently he had some acute complaints.  Low-dose lung scan showed a right upper lobe infiltrate.  He was treated with Augmentin and steroid taper.  He is back today for follow-up imaging.  He indicates clinically he is feeling much better.  No issues or problems.  His wife is present today and confirms..        Prior to Admission medications    Medication Sig Start Date End Date Taking? Authorizing Provider   acetaminophen (TYLENOL) 650 MG 8 hr tablet Take 650 mg by mouth 2 (Two) Times a Day.    Provider, MD Cristian   albuterol sulfate  (90 Base) MCG/ACT inhaler Inhale 2 puffs Every 4 (Four) Hours As Needed for Wheezing. 4/11/22   Hien Pimentel APRN   amLODIPine (NORVASC) 5 MG tablet TAKE ONE TABLET BY MOUTH DAILY 9/6/22   Katerine Mcclain APRN   aspirin 81 MG EC tablet Take 81 mg by mouth Daily.    Provider, MD Cristian   azelastine (ASTELIN) 0.1 % nasal spray 2 sprays into the nostril(s) as directed by provider 2 (Two) Times a Day. Use in each nostril as directed 8/6/21   Angelica Mccabe DO   bisoprolol (ZEBeta) 5 MG tablet TAKE ONE TABLET BY MOUTH DAILY 8/29/22   Katerine Mcclain APRN    budesonide-formoterol (SYMBICORT) 160-4.5 MCG/ACT inhaler Inhale 2 puffs 2 (Two) Times a Day. 4/20/22   Hien Pimentel APRN   calcium carbonate (OS-TASHIA) 600 MG tablet Take 600 mg by mouth 2 (Two) Times a Day.    Cristian Pfeiffer MD   cetirizine (zyrTEC) 10 MG tablet Take 1 tablet by mouth Daily for 5 days. 7/22/22 10/17/22  Abimael Christian MD   Cholecalciferol (VITAMIN D3) 1000 units capsule Take 2,000 Units by mouth Daily.    Cristian Pfeiffer MD   coenzyme Q10 100 MG capsule Take 100 mg by mouth Daily.    Cristian Pfeiffer MD   Collagen-Boron-Hyaluronic Acid (Move SIRS-Lab) 10-5-3.3 MG tablet Take 1 tablet by mouth Daily.    Cristian Pfeiffer MD   docusate sodium (Colace) 100 MG capsule Take 1 capsule by mouth 2 (Two) Times a Day. 4/27/22   Ignacio Moon MD   finasteride (PROSCAR) 5 MG tablet Take 1 tablet by mouth Daily. 12/8/22   Ignacio Moon MD   fluticasone (FLONASE) 50 MCG/ACT nasal spray 2 sprays into the nostril(s) as directed by provider Daily.    Cristian Pfeiffer MD   gabapentin (NEURONTIN) 300 MG capsule Take 300 mg by mouth 4 (Four) Times a Day. 6/8/22   Cristian Pfeiffer MD   gabapentin (NEURONTIN) 400 MG capsule TAKE ONE CAPSULE BY MOUTH FOUR TIMES DAILY 11/10/22   Katerine Mcclain APRN   Garlic 1000 MG capsule Take 1,000 mg by mouth Daily.    Cristian Pfeiffer MD   HYDROcodone-acetaminophen (NORCO) 5-325 MG per tablet Take 1 tablet by mouth Every 4 (Four) Hours As Needed for Severe Pain  (postop pain). 4/27/22   Ignacio Moon MD   ibuprofen (ADVIL,MOTRIN) 200 MG tablet Take 400 mg by mouth Every 6 (Six) Hours As Needed for Mild Pain .    Cristian Pfeiffer MD   Krill Oil 300 MG capsule Take 300 mg by mouth Daily.    Cristian Pfeiffer MD   lisinopril (PRINIVIL,ZESTRIL) 10 MG tablet TAKE ONE TABLET BY MOUTH DAILY 12/15/22   Katerine Mcclain APRN   Multiple Vitamins-Minerals (OCUVITE ADULT 50+ PO) Take 1 tablet by  "mouth Daily.    Cristian Pfeiffer MD   mupirocin (BACTROBAN) 2 % ointment Apply 1 application topically to the appropriate area as directed 3 (Three) Times a Day. 22   Katerine Mcclain APRN   naproxen (NAPROSYN) 250 MG tablet Take 1 tablet by mouth 2 (Two) Times a Day With Meals. 22   Ignacio Moon MD   omeprazole (priLOSEC) 20 MG capsule Take 20 mg by mouth Daily.    Cristian Pfeiffer MD   ondansetron ODT (Zofran ODT) 4 MG disintegrating tablet Place 1 tablet on the tongue Every 6 (Six) Hours As Needed for Nausea. 22   Ignacio Moon MD   simvastatin (ZOCOR) 40 MG tablet TAKE ONE TABLET BY MOUTH EVERY NIGHT 22   Katerine Mcclain APRN   SUPER B COMPLEX/C PO Take 1 tablet by mouth Every Night.    Cristian Pfeiffer MD   tamsulosin (FLOMAX) 0.4 MG capsule 24 hr capsule Take 2 capsules by mouth Every Night. 22   Ignacio Moon MD   tiotropium bromide monohydrate (Spiriva Respimat) 2.5 MCG/ACT aerosol solution inhaler Inhale 2 puffs Daily. 22   Katerine Mcclain APRN   vitamin C (ASCORBIC ACID) 500 MG tablet Take 500 mg by mouth Daily.    Cristian Pfeiffer MD       Social History     Socioeconomic History   • Marital status:    Tobacco Use   • Smoking status: Former     Packs/day: 0.50     Years: 50.00     Pack years: 25.00     Types: Pipe, Cigarettes     Quit date:      Years since quittin.0   • Smokeless tobacco: Never   Vaping Use   • Vaping Use: Never used   Substance and Sexual Activity   • Alcohol use: Yes     Alcohol/week: 5.0 standard drinks     Types: 5 Cans of beer per week     Comment: daily   • Drug use: No   • Sexual activity: Defer       Objective   Vital Signs:   /68   Pulse 63   Ht 167.6 cm (66\")   Wt 88.5 kg (195 lb)   SpO2 95% Comment: RA  BMI 31.47 kg/m²     Physical Exam  Constitutional:       Appearance: He is obese.      Interventions: Face mask in place.   Eyes:      Comments: Glasses "   Cardiovascular:      Rate and Rhythm: Normal rate and regular rhythm.      Heart sounds: No murmur heard.  Pulmonary:      Effort: Pulmonary effort is normal.      Breath sounds: Normal breath sounds.   Musculoskeletal:      Right lower leg: No edema.      Left lower leg: No edema.   Neurological:      Mental Status: He is alert and oriented to person, place, and time.        Result Review :    PFT Values        Some values may be hidden. Unless noted otherwise, only the newest values recorded on each date are displayed.         Old Values PFT Results 9/8/21 10/17/22   No data to display.      Pre Drug PFT Results 9/8/21 10/17/22    80   FEV1 99 81   FEF 25-75% 69 98   FEV1/FVC 72.62 76      Post Drug PFT Results 9/8/21 10/17/22   No data to display.      Other Tests PFT Results 9/8/21 10/17/22   No data to display.           My interpretation of the PFT: none    Results for orders placed in visit on 10/17/22    Pulmonary Function Test    Narrative  Pulmonary Function Test  Performed by: Marla Heller, LUIS  Authorized by: Hien Pimentel APRN    Pre Drug % Predicted  FVC: 80%  FEV1: 81%  FEF 25-75%: 98%  FEV1/FVC: 76%    Interpretation  Spirometry  Spirometry shows normal results.  Review of FVL curve  Patient's effort is normal.      Results for orders placed in visit on 09/08/21    Pulmonary Function Test    Narrative  Pulmonary Function Test  Performed by: Marla Heller, RRT  Authorized by: Hien Pimentel APRN    Pre Drug % Predicted  FVC: 106%  FEV1: 99%  FEF 25-75%: 69%  FEV1/FVC: 72.62%    Interpretation  Spirometry There is reduced midflow suggesting small airway/airflow obstruction.  Review of FVL curve  Patient's effort is normal.      Results for orders placed in visit on 01/28/20    Pulmonary Function Test    Narrative  Pulmonary Function Test  Performed by: Hien Pimentel APRN  Authorized by: Hien Pimentel APRN    Pre Drug  FVC: 103%  FEV1: 90%  FEF 25-75%:  "46%  FEV1/FVC: 67.97%  T%  RV: 119%  DLCO: 69%  D/VAsb: 72%     CT Chest Without Contrast Diagnostic (2023 14:12)    My interpretation of imaging: Resolution of pneumonia, new 10 mm nodule left upper lobe, new 5 mm nodule left lower lobe, likely related to resolving infection or inflammation    My interpretation of labs: none      Assessment and Plan     Diagnoses and all orders for this visit:    1. Lung infiltrate (Primary)  Comments:  Resolved on CT imaging    2. Environmental allergies  Comments:  Doing well alternating between Flonase and Astelin.  No refills needed    3. Class 1 obesity due to excess calories with serious comorbidity and body mass index (BMI) of 31.0 to 31.9 in adult  Comments:  Contributes to underlying conditions.  Education material provided    4. Bronchiectasis without complication (HCC)  Comments:  Recent exacerbation treated with antibiotics.  Clinically resolved.  Imaging also shows resolution    5. Centrilobular emphysema (HCC)  Comments:  Continue to avoid smoking.  Stable emphysema on recent imaging    6. COPD, group B, by GOLD 2017 classification (HCC)  Comments:  Doing well on Spiriva and Symbicort.  Seldom requiring emergency medicine.  PFTs stable recently.  No refills needed    7. Obstructive sleep apnea  Comments:  Continue CPAP.  He is compliant with this and benefiting from it.  Discussed cleaning.  He indicates it is \"a self cleaning device\"    8. Personal history of nicotine dependence  Comments:  Continue to avoid smoking    9. Lung nodule, multiple  Comments:  New left upper lobe 10 mm nodule, left lower lobe 5 mm nodule.  Follow-up imaging in 3 months.  We will hold on PET given recent pneumonia  Orders:  -     CT Chest Without Contrast; Future      Body mass index is 31.47 kg/m². Educational material provided after visit summary about elevated BMI        Hien Pimentel, APRN  2023  15:44 CST    Follow Up   Return in about 3 months (around 2023) " for ct.    Patient was given instructions and counseling regarding his condition or for health maintenance advice. Please see specific information pulled into the AVS if appropriate.

## 2023-01-11 ENCOUNTER — HOSPITAL ENCOUNTER (OUTPATIENT)
Dept: CT IMAGING | Facility: HOSPITAL | Age: 78
Discharge: HOME | End: 2023-01-11
Admitting: NURSE PRACTITIONER
Payer: COMMERCIAL

## 2023-01-11 DIAGNOSIS — I10 ESSENTIAL HYPERTENSION: ICD-10-CM

## 2023-01-11 DIAGNOSIS — R91.8 LUNG INFILTRATE: ICD-10-CM

## 2023-01-11 PROCEDURE — 71250 CT THORAX DX C-: CPT

## 2023-01-11 NOTE — TELEPHONE ENCOUNTER
Rx Refill Note  Requested Prescriptions     Pending Prescriptions Disp Refills   • bisoprolol (ZEBeta) 5 MG tablet [Pharmacy Med Name: bisoprolol fumarate 5 mg tablet] 150 tablet 0     Sig: TAKE ONE TABLET BY MOUTH EVERY DAY      Last office visit with prescribing clinician: 12/2/2022    Next office visit with prescribing clinician: 3/3/2023                         Would you like a call back once the refill request has been completed: [] Yes [] No    If the office needs to give you a call back, can they leave a voicemail: [] Yes [] No    Mena Hernandez RN  01/11/23, 08:35 CST

## 2023-01-12 RX ORDER — BISOPROLOL FUMARATE 5 MG/1
TABLET, FILM COATED ORAL
Qty: 150 TABLET | Refills: 0 | Status: SHIPPED | OUTPATIENT
Start: 2023-01-12 | End: 2023-03-10 | Stop reason: SDUPTHER

## 2023-01-16 DIAGNOSIS — G62.9 PERIPHERAL POLYNEUROPATHY: ICD-10-CM

## 2023-01-16 DIAGNOSIS — I10 ESSENTIAL (PRIMARY) HYPERTENSION: ICD-10-CM

## 2023-01-16 NOTE — TELEPHONE ENCOUNTER
Rx Refill Note  Requested Prescriptions     Pending Prescriptions Disp Refills   • amLODIPine (NORVASC) 5 MG tablet [Pharmacy Med Name: amlodipine 5 mg tablet] 90 tablet 1     Sig: TAKE ONE TABLET BY MOUTH DAILY   • gabapentin (NEURONTIN) 400 MG capsule [Pharmacy Med Name: gabapentin 400 mg capsule] 120 capsule 1     Sig: TAKE ONE CAPSULE BY MOUTH FOUR TIMES DAILY   Med last filled: 11/10/22   Last office visit with prescribing clinician: 12/2/2022   Next office visit with prescribing clinician: 3/3/2023     NO UDS ON FILE                           Would you like a call back once the refill request has been completed: [] Yes [] No    If the office needs to give you a call back, can they leave a voicemail: [] Yes [] No    Mena Hernandez RN  01/16/23, 11:38 CST

## 2023-01-17 ENCOUNTER — OFFICE VISIT (OUTPATIENT)
Dept: PULMONOLOGY | Facility: CLINIC | Age: 78
End: 2023-01-17
Payer: COMMERCIAL

## 2023-01-17 VITALS
SYSTOLIC BLOOD PRESSURE: 124 MMHG | BODY MASS INDEX: 31.34 KG/M2 | DIASTOLIC BLOOD PRESSURE: 68 MMHG | WEIGHT: 195 LBS | OXYGEN SATURATION: 95 % | HEIGHT: 66 IN | HEART RATE: 63 BPM

## 2023-01-17 DIAGNOSIS — G47.33 OBSTRUCTIVE SLEEP APNEA: Chronic | ICD-10-CM

## 2023-01-17 DIAGNOSIS — J47.9 BRONCHIECTASIS WITHOUT COMPLICATION: Chronic | ICD-10-CM

## 2023-01-17 DIAGNOSIS — Z87.891 PERSONAL HISTORY OF NICOTINE DEPENDENCE: Chronic | ICD-10-CM

## 2023-01-17 DIAGNOSIS — J44.9 COPD, GROUP B, BY GOLD 2017 CLASSIFICATION: Chronic | ICD-10-CM

## 2023-01-17 DIAGNOSIS — R91.8 LUNG NODULE, MULTIPLE: ICD-10-CM

## 2023-01-17 DIAGNOSIS — R91.8 LUNG INFILTRATE: Primary | ICD-10-CM

## 2023-01-17 DIAGNOSIS — E66.09 CLASS 1 OBESITY DUE TO EXCESS CALORIES WITH SERIOUS COMORBIDITY AND BODY MASS INDEX (BMI) OF 31.0 TO 31.9 IN ADULT: Chronic | ICD-10-CM

## 2023-01-17 DIAGNOSIS — J43.2 CENTRILOBULAR EMPHYSEMA: Chronic | ICD-10-CM

## 2023-01-17 DIAGNOSIS — Z91.09 ENVIRONMENTAL ALLERGIES: Chronic | ICD-10-CM

## 2023-01-17 PROCEDURE — 99214 OFFICE O/P EST MOD 30 MIN: CPT | Performed by: NURSE PRACTITIONER

## 2023-01-17 RX ORDER — AMLODIPINE BESYLATE 5 MG/1
TABLET ORAL
Qty: 90 TABLET | Refills: 1 | Status: SHIPPED | OUTPATIENT
Start: 2023-01-17

## 2023-01-17 RX ORDER — GABAPENTIN 400 MG/1
CAPSULE ORAL
Qty: 120 CAPSULE | Refills: 1 | Status: SHIPPED | OUTPATIENT
Start: 2023-01-17

## 2023-01-23 DIAGNOSIS — J43.2 CENTRILOBULAR EMPHYSEMA: ICD-10-CM

## 2023-01-23 RX ORDER — BUDESONIDE AND FORMOTEROL FUMARATE DIHYDRATE 160; 4.5 UG/1; UG/1
2 AEROSOL RESPIRATORY (INHALATION)
Qty: 10.2 G | Refills: 11 | Status: SHIPPED | OUTPATIENT
Start: 2023-01-23

## 2023-01-23 RX ORDER — AZELASTINE 1 MG/ML
2 SPRAY, METERED NASAL 2 TIMES DAILY
Qty: 30 ML | Refills: 12 | Status: SHIPPED | OUTPATIENT
Start: 2023-01-23

## 2023-01-23 NOTE — TELEPHONE ENCOUNTER
Received a fax from [facility] in [place] requesting refills.    Rx Refill Note  Requested Prescriptions     Pending Prescriptions Disp Refills   • budesonide-formoterol (SYMBICORT) 160-4.5 MCG/ACT inhaler 10.2 g 11     Sig: Inhale 2 puffs 2 (Two) Times a Day.      Last office visit with prescribing clinician: 1/17/2023   Last telemedicine visit with prescribing clinician: 4/11/2023   Next office visit with prescribing clinician: 4/11/2023                         Would you like a call back once the refill request has been completed: [] Yes [] No    If the office needs to give you a call back, can they leave a voicemail: [] Yes [] No    Dayami Malik MA  01/23/23, 16:44 CST

## 2023-03-07 ENCOUNTER — OFFICE VISIT (OUTPATIENT)
Dept: INTERNAL MEDICINE | Facility: CLINIC | Age: 78
End: 2023-03-07
Payer: COMMERCIAL

## 2023-03-07 VITALS
SYSTOLIC BLOOD PRESSURE: 131 MMHG | OXYGEN SATURATION: 95 % | RESPIRATION RATE: 16 BRPM | BODY MASS INDEX: 31.82 KG/M2 | DIASTOLIC BLOOD PRESSURE: 67 MMHG | HEART RATE: 65 BPM | HEIGHT: 66 IN | TEMPERATURE: 97.8 F | WEIGHT: 198 LBS

## 2023-03-07 DIAGNOSIS — G62.9 PERIPHERAL POLYNEUROPATHY: ICD-10-CM

## 2023-03-07 DIAGNOSIS — Z79.899 ENCOUNTER FOR LONG-TERM (CURRENT) USE OF OTHER MEDICATIONS: Primary | ICD-10-CM

## 2023-03-07 PROCEDURE — 99213 OFFICE O/P EST LOW 20 MIN: CPT | Performed by: NURSE PRACTITIONER

## 2023-03-07 NOTE — PROGRESS NOTES
"Subjective   Sri Herzog is a 77 y.o. male.     History of Present Illness   Mr. SRI HERZOG presents today for follow-up evaluation of lower back pain. He is accompanied by an adult female.    The patient was prescribed gabapentin on 01/17/2023. He reports his back was fine until he was given a \"heavy thing\" to put on the counter today.    The adult female states he has a nodule on his lung that he will follow-up on 04/11/2023. They was told that if the nodule is not gone, then the next step is biopsy. The adult female believes that it is related to his COPD.    The patient states that he bumped into something that caused him to have a skin tear. The adult female states that she has been applying Polysporin to the affected area.    The following portions of the patient's history were reviewed and updated as appropriate: past family history, past medical history, past social history and past surgical history.    Review of Systems   Constitutional: Positive for fatigue.   Genitourinary: Negative for flank pain and penile pain.   Musculoskeletal: Positive for back pain and myalgias.   Neurological: Negative for headaches.       Objective   Physical Exam  Vitals reviewed.   Constitutional:       Appearance: He is well-developed.   HENT:      Head: Normocephalic and atraumatic.   Eyes:      Pupils: Pupils are equal, round, and reactive to light.   Neck:      Vascular: No JVD.   Cardiovascular:      Rate and Rhythm: Normal rate and regular rhythm.   Pulmonary:      Effort: Pulmonary effort is normal.      Breath sounds: Wheezing ( mild bilateral) present.   Abdominal:      General: Bowel sounds are normal.      Palpations: Abdomen is soft.   Musculoskeletal:         General: No deformity.      Cervical back: Normal range of motion and neck supple.   Lymphadenopathy:      Cervical: No cervical adenopathy.   Skin:     General: Skin is warm and dry.   Neurological:      Mental Status: He is alert and oriented to " person, place, and time.   Psychiatric:         Behavior: Behavior normal.         Thought Content: Thought content normal.         Judgment: Judgment normal.         Assessment & Plan   Diagnoses and all orders for this visit:    1. Encounter for long-term (current) use of other medications (Primary)  -     Gabapentin, Urine - Urine, Clean Catch    2. Peripheral polyneuropathy     Stable on Gabapentin.       Transcribed from ambient dictation for SALONI Kaur by Allie Palmer.  03/07/23   15:34 CST    Patient or patient representative verbalized consent to the visit recording.  I have personally performed the services described in this document as transcribed by the above individual, and it is both accurate and complete.  SALONI Kaur  3/9/2023  14:52 CST

## 2023-03-10 ENCOUNTER — OFFICE VISIT (OUTPATIENT)
Dept: INTERNAL MEDICINE | Facility: CLINIC | Age: 78
End: 2023-03-10
Payer: COMMERCIAL

## 2023-03-10 VITALS
WEIGHT: 198 LBS | SYSTOLIC BLOOD PRESSURE: 132 MMHG | BODY MASS INDEX: 31.82 KG/M2 | HEIGHT: 66 IN | DIASTOLIC BLOOD PRESSURE: 77 MMHG | OXYGEN SATURATION: 96 % | RESPIRATION RATE: 18 BRPM | HEART RATE: 74 BPM | TEMPERATURE: 97.7 F

## 2023-03-10 DIAGNOSIS — Z23 NEED FOR TDAP VACCINATION: ICD-10-CM

## 2023-03-10 DIAGNOSIS — Z00.00 ANNUAL PHYSICAL EXAM: Primary | ICD-10-CM

## 2023-03-10 DIAGNOSIS — I10 ESSENTIAL HYPERTENSION: ICD-10-CM

## 2023-03-10 DIAGNOSIS — E78.5 HYPERLIPIDEMIA, UNSPECIFIED HYPERLIPIDEMIA TYPE: ICD-10-CM

## 2023-03-10 DIAGNOSIS — D64.9 ANEMIA, UNSPECIFIED TYPE: ICD-10-CM

## 2023-03-10 DIAGNOSIS — Z23 ENCOUNTER FOR IMMUNIZATION: ICD-10-CM

## 2023-03-10 DIAGNOSIS — Z12.5 SPECIAL SCREENING FOR MALIGNANT NEOPLASM OF PROSTATE: ICD-10-CM

## 2023-03-10 PROCEDURE — 90715 TDAP VACCINE 7 YRS/> IM: CPT | Performed by: NURSE PRACTITIONER

## 2023-03-10 PROCEDURE — 99397 PER PM REEVAL EST PAT 65+ YR: CPT | Performed by: NURSE PRACTITIONER

## 2023-03-10 PROCEDURE — 90471 IMMUNIZATION ADMIN: CPT | Performed by: NURSE PRACTITIONER

## 2023-03-10 RX ORDER — BISOPROLOL FUMARATE 5 MG/1
5 TABLET, FILM COATED ORAL DAILY
Qty: 150 TABLET | Refills: 3 | Status: SHIPPED | OUTPATIENT
Start: 2023-03-10

## 2023-03-10 NOTE — PROGRESS NOTES
Subjective     Chief Complaint   Patient presents with   • Annual Exam       History of Present Illness      Justin Robledo is a 77-year-old male who presents today for an annual exam.    The patient states he is doing generally well. He denies chest pain, shortness of breath, syncope, or recent falls. His back pain has improved. He is drinking coffee.He had some concerns about his memory, it has not gotten severe enough to where he is unable to remember where he is driving as of yet.   His sleeping habits have improved. He utilizes a CPAP machine.      Patient's PMR from outside medical facility reviewed and noted.    Review of Systems   Constitutional: Negative for fatigue.   HENT: Negative for sinus pressure and sinus pain.    Eyes: Negative for visual disturbance.   Respiratory: Positive for shortness of breath ( chronic). Negative for cough.    Cardiovascular: Negative for chest pain and palpitations.   Endocrine: Negative for polydipsia, polyphagia and polyuria.   Genitourinary: Negative for frequency and penile swelling.   Musculoskeletal: Positive for back pain.   Skin: Negative.    Neurological: Negative for syncope.   Psychiatric/Behavioral: Negative for sleep disturbance. The patient is not nervous/anxious.       Otherwise complete ROS reviewed and negative except as mentioned in the HPI.    Past Medical History:   Past Medical History:   Diagnosis Date   • Arthritis    • BPH with obstruction/lower urinary tract symptoms    • CAD (coronary artery disease)    • Chronic back pain    • Class 1 obesity due to excess calories with serious comorbidity and body mass index (BMI) of 32.0 to 32.9 in adult 01/20/2020   • COPD (chronic obstructive pulmonary disease) (HCC)    • Coronary artery disease involving autologous vein coronary bypass graft without angina pectoris 01/20/2020   • Elevated cholesterol    • Environmental allergies 9/21/2022   • Essential hypertension 10/14/2019   • GERD (gastroesophageal  reflux disease)    • Hyperlipidemia    • Hypertension    • Left bundle branch block    • Lung infiltrate 12/20/2022    Right upper lobe, CT [facility], October 2022   • Obstructive sleep apnea 01/20/2020    pt uses a cpap machine at home   • Sleep apnea     CPAP     Past Surgical History:  Past Surgical History:   Procedure Laterality Date   • CARPAL TUNNEL RELEASE Right 09/29/2021    Procedure: CARPAL TUNNEL RELEASE right;  Surgeon: Luis Perdomo MD;  Location:  [facility] OR;  Service: Neurosurgery;  Laterality: Right;   • COLONOSCOPY     • COLONOSCOPY N/A 04/13/2022    Procedure: COLONOSCOPY WITH ANESTHESIA;  Surgeon: Cortes Ribera DO;  Location: [facility] ENDOSCOPY;  Service: Gastroenterology;  Laterality: N/A;  preop; hx of poylps   postop; polyps   PCP Katerine Mcclain    • KNEE ARTHROPLASTY Right    • KNEE MENISCAL REPAIR Left 2018   • PENILE PROSTHESIS IMPLANT N/A 4/27/2022    Procedure: MALLEABLE PENILE PROSTHESIS PLACEMENT;  Surgeon: Ignacio Moon MD;  Location:  [facility] OR;  Service: Urology;  Laterality: N/A;   • ROTATOR CUFF REPAIR Right 2000   • SPINE SURGERY  1981    lower back ruptured disc   • TOTAL SHOULDER ARTHROPLASTY W/ DISTAL CLAVICLE EXCISION Right 01/14/2020    Procedure: RIGHT REVERSE TOTAL SHOULDER  ARTHROPLASTY;  Surgeon: Suman Ventura MD;  Location:  [facility] OR;  Service: Orthopedics     Social History:  reports that he quit smoking about 12 years ago. His smoking use included pipe and cigarettes. He has a 25.00 pack-year smoking history. He has never used smokeless tobacco. He reports current alcohol use of about 5.0 standard drinks per week. He reports that he does not use drugs.    Family History: family history includes Arthritis in his father and mother; Cancer in his brother; Diabetes in his mother and sister; Hypertension in his father and mother; Kidney disease in his mother; Obesity in his sister; Osteoporosis in his mother; Stroke in his father.      Allergies:  No Known  Allergies  Medications:  Prior to Admission medications    Medication Sig Start Date End Date Taking? Authorizing Provider   acetaminophen (TYLENOL) 650 MG 8 hr tablet Take 650 mg by mouth 2 (Two) Times a Day.    Cristian Pfeiffer MD   albuterol sulfate  (90 Base) MCG/ACT inhaler Inhale 2 puffs Every 4 (Four) Hours As Needed for Wheezing. 4/11/22   Hien Pimentel APRN   amLODIPine (NORVASC) 5 MG tablet TAKE ONE TABLET BY MOUTH DAILY 1/17/23   Katerine Mcclain APRN   aspirin 81 MG EC tablet Take 81 mg by mouth Daily.    Cristian Pfeiffer MD   azelastine (ASTELIN) 0.1 % nasal spray 2 sprays into the nostril(s) as directed by provider 2 (Two) Times a Day. Use in each nostril as directed 1/23/23   Katerine Mcclain APRN   bisoprolol (ZEBeta) 5 MG tablet TAKE ONE TABLET BY MOUTH EVERY DAY 1/12/23   Katerine Mcclain APRN   budesonide-formoterol (SYMBICORT) 160-4.5 MCG/ACT inhaler Inhale 2 puffs 2 (Two) Times a Day. 1/23/23   Hien Pimentel APRN   calcium carbonate (OS-TASHIA) 600 MG tablet Take 600 mg by mouth 2 (Two) Times a Day.    Cristian Pfeiffer MD   cetirizine (zyrTEC) 10 MG tablet Take 1 tablet by mouth Daily for 5 days. 7/22/22 10/17/22  Abimael Christian MD   Cholecalciferol (VITAMIN D3) 1000 units capsule Take 2,000 Units by mouth Daily.    Cristian Pfeiffer MD   coenzyme Q10 100 MG capsule Take 100 mg by mouth Daily.    Cristian Pfeiffer MD   Collagen-Boron-Hyaluronic Acid (Move Free Risktail) 10-5-3.3 MG tablet Take 1 tablet by mouth Daily.    Cristian Pfeiffer MD   docusate sodium (Colace) 100 MG capsule Take 1 capsule by mouth 2 (Two) Times a Day. 4/27/22   Ignacio Moon MD   finasteride (PROSCAR) 5 MG tablet Take 1 tablet by mouth Daily. 12/8/22   Ignacio Moon MD   fluticasone (FLONASE) 50 MCG/ACT nasal spray 2 sprays into the nostril(s) as directed by provider Daily.    Provider, MD Cristian   gabapentin (NEURONTIN) 300 MG  capsule Take 300 mg by mouth 4 (Four) Times a Day. 6/8/22   Cristian Pfeiffer MD   gabapentin (NEURONTIN) 400 MG capsule TAKE ONE CAPSULE BY MOUTH FOUR TIMES DAILY 1/17/23   Katerine Mcclain APRN   Garlic 1000 MG capsule Take 1,000 mg by mouth Daily.    Cristian Pfeiffer MD   HYDROcodone-acetaminophen (NORCO) 5-325 MG per tablet Take 1 tablet by mouth Every 4 (Four) Hours As Needed for Severe Pain  (postop pain). 4/27/22   Ignacio Moon MD   ibuprofen (ADVIL,MOTRIN) 200 MG tablet Take 400 mg by mouth Every 6 (Six) Hours As Needed for Mild Pain .    Cristian Pfeiffer MD   Krill Oil 300 MG capsule Take 300 mg by mouth Daily.    Cristian Pfeiffer MD   lisinopril (PRINIVIL,ZESTRIL) 10 MG tablet TAKE ONE TABLET BY MOUTH DAILY 12/15/22   Katerine Mcclain APRN   Multiple Vitamins-Minerals (OCUVITE ADULT 50+ PO) Take 1 tablet by mouth Daily.    Cristian Pfeiffer MD   mupirocin (BACTROBAN) 2 % ointment Apply 1 application topically to the appropriate area as directed 3 (Three) Times a Day. 6/7/22   Katerine Mcclain APRN   naproxen (NAPROSYN) 250 MG tablet Take 1 tablet by mouth 2 (Two) Times a Day With Meals. 4/27/22   Ignacio Moon MD   omeprazole (priLOSEC) 20 MG capsule Take 20 mg by mouth Daily.    Cristian Pfeiffer MD   ondansetron ODT (Zofran ODT) 4 MG disintegrating tablet Place 1 tablet on the tongue Every 6 (Six) Hours As Needed for Nausea. 4/27/22   Ignacio Moon MD   simvastatin (ZOCOR) 40 MG tablet TAKE ONE TABLET BY MOUTH EVERY NIGHT 11/21/22   Katerine Mcclain APRN   SUPER B COMPLEX/C PO Take 1 tablet by mouth Every Night.    Cristian Pfeiffer MD   tamsulosin (FLOMAX) 0.4 MG capsule 24 hr capsule Take 2 capsules by mouth Every Night. 12/8/22   Ignacio Moon MD   tiotropium bromide monohydrate (Spiriva Respimat) 2.5 MCG/ACT aerosol solution inhaler Inhale 2 puffs Daily. 5/9/22   Katerine Mcclain, SALONI   vitamin C (ASCORBIC  "ACID) 500 MG tablet Take 500 mg by mouth Daily.    Provider, MD Cristian       Objective     Vital Signs: /77   Pulse 74   Temp 97.7 °F (36.5 °C)   Resp 18   Ht 167.6 cm (66\")   Wt 89.8 kg (198 lb)   SpO2 96%   BMI 31.96 kg/m²   Physical Exam  Vitals reviewed.   Constitutional:       Appearance: He is well-developed. He is obese.   HENT:      Head: Normocephalic and atraumatic.   Eyes:      Pupils: Pupils are equal, round, and reactive to light.   Neck:      Vascular: No JVD.   Cardiovascular:      Rate and Rhythm: Normal rate and regular rhythm.   Pulmonary:      Effort: Pulmonary effort is normal.      Comments: diminished but clear  Abdominal:      General: Bowel sounds are normal.      Palpations: Abdomen is soft.   Musculoskeletal:         General: No deformity.      Cervical back: Normal range of motion and neck supple.   Lymphadenopathy:      Cervical: No cervical adenopathy.   Skin:     General: Skin is warm and dry.   Neurological:      Mental Status: He is alert and oriented to person, place, and time.   Psychiatric:         Behavior: Behavior normal.         Thought Content: Thought content normal.         Judgment: Judgment normal.         BMI is >= 30 and <35. (Class 1 Obesity). The following options were offered after discussion;: exercise counseling/recommendations and nutrition counseling/recommendations      Results Reviewed:  Glucose   Date Value Ref Range Status   04/25/2022 145 (H) 65 - 99 mg/dL Final   12/22/2020 92 74 - 109 mg/dL Final     BUN   Date Value Ref Range Status   04/25/2022 12 8 - 23 mg/dL Final   12/22/2020 16 8 - 23 mg/dL Final     Creatinine   Date Value Ref Range Status   04/25/2022 0.93 0.76 - 1.27 mg/dL Final   12/22/2020 0.8 0.5 - 1.2 mg/dL Final     Sodium   Date Value Ref Range Status   04/25/2022 141 136 - 145 mmol/L Final   12/22/2020 139 136 - 145 mmol/L Final     Potassium   Date Value Ref Range Status   04/25/2022 4.0 3.5 - 5.2 mmol/L Final   12/22/2020 " 4.1 3.5 - 5.0 mmol/L Final     Chloride   Date Value Ref Range Status   04/25/2022 107 98 - 107 mmol/L Final   12/22/2020 102 98 - 111 mmol/L Final     CO2   Date Value Ref Range Status   04/25/2022 24.0 22.0 - 29.0 mmol/L Final   12/22/2020 25 22 - 29 mmol/L Final     Calcium   Date Value Ref Range Status   04/25/2022 9.3 8.6 - 10.5 mg/dL Final   12/22/2020 9.4 8.8 - 10.2 mg/dL Final     ALT (SGPT)   Date Value Ref Range Status   09/16/2021 17 1 - 41 U/L Final     AST (SGOT)   Date Value Ref Range Status   09/16/2021 17 1 - 40 U/L Final     WBC   Date Value Ref Range Status   04/25/2022 4.76 3.40 - 10.80 10*3/mm3 Final   12/22/2020 6.6 4.8 - 10.8 K/uL Final     Hematocrit   Date Value Ref Range Status   04/25/2022 39.9 37.5 - 51.0 % Final   12/22/2020 40.7 (L) 42.0 - 52.0 % Final     Platelets   Date Value Ref Range Status   04/25/2022 159 140 - 450 10*3/mm3 Final   12/22/2020 175 130 - 400 K/uL Final     Triglycerides   Date Value Ref Range Status   06/02/2021 103 0 - 149 mg/dL Final     HDL Cholesterol   Date Value Ref Range Status   06/02/2021 57 >39 mg/dL Final     LDL Chol Calc (NIH)   Date Value Ref Range Status   06/02/2021 98 0 - 99 mg/dL Final     Hemoglobin A1C   Date Value Ref Range Status   09/17/2021 5.4 % Final         Assessment / Plan     Assessment/Plan:  Diagnoses and all orders for this visit:    1. Annual physical exam (Primary)    2. Special screening for malignant neoplasm of prostate  -     PSA SCREENING    3. Hyperlipidemia, unspecified hyperlipidemia type  -     CBC & Differential  -     Comprehensive Metabolic Panel  -     Lipid Panel  -     TSH  -     Hepatitis C antibody    4. Essential hypertension  -     CBC & Differential  -     Comprehensive Metabolic Panel  -     Lipid Panel  -     TSH  -     Hepatitis C antibody  -     bisoprolol (ZEBeta) 5 MG tablet; Take 1 tablet by mouth Daily.  Dispense: 150 tablet; Refill: 3    5. Anemia, unspecified type  -     CBC & Differential  -      Comprehensive Metabolic Panel  -     Lipid Panel  -     TSH  -     Hepatitis C antibody    6. Need for Tdap vaccination  -     Tdap Vaccine Greater Than or Equal To 8yo IM    7. Encounter for immunization  -     Cancel: Tdap Vaccine Greater Than or Equal To 8yo IM        No follow-ups on file. unless patient needs to be seen sooner or acute issues arise.    Code Status: Full.     I have discussed the patient results/orders and and plan/recommendation with them at today's visit.      SALONI Kaur   03/10/2023    Transcribed from ambient dictation for SALONI Kaur by Fadi Wu.  03/10/23   09:58 CST    Patient or patient representative verbalized consent to the visit recording.  I have personally performed the services described in this document as transcribed by the above individual, and it is both accurate and complete.  SALONI Kaur  3/10/2023  13:55 CST

## 2023-03-11 LAB
ALBUMIN SERPL-MCNC: 4.1 G/DL (ref 3.7–4.7)
ALBUMIN/GLOB SERPL: 1.7 {RATIO} (ref 1.2–2.2)
ALP SERPL-CCNC: 41 IU/L (ref 44–121)
ALT SERPL-CCNC: 24 IU/L (ref 0–44)
AST SERPL-CCNC: 24 IU/L (ref 0–40)
BASOPHILS # BLD AUTO: 0.1 X10E3/UL (ref 0–0.2)
BASOPHILS NFR BLD AUTO: 1 %
BILIRUB SERPL-MCNC: 0.3 MG/DL (ref 0–1.2)
BUN SERPL-MCNC: 10 MG/DL (ref 8–27)
BUN/CREAT SERPL: 12 (ref 10–24)
CALCIUM SERPL-MCNC: 9.4 MG/DL (ref 8.6–10.2)
CHLORIDE SERPL-SCNC: 105 MMOL/L (ref 96–106)
CHOLEST SERPL-MCNC: 163 MG/DL (ref 100–199)
CO2 SERPL-SCNC: 23 MMOL/L (ref 20–29)
CREAT SERPL-MCNC: 0.86 MG/DL (ref 0.76–1.27)
EGFRCR SERPLBLD CKD-EPI 2021: 89 ML/MIN/1.73
EOSINOPHIL # BLD AUTO: 0.2 X10E3/UL (ref 0–0.4)
EOSINOPHIL NFR BLD AUTO: 5 %
ERYTHROCYTE [DISTWIDTH] IN BLOOD BY AUTOMATED COUNT: 13.1 % (ref 11.6–15.4)
GLOBULIN SER CALC-MCNC: 2.4 G/DL (ref 1.5–4.5)
GLUCOSE SERPL-MCNC: 86 MG/DL (ref 70–99)
HCT VFR BLD AUTO: 40 % (ref 37.5–51)
HCV IGG SERPL QL IA: NON REACTIVE
HDLC SERPL-MCNC: 53 MG/DL
HGB BLD-MCNC: 13.4 G/DL (ref 13–17.7)
IMM GRANULOCYTES # BLD AUTO: 0 X10E3/UL (ref 0–0.1)
IMM GRANULOCYTES NFR BLD AUTO: 0 %
LDLC SERPL CALC-MCNC: 90 MG/DL (ref 0–99)
LYMPHOCYTES # BLD AUTO: 1.4 X10E3/UL (ref 0.7–3.1)
LYMPHOCYTES NFR BLD AUTO: 32 %
MCH RBC QN AUTO: 32.9 PG (ref 26.6–33)
MCHC RBC AUTO-ENTMCNC: 33.5 G/DL (ref 31.5–35.7)
MCV RBC AUTO: 98 FL (ref 79–97)
MONOCYTES # BLD AUTO: 0.4 X10E3/UL (ref 0.1–0.9)
MONOCYTES NFR BLD AUTO: 9 %
NEUTROPHILS # BLD AUTO: 2.3 X10E3/UL (ref 1.4–7)
NEUTROPHILS NFR BLD AUTO: 53 %
PLATELET # BLD AUTO: 167 X10E3/UL (ref 150–450)
POTASSIUM SERPL-SCNC: 4 MMOL/L (ref 3.5–5.2)
PROT SERPL-MCNC: 6.5 G/DL (ref 6–8.5)
PSA SERPL-MCNC: 0.3 NG/ML (ref 0–4)
RBC # BLD AUTO: 4.07 X10E6/UL (ref 4.14–5.8)
SODIUM SERPL-SCNC: 142 MMOL/L (ref 134–144)
TRIGL SERPL-MCNC: 109 MG/DL (ref 0–149)
TSH SERPL DL<=0.005 MIU/L-ACNC: 1.62 UIU/ML (ref 0.45–4.5)
VLDLC SERPL CALC-MCNC: 20 MG/DL (ref 5–40)
WBC # BLD AUTO: 4.4 X10E3/UL (ref 3.4–10.8)

## 2023-03-12 LAB — GABAPENTIN UR-MCNC: 665.2 UG/ML

## 2023-03-13 DIAGNOSIS — E78.5 HYPERLIPIDEMIA, UNSPECIFIED HYPERLIPIDEMIA TYPE: ICD-10-CM

## 2023-03-13 RX ORDER — SIMVASTATIN 40 MG
TABLET ORAL
Qty: 90 TABLET | Refills: 1 | Status: SHIPPED | OUTPATIENT
Start: 2023-03-13

## 2023-03-13 NOTE — TELEPHONE ENCOUNTER
Rx Refill Note  Requested Prescriptions     Pending Prescriptions Disp Refills   • simvastatin (ZOCOR) 40 MG tablet [Pharmacy Med Name: simvastatin 40 mg tablet] 90 tablet 1     Sig: TAKE ONE TABLET BY MOUTH NIGHTLY      Last office visit with prescribing clinician: 3/10/2023   Next office visit with prescribing clinician: Visit date not found                         Would you like a call back once the refill request has been completed: [] Yes [] No    If the office needs to give you a call back, can they leave a voicemail: [] Yes [] No    Mena Hernandez RN  03/13/23, 08:21 CDT

## 2023-03-23 DIAGNOSIS — J42 CHRONIC BRONCHITIS, UNSPECIFIED CHRONIC BRONCHITIS TYPE: ICD-10-CM

## 2023-03-23 DIAGNOSIS — J44.9 CHRONIC OBSTRUCTIVE PULMONARY DISEASE, UNSPECIFIED COPD TYPE: ICD-10-CM

## 2023-03-23 RX ORDER — TIOTROPIUM BROMIDE INHALATION SPRAY 3.12 UG/1
2 SPRAY, METERED RESPIRATORY (INHALATION)
Qty: 4 G | Refills: 11 | Status: SHIPPED | OUTPATIENT
Start: 2023-03-23

## 2023-03-23 NOTE — TELEPHONE ENCOUNTER
"Patient called requesting a refill on his Spiriva. He said the pharmacy was supposed to send a request and he is \"totally out\".  Patient informed no request has been received previously for the Spiriva.   Refill request passed protocol and sent to the pharmacy.  Rx Refill Note  Requested Prescriptions     Pending Prescriptions Disp Refills   • tiotropium bromide monohydrate (Spiriva Respimat) 2.5 MCG/ACT aerosol solution inhaler 4 g 11     Sig: Inhale 2 puffs Daily.      Last office visit with prescribing clinician: 1/17/2023   Last telemedicine visit with prescribing clinician: 4/11/2023   Next office visit with prescribing clinician: 4/11/2023                         Would you like a call back once the refill request has been completed: [] Yes [] No    If the office needs to give you a call back, can they leave a voicemail: [] Yes [] No    Dax Gooden, [facility]  03/23/23, 10:59 CDT  "

## 2023-04-10 DIAGNOSIS — G62.9 PERIPHERAL POLYNEUROPATHY: ICD-10-CM

## 2023-04-10 RX ORDER — GABAPENTIN 400 MG/1
CAPSULE ORAL
Qty: 120 CAPSULE | Refills: 1 | Status: SHIPPED | OUTPATIENT
Start: 2023-04-10

## 2023-04-10 NOTE — TELEPHONE ENCOUNTER
Rx Refill Note  Requested Prescriptions     Pending Prescriptions Disp Refills   • gabapentin (NEURONTIN) 400 MG capsule [Pharmacy Med Name: gabapentin 400 mg capsule] 120 capsule 1     Sig: TAKE ONE CAPSULE BY MOUTH FOUR TIMES DAILY   Med last filled:  1/17/23  Last office visit with prescribing clinician: 3/10/2023   Next office visit with prescribing clinician: Visit date not found     Gabapentin, Urine - Urine, Clean Catch (03/07/2023 14:38)                            Would you like a call back once the refill request has been completed: [] Yes [] No    If the office needs to give you a call back, can they leave a voicemail: [] Yes [] No    Mena Hernandez RN  04/10/23, 08:57 CDT

## 2023-04-12 NOTE — PROGRESS NOTES
Chief Complaint  Lung infiltrate    Subjective    History of Present Illness     Justin Robledo presents to [facility] for:    History of Present Illness  Management of his COPD, emphysema, bronchiectasis and recent pneumonia.  He is obese.  Most recent FEV1 81. He is a former smoker.  He is a 1 pack/day smoker for 50 years.  He quit in 2011.  He has shortness of breath and productive coughing daily.  He is on Spiriva and Symbicort.  He is requesting 90-day supply sent to his pharmacy.  He has a rescue inhaler and nebulizer he can use when needed.  He seldom requires them.  He is on CPAP for sleep apnea.  He is compliant with this and benefiting from it.  He alternates between Flonase and Astelin for his allergies and postnasal drainage.  Low-dose lung scan recently showed a right upper lobe infiltrate. He had acute complaints and was treated with Augmentin and steroid taper.  Follow up imaging showed resolution of infiltrate but 2 new nodules requiring follow-up. He is here for that today.  He is doing much better.  He will be traveling to [place] for the summer.       Prior to Admission medications    Medication Sig Start Date End Date Taking? Authorizing Provider   acetaminophen (TYLENOL) 650 MG 8 hr tablet Take 650 mg by mouth 2 (Two) Times a Day.    Provider, MD Cristian   albuterol sulfate  (90 Base) MCG/ACT inhaler Inhale 2 puffs Every 4 (Four) Hours As Needed for Wheezing. 4/11/22   Hien Pimentel APRN   amLODIPine (NORVASC) 5 MG tablet TAKE ONE TABLET BY MOUTH DAILY 1/17/23   Katerine Mcclain APRN   aspirin 81 MG EC tablet Take 81 mg by mouth Daily.    Provider, MD Cristian   azelastine (ASTELIN) 0.1 % nasal spray 2 sprays into the nostril(s) as directed by provider 2 (Two) Times a Day. Use in each nostril as directed 1/23/23   Katerine Mcclain APRN   bisoprolol (ZEBeta) 5 MG tablet Take 1 tablet by mouth Daily. 3/10/23    Katerine Mcclain APRN   budesonide-formoterol (SYMBICORT) 160-4.5 MCG/ACT inhaler Inhale 2 puffs 2 (Two) Times a Day. 1/23/23   Hien Pimentel APRN   calcium carbonate (OS-TASHIA) 600 MG tablet Take 600 mg by mouth 2 (Two) Times a Day.    Cristian Pfeiffer MD   cetirizine (zyrTEC) 10 MG tablet Take 1 tablet by mouth Daily for 5 days. 7/22/22 10/17/22  Abimael Christian MD   Cholecalciferol (VITAMIN D3) 1000 units capsule Take 2,000 Units by mouth Daily.    Cristian Pfeiffer MD   coenzyme Q10 100 MG capsule Take 100 mg by mouth Daily.    Cristian Pfeiffer MD   Collagen-Boron-Hyaluronic Acid (Move Free Exel[employer]) 10-5-3.3 MG tablet Take 1 tablet by mouth Daily.    Cristian Pfeiffer MD   docusate sodium (Colace) 100 MG capsule Take 1 capsule by mouth 2 (Two) Times a Day. 4/27/22   Ignacio Moon MD   finasteride (PROSCAR) 5 MG tablet Take 1 tablet by mouth Daily. 12/8/22   Ignacio Moon MD   fluticasone (FLONASE) 50 MCG/ACT nasal spray 2 sprays into the nostril(s) as directed by provider Daily.    Cristian Pfeiffer MD   gabapentin (NEURONTIN) 300 MG capsule Take 300 mg by mouth 4 (Four) Times a Day. 6/8/22   Cristian Pfeiffer MD   gabapentin (NEURONTIN) 400 MG capsule TAKE ONE CAPSULE BY MOUTH FOUR TIMES DAILY 4/10/23   Katerine Mcclain APRN   Garlic 1000 MG capsule Take 1,000 mg by mouth Daily.    Cristian Pfeiffer MD   HYDROcodone-acetaminophen (NORCO) 5-325 MG per tablet Take 1 tablet by mouth Every 4 (Four) Hours As Needed for Severe Pain  (postop pain). 4/27/22   Ignacio Moon MD   ibuprofen (ADVIL,MOTRIN) 200 MG tablet Take 400 mg by mouth Every 6 (Six) Hours As Needed for Mild Pain .    Cristian Pfeiffer MD   Krill Oil 300 MG capsule Take 300 mg by mouth Daily.    Cristian Pfeiffer MD   lisinopril (PRINIVIL,ZESTRIL) 10 MG tablet TAKE ONE TABLET BY MOUTH DAILY 12/15/22   Katerine Mcclain APRN   Multiple Vitamins-Minerals (OCUVITE  "ADULT 50+ PO) Take 1 tablet by mouth Daily.    Cristian Pfeiffer MD   mupirocin (BACTROBAN) 2 % ointment Apply 1 application topically to the appropriate area as directed 3 (Three) Times a Day. 22   Katerine Mcclain APRN   naproxen (NAPROSYN) 250 MG tablet Take 1 tablet by mouth 2 (Two) Times a Day With Meals. 22   Ignacio Moon MD   omeprazole (priLOSEC) 20 MG capsule Take 20 mg by mouth Daily.    Cristian Pfeiffer MD   ondansetron ODT (Zofran ODT) 4 MG disintegrating tablet Place 1 tablet on the tongue Every 6 (Six) Hours As Needed for Nausea. 22   Ignacio Moon MD   simvastatin (ZOCOR) 40 MG tablet TAKE ONE TABLET BY MOUTH NIGHTLY 3/13/23   Katerine Mcclain APRN   SUPER B COMPLEX/C PO Take 1 tablet by mouth Every Night.    Cristian Pfeiffer MD   tamsulosin (FLOMAX) 0.4 MG capsule 24 hr capsule Take 2 capsules by mouth Every Night. 22   Ignacio Moon MD   tiotropium bromide monohydrate (Spiriva Respimat) 2.5 MCG/ACT aerosol solution inhaler Inhale 2 puffs Daily. 3/23/23   Hien Pimentel APRN   vitamin C (ASCORBIC ACID) 500 MG tablet Take 500 mg by mouth Daily.    ProviderCristian MD       Social History     Socioeconomic History   • Marital status:    Tobacco Use   • Smoking status: Former     Packs/day: 1.00     Years: 50.00     Pack years: 50.00     Types: Pipe, Cigarettes     Quit date:      Years since quittin.2     Passive exposure: Past   • Smokeless tobacco: Never   Vaping Use   • Vaping Use: Never used   Substance and Sexual Activity   • Alcohol use: Yes     Alcohol/week: 5.0 standard drinks     Types: 5 Cans of beer per week     Comment: daily   • Drug use: No   • Sexual activity: Defer       Objective   Vital Signs:   /78   Pulse 78   Ht 167.6 cm (66\")   Wt 88 kg (194 lb)   SpO2 95% Comment: RA  BMI 31.31 kg/m²     Physical Exam  Constitutional:       Appearance: He is obese.   Eyes:      Comments: Glasses "   Cardiovascular:      Rate and Rhythm: Normal rate and regular rhythm.      Heart sounds: No murmur heard.  Pulmonary:      Effort: Pulmonary effort is normal.      Breath sounds: Normal breath sounds.   Musculoskeletal:      Right lower leg: No edema.      Left lower leg: No edema.   Neurological:      Mental Status: He is alert and oriented to person, place, and time.        Result Review :    PFT Values        2021    11:00 10/17/2022    11:00   Pre Drug PFT Results    80   FEV1 99 81   FEF 25-75% 69 98   FEV1/FVC 72.62 76     My interpretation of the PFT: none    Results for orders placed in visit on 10/17/22    Pulmonary Function Test    Narrative  Pulmonary Function Test  Performed by: Marla Heller, LUIS  Authorized by: Hien Pimentel APRN    Pre Drug % Predicted  FVC: 80%  FEV1: 81%  FEF 25-75%: 98%  FEV1/FVC: 76%    Interpretation  Spirometry  Spirometry shows normal results.  Review of FVL curve  Patient's effort is normal.      Results for orders placed in visit on 21    Pulmonary Function Test    Narrative  Pulmonary Function Test  Performed by: Marla Heller, LUIS  Authorized by: Hien Pimentel APRN    Pre Drug % Predicted  FVC: 106%  FEV1: 99%  FEF 25-75%: 69%  FEV1/FVC: 72.62%    Interpretation  Spirometry There is reduced midflow suggesting small airway/airflow obstruction.  Review of FVL curve  Patient's effort is normal.      Results for orders placed in visit on 20    Pulmonary Function Test    Narrative  Pulmonary Function Test  Performed by: Hien Pimentel APRN  Authorized by: Hien Pimentel APRN    Pre Drug  FVC: 103%  FEV1: 90%  FEF 25-75%: 46%  FEV1/FVC: 67.97%  T%  RV: 119%  DLCO: 69%  D/VAsb: 72%    CT Chest Without Contrast Diagnostic (2023 16:12)    My interpretation of imaging: Stable emphysema, atelectasis and reticular changes, left upper lobe lung nodule, smaller, previously 10 mm, now 6 mm, left lower lobe nodule  previously 5 mm, now 2 mm    My interpretation of labs: none      Assessment and Plan     Diagnoses and all orders for this visit:    1. Bronchiectasis without complication (Primary)  Comments:  Stable on recent imaging.  Update PFTs when he returns    2. Environmental allergies  Comments:  Doing okay alternating Flonase and azelastine.  No refills needed    3. Class 1 obesity due to excess calories with serious comorbidity and body mass index (BMI) of 31.0 to 31.9 in adult  Comments:  Contributes to underlying issues.  Education material provided    4. Centrilobular emphysema  Comments:  Stable on recent imaging.  Update PFTs when he returns    5. COPD, group B, by GOLD 2017 classification  Comments:  90-day supply for Spiriva and Symbicort sent to pharmacy.  Use albuterol as needed.  PFTs with follow-up  Orders:  -     tiotropium bromide monohydrate (Spiriva Respimat) 2.5 MCG/ACT aerosol solution inhaler; Inhale 2 puffs Daily for 90 days.  Dispense: 12 g; Refill: 3  -     budesonide-formoterol (Symbicort) 160-4.5 MCG/ACT inhaler; Inhale 2 puffs 2 (Two) Times a Day for 90 days.  Dispense: 30.6 each; Refill: 3    6. Obstructive sleep apnea  Comments:  Continue PAP.  He is compliant with this and benefiting from    7. Personal history of nicotine dependence  Comments:  Continue to avoid smoking.  We will soon aged out of low-dose lung cancer screening per Medicare criteria      Body mass index is 31.31 kg/m². Educational material provided after visit summary about elevated BMI        Charting time spent: 6  Bedside time spent: 11    Hien Pimentel, SALONI  4/17/2023  10:26 CDT    Follow Up   Return in about 6 months (around 10/17/2023) for FVL.    Patient was given instructions and counseling regarding his condition or for health maintenance advice. Please see specific information pulled into the AVS if appropriate.

## 2023-04-14 ENCOUNTER — HOSPITAL ENCOUNTER (OUTPATIENT)
Dept: CT IMAGING | Facility: HOSPITAL | Age: 78
Discharge: HOME | End: 2023-04-14
Admitting: NURSE PRACTITIONER
Payer: COMMERCIAL

## 2023-04-14 DIAGNOSIS — R91.8 LUNG NODULE, MULTIPLE: ICD-10-CM

## 2023-04-14 PROCEDURE — 71250 CT THORAX DX C-: CPT

## 2023-04-17 ENCOUNTER — OFFICE VISIT (OUTPATIENT)
Dept: PULMONOLOGY | Facility: CLINIC | Age: 78
End: 2023-04-17
Payer: COMMERCIAL

## 2023-04-17 VITALS
HEIGHT: 66 IN | DIASTOLIC BLOOD PRESSURE: 78 MMHG | WEIGHT: 194 LBS | OXYGEN SATURATION: 95 % | SYSTOLIC BLOOD PRESSURE: 130 MMHG | BODY MASS INDEX: 31.18 KG/M2 | HEART RATE: 78 BPM

## 2023-04-17 DIAGNOSIS — E66.09 CLASS 1 OBESITY DUE TO EXCESS CALORIES WITH SERIOUS COMORBIDITY AND BODY MASS INDEX (BMI) OF 31.0 TO 31.9 IN ADULT: Chronic | ICD-10-CM

## 2023-04-17 DIAGNOSIS — G47.33 OBSTRUCTIVE SLEEP APNEA: Chronic | ICD-10-CM

## 2023-04-17 DIAGNOSIS — J47.9 BRONCHIECTASIS WITHOUT COMPLICATION: Primary | Chronic | ICD-10-CM

## 2023-04-17 DIAGNOSIS — J44.9 COPD, GROUP B, BY GOLD 2017 CLASSIFICATION: Chronic | ICD-10-CM

## 2023-04-17 DIAGNOSIS — Z91.09 ENVIRONMENTAL ALLERGIES: Chronic | ICD-10-CM

## 2023-04-17 DIAGNOSIS — J43.2 CENTRILOBULAR EMPHYSEMA: Chronic | ICD-10-CM

## 2023-04-17 DIAGNOSIS — Z87.891 PERSONAL HISTORY OF NICOTINE DEPENDENCE: Chronic | ICD-10-CM

## 2023-04-17 PROCEDURE — 99214 OFFICE O/P EST MOD 30 MIN: CPT | Performed by: NURSE PRACTITIONER

## 2023-04-17 RX ORDER — BUDESONIDE AND FORMOTEROL FUMARATE DIHYDRATE 160; 4.5 UG/1; UG/1
2 AEROSOL RESPIRATORY (INHALATION)
Qty: 30.6 EACH | Refills: 3 | Status: SHIPPED | OUTPATIENT
Start: 2023-04-17 | End: 2023-07-16

## 2023-04-17 RX ORDER — TIOTROPIUM BROMIDE INHALATION SPRAY 3.12 UG/1
2 SPRAY, METERED RESPIRATORY (INHALATION)
Qty: 12 G | Refills: 3 | Status: SHIPPED | OUTPATIENT
Start: 2023-04-17 | End: 2023-07-16

## 2023-04-17 RX ORDER — LORATADINE 10 MG/1
CAPSULE, LIQUID FILLED ORAL
COMMUNITY

## 2023-05-15 ENCOUNTER — TELEPHONE (OUTPATIENT)
Dept: NEUROSURGERY | Facility: CLINIC | Age: 78
End: 2023-05-15

## 2023-05-15 NOTE — TELEPHONE ENCOUNTER
Caller: ZHOU HERZOG    Relationship: Emergency Contact    Best call back number: [phone]    Who are you requesting to speak with (clinical staff, provider,  specific staff member): CLINICAL    What was the call regarding: PATIENT'S WIFE CALLED IN AND STATES THEY FEEL HE HAS CARPAL TUNNEL ON THE PATIENT'S LEFT HAND NOW.  STATES PATIENT IS HAVING SAME SYMPTOMS FROM WHEN THE RIGHT HAND WAS DIAGNOSED BUT HAS NOT BEEN OFFICIALLY  DIAGNOSED BY AN MD.     PLEASE CALL PATIENT OR HIS WIFE ZHOU WITH ANY ADVICE    Do you require a callback: YES

## 2023-05-15 NOTE — TELEPHONE ENCOUNTER
Patient will need to see Ozzy for evaluation and testing.    ROSAMARIA GOULD [facility]  PHYSICIAN LEAD  DR VEDA CRAWFORD  [facility] [place] NEUROSURGERY

## 2023-05-24 DIAGNOSIS — I10 ESSENTIAL HYPERTENSION: ICD-10-CM

## 2023-05-24 NOTE — TELEPHONE ENCOUNTER
Rx Refill Note  Requested Prescriptions     Pending Prescriptions Disp Refills    bisoprolol (ZEBeta) 5 MG tablet [Pharmacy Med Name: BISOPROLOL FUMARATE 5 MG TAB] 90 tablet 1     Sig: TAKE 1 TABLET BY MOUTH EVERY DAY      Last office visit with prescribing clinician: 3/10/2023   Next office visit with prescribing clinician: Visit date not found                         Would you like a call back once the refill request has been completed: [] Yes [] No    If the office needs to give you a call back, can they leave a voicemail: [] Yes [] No    Mena Hernandez RN  05/24/23, 13:47 CDT

## 2023-05-25 RX ORDER — BISOPROLOL FUMARATE 5 MG/1
TABLET, FILM COATED ORAL
Qty: 90 TABLET | Refills: 1 | Status: SHIPPED | OUTPATIENT
Start: 2023-05-25

## 2023-06-09 DIAGNOSIS — G62.9 PERIPHERAL POLYNEUROPATHY: ICD-10-CM

## 2023-06-09 RX ORDER — GABAPENTIN 400 MG/1
CAPSULE ORAL
Qty: 120 CAPSULE | Refills: 1 | Status: SHIPPED | OUTPATIENT
Start: 2023-06-09

## 2023-06-09 NOTE — TELEPHONE ENCOUNTER
Rx Refill Note  Requested Prescriptions     Pending Prescriptions Disp Refills    gabapentin (NEURONTIN) 400 MG capsule [Pharmacy Med Name: gabapentin 400 mg capsule] 120 capsule 1     Sig: TAKE ONE CAPSULE BY MOUTH FOUR TIMES DAILY      Last office visit with prescribing clinician: 3/10/2023      Next office visit with prescribing clinician: Visit date not found     UDS: Gabapentin, Urine - Urine, Clean Catch (03/07/2023 14:38)     DATE OF LAST REFILL: 04/10/2023             Jessie Paige MA  06/09/23, 09:32 CDT

## 2023-06-13 ENCOUNTER — OFFICE VISIT (OUTPATIENT)
Dept: INTERNAL MEDICINE | Facility: CLINIC | Age: 78
End: 2023-06-13
Payer: COMMERCIAL

## 2023-06-13 VITALS
SYSTOLIC BLOOD PRESSURE: 162 MMHG | HEART RATE: 84 BPM | BODY MASS INDEX: 31.66 KG/M2 | DIASTOLIC BLOOD PRESSURE: 83 MMHG | WEIGHT: 197 LBS | TEMPERATURE: 98.5 F | OXYGEN SATURATION: 95 % | RESPIRATION RATE: 16 BRPM | HEIGHT: 66 IN

## 2023-06-13 DIAGNOSIS — L30.9 ECZEMA OF BOTH HANDS: ICD-10-CM

## 2023-06-13 DIAGNOSIS — H57.9 ALLERGIC EYE REACTION: Primary | ICD-10-CM

## 2023-06-13 PROCEDURE — 99213 OFFICE O/P EST LOW 20 MIN: CPT | Performed by: NURSE PRACTITIONER

## 2023-06-13 RX ORDER — TRIAMCINOLONE ACETONIDE 5 MG/G
1 OINTMENT TOPICAL 2 TIMES DAILY
Qty: 15 G | Refills: 1 | Status: SHIPPED | OUTPATIENT
Start: 2023-06-13

## 2023-06-13 NOTE — PROGRESS NOTES
Subjective     Chief Complaint   Patient presents with    Rash     Both Hands         Rash  Associated symptoms include eye pain and rhinorrhea.   Pt in office today for ar rash on his hands. The rash started 2 weeks ago and has spread from the fingers to the palms. Pt does not remember any exposures on the hands. Overall the rash is dry and causing cracking at joints. Pt denies ever having blisters, itching,or redness.     Pt also c/t redness, irritation, and mild pain in the outer corner of the right eye. Pt has frequent dry eye and uses OTC drops. Pt denies any drainage. Pt also states that eye is watery at times.     Review of Systems   Constitutional:  Negative for activity change and appetite change.   HENT:  Positive for postnasal drip and rhinorrhea.    Eyes:  Positive for pain, redness and itching. Negative for photophobia, discharge and visual disturbance.   Skin:  Positive for rash (bilateral hands). Negative for color change (no erythema).   Allergic/Immunologic: Positive for environmental allergies.      Otherwise complete ROS reviewed and negative except as mentioned in the HPI.    Past Medical History:   Past Medical History:   Diagnosis Date    Arthritis     BPH with obstruction/lower urinary tract symptoms     CAD (coronary artery disease)     Chronic back pain     Class 1 obesity due to excess calories with serious comorbidity and body mass index (BMI) of 32.0 to 32.9 in adult 01/20/2020    COPD (chronic obstructive pulmonary disease)     Coronary artery disease involving autologous vein coronary bypass graft without angina pectoris 01/20/2020    Elevated cholesterol     Environmental allergies 9/21/2022    Essential hypertension 10/14/2019    GERD (gastroesophageal reflux disease)     Hyperlipidemia     Hypertension     Left bundle branch block     Lung infiltrate 12/20/2022    Right upper lobe, CT [facility], October 2022    Obstructive sleep apnea 01/20/2020    pt uses a cpap machine at  home    Sleep apnea     CPAP     Past Surgical History:  Past Surgical History:   Procedure Laterality Date    CARPAL TUNNEL RELEASE Right 09/29/2021    Procedure: CARPAL TUNNEL RELEASE right;  Surgeon: Luis Perdomo MD;  Location:  [facility] OR;  Service: Neurosurgery;  Laterality: Right;    COLONOSCOPY      COLONOSCOPY N/A 04/13/2022    Procedure: COLONOSCOPY WITH ANESTHESIA;  Surgeon: Cortes Ribera DO;  Location:  [facility] ENDOSCOPY;  Service: Gastroenterology;  Laterality: N/A;  preop; hx of poylps   postop; polyps   PCP Katerine Mcclain     KNEE ARTHROPLASTY Right     KNEE MENISCAL REPAIR Left 2018    PENILE PROSTHESIS IMPLANT N/A 4/27/2022    Procedure: MALLEABLE PENILE PROSTHESIS PLACEMENT;  Surgeon: Ignacio Moon MD;  Location:  [facility] OR;  Service: Urology;  Laterality: N/A;    ROTATOR CUFF REPAIR Right 2000    SPINE SURGERY  1981    lower back ruptured disc    TOTAL SHOULDER ARTHROPLASTY W/ DISTAL CLAVICLE EXCISION Right 01/14/2020    Procedure: RIGHT REVERSE TOTAL SHOULDER  ARTHROPLASTY;  Surgeon: Suman Ventura MD;  Location:  [facility] OR;  Service: Orthopedics     Social History:  reports that he quit smoking about 12 years ago. His smoking use included pipe and cigarettes. He has a 50.00 pack-year smoking history. He has been exposed to tobacco smoke. He has never used smokeless tobacco. He reports current alcohol use of about 5.0 standard drinks per week. He reports that he does not use drugs.    Family History: family history includes Arthritis in his father and mother; Cancer in his brother; Diabetes in his mother and sister; Hypertension in his father and mother; Kidney disease in his mother; Obesity in his sister; Osteoporosis in his mother; Stroke in his father.       Allergies:  No Known Allergies  Medications:  Prior to Admission medications    Medication Sig Start Date End Date Taking? Authorizing Provider   acetaminophen (TYLENOL) 650 MG 8 hr tablet Take 1 tablet by mouth 2 (Two)  Times a Day.    Cristian Pfeiffer MD   albuterol sulfate  (90 Base) MCG/ACT inhaler Inhale 2 puffs Every 4 (Four) Hours As Needed for Wheezing. 4/11/22   Hien Pimentel APRN   amLODIPine (NORVASC) 5 MG tablet TAKE ONE TABLET BY MOUTH DAILY 1/17/23   Katerine Mcclain APRN   aspirin 81 MG EC tablet Take 1 tablet by mouth Daily.    Cristian Pfeiffer MD   azelastine (ASTELIN) 0.1 % nasal spray 2 sprays into the nostril(s) as directed by provider 2 (Two) Times a Day. Use in each nostril as directed 1/23/23   Katerine Mcclain APRN   bisoprolol (ZEBeta) 5 MG tablet TAKE 1 TABLET BY MOUTH EVERY DAY 5/25/23   Katerine Mcclain APRN   budesonide-formoterol (Symbicort) 160-4.5 MCG/ACT inhaler Inhale 2 puffs 2 (Two) Times a Day for 90 days. 4/17/23 7/16/23  Hien Pimentel APRN   calcium carbonate (OS-TASHIA) 600 MG tablet Take 1 tablet by mouth 2 (Two) Times a Day.    ProviderCristian MD   cetirizine (zyrTEC) 10 MG tablet Take 1 tablet by mouth Daily for 5 days. 7/22/22 10/17/22  Abimael Christian MD   Cholecalciferol (VITAMIN D3) 1000 units capsule Take 2 capsules by mouth Daily.    ProviderCristian MD   coenzyme Q10 100 MG capsule Take 1 capsule by mouth Daily.    ProviderCristian MD   Collagen-Boron-Hyaluronic Acid (Move Free [employer]) 10-5-3.3 MG tablet Take 1 tablet by mouth Daily.    Cristian Pfeiffer MD   docusate sodium (Colace) 100 MG capsule Take 1 capsule by mouth 2 (Two) Times a Day. 4/27/22   Ignacio Moon MD   finasteride (PROSCAR) 5 MG tablet Take 1 tablet by mouth Daily. 12/8/22   Ignacio Moon MD   fluticasone (FLONASE) 50 MCG/ACT nasal spray 2 sprays into the nostril(s) as directed by provider Daily.  Patient not taking: Reported on 4/17/2023    ProviderCristian MD   gabapentin (NEURONTIN) 300 MG capsule Take 1 capsule by mouth 4 (Four) Times a Day. 6/8/22   Cristian Pfeiffer MD   gabapentin (NEURONTIN) 400 MG capsule TAKE  ONE CAPSULE BY MOUTH FOUR TIMES DAILY 6/9/23   Katerine Mcclain APRN   Garlic 1000 MG capsule Take 1 capsule by mouth Daily.    Cristian Pfeiffer MD   HYDROcodone-acetaminophen (NORCO) 5-325 MG per tablet Take 1 tablet by mouth Every 4 (Four) Hours As Needed for Severe Pain  (postop pain). 4/27/22   Ignacio Moon MD   ibuprofen (ADVIL,MOTRIN) 200 MG tablet Take 2 tablets by mouth Every 6 (Six) Hours As Needed for Mild Pain.    Cristian Pfeiffer MD   Krill Oil 300 MG capsule Take 300 mg by mouth Daily.    Cristian Pfeiffer MD   lisinopril (PRINIVIL,ZESTRIL) 10 MG tablet TAKE ONE TABLET BY MOUTH DAILY 12/15/22   Katerine Mcclain APRN   Loratadine (Claritin) 10 MG capsule Take  by mouth.    Cristian Pfeiffer MD   Multiple Vitamins-Minerals (OCUVITE ADULT 50+ PO) Take 1 tablet by mouth Daily.    Cristian Pfeiffer MD   mupirocin (BACTROBAN) 2 % ointment Apply 1 application topically to the appropriate area as directed 3 (Three) Times a Day. 6/7/22   Katerine Mcclain APRN   naproxen (NAPROSYN) 250 MG tablet Take 1 tablet by mouth 2 (Two) Times a Day With Meals. 4/27/22   Ignacio Moon MD   omeprazole (priLOSEC) 20 MG capsule Take 1 capsule by mouth Daily.    Cristian Pfeiffer MD   ondansetron ODT (Zofran ODT) 4 MG disintegrating tablet Place 1 tablet on the tongue Every 6 (Six) Hours As Needed for Nausea. 4/27/22   Ignacio Moon MD   simvastatin (ZOCOR) 40 MG tablet TAKE ONE TABLET BY MOUTH NIGHTLY 3/13/23   Katerine Mcclain APRN   SUPER B COMPLEX/C PO Take 1 tablet by mouth Every Night.    Cristian Pfeiffer MD   tamsulosin (FLOMAX) 0.4 MG capsule 24 hr capsule Take 2 capsules by mouth Every Night. 12/8/22   Ignacio Moon MD   tiotropium bromide monohydrate (Spiriva Respimat) 2.5 MCG/ACT aerosol solution inhaler Inhale 2 puffs Daily. 3/23/23   Hien Pimentel APRN   tiotropium bromide monohydrate (Spiriva Respimat) 2.5 MCG/ACT aerosol  "solution inhaler Inhale 2 puffs Daily for 90 days. 4/17/23 7/16/23  Hien Pimentel SALONI   vitamin C (ASCORBIC ACID) 500 MG tablet Take 1 tablet by mouth Daily.    Provider, MD Cristian       Objective     Vital Signs: /83   Pulse 84   Temp 98.5 °F (36.9 °C)   Resp 16   Ht 167.6 cm (66\")   Wt 89.4 kg (197 lb)   SpO2 95%   BMI 31.80 kg/m²   Physical Exam  Vitals reviewed.   Constitutional:       Appearance: He is well-developed.   HENT:      Head: Normocephalic and atraumatic.      Nose: Rhinorrhea present.   Eyes:      Conjunctiva/sclera:      Right eye: No exudate.     Left eye: No exudate.     Pupils: Pupils are equal, round, and reactive to light.        Comments: erythema   Neck:      Vascular: No JVD.   Cardiovascular:      Rate and Rhythm: Normal rate and regular rhythm.   Pulmonary:      Effort: Pulmonary effort is normal.   Abdominal:      General: Bowel sounds are normal.      Palpations: Abdomen is soft.   Musculoskeletal:         General: No deformity.      Cervical back: Normal range of motion and neck supple.   Lymphadenopathy:      Cervical: No cervical adenopathy.   Skin:     General: Skin is warm and dry.      Findings: Rash present.      Comments: Drynesss and cracking on bilateral hands. Pinky and palm of left hand, thumb on right hand.    Neurological:      Mental Status: He is alert and oriented to person, place, and time.   Psychiatric:         Behavior: Behavior normal.         Thought Content: Thought content normal.         Judgment: Judgment normal.         Results Reviewed:  Glucose   Date Value Ref Range Status   03/10/2023 86 70 - 99 mg/dL Final   04/25/2022 145 (H) 65 - 99 mg/dL Final   12/22/2020 92 74 - 109 mg/dL Final     BUN   Date Value Ref Range Status   03/10/2023 10 8 - 27 mg/dL Final   04/25/2022 12 8 - 23 mg/dL Final   12/22/2020 16 8 - 23 mg/dL Final     Creatinine   Date Value Ref Range Status   03/10/2023 0.86 0.76 - 1.27 mg/dL Final   04/25/2022 0.93 0.76 " - 1.27 mg/dL Final   12/22/2020 0.8 0.5 - 1.2 mg/dL Final     Sodium   Date Value Ref Range Status   03/10/2023 142 134 - 144 mmol/L Final   04/25/2022 141 136 - 145 mmol/L Final   12/22/2020 139 136 - 145 mmol/L Final     Potassium   Date Value Ref Range Status   03/10/2023 4.0 3.5 - 5.2 mmol/L Final   04/25/2022 4.0 3.5 - 5.2 mmol/L Final   12/22/2020 4.1 3.5 - 5.0 mmol/L Final     Chloride   Date Value Ref Range Status   03/10/2023 105 96 - 106 mmol/L Final   04/25/2022 107 98 - 107 mmol/L Final   12/22/2020 102 98 - 111 mmol/L Final     CO2   Date Value Ref Range Status   04/25/2022 24.0 22.0 - 29.0 mmol/L Final   12/22/2020 25 22 - 29 mmol/L Final     Total CO2   Date Value Ref Range Status   03/10/2023 23 20 - 29 mmol/L Final     Calcium   Date Value Ref Range Status   03/10/2023 9.4 8.6 - 10.2 mg/dL Final   04/25/2022 9.3 8.6 - 10.5 mg/dL Final   12/22/2020 9.4 8.8 - 10.2 mg/dL Final     ALT (SGPT)   Date Value Ref Range Status   03/10/2023 24 0 - 44 IU/L Final   09/16/2021 17 1 - 41 U/L Final     AST (SGOT)   Date Value Ref Range Status   03/10/2023 24 0 - 40 IU/L Final   09/16/2021 17 1 - 40 U/L Final     WBC   Date Value Ref Range Status   03/10/2023 4.4 3.4 - 10.8 x10E3/uL Final   12/22/2020 6.6 4.8 - 10.8 K/uL Final     Hematocrit   Date Value Ref Range Status   03/10/2023 40.0 37.5 - 51.0 % Final   04/25/2022 39.9 37.5 - 51.0 % Final   12/22/2020 40.7 (L) 42.0 - 52.0 % Final     Platelets   Date Value Ref Range Status   03/10/2023 167 150 - 450 x10E3/uL Final   04/25/2022 159 140 - 450 10*3/mm3 Final   12/22/2020 175 130 - 400 K/uL Final     Triglycerides   Date Value Ref Range Status   03/10/2023 109 0 - 149 mg/dL Final     HDL Cholesterol   Date Value Ref Range Status   03/10/2023 53 >39 mg/dL Final     LDL Chol Calc ([facility])   Date Value Ref Range Status   03/10/2023 90 0 - 99 mg/dL Final     Hemoglobin A1C   Date Value Ref Range Status   09/17/2021 5.4 % Final         Assessment / Plan      Assessment/Plan:  Diagnoses and all orders for this visit:    1. Allergic eye reaction (Primary)  Pt is getting Pat[place] ophthalmic drops OTC    2. Eczema of both hands  -     mupirocin (BACTROBAN) 2 % ointment; Apply 1 application topically to the appropriate area as directed 2 (Two) Times a Day.  Dispense: 22 g; Refill: 1  -     triamcinolone (KENALOG) 0.5 % ointment; Apply 1 application topically to the appropriate area as directed 2 (Two) Times a Day.  Dispense: 15 g; Refill: 1      No follow-ups on file. unless patient needs to be seen sooner or acute issues arise.    Code Status: full    I have discussed the patient results/orders and and plan/recommendation with them at today's visit.      Katerine Mcclain, APRN   06/13/2023

## 2023-06-20 PROBLEM — G56.02 LEFT CARPAL TUNNEL SYNDROME: Status: ACTIVE | Noted: 2023-06-20

## 2023-07-27 ENCOUNTER — PRE-ADMISSION TESTING (OUTPATIENT)
Dept: PREADMISSION TESTING | Facility: HOSPITAL | Age: 78
End: 2023-07-27
Payer: COMMERCIAL

## 2023-07-27 VITALS
OXYGEN SATURATION: 94 % | WEIGHT: 194.89 LBS | HEART RATE: 60 BPM | BODY MASS INDEX: 31.32 KG/M2 | RESPIRATION RATE: 18 BRPM | SYSTOLIC BLOOD PRESSURE: 137 MMHG | DIASTOLIC BLOOD PRESSURE: 75 MMHG | HEIGHT: 66 IN

## 2023-07-27 DIAGNOSIS — G56.02 LEFT CARPAL TUNNEL SYNDROME: ICD-10-CM

## 2023-07-27 LAB
ALBUMIN SERPL-MCNC: 4.1 G/DL (ref 3.5–5.2)
ALBUMIN/GLOB SERPL: 1.5 G/DL
ALP SERPL-CCNC: 49 U/L (ref 39–117)
ALT SERPL W P-5'-P-CCNC: 15 U/L (ref 1–41)
ANION GAP SERPL CALCULATED.3IONS-SCNC: 11 MMOL/L (ref 5–15)
APTT PPP: 28.5 SECONDS (ref 24.1–35)
AST SERPL-CCNC: 16 U/L (ref 1–40)
BASOPHILS # BLD AUTO: 0.03 10*3/MM3 (ref 0–0.2)
BASOPHILS NFR BLD AUTO: 0.6 % (ref 0–1.5)
BILIRUB SERPL-MCNC: 0.3 MG/DL (ref 0–1.2)
BILIRUB UR QL STRIP: NEGATIVE
BUN SERPL-MCNC: 12 MG/DL (ref 8–23)
BUN/CREAT SERPL: 15.6 (ref 7–25)
CALCIUM SPEC-SCNC: 9.4 MG/DL (ref 8.6–10.5)
CHLORIDE SERPL-SCNC: 106 MMOL/L (ref 98–107)
CLARITY UR: CLEAR
CO2 SERPL-SCNC: 24 MMOL/L (ref 22–29)
COLOR UR: ABNORMAL
CREAT SERPL-MCNC: 0.77 MG/DL (ref 0.76–1.27)
DEPRECATED RDW RBC AUTO: 49.5 FL (ref 37–54)
EGFRCR SERPLBLD CKD-EPI 2021: 91.6 ML/MIN/1.73
EOSINOPHIL # BLD AUTO: 0.13 10*3/MM3 (ref 0–0.4)
EOSINOPHIL NFR BLD AUTO: 2.6 % (ref 0.3–6.2)
ERYTHROCYTE [DISTWIDTH] IN BLOOD BY AUTOMATED COUNT: 13.3 % (ref 12.3–15.4)
GLOBULIN UR ELPH-MCNC: 2.7 GM/DL
GLUCOSE SERPL-MCNC: 107 MG/DL (ref 65–99)
GLUCOSE UR STRIP-MCNC: NEGATIVE MG/DL
HCT VFR BLD AUTO: 39.8 % (ref 37.5–51)
HGB BLD-MCNC: 12.7 G/DL (ref 13–17.7)
HGB UR QL STRIP.AUTO: NEGATIVE
IMM GRANULOCYTES # BLD AUTO: 0.02 10*3/MM3 (ref 0–0.05)
IMM GRANULOCYTES NFR BLD AUTO: 0.4 % (ref 0–0.5)
INR PPP: 0.89 (ref 0.91–1.09)
KETONES UR QL STRIP: NEGATIVE
LEUKOCYTE ESTERASE UR QL STRIP.AUTO: NEGATIVE
LYMPHOCYTES # BLD AUTO: 1.47 10*3/MM3 (ref 0.7–3.1)
LYMPHOCYTES NFR BLD AUTO: 29.7 % (ref 19.6–45.3)
MCH RBC QN AUTO: 32.2 PG (ref 26.6–33)
MCHC RBC AUTO-ENTMCNC: 31.9 G/DL (ref 31.5–35.7)
MCV RBC AUTO: 101 FL (ref 79–97)
MONOCYTES # BLD AUTO: 0.48 10*3/MM3 (ref 0.1–0.9)
MONOCYTES NFR BLD AUTO: 9.7 % (ref 5–12)
NEUTROPHILS NFR BLD AUTO: 2.82 10*3/MM3 (ref 1.7–7)
NEUTROPHILS NFR BLD AUTO: 57 % (ref 42.7–76)
NITRITE UR QL STRIP: NEGATIVE
NRBC BLD AUTO-RTO: 0 /100 WBC (ref 0–0.2)
PH UR STRIP.AUTO: 6.5 [PH] (ref 5–8)
PLATELET # BLD AUTO: 154 10*3/MM3 (ref 140–450)
PMV BLD AUTO: 10.2 FL (ref 6–12)
POTASSIUM SERPL-SCNC: 4.1 MMOL/L (ref 3.5–5.2)
PROT SERPL-MCNC: 6.8 G/DL (ref 6–8.5)
PROT UR QL STRIP: NEGATIVE
PROTHROMBIN TIME: 12.1 SECONDS (ref 11.8–14.8)
RBC # BLD AUTO: 3.94 10*6/MM3 (ref 4.14–5.8)
SODIUM SERPL-SCNC: 141 MMOL/L (ref 136–145)
SP GR UR STRIP: 1.01 (ref 1–1.03)
UROBILINOGEN UR QL STRIP: ABNORMAL
WBC NRBC COR # BLD: 4.95 10*3/MM3 (ref 3.4–10.8)

## 2023-07-27 PROCEDURE — 93005 ELECTROCARDIOGRAM TRACING: CPT

## 2023-07-27 PROCEDURE — 36415 COLL VENOUS BLD VENIPUNCTURE: CPT

## 2023-07-27 PROCEDURE — 93010 ELECTROCARDIOGRAM REPORT: CPT | Performed by: HOSPITALIST

## 2023-07-27 PROCEDURE — 85730 THROMBOPLASTIN TIME PARTIAL: CPT

## 2023-07-27 PROCEDURE — 85610 PROTHROMBIN TIME: CPT

## 2023-07-27 PROCEDURE — 85025 COMPLETE CBC W/AUTO DIFF WBC: CPT

## 2023-07-27 PROCEDURE — 81003 URINALYSIS AUTO W/O SCOPE: CPT

## 2023-07-27 PROCEDURE — 80053 COMPREHEN METABOLIC PANEL: CPT

## 2023-07-27 NOTE — DISCHARGE INSTRUCTIONS
Before you come to the hospital        Arrival time: AS DIRECTED BY OFFICE     YOU MAY TAKE THE FOLLOWING MEDICATION(S) THE MORNING OF SURGERY WITH A SIP OF WATER: Gabapentin and Bisoprolol    Hold Your Lisinopril For 24 Hours Prior To Surgery           ALL OTHER HOME MEDICATION CHECK WITH YOUR PHYSICIAN (especially if   you are taking diabetes medicines or blood thinners)    Do not take any Erectile Dysfunction medications (EX: CIALIS, VIAGRA) 24 hours prior to surgery.      If you were given and instructed to use a germ- killing soap, use as directed the night before surgery and again the morning of surgery or as directed by your surgeon. (Use one-half of the bottle with each shower.)   See attached information for How to Use Chlorhexidine for Bathing if applicable.            Eating and drinking restrictions prior to scheduled arrival time    2 Hours before arrival time STOP   Drinking Clear liquids (water, black coffee-NO CREAM,  apple juice-no pulp)      8 Hours before arrival time STOP   All food, full liquids, and dairy products    (It is extremely important that you follow these guidelines to prevent delay or cancelation of your procedure)     Clear Liquids  Water and flavored water                                                                      Clear Fruit juices, such as cranberry juice and apple juice.  Black coffee (NO cream of any kind, including powdered).  Plain tea  Clear bouillon or broth.  Flavored gelatin.  Soda.  Gatorade or Powerade.  Full liquid examples  Juices that have pulp.  Frozen ice pops that contain fruit pieces.  Coffee with creamer  Milk.  Yogurt.                MANAGING PAIN AFTER SURGERY    We know you are probably wondering what your pain will be like after surgery.  Following surgery it is unrealistic to expect you will not have pain.   Pain is how our bodies let us know that something is wrong or cautions us to be careful.  That said, our goal is to make your pain  tolerable.    Methods we may use to treat your pain include (oral or IV medications, PCAs, epidurals, nerve blocks, etc.)   While some procedures require IV pain medications for a short time after surgery, transitioning to pain medications by mouth allows for better management of pain.   Your nurse will encourage you to take oral pain medications whenever possible.  IV medications work almost immediately, but only last a short while.  Taking medications by mouth allows for a more constant level of medication in your blood stream for a longer period of time.      Once your pain is out of control it is harder to get back under control.  It is important you are aware when your next dose of pain medication is due.  If you are admitted, your nurse may write the time of your next dose on the white board in your room to help you remember.      We are interested in your pain and encourage you to inform us about aggravating factors during your visit.   Many times a simple repositioning every few hours can make a big difference.    If your physician says it is okay, do not let your pain prevent you from getting out of bed. Be sure to call your nurse for assistance prior to getting up so you do not fall.      Before surgery, please decide your tolerable pain goal.  These faces help describe the pain ratings we use on a 0-10 scale.   Be prepared to tell us your goal and whether or not you take pain or anxiety medications at home.          Preparing for Surgery  Preparing for surgery is an important part of your care. It can make things go more smoothly and help you avoid complications. The steps leading up to surgery may vary among hospitals. Follow all instructions given to you by your health care providers. Ask questions if you do not understand something. Talk about any concerns that you have.  Here are some questions to consider asking before your surgery:  If my surgery is not an emergency (is elective), when would be the  best time to have the surgery?  What arrangements do I need to make for work, home, or school?  What will my recovery be like? How long will it be before I can return to normal activities?  Will I need to prepare my home? Will I need to arrange care for me or my children?  Should I expect to have pain after surgery? What are my pain management options? Are there nonmedical options that I can try for pain?  Tell a health care provider about:  Any allergies you have.  All medicines you are taking, including vitamins, herbs, eye drops, creams, and over-the-counter medicines.  Any problems you or family members have had with anesthetic medicines.  Any blood disorders you have.  Any surgeries you have had.  Any medical conditions you have.  Whether you are pregnant or may be pregnant.  What are the risks?  The risks and complications of surgery depend on the specific procedure that you have. Discuss all the risks with your health care providers before your surgery. Ask about common surgical complications, which may include:  Infection.  Bleeding or a need for blood replacement (transfusion).  Allergic reactions to medicines.  Damage to surrounding nerves, tissues, or structures.  A blood clot.  Scarring.  Failure of the surgery to correct the problem.  Follow these instructions before the procedure:  Several days or weeks before your procedure  You may have a physical exam by your primary health care provider to make sure it is safe for you to have surgery.  You may have testing. This may include a chest X-ray, blood and urine tests, electrocardiogram (ECG), or other testing.  Ask your health care provider about:  Changing or stopping your regular medicines. This is especially important if you are taking diabetes medicines or blood thinners.  Taking medicines such as aspirin and ibuprofen. These medicines can thin your blood. Do not take these medicines unless your health care provider tells you to take them.  Taking  over-the-counter medicines, vitamins, herbs, and supplements.  Do not use any products that contain nicotine or tobacco, such as cigarettes and e-cigarettes. If you need help quitting, ask your health care provider.  Avoid alcohol.  Ask your health care provider if there are exercises you can do to prepare for surgery.  Eat a healthy diet.   Plan to have someone 18 years of age or older to take you home from the hospital. We will need to verify your ride on the morning of surgery if you are being discharged home on the same day. Tell your ride to be expecting a call from the hospital prior to your procedure.   Plan to have a responsible adult care for you for at least 24 hours after you leave the hospital or clinic. This is important.  The day before your procedure  You may be given antibiotic medicine to take by mouth to help prevent infection. Take it as told by your health care provider.  You may be asked to shower with a germ-killing soap.  Follow instructions from your health care provider about eating and drinking restrictions. This includes gum, mints and hard candy.  Pack comfortable clothes according to your procedure.   The day of your procedure  You may need to take another shower with a germ-killing soap before you leave home in the morning.  With a small sip of water, take only the medicines that you are told to take.  Remove all jewelry including rings.   Leave anything you consider valuable at home except hearing aids if needed.  You do not need to bring your home medications into the hospital.   Do not wear any makeup, nail polish, powder, deodorant, lotion, hair accessories, or anything on your skin or body except your clothes.  If you will be staying in the hospital, bring a case to hold your glasses, contacts, or dentures. You may also want to bring your robe and non-skid footwear.       (Do not use denture adhesives since you will be asked to remove them during  surgery).   If you wear oxygen at  home, bring it with you the day of surgery.  If instructed by your health care provider, bring your sleep apnea device with you on the day of your surgery (if this applies to you).  You may want to leave your suitcase and sleep apnea device in the car until after surgery.   Arrive at the hospital as scheduled.  Bring a friend or family member with you who can help to answer questions and be present while you meet with your health care provider.  At the hospital  When you arrive at the hospital:  Go to registration located at the main entrance of the hospital. You will be registered and given a beeper and a sticker sheet. Take the stickers to the Outpatient nurses desk and place in the black tray. This is to notify staff that you have arrived. Then return to the lobby to wait.   When your beeper lights up and vibrates proceed through the double doors, under the stairs, and a member of the Outpatient Surgery staff will escort you to your preoperative room.  You may have to wear compression sleeves. These help to prevent blood clots and reduce swelling in your legs.  An IV may be inserted into one of your veins.              In the operating room, you may be given one or more of the following:        A medicine to help you relax (sedative).        A medicine to numb the area (local anesthetic).        A medicine to make you fall asleep (general anesthetic).        A medicine that is injected into an area of your body to numb everything below the                      injection site (regional anesthetic).  You may be given an antibiotic through your IV to help prevent infection.  Your surgical site will be marked or identified.    Contact a health care provider if you:  Develop a fever of more than 100.4°F (38°C) or other feelings of illness during the 48 hours before your surgery.  Have symptoms that get worse.  Have questions or concerns about your surgery.  Summary  Preparing for surgery can make the procedure go more  smoothly and lower your risk of complications.  Before surgery, make a list of questions and concerns to discuss with your surgeon. Ask about the risks and possible complications.  In the days or weeks before your surgery, follow all instructions from your health care provider. You may need to stop smoking, avoid alcohol, follow eating restrictions, and change or stop your regular medicines.  Contact your surgeon if you develop a fever or other signs of illness during the few days before your surgery.  This information is not intended to replace advice given to you by your health care provider. Make sure you discuss any questions you have with your health care provider.  Document Revised: 12/21/2018 Document Reviewed: 10/23/2018  [employer] Patient Education © 2021 [employer]

## 2023-07-28 ENCOUNTER — OFFICE VISIT (OUTPATIENT)
Dept: INTERNAL MEDICINE | Facility: CLINIC | Age: 78
End: 2023-07-28
Payer: COMMERCIAL

## 2023-07-28 VITALS
RESPIRATION RATE: 17 BRPM | HEART RATE: 69 BPM | BODY MASS INDEX: 31.53 KG/M2 | DIASTOLIC BLOOD PRESSURE: 81 MMHG | WEIGHT: 196.2 LBS | HEIGHT: 66 IN | OXYGEN SATURATION: 97 % | TEMPERATURE: 97.3 F | SYSTOLIC BLOOD PRESSURE: 141 MMHG

## 2023-07-28 DIAGNOSIS — J44.9 COPD, GROUP B, BY GOLD 2017 CLASSIFICATION: Chronic | ICD-10-CM

## 2023-07-28 DIAGNOSIS — Z79.899 LONG TERM USE OF DRUG: ICD-10-CM

## 2023-07-28 DIAGNOSIS — Z01.811 PRE-OP CHEST EXAM: ICD-10-CM

## 2023-07-28 DIAGNOSIS — T63.461A ALLERGIC REACTION TO WASP STING: Primary | ICD-10-CM

## 2023-07-28 LAB
QT INTERVAL: 484 MS
QTC INTERVAL: 475 MS

## 2023-07-28 RX ORDER — BUDESONIDE AND FORMOTEROL FUMARATE DIHYDRATE 160; 4.5 UG/1; UG/1
2 AEROSOL RESPIRATORY (INHALATION)
Qty: 30.6 EACH | Refills: 3 | Status: SHIPPED | OUTPATIENT
Start: 2023-07-28 | End: 2023-10-26

## 2023-07-28 RX ORDER — EPINEPHRINE 0.3 MG/.3ML
0.3 INJECTION SUBCUTANEOUS ONCE
Qty: 1 EACH | Refills: 0 | Status: SHIPPED | OUTPATIENT
Start: 2023-07-28 | End: 2023-07-28

## 2023-07-28 NOTE — PROGRESS NOTES
Subjective     Chief Complaint   Patient presents with    Surgical Clearance    Insect Bite     Reactions to wasp stings.        History of Present Illness  Patient comes in today for surgical clearance.  He is scheduled for a carpal tunnel release on the left.  The patient states he needs a refill on his Symbicort. The adult female states he is unable to get the pulmonologist to respond to the refill request for [facility]. She states he needs his Symbicort sent to [facility]. The patient he needs an EpiPen. He states he gets stung every time. The patient needs an EpiPen in the area where he is stung. He states it is getting worse. The patient is having carpal tunnel on the left. He denies coughing. The patient did not have a lung x-ray. The adult female states she had to ask for an EKG yesterday at the hospital. He has a left bundle branch block. The patient denies chest pain. His breathing is okay for now. The adult female states he is not able to do things for a couple of weeks. She states it is not a long recovery. He does not want to bend it. The patient will come in with a splint. He does not take oral steroids. The patient does not want to take systemic steroids. He uses his albuterol rescue inhaler a little more often than he did 4 to 5 years ago. The adult female states he uses it 3 to 4 times a month. He denies swelling in his feet. The patient denies other heart problems.    Patient's PMR from outside medical facility reviewed and noted.    Review of Systems   Otherwise complete ROS reviewed and negative except as mentioned in the HPI.    Past Medical History:   Past Medical History:   Diagnosis Date    Arthritis     BPH with obstruction/lower urinary tract symptoms     Chronic back pain     Class 1 obesity due to excess calories with serious comorbidity and body mass index (BMI) of 32.0 to 32.9 in adult 01/20/2020    COPD (chronic obstructive pulmonary disease)     Elevated cholesterol      Environmental allergies 09/21/2022    Essential hypertension 10/14/2019    GERD (gastroesophageal reflux disease)     Hyperlipidemia     Hypertension     Left bundle branch block     Lung infiltrate 12/20/2022    Right upper lobe, [facility], October 2022    Obstructive sleep apnea 01/20/2020    pt uses a cpap machine at home    Sleep apnea     CPAP     Past Surgical History:  Past Surgical History:   Procedure Laterality Date    CARPAL TUNNEL RELEASE Right 09/29/2021    Procedure: CARPAL TUNNEL RELEASE right;  Surgeon: Luis Perdomo MD;  Location:  [facility] OR;  Service: Neurosurgery;  Laterality: Right;    COLONOSCOPY      COLONOSCOPY N/A 04/13/2022    Procedure: COLONOSCOPY WITH ANESTHESIA;  Surgeon: Cortes Ribera DO;  Location:  [facility];  Service: Gastroenterology;  Laterality: N/A;  preop; hx of poylps   postop; polyps   PCP Katerine Mcclain     KNEE ARTHROPLASTY Right     KNEE MENISCAL REPAIR Left 2018    PENILE PROSTHESIS IMPLANT N/A 4/27/2022    Procedure: MALLEABLE PENILE PROSTHESIS PLACEMENT;  Surgeon: Ignacio Moon MD;  Location:  [facility] OR;  Service: Urology;  Laterality: N/A;    ROTATOR CUFF REPAIR Right 2000    SPINE SURGERY  1981    lower back ruptured disc    TOTAL SHOULDER ARTHROPLASTY W/ DISTAL CLAVICLE EXCISION Right 01/14/2020    Procedure: RIGHT REVERSE TOTAL SHOULDER  ARTHROPLASTY;  Surgeon: Suman Ventura MD;  Location:  [facility] OR;  Service: Orthopedics     Social History:  reports that he quit smoking about 12 years ago. His smoking use included pipe and cigarettes. He has a 50.00 pack-year smoking history. He has been exposed to tobacco smoke. He has never used smokeless tobacco. He reports current alcohol use of about 5.0 standard drinks per week. He reports that he does not use drugs.    Family History: family history includes Arthritis in his father and mother; Cancer in his brother; Diabetes in his mother and sister; Hypertension in his father and mother; Kidney  disease in his mother; Obesity in his sister; Osteoporosis in his mother; Stroke in his father.       Allergies:  No Known Allergies  Medications:  Prior to Admission medications    Medication Sig Start Date End Date Taking? Authorizing Provider   acetaminophen (TYLENOL) 650 MG 8 hr tablet Take 1 tablet by mouth 2 (Two) Times a Day.   Yes Cristian Pfeiffer MD   albuterol sulfate  (90 Base) MCG/ACT inhaler Inhale 2 puffs Every 4 (Four) Hours As Needed for Wheezing. 4/11/22  Yes Hien Pimentel APRN   amLODIPine (NORVASC) 5 MG tablet TAKE ONE TABLET BY MOUTH DAILY 7/12/23  Yes Katerine Mcclain APRN   aspirin 81 MG EC tablet Take 1 tablet by mouth Daily.   Yes Cristian Pfeiffer MD   azelastine (ASTELIN) 0.1 % nasal spray 2 sprays into the nostril(s) as directed by provider 2 (Two) Times a Day. Use in each nostril as directed 1/23/23  Yes Katerine Mcclain APRN   betamethasone dipropionate 0.05 % cream Apply 1 application topically to the appropriate area as directed Daily. 6/22/23  Yes Katerine Mcclain APRN   bisoprolol (ZEBeta) 5 MG tablet TAKE 1 TABLET BY MOUTH EVERY DAY 5/25/23  Yes Katerine Mcclain APRN   calcium carbonate (OS-TASHIA) 600 MG tablet Take 1 tablet by mouth 2 (Two) Times a Day.   Yes Cristian Pfeiffer MD   Cholecalciferol (VITAMIN D3) 1000 units capsule Take 2 capsules by mouth Daily.   Yes Cristian Pfeiffer MD   coenzyme Q10 100 MG capsule Take 1 capsule by mouth Daily.   Yes rCistian Pfeiffer MD   Collagen-Boron-Hyaluronic Acid (Move Free AcesoBee) 10-5-3.3 MG tablet Take 1 tablet by mouth Daily.   Yes Cristian Pfeiffer MD   docusate sodium (Colace) 100 MG capsule Take 1 capsule by mouth 2 (Two) Times a Day. 4/27/22  Yes Ignacio Moon MD   finasteride (PROSCAR) 5 MG tablet Take 1 tablet by mouth Daily. 12/8/22  Yes Ignacio Moon MD   fluticasone (FLONASE) 50 MCG/ACT nasal spray 2 sprays into the nostril(s) as directed  by provider Daily.   Yes Cristian Pfeiffer MD   gabapentin (NEURONTIN) 300 MG capsule Take 800 mg by mouth 2 (Two) Times a Day. 6/8/22  Yes Cristian Pfeiffer MD   gabapentin (NEURONTIN) 400 MG capsule TAKE ONE CAPSULE BY MOUTH FOUR TIMES DAILY  Patient taking differently: 2 capsules 2 (Two) Times a Day. 6/9/23  Yes Katerine Mcclain APRN   Garlic 1000 MG capsule Take 1 capsule by mouth Daily.   Yes Cristian Pfeiffer MD   ibuprofen (ADVIL,MOTRIN) 200 MG tablet Take 2 tablets by mouth Every 6 (Six) Hours As Needed for Mild Pain.   Yes Cristian Pfeiffer MD   Krill Oil 300 MG capsule Take 300 mg by mouth Daily.   Yes Cristian Pfeiffer MD   lisinopril (PRINIVIL,ZESTRIL) 10 MG tablet TAKE ONE TABLET BY MOUTH DAILY 7/12/23  Yes Katerine Mcclain APRN   Loratadine 10 MG capsule Take  by mouth.   Yes Cristian Pfeiffer MD   Multiple Vitamins-Minerals (OCUVITE ADULT 50+ PO) Take 1 tablet by mouth Daily.   Yes Cristian Pfeiffer MD   mupirocin (BACTROBAN) 2 % ointment Apply 1 application topically to the appropriate area as directed 2 (Two) Times a Day. 6/13/23  Yes Katerine Mcclain APRN   naproxen (NAPROSYN) 250 MG tablet Take 1 tablet by mouth 2 (Two) Times a Day With Meals. 4/27/22  Yes Ignacio Moon MD   omeprazole (priLOSEC) 20 MG capsule Take 1 capsule by mouth Daily.   Yes Cristian Pfeiffer MD   simvastatin (ZOCOR) 40 MG tablet TAKE ONE TABLET BY MOUTH NIGHTLY 3/13/23  Yes Katerine Mcclain APRN   SUPER B COMPLEX/C PO Take 1 tablet by mouth Every Night.   Yes Cristian Pfeiffer MD   tamsulosin (FLOMAX) 0.4 MG capsule 24 hr capsule Take 2 capsules by mouth Every Night. 12/8/22  Yes Ignacio Moon MD   tiotropium bromide monohydrate (Spiriva Respimat) 2.5 MCG/ACT aerosol solution inhaler Inhale 2 puffs Daily. 3/23/23  Yes Hien Pimentel APRN   triamcinolone (KENALOG) 0.5 % ointment Apply 1 application topically to the appropriate area as directed 2  "(Two) Times a Day. 6/13/23  Yes Katerine Mcclain APRN   vitamin C (ASCORBIC ACID) 500 MG tablet Take 1 tablet by mouth Daily.   Yes Provider, MD Cristian   budesonide-formoterol (Symbicort) 160-4.5 MCG/ACT inhaler Inhale 2 puffs 2 (Two) Times a Day for 90 days. 4/17/23 7/27/23  Hien Pimentel APRN   cetirizine (zyrTEC) 10 MG tablet Take 1 tablet by mouth Daily for 5 days. 7/22/22 7/27/23  Abimael Christian MD   tiotropium bromide monohydrate (Spiriva Respimat) 2.5 MCG/ACT aerosol solution inhaler Inhale 2 puffs Daily for 90 days. 4/17/23 7/16/23  Hien Pimentel APRN       Objective     Vital Signs: /81 (BP Location: Left arm, Patient Position: Sitting, Cuff Size: Adult)   Pulse 69   Temp 97.3 °F (36.3 °C) (Skin)   Resp 17   Ht 167.6 cm (66\")   Wt 89 kg (196 lb 3.2 oz)   SpO2 97%   BMI 31.67 kg/m²   Physical Exam  Vitals reviewed.   Constitutional:       Appearance: He is well-developed.   HENT:      Head: Normocephalic and atraumatic.   Eyes:      Pupils: Pupils are equal, round, and reactive to light.   Neck:      Vascular: No JVD.   Cardiovascular:      Rate and Rhythm: Normal rate and regular rhythm.   Pulmonary:      Effort: Pulmonary effort is normal.      Comments: Diminished in the bases.   Abdominal:      General: Bowel sounds are normal.      Palpations: Abdomen is soft.   Musculoskeletal:         General: No deformity.      Cervical back: Normal range of motion and neck supple.   Lymphadenopathy:      Cervical: No cervical adenopathy.   Skin:     General: Skin is warm and dry.   Neurological:      Mental Status: He is alert and oriented to person, place, and time.   Psychiatric:         Behavior: Behavior normal.         Thought Content: Thought content normal.         Judgment: Judgment normal.       Results Reviewed:  Glucose   Date Value Ref Range Status   07/27/2023 107 (H) 65 - 99 mg/dL Final   12/22/2020 92 74 - 109 mg/dL Final     BUN   Date Value Ref Range Status "   07/27/2023 12 8 - 23 mg/dL Final   12/22/2020 16 8 - 23 mg/dL Final     Creatinine   Date Value Ref Range Status   07/27/2023 0.77 0.76 - 1.27 mg/dL Final   12/22/2020 0.8 0.5 - 1.2 mg/dL Final     Sodium   Date Value Ref Range Status   07/27/2023 141 136 - 145 mmol/L Final   12/22/2020 139 136 - 145 mmol/L Final     Potassium   Date Value Ref Range Status   07/27/2023 4.1 3.5 - 5.2 mmol/L Final   12/22/2020 4.1 3.5 - 5.0 mmol/L Final     Chloride   Date Value Ref Range Status   07/27/2023 106 98 - 107 mmol/L Final   12/22/2020 102 98 - 111 mmol/L Final     CO2   Date Value Ref Range Status   07/27/2023 24.0 22.0 - 29.0 mmol/L Final   12/22/2020 25 22 - 29 mmol/L Final     Calcium   Date Value Ref Range Status   07/27/2023 9.4 8.6 - 10.5 mg/dL Final   12/22/2020 9.4 8.8 - 10.2 mg/dL Final     ALT (SGPT)   Date Value Ref Range Status   07/27/2023 15 1 - 41 U/L Final     AST (SGOT)   Date Value Ref Range Status   07/27/2023 16 1 - 40 U/L Final     WBC   Date Value Ref Range Status   07/27/2023 4.95 3.40 - 10.80 10*3/mm3 Final   03/10/2023 4.4 3.4 - 10.8 x10E3/uL Final   12/22/2020 6.6 4.8 - 10.8 K/uL Final     Hematocrit   Date Value Ref Range Status   07/27/2023 39.8 37.5 - 51.0 % Final   12/22/2020 40.7 (L) 42.0 - 52.0 % Final     Platelets   Date Value Ref Range Status   07/27/2023 154 140 - 450 10*3/mm3 Final   12/22/2020 175 130 - 400 K/uL Final     Triglycerides   Date Value Ref Range Status   03/10/2023 109 0 - 149 mg/dL Final     HDL Cholesterol   Date Value Ref Range Status   03/10/2023 53 >39 mg/dL Final     LDL Chol Calc (NIH)   Date Value Ref Range Status   03/10/2023 90 0 - 99 mg/dL Final     Hemoglobin A1C   Date Value Ref Range Status   09/17/2021 5.4 % Final         Assessment / Plan //  /.     Assessment/Plan:  Diagnoses and all orders for this visit:    1. Allergic reaction to wasp sting (Primary)  -     EPINEPHrine (EpiPen 2-Pierce) 0.3 MG/0.3ML solution auto-injector injection; Inject 0.3 mL into  the appropriate muscle as directed by prescriber 1 (One) Time for 1 dose.  Dispense: 1 each; Refill: 0    2. COPD, group B, by GOLD 2017 classification  Comments:  90-day supply for Spiriva and Symbicort sent to pharmacy.  Use albuterol as needed.  PFTs with follow-up  Orders:  -     budesonide-formoterol (Symbicort) 160-4.5 MCG/ACT inhaler; Inhale 2 puffs 2 (Two) Times a Day for 90 days.  Dispense: 30.6 each; Refill: 3  -     XR Chest PA & Lateral (In Office)    3. Pre-op chest exam  -     XR Chest PA & Lateral (In Office)    4. Long term use of drug  -     Compliance Drug Analysis, Ur - Urine, Clean Catch    His CXR shows his chronic issues with nothing acute    Pt is a reasonable risk for anesthesia.     No follow-ups on file. unless patient needs to be seen sooner or acute issues arise.    Code Status: Full.     I have discussed the patient results/orders and and plan/recommendation with them at today's visit.      SALONI Kaur   07/28/2023  Transcribed from ambient dictation for SALONI Kaur by Oliva Ortega.  07/28/23   11:25 CDT    Patient or patient representative verbalized consent to the visit recording.  I have personally performed the services described in this document as transcribed by the above individual, and it is both accurate and complete.

## 2023-08-01 RX ORDER — BETAMETHASONE DIPROPIONATE 0.5 MG/G
CREAM TOPICAL
Qty: 15 G | Refills: 1 | Status: SHIPPED | OUTPATIENT
Start: 2023-08-01

## 2023-08-04 LAB — DRUGS UR: NORMAL

## 2023-08-08 ENCOUNTER — ANESTHESIA EVENT (OUTPATIENT)
Dept: PERIOP | Facility: HOSPITAL | Age: 78
End: 2023-08-08
Payer: COMMERCIAL

## 2023-08-09 ENCOUNTER — HOSPITAL ENCOUNTER (OUTPATIENT)
Facility: HOSPITAL | Age: 78
Setting detail: HOSPITAL OUTPATIENT SURGERY
Discharge: HOME | End: 2023-08-09
Attending: NEUROLOGICAL SURGERY | Admitting: NEUROLOGICAL SURGERY
Payer: COMMERCIAL

## 2023-08-09 ENCOUNTER — ANESTHESIA (OUTPATIENT)
Dept: PERIOP | Facility: HOSPITAL | Age: 78
End: 2023-08-09
Payer: COMMERCIAL

## 2023-08-09 VITALS
HEART RATE: 61 BPM | SYSTOLIC BLOOD PRESSURE: 149 MMHG | RESPIRATION RATE: 18 BRPM | TEMPERATURE: 97.4 F | DIASTOLIC BLOOD PRESSURE: 84 MMHG | OXYGEN SATURATION: 93 %

## 2023-08-09 DIAGNOSIS — G56.02 LEFT CARPAL TUNNEL SYNDROME: ICD-10-CM

## 2023-08-09 DIAGNOSIS — G62.9 PERIPHERAL POLYNEUROPATHY: ICD-10-CM

## 2023-08-09 PROCEDURE — 25010000002 CEFAZOLIN PER 500 MG: Performed by: NEUROLOGICAL SURGERY

## 2023-08-09 PROCEDURE — 25010000002 PROPOFOL 10 MG/ML EMULSION

## 2023-08-09 PROCEDURE — 25010000002 CEFAZOLIN PER 500 MG: Performed by: NURSE PRACTITIONER

## 2023-08-09 PROCEDURE — S0260 H&P FOR SURGERY: HCPCS | Performed by: NEUROLOGICAL SURGERY

## 2023-08-09 PROCEDURE — 64721 CARPAL TUNNEL SURGERY: CPT | Performed by: NEUROLOGICAL SURGERY

## 2023-08-09 PROCEDURE — 25010000002 SUGAMMADEX 200 MG/2ML SOLUTION

## 2023-08-09 PROCEDURE — 25010000002 DEXAMETHASONE PER 1 MG

## 2023-08-09 PROCEDURE — 25010000002 ONDANSETRON PER 1 MG

## 2023-08-09 PROCEDURE — 25010000002 FENTANYL CITRATE (PF) 50 MCG/ML SOLUTION

## 2023-08-09 RX ORDER — LIDOCAINE HYDROCHLORIDE 10 MG/ML
0.5 INJECTION, SOLUTION EPIDURAL; INFILTRATION; INTRACAUDAL; PERINEURAL ONCE AS NEEDED
Status: DISCONTINUED | OUTPATIENT
Start: 2023-08-09 | End: 2023-08-09 | Stop reason: HOSPADM

## 2023-08-09 RX ORDER — SODIUM CHLORIDE 0.9 % (FLUSH) 0.9 %
3-10 SYRINGE (ML) INJECTION AS NEEDED
Status: DISCONTINUED | OUTPATIENT
Start: 2023-08-09 | End: 2023-08-09 | Stop reason: HOSPADM

## 2023-08-09 RX ORDER — ONDANSETRON 2 MG/ML
INJECTION INTRAMUSCULAR; INTRAVENOUS AS NEEDED
Status: DISCONTINUED | OUTPATIENT
Start: 2023-08-09 | End: 2023-08-09 | Stop reason: SURG

## 2023-08-09 RX ORDER — LIDOCAINE HYDROCHLORIDE 20 MG/ML
INJECTION, SOLUTION EPIDURAL; INFILTRATION; INTRACAUDAL; PERINEURAL AS NEEDED
Status: DISCONTINUED | OUTPATIENT
Start: 2023-08-09 | End: 2023-08-09 | Stop reason: SURG

## 2023-08-09 RX ORDER — TRAMADOL HYDROCHLORIDE 50 MG/1
50 TABLET ORAL EVERY 8 HOURS PRN
Qty: 9 TABLET | Refills: 0 | Status: SHIPPED | OUTPATIENT
Start: 2023-08-09

## 2023-08-09 RX ORDER — SODIUM CHLORIDE, SODIUM LACTATE, POTASSIUM CHLORIDE, CALCIUM CHLORIDE 600; 310; 30; 20 MG/100ML; MG/100ML; MG/100ML; MG/100ML
100 INJECTION, SOLUTION INTRAVENOUS CONTINUOUS
Status: DISCONTINUED | OUTPATIENT
Start: 2023-08-09 | End: 2023-08-09 | Stop reason: HOSPADM

## 2023-08-09 RX ORDER — CEFAZOLIN SODIUM 1 G/3ML
INJECTION, POWDER, FOR SOLUTION INTRAMUSCULAR; INTRAVENOUS AS NEEDED
Status: DISCONTINUED | OUTPATIENT
Start: 2023-08-09 | End: 2023-08-09 | Stop reason: HOSPADM

## 2023-08-09 RX ORDER — ONDANSETRON 2 MG/ML
4 INJECTION INTRAMUSCULAR; INTRAVENOUS ONCE AS NEEDED
Status: DISCONTINUED | OUTPATIENT
Start: 2023-08-09 | End: 2023-08-09 | Stop reason: HOSPADM

## 2023-08-09 RX ORDER — LABETALOL HYDROCHLORIDE 5 MG/ML
5 INJECTION, SOLUTION INTRAVENOUS
Status: DISCONTINUED | OUTPATIENT
Start: 2023-08-09 | End: 2023-08-09 | Stop reason: HOSPADM

## 2023-08-09 RX ORDER — HYDROCODONE BITARTRATE AND ACETAMINOPHEN 5; 325 MG/1; MG/1
1 TABLET ORAL ONCE AS NEEDED
Status: DISCONTINUED | OUTPATIENT
Start: 2023-08-09 | End: 2023-08-09 | Stop reason: HOSPADM

## 2023-08-09 RX ORDER — DEXAMETHASONE SODIUM PHOSPHATE 4 MG/ML
INJECTION, SOLUTION INTRA-ARTICULAR; INTRALESIONAL; INTRAMUSCULAR; INTRAVENOUS; SOFT TISSUE AS NEEDED
Status: DISCONTINUED | OUTPATIENT
Start: 2023-08-09 | End: 2023-08-09 | Stop reason: SURG

## 2023-08-09 RX ORDER — SODIUM CHLORIDE, SODIUM LACTATE, POTASSIUM CHLORIDE, CALCIUM CHLORIDE 600; 310; 30; 20 MG/100ML; MG/100ML; MG/100ML; MG/100ML
1000 INJECTION, SOLUTION INTRAVENOUS CONTINUOUS
Status: DISCONTINUED | OUTPATIENT
Start: 2023-08-09 | End: 2023-08-09 | Stop reason: HOSPADM

## 2023-08-09 RX ORDER — SODIUM CHLORIDE 0.9 % (FLUSH) 0.9 %
3 SYRINGE (ML) INJECTION AS NEEDED
Status: DISCONTINUED | OUTPATIENT
Start: 2023-08-09 | End: 2023-08-09 | Stop reason: HOSPADM

## 2023-08-09 RX ORDER — HYDROCODONE BITARTRATE AND ACETAMINOPHEN 7.5; 325 MG/1; MG/1
1 TABLET ORAL EVERY 4 HOURS PRN
Status: DISCONTINUED | OUTPATIENT
Start: 2023-08-09 | End: 2023-08-09 | Stop reason: HOSPADM

## 2023-08-09 RX ORDER — IBUPROFEN 600 MG/1
600 TABLET ORAL ONCE AS NEEDED
Status: DISCONTINUED | OUTPATIENT
Start: 2023-08-09 | End: 2023-08-09 | Stop reason: HOSPADM

## 2023-08-09 RX ORDER — FENTANYL CITRATE 50 UG/ML
INJECTION, SOLUTION INTRAMUSCULAR; INTRAVENOUS AS NEEDED
Status: DISCONTINUED | OUTPATIENT
Start: 2023-08-09 | End: 2023-08-09 | Stop reason: SURG

## 2023-08-09 RX ORDER — DROPERIDOL 2.5 MG/ML
0.62 INJECTION, SOLUTION INTRAMUSCULAR; INTRAVENOUS ONCE AS NEEDED
Status: DISCONTINUED | OUTPATIENT
Start: 2023-08-09 | End: 2023-08-09 | Stop reason: HOSPADM

## 2023-08-09 RX ORDER — NALOXONE HCL 0.4 MG/ML
0.4 VIAL (ML) INJECTION AS NEEDED
Status: DISCONTINUED | OUTPATIENT
Start: 2023-08-09 | End: 2023-08-09 | Stop reason: HOSPADM

## 2023-08-09 RX ORDER — BUPIVACAINE HYDROCHLORIDE AND EPINEPHRINE 5; 5 MG/ML; UG/ML
INJECTION, SOLUTION PERINEURAL AS NEEDED
Status: DISCONTINUED | OUTPATIENT
Start: 2023-08-09 | End: 2023-08-09 | Stop reason: HOSPADM

## 2023-08-09 RX ORDER — SODIUM CHLORIDE 0.9 % (FLUSH) 0.9 %
3 SYRINGE (ML) INJECTION EVERY 12 HOURS SCHEDULED
Status: DISCONTINUED | OUTPATIENT
Start: 2023-08-09 | End: 2023-08-09 | Stop reason: HOSPADM

## 2023-08-09 RX ORDER — SODIUM CHLORIDE 9 MG/ML
40 INJECTION, SOLUTION INTRAVENOUS AS NEEDED
Status: DISCONTINUED | OUTPATIENT
Start: 2023-08-09 | End: 2023-08-09 | Stop reason: HOSPADM

## 2023-08-09 RX ORDER — ACETAMINOPHEN 500 MG
1000 TABLET ORAL ONCE
Status: COMPLETED | OUTPATIENT
Start: 2023-08-09 | End: 2023-08-09

## 2023-08-09 RX ORDER — ROCURONIUM BROMIDE 10 MG/ML
INJECTION, SOLUTION INTRAVENOUS AS NEEDED
Status: DISCONTINUED | OUTPATIENT
Start: 2023-08-09 | End: 2023-08-09 | Stop reason: SURG

## 2023-08-09 RX ORDER — PROPOFOL 10 MG/ML
VIAL (ML) INTRAVENOUS AS NEEDED
Status: DISCONTINUED | OUTPATIENT
Start: 2023-08-09 | End: 2023-08-09 | Stop reason: SURG

## 2023-08-09 RX ORDER — FENTANYL CITRATE 50 UG/ML
25 INJECTION, SOLUTION INTRAMUSCULAR; INTRAVENOUS
Status: DISCONTINUED | OUTPATIENT
Start: 2023-08-09 | End: 2023-08-09 | Stop reason: HOSPADM

## 2023-08-09 RX ORDER — MAGNESIUM HYDROXIDE 1200 MG/15ML
LIQUID ORAL AS NEEDED
Status: DISCONTINUED | OUTPATIENT
Start: 2023-08-09 | End: 2023-08-09 | Stop reason: HOSPADM

## 2023-08-09 RX ORDER — GABAPENTIN 400 MG/1
800 CAPSULE ORAL 2 TIMES DAILY
Start: 2023-08-09

## 2023-08-09 RX ORDER — FLUMAZENIL 0.1 MG/ML
0.2 INJECTION INTRAVENOUS AS NEEDED
Status: DISCONTINUED | OUTPATIENT
Start: 2023-08-09 | End: 2023-08-09 | Stop reason: HOSPADM

## 2023-08-09 RX ADMIN — ROCURONIUM BROMIDE 40 MG: 10 SOLUTION INTRAVENOUS at 14:35

## 2023-08-09 RX ADMIN — ACETAMINOPHEN 1000 MG: 500 TABLET, FILM COATED ORAL at 13:39

## 2023-08-09 RX ADMIN — SUGAMMADEX 200 MG: 100 INJECTION, SOLUTION INTRAVENOUS at 15:15

## 2023-08-09 RX ADMIN — CEFAZOLIN 2 G: 2 INJECTION, POWDER, FOR SOLUTION INTRAMUSCULAR; INTRAVENOUS at 14:40

## 2023-08-09 RX ADMIN — PROPOFOL 150 MG: 10 INJECTION, EMULSION INTRAVENOUS at 14:34

## 2023-08-09 RX ADMIN — DEXAMETHASONE SODIUM PHOSPHATE 4 MG: 4 INJECTION, SOLUTION INTRA-ARTICULAR; INTRALESIONAL; INTRAMUSCULAR; INTRAVENOUS; SOFT TISSUE at 14:40

## 2023-08-09 RX ADMIN — ONDANSETRON 4 MG: 2 INJECTION INTRAMUSCULAR; INTRAVENOUS at 15:06

## 2023-08-09 RX ADMIN — SODIUM CHLORIDE, POTASSIUM CHLORIDE, SODIUM LACTATE AND CALCIUM CHLORIDE 1000 ML: 600; 310; 30; 20 INJECTION, SOLUTION INTRAVENOUS at 10:58

## 2023-08-09 RX ADMIN — FENTANYL CITRATE 100 MCG: 0.05 INJECTION, SOLUTION INTRAMUSCULAR; INTRAVENOUS at 14:34

## 2023-08-09 RX ADMIN — LIDOCAINE HYDROCHLORIDE 100 MG: 20 INJECTION, SOLUTION EPIDURAL; INFILTRATION; INTRACAUDAL; PERINEURAL at 14:34

## 2023-08-09 NOTE — OP NOTE
Procedure Note  Preop Diagnosis: Left carpal tunnel syndrome [G56.02]    Post-Op Diagnosis Codes:     * Left carpal tunnel syndrome [G56.02]     Procedure Name:leftCarpal Tunnel Release    Indications:  A EMG revealed findings of carpal tunnel syndrome of the left hand. The patient now presents for carpal tunnel release of the left hand after discussing therapeutic alternatives.          Surgeon: Luis Perdomo MD     Assistants: none    Anesthesia: General endotracheal anesthesia    ASA Class: 3    Procedure Details   After obtaining informed consent, having the risks and benefits of the procedure explained including but not limited to  infection, bleeding, damage to the median nerve Causing permanent pain numbness tingling and weakness in the right hand, stroke, coma, and death.  The patient was brought to the operating.  The patient was given general anesthesia via an LMA.  The left hand was placed on a hand table.  A preplanned incision in the palm was marked with indelible marker.  The patient was then prepped and draped in standard sterile fashion.  The preplanned incision was infiltrated Marcaine and epinephrine.  A 10 blade scalpel was used to make the incision through the dermis epidermis.  Tenotomy scissor was then used to bluntly and sharply dissect through the subcutaneous tissues and the palmaris fascia.  A small self-retaining retractor was placed into the wound.  The transverse carpal ligament was identified it was incised using a 15 blade scalpel.  Once the median nerve was identified it was protected using a Penfield 4 and the remainder of the transverse carpal ligament was released proximally and distally using tenotomy scissors.  Once we were satisfied that we had adequately decompressed the nerve  the wound was copiously irrigated with antibiotic solution.  It was inspected for hemostasis.  The subcutaneous tissues were closed using a series of inverted interrupted 4-0 Vicryl sutures and the  skin was closed using interrupted 5-0 nylon.  All sponge needle and instrument counts were correct at the end of the procedure.  The patient was extubated in stable condition returned to recovery with about 3 cc of blood loss        Findings:  left carpal tunnel syndrome        Estimated Blood Loss:   3           Drains: none           Total IV Fluids: ml           Specimens: None           Implants: * No implants in log *           Complications:  none           Disposition: PACU - hemodynamically stable.           Condition: stable        Luis Perdomo MD

## 2023-08-09 NOTE — ANESTHESIA PREPROCEDURE EVALUATION
Anesthesia Evaluation     Patient summary reviewed   no history of anesthetic complications:   NPO Solid Status: > 8 hours  NPO Liquid Status: > 2 hours           Airway   Mallampati: II  TM distance: >3 FB  Neck ROM: full  No difficulty expected  Dental - normal exam     Pulmonary    (+) a smoker (quit 2011) Former, COPD,sleep apnea on CPAP  Cardiovascular   Exercise tolerance: good (4-7 METS)    ECG reviewed    (+) hypertensionhyperlipidemia  (-) past MI, CAD, angina, CHF, cardiac stents      Neuro/Psych  (-) seizures, TIA, CVA  GI/Hepatic/Renal/Endo    (+) obesity, GERD  (-) liver disease, no renal disease, diabetes    Musculoskeletal     Abdominal   (+) obese   Substance History      OB/GYN          Other   arthritis,                     Anesthesia Plan    ASA 2     general     intravenous induction     Anesthetic plan, risks, benefits, and alternatives have been provided, discussed and informed consent has been obtained with: patient.

## 2023-08-09 NOTE — H&P
Chief complaint:        Chief Complaint   Patient presents with    Hand Pain       Pt here for f/u/e. Pt states his L hand numbness and tingling. Pt states he has not had any physical therapy, pain mgmt or seen a chiropractor.            Subjective         HPI: This is a 77-year-old male gentleman who we have seen in the past for carpal tunnel syndrome.  He did have a right carpal tunnel release done in September 2021 with good results.  He comes back in today and says that his left hand has been bothering him more.  He says that it was bothering him at the time of his surgery on the right but it was not bad enough that he wanted any surgical intervention done.  He says now it is bothering him more whenever he is holding his phone or whenever he is driving.  It can wake him up from sleeping at night.  He does complain of pain and numbness and tingling in his hand is prominent in the first 3 digits but it can go into the entire hand.  He denies any neck pain or any pain radiating into his arms.     Review of Systems   Musculoskeletal:  Positive for arthralgias and myalgias. Negative for neck pain.   Neurological:  Positive for numbness. Negative for weakness.   All other systems reviewed and are negative.      Medical History        Past Medical History:   Diagnosis Date    Arthritis      BPH with obstruction/lower urinary tract symptoms      CAD (coronary artery disease)      Chronic back pain      Class 1 obesity due to excess calories with serious comorbidity and body mass index (BMI) of 32.0 to 32.9 in adult 01/20/2020    COPD (chronic obstructive pulmonary disease)      Coronary artery disease involving autologous vein coronary bypass graft without angina pectoris 01/20/2020    Elevated cholesterol      Environmental allergies 9/21/2022    Essential hypertension 10/14/2019    GERD (gastroesophageal reflux disease)      Hyperlipidemia      Hypertension      Left bundle branch block      Lung infiltrate 12/20/2022      Right upper lobe, [facility], 2022    Obstructive sleep apnea 2020     pt uses a cpap machine at home    Sleep apnea       CPAP         Surgical History         Past Surgical History:   Procedure Laterality Date    CARPAL TUNNEL RELEASE Right 2021     Procedure: CARPAL TUNNEL RELEASE right;  Surgeon: Luis Perdomo MD;  Location:  [facility];  Service: Neurosurgery;  Laterality: Right;    COLONOSCOPY        COLONOSCOPY N/A 2022     Procedure: COLONOSCOPY WITH ANESTHESIA;  Surgeon: Cortes Ribera DO;  Location:  [facility];  Service: Gastroenterology;  Laterality: N/A;  preop; hx of poylps   postop; polyps   PCP Katerine Mcclain     KNEE ARTHROPLASTY Right      KNEE MENISCAL REPAIR Left 2018    PENILE PROSTHESIS IMPLANT N/A 2022     Procedure: MALLEABLE PENILE PROSTHESIS PLACEMENT;  Surgeon: Ignacio Moon MD;  Location:  [facility] OR;  Service: Urology;  Laterality: N/A;    ROTATOR CUFF REPAIR Right 2000    SPINE SURGERY   1981     lower back ruptured disc    TOTAL SHOULDER ARTHROPLASTY W/ DISTAL CLAVICLE EXCISION Right 2020     Procedure: RIGHT REVERSE TOTAL SHOULDER  ARTHROPLASTY;  Surgeon: Suman Ventura MD;  Location:  [facility];  Service: Orthopedics               Family History   Problem Relation Age of Onset    Arthritis Mother      Diabetes Mother      Osteoporosis Mother      Hypertension Mother      Kidney disease Mother      Arthritis Father      Hypertension Father      Stroke Father      Diabetes Sister      Obesity Sister      Cancer Brother      Colon cancer Neg Hx      Colon polyps Neg Hx        Social History            Tobacco Use    Smoking status: Former       Packs/day: 1.00       Years: 50.00       Pack years: 50.00       Types: Pipe, Cigarettes       Quit date:        Years since quittin.4       Passive exposure: Past    Smokeless tobacco: Never   Vaping Use    Vaping Use: Never used   Substance Use Topics    Alcohol use: Yes     "   Alcohol/week: 5.0 standard drinks       Types: 5 Cans of beer per week       Comment: daily    Drug use: No        Prescriptions Prior to Admission   (Not in a hospital admission)      Allergies:  Patient has no known allergies.           Objective          Vital Signs  Ht 167.6 cm (66\")   Wt 89.4 kg (197 lb)   BMI 31.80 kg/mý      Physical Exam  Constitutional:       Appearance: Normal appearance. He is well-developed.   HENT:      Head: Normocephalic.   Eyes:      General: Lids are normal.      Extraocular Movements: EOM normal.      Conjunctiva/sclera: Conjunctivae normal.      Pupils: Pupils are equal, round, and reactive to light.   Pulmonary:      Effort: Pulmonary effort is normal.      Breath sounds: Normal breath sounds.   Musculoskeletal:         General: Normal range of motion.      Cervical back: Normal range of motion.   Skin:     General: Skin is warm.   Neurological:      Mental Status: He is alert and oriented to person, place, and time.      GCS: GCS eye subscore is 4. GCS verbal subscore is 5. GCS motor subscore is 6.      Cranial Nerves: No cranial nerve deficit.      Sensory: No sensory deficit.      Motor: Motor strength is normal.      Gait: Gait is intact. Gait normal.      Deep Tendon Reflexes: Reflexes are normal and symmetric. Reflexes normal.   Psychiatric:         Speech: Speech normal.         Behavior: Behavior normal.         Thought Content: Thought content normal.         Neurologic Exam      Mental Status   Oriented to person, place, and time.   Attention: normal. Concentration: normal.   Speech: speech is normal   Level of consciousness: alert  Normal comprehension.      Cranial Nerves      CN II   Visual fields full to confrontation.      CN III, IV, VI   Pupils are equal, round, and reactive to light.  Extraocular motions are normal.      CN V   Facial sensation intact.      CN VII   Facial expression full, symmetric.      CN VIII   CN VIII normal.      CN IX, X   CN IX " normal.   CN X normal.      CN XI   CN XI normal.      CN XII   CN XII normal.      Motor Exam   Muscle bulk: normal     Strength   Strength 5/5 throughout.      Sensory Exam   Light touch normal.      Gait, Coordination, and Reflexes      Gait  Gait: normal     Reflexes   Reflexes 2+ except as noted.      Imaging review: EMG/NCS of the bilateral upper extremities that was done in 2021 shows bilateral carpal tunnel syndrome with the right being moderate to severe in the left being moderate           Assessment/Plan: Patient does have carpal tunnel symptoms on the left side and it is consistent with the nerve conduction study.  Dr. Perdomo did review the test results and did come in and discussed this with the patient.  Is felt the patient would benefit by going to the operating room for a left carpal tunnel release.  Dr. Perdomo to go over the risk and benefits of the procedure with the patient.  His questions and concerns were addressed.  Please see his addendum on the patient.  We will have the patient cleared by his primary care doctor.  We will have him go for preoperative lab work clearance.  His questions and concerns were addressed  Patient is a nonsmoker

## 2023-08-09 NOTE — ANESTHESIA POSTPROCEDURE EVALUATION
Patient: Justin Robledo    Procedure Summary       Date: 08/09/23 Room / Location:  [facility]  /  [facility]    Anesthesia Start: 1430 Anesthesia Stop: 1526    Procedure: CARPAL TUNNEL RELEASE Left (Left: Wrist) Diagnosis:       Left carpal tunnel syndrome      (Left carpal tunnel syndrome [G56.02])    Surgeons: Luis Perdomo MD Provider: Aayush Gilmore CRNA    Anesthesia Type: general ASA Status: 2            Anesthesia Type: general    Vitals  Vitals Value Taken Time   /75 08/09/23 1537   Temp 97.4 øF (36.3 øC) 08/09/23 1523   Pulse 68 08/09/23 1538   Resp 13 08/09/23 1535   SpO2 89 % 08/09/23 1538   Vitals shown include unvalidated device data.        Post Anesthesia Care and Evaluation    Patient location during evaluation: PACU  Patient participation: complete - patient participated  Level of consciousness: awake and alert  Pain management: adequate    Airway patency: patent  Anesthetic complications: No anesthetic complications  PONV Status: none  Cardiovascular status: acceptable and hemodynamically stable  Respiratory status: acceptable  Hydration status: acceptable    Comments: Blood pressure 148/72, pulse 62, temperature 97.4 øF (36.3 øC), temperature source Temporal, resp. rate 16, SpO2 96 %.    Patient discharged from PACU based upon Ramya score. Please see RN notes for further details

## 2023-08-09 NOTE — ANESTHESIA PROCEDURE NOTES
Airway  Urgency: elective    Date/Time: 8/9/2023 2:37 PM  Airway not difficult    General Information and Staff    Patient location during procedure: OR  CRNA/CAA: Aayush Gilmore CRNA    Indications and Patient Condition  Indications for airway management: airway protection    Preoxygenated: yes  MILS maintained throughout  Mask difficulty assessment: 2 - vent by mask + OA or adjuvant +/- NMBA    Final Airway Details  Final airway type: endotracheal airway      Successful airway: ETT  Cuffed: yes   Successful intubation technique: direct laryngoscopy  Facilitating devices/methods: intubating stylet  Endotracheal tube insertion site: oral  Blade: Alicea  Blade size: 3  ETT size (mm): 7.5  Cormack-Lehane Classification: grade I - full view of glottis  Placement verified by: chest auscultation and capnometry   Measured from: teeth  ETT/EBT  to teeth (cm): 21  Number of attempts at approach: 1  Assessment: lips, teeth, and gum same as pre-op and atraumatic intubation    Additional Comments  Intubated by Eunice EDWARD

## 2023-08-10 DIAGNOSIS — G62.9 PERIPHERAL POLYNEUROPATHY: ICD-10-CM

## 2023-08-23 ENCOUNTER — OFFICE VISIT (OUTPATIENT)
Dept: NEUROSURGERY | Facility: CLINIC | Age: 78
End: 2023-08-23
Payer: COMMERCIAL

## 2023-08-23 VITALS — BODY MASS INDEX: 31.5 KG/M2 | WEIGHT: 196 LBS | HEIGHT: 66 IN

## 2023-08-23 DIAGNOSIS — G56.02 LEFT CARPAL TUNNEL SYNDROME: Primary | ICD-10-CM

## 2023-08-23 DIAGNOSIS — Z87.891 FORMER SMOKER: ICD-10-CM

## 2023-08-23 DIAGNOSIS — E66.09 CLASS 1 OBESITY DUE TO EXCESS CALORIES WITH SERIOUS COMORBIDITY AND BODY MASS INDEX (BMI) OF 31.0 TO 31.9 IN ADULT: ICD-10-CM

## 2023-08-23 PROCEDURE — 99024 POSTOP FOLLOW-UP VISIT: CPT | Performed by: NURSE PRACTITIONER

## 2023-08-23 RX ORDER — EPINEPHRINE 0.3 MG/.3ML
INJECTION SUBCUTANEOUS
COMMUNITY
Start: 2023-07-28

## 2023-08-23 RX ORDER — PREDNISOLONE ACETATE 10 MG/ML
SUSPENSION/ DROPS OPHTHALMIC
COMMUNITY
Start: 2023-08-14

## 2023-08-23 NOTE — PATIENT INSTRUCTIONS
Advance Care Planning and Advance Directives     You make decisions on a daily basis - decisions about where you want to live, your career, your home, your life. Perhaps one of the most important decisions you face is your choice for future medical care. Take time to talk with your family and your healthcare team and start planning today.  Advance Care Planning is a process that can help you:  Understand possible future healthcare decisions in light of your own experiences  Reflect on those decision in light of your goals and values  Discuss your decisions with those closest to you and the healthcare professionals that care for you  Make a plan by creating a document that reflects your wishes    Surrogate Decision Maker  In the event of a medical emergency, which has left you unable to communicate or to make your own decisions, you would need someone to make decisions for you.  It is important to discuss your preferences for medical treatment with this person while you are in good health.     Qualities of a surrogate decision maker:  Willing to take on this role and responsibility  Knows what you want for future medical care  Willing to follow your wishes even if they don't agree with them  Able to make difficult medical decisions under stressful circumstances    Advance Directives  These are legal documents you can create that will guide your healthcare team and decision maker(s) when needed. These documents can be stored in the electronic medical record.    Living Will - a legal document to guide your care if you have a terminal condition or a serious illness and are unable to communicate. States vary by statute in document names/types, but most forms may include one or more of the following:        -  Directions regarding life-prolonging treatments        -  Directions regarding artificially provided nutrition/hydration        -  Choosing a healthcare decision maker        -  Direction regarding organ/tissue  donation    Durable Power of  for Healthcare - this document names an -in-fact to make medical decisions for you, but it may also allow this person to make personal and financial decisions for you. Please seek the advice of an  if you need this type of document.    **Advance Directives are not required and no one may discriminate against you if you do not sign one.    Medical Orders  Many states allow specific forms/orders signed by your physician to record your wishes for medical treatment in your current state of health. This form, signed in personal communication with your physician, addresses resuscitation and other medical interventions that you may or may not want.      For more information or to schedule a time with a [facility] Advance Care Planning Facilitator contact: [facility] or call [phone] and someone will contact you directly.BMI for Adults  What is BMI?  Body mass index (BMI) is a number that is calculated from a person's weight and height. BMI can help estimate how much of a person's weight is composed of fat. BMI does not measure body fat directly. Rather, it is an alternative to procedures that directly measure body fat, which can be difficult and expensive.  BMI can help identify people who may be at higher risk for certain medical problems.  What are BMI measurements used for?  BMI is used as a screening tool to identify possible weight problems. It helps determine whether a person is obese, overweight, a healthy weight, or underweight.  BMI is useful for:  Identifying a weight problem that may be related to a medical condition or may increase the risk for medical problems.  Promoting changes, such as changes in diet and exercise, to help reach a healthy weight. BMI screening can be repeated to see if these changes are working.  How is BMI calculated?  BMI involves measuring your weight in relation to your height. Both height and weight are measured,  "and the BMI is calculated from those numbers. This can be done either in English ([place] or metric measurements. Note that charts and online BMI calculators are available to help you find your BMI quickly and easily without having to do these calculations yourself.  To calculate your BMI in English ([place] measurements:    Measure your weight in pounds (lb).  Multiply the number of pounds by 703.  For example, for a person who weighs 180 lb, multiply that number by 703, which equals 126,540.  Measure your height in inches. Then multiply that number by itself to get a measurement called \"inches squared.\"  For example, for a person who is 70 inches tall, the \"inches squared\" measurement is 70 inches x 70 inches, which equals 4,900 inches squared.  Divide the total from step 2 (number of lb x 703) by the total from step 3 (inches squared): 126,540 ö 4,900 = 25.8. This is your BMI.    To calculate your BMI in metric measurements:  Measure your weight in kilograms (kg).  Measure your height in meters (m). Then multiply that number by itself to get a measurement called \"meters squared.\"  For example, for a person who is 1.75 m tall, the \"meters squared\" measurement is 1.75 m x 1.75 m, which is equal to 3.1 meters squared.  Divide the number of kilograms (your weight) by the meters squared number. In this example: 70 ö 3.1 = 22.6. This is your BMI.  What do the results mean?  BMI charts are used to identify whether you are underweight, normal weight, overweight, or obese. The following guidelines will be used:  Underweight: BMI less than 18.5.  Normal weight: BMI between 18.5 and 24.9.  Overweight: BMI between 25 and 29.9.  Obese: BMI of 30 or above.  Keep these notes in mind:  Weight includes both fat and muscle, so someone with a muscular build, such as an athlete, may have a BMI that is higher than 24.9. In cases like these, BMI is not an accurate measure of body fat.  To determine if excess body fat is the cause of a BMI " "of 25 or higher, further assessments may need to be done by a health care provider.  BMI is usually interpreted in the same way for men and women.  Where to find more information  For more information about BMI, including tools to quickly calculate your BMI, go to these websites:  Centers for Disease Control and Prevention: www.cdc.gov  American Heart Association: www.heart.org  National Heart, Lung, and Blood [place]: www.nhlbi.nih.gov  Summary  Body mass index (BMI) is a number that is calculated from a person's weight and height.  BMI may help estimate how much of a person's weight is composed of fat. BMI can help identify those who may be at higher risk for certain medical problems.  BMI can be measured using English measurements or metric measurements.  BMI charts are used to identify whether you are underweight, normal weight, overweight, or obese.  This information is not intended to replace advice given to you by your health care provider. Make sure you discuss any questions you have with your health care provider.  Document Revised: 09/09/2020 Document Reviewed: 07/17/2020  [employer] Patient Education c 2021 [employer]. https://www.nhlbi.nih.gov/files/docs/public/heart/dash_brief.pdf\">   DASH Eating Plan  DASH stands for Dietary Approaches to Stop Hypertension. The DASH eating plan is a healthy eating plan that has been shown to:  Reduce high blood pressure (hypertension).  Reduce your risk for type 2 diabetes, heart disease, and stroke.  Help with weight loss.  What are tips for following this plan?  Reading food labels  Check food labels for the amount of salt (sodium) per serving. Choose foods with less than 5 percent of the Daily Value of sodium. Generally, foods with less than 300 milligrams (mg) of sodium per serving fit into this eating plan.  To find whole grains, look for the word \"whole\" as the first word in the ingredient list.  Shopping  Buy products labeled as \"low-sodium\" or \"no salt " "added.\"  Buy fresh foods. Avoid canned foods and pre-made or frozen meals.  Cooking  Avoid adding salt when cooking. Use salt-free seasonings or herbs instead of table salt or sea salt. Check with your health care provider or pharmacist before using salt substitutes.  Do not clemente foods. Cook foods using healthy methods such as baking, boiling, grilling, roasting, and broiling instead.  Cook with heart-healthy oils, such as olive, canola, avocado, soybean, or sunflower oil.  Meal planning    Eat a balanced diet that includes:  4 or more servings of fruits and 4 or more servings of vegetables each day. Try to fill one-half of your plate with fruits and vegetables.  6-8 servings of whole grains each day.  Less than 6 oz (170 g) of lean meat, poultry, or fish each day. A 3-oz (85-g) serving of meat is about the same size as a deck of cards. One egg equals 1 oz (28 g).  2-3 servings of low-fat dairy each day. One serving is 1 cup (237 mL).  1 serving of nuts, seeds, or beans 5 times each week.  2-3 servings of heart-healthy fats. Healthy fats called omega-3 fatty acids are found in foods such as walnuts, flaxseeds, fortified milks, and eggs. These fats are also found in cold-water fish, such as sardines, salmon, and mackerel.  Limit how much you eat of:  Canned or prepackaged foods.  Food that is high in trans fat, such as some fried foods.  Food that is high in saturated fat, such as fatty meat.  Desserts and other sweets, sugary drinks, and other foods with added sugar.  Full-fat dairy products.  Do not salt foods before eating.  Do not eat more than 4 egg yolks a week.  Try to eat at least 2 vegetarian meals a week.  Eat more home-cooked food and less restaurant, buffet, and fast food.    Lifestyle  When eating at a restaurant, ask that your food be prepared with less salt or no salt, if possible.  If you drink alcohol:  Limit how much you use to:  0-1 drink a day for women who are not pregnant.  0-2 drinks a day for " men.  Be aware of how much alcohol is in your drink. In the [place] one drink equals one 12 oz bottle of beer (355 mL), one 5 oz glass of wine (148 mL), or one 1« oz glass of hard liquor (44 mL).  General information  Avoid eating more than 2,300 mg of salt a day. If you have hypertension, you may need to reduce your sodium intake to 1,500 mg a day.  Work with your health care provider to maintain a healthy body weight or to lose weight. Ask what an ideal weight is for you.  Get at least 30 minutes of exercise that causes your heart to beat faster (aerobic exercise) most days of the week. Activities may include walking, swimming, or biking.  Work with your health care provider or dietitian to adjust your eating plan to your individual calorie needs.  What foods should I eat?  Fruits  All fresh, dried, or frozen fruit. Canned fruit in natural juice (without added sugar).  Vegetables  Fresh or frozen vegetables (raw, steamed, roasted, or grilled). Low-sodium or reduced-sodium tomato and vegetable juice. Low-sodium or reduced-sodium tomato sauce and tomato paste. Low-sodium or reduced-sodium canned vegetables.  Grains  Whole-grain or whole-wheat bread. Whole-grain or whole-wheat pasta. Brown rice. Oatmeal. Quinoa. Bulgur. Whole-grain and low-sodium cereals. Karla bread. Low-fat, low-sodium crackers. Whole-wheat flour tortillas.  Meats and other proteins  Skinless chicken or turkey. Ground chicken or turkey. Pork with fat trimmed off. Fish and seafood. Egg whites. Dried beans, peas, or lentils. Unsalted nuts, nut butters, and seeds. Unsalted canned beans. Lean cuts of beef with fat trimmed off. Low-sodium, lean precooked or cured meat, such as sausages or meat loaves.  Dairy  Low-fat (1%) or fat-free (skim) milk. Reduced-fat, low-fat, or fat-free cheeses. Nonfat, low-sodium ricotta or cottage cheese. Low-fat or nonfat yogurt. Low-fat, low-sodium cheese.  Fats and oils  Soft margarine without trans fats. Vegetable oil.  Reduced-fat, low-fat, or light mayonnaise and salad dressings (reduced-sodium). Canola, safflower, olive, avocado, soybean, and sunflower oils. Avocado.  Seasonings and condiments  Herbs. Spices. Seasoning mixes without salt.  Other foods  Unsalted popcorn and pretzels. Fat-free sweets.  The items listed above may not be a complete list of foods and beverages you can eat. Contact a dietitian for more information.  What foods should I avoid?  Fruits  Canned fruit in a light or heavy syrup. Fried fruit. Fruit in cream or butter sauce.  Vegetables  Creamed or fried vegetables. Vegetables in a cheese sauce. Regular canned vegetables (not low-sodium or reduced-sodium). Regular canned tomato sauce and paste (not low-sodium or reduced-sodium). Regular tomato and vegetable juice (not low-sodium or reduced-sodium). Pickles. Olives.  Grains  Baked goods made with fat, such as croissants, muffins, or some breads. Dry pasta or rice meal packs.  Meats and other proteins  Fatty cuts of meat. Ribs. Fried meat. Agosto. Bologna, salami, and other precooked or cured meats, such as sausages or meat loaves. Fat from the back of a pig (fatback). Bratwurst. Salted nuts and seeds. Canned beans with added salt. Canned or smoked fish. Whole eggs or egg yolks. Chicken or turkey with skin.  Dairy  Whole or 2% milk, cream, and half-and-half. Whole or full-fat cream cheese. Whole-fat or sweetened yogurt. Full-fat cheese. Nondairy creamers. Whipped toppings. Processed cheese and cheese spreads.  Fats and oils  Butter. Stick margarine. Lard. Shortening. Ghee. Agosto fat. Tropical oils, such as coconut, palm kernel, or palm oil.  Seasonings and condiments  Onion salt, garlic salt, seasoned salt, table salt, and sea salt. Worcestershire sauce. Tartar sauce. Barbecue sauce. Teriyaki sauce. Soy sauce, including reduced-sodium. Steak sauce. Canned and packaged gravies. Fish sauce. Oyster sauce. Cocktail sauce. Store-bought horseradish. Ketchup. Mustard.  Meat flavorings and tenderizers. Bouillon cubes. Hot sauces. Pre-made or packaged marinades. Pre-made or packaged taco seasonings. Relishes. Regular salad dressings.  Other foods  Salted popcorn and pretzels.  The items listed above may not be a complete list of foods and beverages you should avoid. Contact a dietitian for more information.  Where to find more information  National Heart, Lung, and Blood [place]: www.nhlbi.nih.gov  American Heart Association: www.heart.org  Academy of Nutrition and Dietetics: www.eatright.org  National Kidney Foundation: www.kidney.org  Summary  The DASH eating plan is a healthy eating plan that has been shown to reduce high blood pressure (hypertension). It may also reduce your risk for type 2 diabetes, heart disease, and stroke.  When on the DASH eating plan, aim to eat more fresh fruits and vegetables, whole grains, lean proteins, low-fat dairy, and heart-healthy fats.  With the DASH eating plan, you should limit salt (sodium) intake to 2,300 mg a day. If you have hypertension, you may need to reduce your sodium intake to 1,500 mg a day.  Work with your health care provider or dietitian to adjust your eating plan to your individual calorie needs.  This information is not intended to replace advice given to you by your health care provider. Make sure you discuss any questions you have with your health care provider.  Document Revised: 11/20/2020 Document Reviewed: 11/20/2020  [employer] Patient Education c 2021 [employer] High-Protein and High-Calorie Diet  Eating high-protein and high-calorie foods can help you to gain weight, heal after an injury, and recover after an illness or surgery. The specific amount of daily protein and calories you need depends on:  Your body weight.  The reason this diet is recommended for you.  What is my plan?  Generally, a high-protein, high-calorie diet involves:  Eating 250-500 extra calories each day.  Making sure that you get enough of your  daily calories from protein. Ask your health care provider how many of your calories should come from protein.  Talk with a health care provider, such as a diet and nutrition specialist (dietitian), about how much protein and how many calories you need each day. Follow the diet as directed by your health care provider.  What are tips for following this plan?    Preparing meals  Add whole milk, half-and-half, or heavy cream to cereal, pudding, soup, or hot cocoa.  Add whole milk to instant breakfast drinks.  Add peanut butter to oatmeal or smoothies.  Add powdered milk to baked goods, smoothies, or milkshakes.  Add powdered milk, cream, or butter to mashed potatoes.  Add cheese to cooked vegetables.  Make whole-milk yogurt parfaits. Top them with granola, fruit, or nuts.  Add cottage cheese to your fruit.  Add avocado, cheese, or both to sandwiches or salads.  Add meat, poultry, or seafood to rice, pasta, casseroles, salads, and soups.  Use mayonnaise when making egg salad, chicken salad, or tuna salad.  Use peanut butter as a dip for vegetables or as a topping for pretzels, celery, or crackers.  Add beans to casseroles, dips, and spreads.  Add pureed beans to sauces and soups.  Replace calorie-free drinks with calorie-containing drinks, such as milk and fruit juice.  Replace water with milk or heavy cream when making foods such as oatmeal, pudding, or cocoa.  General instructions  Ask your health care provider if you should take a nutritional supplement.  Try to eat six small meals each day instead of three large meals.  Eat a balanced diet. In each meal, include one food that is high in protein.  Keep nutritious snacks available, such as nuts, trail mixes, dried fruit, and yogurt.  If you have kidney disease or diabetes, talk with your health care provider about how much protein is safe for you. Too much protein may put extra stress on your kidneys.  Drink your calories. Choose high-calorie drinks and have them  after your meals.  What high-protein foods should I eat?    Vegetables  Soybeans. Peas.  Grains  Quinoa. Bulgur wheat.  Meats and other proteins  Beef, pork, and poultry. Fish and seafood. Eggs. Tofu. Textured vegetable protein (TVP). Peanut butter. Nuts and seeds. Dried beans. Protein powders.  Dairy  Whole milk. Whole-milk yogurt. Powdered milk. Cheese. Cottage Cheese. Eggnog.  Beverages  High-protein supplement drinks. Soy milk.  Other foods  Protein bars.  The items listed above may not be a complete list of high-protein foods and beverages. Contact a dietitian for more options.  What high-calorie foods should I eat?  Fruits  Dried fruit. Fruit leather. Canned fruit in syrup. Fruit juice. Avocado.  Vegetables  Vegetables cooked in oil or butter. Fried potatoes.  Grains  Pasta. Quick breads. Muffins. Pancakes. Ready-to-eat cereal.  Meats and other proteins  Peanut butter. Nuts and seeds.  Dairy  Heavy cream. Whipped cream. Cream cheese. Sour cream. Ice cream. Custard. Pudding.  Beverages  Meal-replacement beverages. Nutrition shakes. Fruit juice. Sugar-sweetened soft drinks.  Seasonings and condiments  Salad dressing. Mayonnaise. Ashwin sauce. Fruit preserves or jelly. Honey. Syrup.  Sweets and desserts  Cake. Cookies. Pie. Pastries. Candy bars. Chocolate.  Fats and oils  Butter or margarine. Oil. Gravy.  Other foods  Meal-replacement bars.  The items listed above may not be a complete list of high-calorie foods and beverages. Contact a dietitian for more options.  Summary  A high-protein, high-calorie diet can help you gain weight or heal faster after an injury, illness, or surgery.  To increase your protein and calories, add ingredients such as whole milk, peanut butter, cheese, beans, meat, or seafood to meal items.  To get enough extra calories each day, include high-calorie foods and beverages at each meal.  Adding a high-calorie drink or shake can be an easy way to help you get enough calories each day.  Talk with your healthcare provider or dietitian about the best options for you.  This information is not intended to replace advice given to you by your health care provider. Make sure you discuss any questions you have with your health care provider.  Document Revised: 11/30/2018 Document Reviewed: 10/30/2018  [employer] Patient Education c 2021 [employer]

## 2023-08-23 NOTE — PROGRESS NOTES
"  Chief complaint:   Chief Complaint   Patient presents with    Left carpal tunnel syndrome     Pt here for 14day p/o f/u. Pt states since surgery his symptoms have improved.         Subjective     HPI: This is a 78 y.o. male patient who went to the operating room on 8/9/2023 for a left carpal tunnel release.  The patient is here in follow up today for postoperative visit.  The patient says that he is doing well.  He is not complaining of any pain in his hand.  He does have some soreness on the incision.  Denies any numbness or tingling in his fingers.  Overall feels like he is doing well from the surgery        Review of Systems   Musculoskeletal: Negative.    Neurological: Negative.        Objective      Vital Signs  Ht 167.6 cm (66\")   Wt 88.9 kg (196 lb)   BMI 31.64 kg/mý     Physical Exam  Constitutional:       Appearance: He is well-developed.   HENT:      Head: Normocephalic.   Eyes:      Extraocular Movements: EOM normal.      Pupils: Pupils are equal, round, and reactive to light.   Pulmonary:      Effort: Pulmonary effort is normal.   Musculoskeletal:         General: Normal range of motion.      Cervical back: Normal range of motion.   Skin:     General: Skin is warm.   Neurological:      Mental Status: He is alert and oriented to person, place, and time.      GCS: GCS eye subscore is 4. GCS verbal subscore is 5. GCS motor subscore is 6.      Cranial Nerves: No cranial nerve deficit.      Sensory: No sensory deficit.      Motor: Motor strength is normal.      Gait: Gait is intact. Gait normal.      Deep Tendon Reflexes: Reflexes are normal and symmetric.   Psychiatric:         Speech: Speech normal.         Behavior: Behavior normal.         Thought Content: Thought content normal.     Incisions clean dry and intact    Neurologic Exam     Mental Status   Oriented to person, place, and time.   Attention: normal. Concentration: normal.   Speech: speech is normal   Level of consciousness: alert  Normal " comprehension.     Cranial Nerves     CN II   Visual fields full to confrontation.     CN III, IV, VI   Pupils are equal, round, and reactive to light.  Extraocular motions are normal.     CN V   Facial sensation intact.     CN VII   Facial expression full, symmetric.     CN VIII   CN VIII normal.     CN IX, X   CN IX normal.   CN X normal.     CN XI   CN XI normal.     CN XII   CN XII normal.     Motor Exam   Muscle bulk: normal    Strength   Strength 5/5 throughout.     Sensory Exam   Light touch normal.     Gait, Coordination, and Reflexes     Gait  Gait: normal    Reflexes   Reflexes 2+ except as noted.     Imaging review: No new imaging          Assessment/Plan: Patient is doing good from a surgery standpoint at this time.  He denies any pain or numbness and tingling.  I did remove the sutures.  He was told to keep his activities limited for the next 2 weeks.  His questions or concerns were addressed.  We only need to see him on an as-needed basis.    Patient is a nonsmoker  The patient's Body mass index is 31.64 kg/mý.. BMI is above normal parameters. Recommendations include: educational material and nutrition counseling    Diagnoses and all orders for this visit:    1. Left carpal tunnel syndrome (Primary)    2. Class 1 obesity due to excess calories with serious comorbidity and body mass index (BMI) of 31.0 to 31.9 in adult    3. Former smoker        I discussed the patients findings and my recommendations with patient  Ozzy Naidu, SALONI  08/23/23  14:50 CDT

## 2023-09-05 DIAGNOSIS — E78.5 HYPERLIPIDEMIA, UNSPECIFIED HYPERLIPIDEMIA TYPE: ICD-10-CM

## 2023-09-05 RX ORDER — SIMVASTATIN 40 MG
TABLET ORAL
Qty: 90 TABLET | Refills: 1 | Status: SHIPPED | OUTPATIENT
Start: 2023-09-05

## 2023-09-05 NOTE — TELEPHONE ENCOUNTER
Rx Refill Note  Requested Prescriptions     Pending Prescriptions Disp Refills    simvastatin (ZOCOR) 40 MG tablet [Pharmacy Med Name: simvastatin 40 mg tablet] 90 tablet 1     Sig: TAKE ONE TABLET BY MOUTH NIGHTLY      Last office visit with prescribing clinician: 7/28/2023   Last telemedicine visit with prescribing clinician: Visit date not found   Next office visit with prescribing clinician: Visit date not found                         Would you like a call back once the refill request has been completed: [] Yes [] No    If the office needs to give you a call back, can they leave a voicemail: [] Yes [] No    Jessie Paige MA  09/05/23, 09:10 CDT

## 2023-09-13 ENCOUNTER — OFFICE VISIT (OUTPATIENT)
Dept: INTERNAL MEDICINE | Facility: CLINIC | Age: 78
End: 2023-09-13
Payer: COMMERCIAL

## 2023-09-13 VITALS
BODY MASS INDEX: 31.82 KG/M2 | SYSTOLIC BLOOD PRESSURE: 173 MMHG | DIASTOLIC BLOOD PRESSURE: 78 MMHG | OXYGEN SATURATION: 95 % | HEART RATE: 71 BPM | RESPIRATION RATE: 16 BRPM | TEMPERATURE: 98.6 F | HEIGHT: 66 IN | WEIGHT: 198 LBS

## 2023-09-13 DIAGNOSIS — J44.9 COPD, GROUP B, BY GOLD 2017 CLASSIFICATION: Primary | ICD-10-CM

## 2023-09-13 DIAGNOSIS — R05.2 SUBACUTE COUGH: ICD-10-CM

## 2023-09-13 RX ORDER — METHYLPREDNISOLONE 4 MG/1
TABLET ORAL
Qty: 21 TABLET | Refills: 0 | Status: SHIPPED | OUTPATIENT
Start: 2023-09-13

## 2023-09-13 NOTE — PROGRESS NOTES
Chief Complaint  Cough (3 weeks) and Nasal Congestion    Subjective        Justin Robledo presents to [facility] PRIMARY CARE  Cough  Patient is a 78-year-old male who presents today with complaints of cough and sinus congestion. Patient noticed his cough increasing 3 to 4 weeks ago. Had carpal tunnel procedure completed 8/9/23 and reports he did use the incentive spirometer for about a week following. Cough has been productive of sputum. Cough has not been worsening but not improving. Has not tried any treatments in addition to his usual Symbicort and Spiriva. He has used his emergency albuterol inhaler a few times with decrease in coughing short term. Has a follow up with pulmonology 10/23/23 and was not able to get in sooner.     Past Medical History:   Diagnosis Date    Arthritis     BPH with obstruction/lower urinary tract symptoms     Chronic back pain     Class 1 obesity due to excess calories with serious comorbidity and body mass index (BMI) of 32.0 to 32.9 in adult 01/20/2020    COPD (chronic obstructive pulmonary disease)     Elevated cholesterol     Environmental allergies 09/21/2022    Essential hypertension 10/14/2019    GERD (gastroesophageal reflux disease)     Hyperlipidemia     Hypertension     Left bundle branch block     Lung infiltrate 12/20/2022    Right upper lobe, CT [facility], October 2022    Obstructive sleep apnea 01/20/2020    pt uses a cpap machine at home    Sleep apnea     CPAP     Past Surgical History:   Procedure Laterality Date    CARPAL TUNNEL RELEASE Right 09/29/2021    Procedure: CARPAL TUNNEL RELEASE right;  Surgeon: Luis Perdomo MD;  Location: [facility] OR;  Service: Neurosurgery;  Laterality: Right;    CARPAL TUNNEL RELEASE Left 8/9/2023    Procedure: CARPAL TUNNEL RELEASE Left;  Surgeon: Luis Perdomo MD;  Location: [facility] OR;  Service: Neurosurgery;  Laterality: Left;    COLONOSCOPY      COLONOSCOPY N/A 04/13/2022    Procedure: COLONOSCOPY WITH  "ANESTHESIA;  Surgeon: Cortes Ribera DO;  Location:  [facility] ENDOSCOPY;  Service: Gastroenterology;  Laterality: N/A;  preop; hx of poylps   postop; polyps   PCP Katerine Mcclain     KNEE ARTHROPLASTY Right     KNEE MENISCAL REPAIR Left 2018    PENILE PROSTHESIS IMPLANT N/A 2022    Procedure: MALLEABLE PENILE PROSTHESIS PLACEMENT;  Surgeon: Ignacio Moon MD;  Location:  [facility] OR;  Service: Urology;  Laterality: N/A;    ROTATOR CUFF REPAIR Right     SPINE SURGERY  1981    lower back ruptured disc    TOTAL SHOULDER ARTHROPLASTY W/ DISTAL CLAVICLE EXCISION Right 2020    Procedure: RIGHT REVERSE TOTAL SHOULDER  ARTHROPLASTY;  Surgeon: Suman Ventura MD;  Location:  [facility] OR;  Service: Orthopedics     Social History     Socioeconomic History    Marital status:    Tobacco Use    Smoking status: Former     Packs/day: 1.00     Years: 50.00     Pack years: 50.00     Types: Pipe, Cigarettes     Quit date:      Years since quittin.     Passive exposure: Past    Smokeless tobacco: Never   Vaping Use    Vaping Use: Never used   Substance and Sexual Activity    Alcohol use: Yes     Alcohol/week: 5.0 standard drinks     Types: 5 Cans of beer per week     Comment: daily    Drug use: No    Sexual activity: Defer     Family History   Problem Relation Age of Onset    Arthritis Mother     Diabetes Mother     Osteoporosis Mother     Hypertension Mother     Kidney disease Mother     Arthritis Father     Hypertension Father     Stroke Father     Diabetes Sister     Obesity Sister     Cancer Brother     Colon cancer Neg Hx     Colon polyps Neg Hx        Objective   Vital Signs:  /78 (BP Location: Left arm, Patient Position: Sitting, Cuff Size: Adult)   Pulse 71   Temp 98.6 °F (37 °C) (Infrared)   Resp 16   Ht 167.6 cm (65.98\")   Wt 89.8 kg (198 lb)   SpO2 95%   BMI 31.97 kg/m²   Estimated body mass index is 31.97 kg/m² as calculated from the following:    Height as of this " "encounter: 167.6 cm (65.98\").    Weight as of this encounter: 89.8 kg (198 lb).          Physical Exam  Vitals and nursing note reviewed.   Constitutional:       Appearance: Normal appearance.   HENT:      Head: Normocephalic.      Right Ear: Tympanic membrane normal.      Left Ear: Tympanic membrane normal.      Nose: Nose normal.      Mouth/Throat:      Mouth: Mucous membranes are moist.      Pharynx: Oropharynx is clear.   Eyes:      Pupils: Pupils are equal, round, and reactive to light.   Cardiovascular:      Rate and Rhythm: Normal rate and regular rhythm.      Pulses: Normal pulses.      Heart sounds: Normal heart sounds.   Pulmonary:      Effort: Pulmonary effort is normal. No respiratory distress.      Breath sounds: Normal breath sounds.   Abdominal:      General: Bowel sounds are normal.      Palpations: Abdomen is soft.   Musculoskeletal:         General: Normal range of motion.      Cervical back: Normal range of motion.   Skin:     General: Skin is warm and dry.      Capillary Refill: Capillary refill takes less than 2 seconds.   Neurological:      General: No focal deficit present.      Mental Status: He is alert.        Result Review :  The following data was reviewed by: SALONI Card on 09/13/2023:  Common labs          3/10/2023    07:35 7/27/2023    13:21   Common Labs   Glucose 86  107    BUN 10  12    Creatinine 0.86  0.77    Sodium 142  141    Potassium 4.0  4.1    Chloride 105  106    Calcium 9.4  9.4    Total Protein 6.5     Albumin 4.1  4.1    Total Bilirubin 0.3  0.3    Alkaline Phosphatase 41  49    AST (SGOT) 24  16    ALT (SGPT) 24  15    WBC 4.4  4.95    Hemoglobin 13.4  12.7    Hematocrit 40.0  39.8    Platelets 167  154    Total Cholesterol 163     Triglycerides 109     HDL Cholesterol 53     LDL Cholesterol  90     PSA 0.3                  Assessment and Plan   Diagnoses and all orders for this visit:    1. COPD, group B, by GOLD 2017 classification (Primary)  -     XR Chest " PA & Lateral (In Office)  -     methylPREDNISolone (MEDROL) 4 MG dose pack; Take as directed on package instructions.  Dispense: 21 tablet; Refill: 0    2. Subacute cough  -     XR Chest PA & Lateral (In Office)  -     methylPREDNISolone (MEDROL) 4 MG dose pack; Take as directed on package instructions.  Dispense: 21 tablet; Refill: 0      Will treat cough with medrol dose pack. Encouraged keeping follow up with Pulmonology for further evaluation and PFTs.          Follow Up   No follow-ups on file.  Patient was given instructions and counseling regarding his condition or for health maintenance advice. Please see specific information pulled into the AVS if appropriate.

## 2023-09-15 ENCOUNTER — TELEPHONE (OUTPATIENT)
Dept: NEUROSURGERY | Facility: CLINIC | Age: 78
End: 2023-09-15
Payer: COMMERCIAL

## 2023-09-15 RX ORDER — SULFAMETHOXAZOLE AND TRIMETHOPRIM 800; 160 MG/1; MG/1
1 TABLET ORAL 2 TIMES DAILY
Qty: 14 TABLET | Refills: 0 | Status: SHIPPED | OUTPATIENT
Start: 2023-09-15

## 2023-09-15 NOTE — TELEPHONE ENCOUNTER
Pt wife called stated pt had carpal tunnel surgery 4wks ago and today he has a spot on his hand that didn't heal fully and he has brownish puss coming out of it. Informed pt wife I will send Ozzy a message and get back with her soon as I hear from him. Ended call with pt understanding and acknowledgement.

## 2023-09-15 NOTE — TELEPHONE ENCOUNTER
Placed a call back to inform pt wife Ozzy has sent meds to pts pharmacy and pt needs to be seen Monday at 345p. Pt wife agreed with date and time.

## 2023-09-18 ENCOUNTER — OFFICE VISIT (OUTPATIENT)
Dept: NEUROSURGERY | Facility: CLINIC | Age: 78
End: 2023-09-18
Payer: COMMERCIAL

## 2023-09-18 VITALS — BODY MASS INDEX: 31.66 KG/M2 | HEIGHT: 66 IN | WEIGHT: 197 LBS

## 2023-09-18 DIAGNOSIS — Z87.891 FORMER SMOKER: ICD-10-CM

## 2023-09-18 DIAGNOSIS — G56.02 LEFT CARPAL TUNNEL SYNDROME: Primary | ICD-10-CM

## 2023-09-18 DIAGNOSIS — E66.09 CLASS 1 OBESITY DUE TO EXCESS CALORIES WITH SERIOUS COMORBIDITY AND BODY MASS INDEX (BMI) OF 31.0 TO 31.9 IN ADULT: ICD-10-CM

## 2023-09-18 PROCEDURE — 99024 POSTOP FOLLOW-UP VISIT: CPT | Performed by: NURSE PRACTITIONER

## 2023-09-19 NOTE — PROGRESS NOTES
"    Chief complaint:   Chief Complaint   Patient presents with    Left carpal tunnel syndrome     Pt here for p/o wound check.        Subjective     HPI: This is a 78-year-old male gentleman who went to the operating room on August 9, 2023 for a left carpal tunnel release.  The patient had been doing well.  His numbness and tingling has resolved.  He has been complaining of some tenderness around the incision.  He said that on Friday after 15th he noticed some drainage from the incision.  I did send Bactrim into the pharmacy for the patient and he has been taking it.  No further drainage has been noted.  There is a scabbed area over the lower part of the incision.  Again denies any numbness or tingling.  Some tenderness noted    Review of Systems   Musculoskeletal:  Positive for arthralgias and myalgias.   Neurological: Negative.        Objective      Vital Signs  Ht 167.6 cm (65.98\")   Wt 89.4 kg (197 lb)   BMI 31.82 kg/m²     Physical Exam  Constitutional:       Appearance: He is well-developed.   HENT:      Head: Normocephalic.   Eyes:      Extraocular Movements: EOM normal.      Pupils: Pupils are equal, round, and reactive to light.   Pulmonary:      Effort: Pulmonary effort is normal.   Musculoskeletal:         General: Normal range of motion.      Cervical back: Normal range of motion.   Skin:     General: Skin is warm.      Comments: Small scab area over the left hand incision   Neurological:      Mental Status: He is alert and oriented to person, place, and time.      GCS: GCS eye subscore is 4. GCS verbal subscore is 5. GCS motor subscore is 6.      Cranial Nerves: No cranial nerve deficit.      Sensory: No sensory deficit.      Motor: Motor strength is normal.      Gait: Gait is intact. Gait normal.      Deep Tendon Reflexes: Reflexes are normal and symmetric.   Psychiatric:         Speech: Speech normal.         Behavior: Behavior normal.         Thought Content: Thought content normal. "       Neurologic Exam     Mental Status   Oriented to person, place, and time.   Attention: normal. Concentration: normal.   Speech: speech is normal   Level of consciousness: alert  Normal comprehension.     Cranial Nerves     CN II   Visual fields full to confrontation.     CN III, IV, VI   Pupils are equal, round, and reactive to light.  Extraocular motions are normal.     CN V   Facial sensation intact.     CN VII   Facial expression full, symmetric.     CN VIII   CN VIII normal.     CN IX, X   CN IX normal.   CN X normal.     CN XI   CN XI normal.     CN XII   CN XII normal.     Motor Exam   Muscle bulk: normal    Strength   Strength 5/5 throughout.     Sensory Exam   Light touch normal.     Gait, Coordination, and Reflexes     Gait  Gait: normal    Reflexes   Reflexes 2+ except as noted.     Imaging review: no new imaging        Assessment/Plan: The incision does not show any drainage.  Continue with the Bactrim.  I will follow-up with the patient in 2 weeks.  He was told to call us if any further problems    Patient is a nonsmoker  The patient's Body mass index is 31.82 kg/m².. BMI is above normal parameters. Recommendations include: educational material and nutrition counseling    Diagnoses and all orders for this visit:    1. Left carpal tunnel syndrome (Primary)    2. Former smoker    3. Class 1 obesity due to excess calories with serious comorbidity and body mass index (BMI) of 31.0 to 31.9 in adult        I discussed the patients findings and my recommendations with patient  SALONI Reed  09/19/23  07:03 CDT        Answers submitted by the patient for this visit:  Primary Reason for Visit (Submitted on 9/18/2023)  What is the primary reason for your visit?: Other  Other (Submitted on 9/18/2023)  Please describe your symptoms.: P/O WOUND CHECK  Have you had these symptoms before?: No  How long have you been having these symptoms?: 5-7 days

## 2023-09-25 DIAGNOSIS — G56.02 LEFT CARPAL TUNNEL SYNDROME: ICD-10-CM

## 2023-09-25 DIAGNOSIS — M25.532 LEFT WRIST PAIN: Primary | ICD-10-CM

## 2023-09-25 PROBLEM — Z87.891 FORMER SMOKER: Status: RESOLVED | Noted: 2021-07-06 | Resolved: 2023-09-25

## 2023-09-25 PROBLEM — R91.8 LUNG INFILTRATE: Status: RESOLVED | Noted: 2022-12-20 | Resolved: 2023-09-25

## 2023-09-25 NOTE — PROGRESS NOTES
"Chief Complaint  Bronchiectasis without complication    Subjective    History of Present Illness {CC  Problem List  Visit Diagnosis   Encounters  Notes  Medications  Labs  Result Review Imaging  Media: 23}    Justin Robledo presents to [facility] for:    History of Present Illness  Management of his COPD, emphysema, bronchiectasis and recent pneumonia. He is obese. Most recent FEV1 81. He is a former smoker. He is a 1 pack/day smoker for 50 years. He quit in 2011.  Last CT of the chest April 2023 showing emphysema, 2 mm nodule left lower lobe and improving 6 mm nodule right upper lobe.    He has shortness of breath and productive coughing daily. He is on Spiriva and Symbicort. He has a rescue inhaler and nebulizer he can use when needed. He seldom requires them. He is on CPAP for sleep apnea. He is compliant with this but not benefiting from it like he used to.  He reports waking up tired.  He reports he is starting to \"nod off during the day\".  His wife is present and indicates she actually believes she hears him snore despite wearing the device.  Sleep study greater than 10 years ago.  They are wondering if it may need to be updated.  He has had increased coughing and postnasal drainage.  He took himself off of his allergy medication.          Prior to Admission medications    Medication Sig Start Date End Date Taking? Authorizing Provider   acetaminophen (TYLENOL) 650 MG 8 hr tablet Take 1 tablet by mouth 2 (Two) Times a Day.    Provider, MD Cristian   albuterol sulfate  (90 Base) MCG/ACT inhaler Inhale 2 puffs Every 4 (Four) Hours As Needed for Wheezing. 4/11/22   Hien Pimentel APRN   amLODIPine (NORVASC) 5 MG tablet TAKE ONE TABLET BY MOUTH DAILY 7/12/23   Katerine Mcclain APRN   aspirin 81 MG EC tablet Take 1 tablet by mouth Daily.    Provider, MD Cristian   betamethasone dipropionate 0.05 % cream APPLY TO AFFECTED AREA AS " DIRECTED DAILY 8/1/23   Katerine Mcclain APRN   bisoprolol (ZEBeta) 5 MG tablet TAKE 1 TABLET BY MOUTH EVERY DAY 5/25/23   Katerine Mcclain APRN   budesonide-formoterol (Symbicort) 160-4.5 MCG/ACT inhaler Inhale 2 puffs 2 (Two) Times a Day for 90 days. 7/28/23 10/26/23  Katerine Mcclain APRN   calcium carbonate (OS-TASHIA) 600 MG tablet Take 1 tablet by mouth 2 (Two) Times a Day.    Cristian Pfeiffer MD   cetirizine (zyrTEC) 10 MG tablet Take 1 tablet by mouth Daily for 5 days. 7/22/22 7/27/23  Abimael Christian MD   Cholecalciferol (VITAMIN D3) 1000 units capsule Take 2 capsules by mouth Daily.    Cristian Pfeiffer MD   coenzyme Q10 100 MG capsule Take 1 capsule by mouth Daily.    Cristian Pfeiffer MD   Collagen-Boron-Hyaluronic Acid (Move Free 10Six) 10-5-3.3 MG tablet Take 1 tablet by mouth Daily.    Cristian Pfeiffer MD   docusate sodium (Colace) 100 MG capsule Take 1 capsule by mouth 2 (Two) Times a Day. 4/27/22   Ignacio Moon MD   EPINEPHrine (EPIPEN) 0.3 MG/0.3ML solution auto-injector injection INJECT 0.3ML INTO THE APPROPRIATE MUSCLE AS DIRECTED BY PRESCRIBER ONE TIME FOR ONE DOSE 7/28/23   Cristian Pfeiffer MD   finasteride (PROSCAR) 5 MG tablet Take 1 tablet by mouth Daily. 12/8/22   Ignacio Moon MD   fluticasone (FLONASE) 50 MCG/ACT nasal spray 2 sprays into the nostril(s) as directed by provider Daily.    Cristian Pfeiffer MD   gabapentin (NEURONTIN) 400 MG capsule Take 2 capsules by mouth 2 (Two) Times a Day. 8/9/23   Ozzy Naidu APRN   Garlic 1000 MG capsule Take 1 capsule by mouth Daily.    Cristian Pfeiffer MD   ibuprofen (ADVIL,MOTRIN) 200 MG tablet Take 2 tablets by mouth Every 6 (Six) Hours As Needed for Mild Pain.    Cristian Pfeiffer MD   Krill Oil 300 MG capsule Take 300 mg by mouth Daily.    Cristian Pfeiffer MD   lisinopril (PRINIVIL,ZESTRIL) 10 MG tablet TAKE ONE TABLET BY MOUTH DAILY 7/12/23   Johny  SALONI Calzada   Loratadine 10 MG capsule Take  by mouth.    Cristian Pfeiffer MD   methylPREDNISolone (MEDROL) 4 MG dose pack Take as directed on package instructions. 9/13/23   Ozzy Hernandez APRN   Multiple Vitamins-Minerals (OCUVITE ADULT 50+ PO) Take 1 tablet by mouth Daily.    Cristian Pfeiffer MD   naproxen (NAPROSYN) 250 MG tablet Take 1 tablet by mouth 2 (Two) Times a Day With Meals. 4/27/22   Ignacio Moon MD   omeprazole (priLOSEC) 20 MG capsule Take 1 capsule by mouth Daily.    Cristian Pfeiffer MD   prednisoLONE acetate (PRED FORTE) 1 % ophthalmic suspension INSTILL ONE DROP IN THE RIGHT EYE FOUR TIMES DAILY FOR 7 DAYS 8/14/23   Cristian Pfeiffer MD   simvastatin (ZOCOR) 40 MG tablet TAKE ONE TABLET BY MOUTH NIGHTLY 9/5/23   Katerine Mcclain APRN   sulfamethoxazole-trimethoprim (Bactrim DS) 800-160 MG per tablet Take 1 tablet by mouth 2 (Two) Times a Day. 9/15/23   Ozzy Naidu APRN   SUPER B COMPLEX/C PO Take 1 tablet by mouth Every Night.    Cristian Pfeiffer MD   tamsulosin (FLOMAX) 0.4 MG capsule 24 hr capsule Take 2 capsules by mouth Every Night. 12/8/22   Ignacio Moon MD   tiotropium bromide monohydrate (Spiriva Respimat) 2.5 MCG/ACT aerosol solution inhaler Inhale 2 puffs Daily. 3/23/23   Hien Pimentel APRN   traMADol (ULTRAM) 50 MG tablet Take 1 tablet by mouth Every 8 (Eight) Hours As Needed for Moderate Pain. 8/9/23   Ozzy Naidu APRN   triamcinolone (KENALOG) 0.5 % ointment Apply 1 application topically to the appropriate area as directed 2 (Two) Times a Day. 6/13/23   Katerine Mcclain APRN   vitamin C (ASCORBIC ACID) 500 MG tablet Take 1 tablet by mouth Daily.    Cristian Pfeiffer MD       Social History     Socioeconomic History    Marital status:    Tobacco Use    Smoking status: Former     Packs/day: 1.00     Years: 50.00     Additional pack years: 0.00     Total pack years: 50.00     Types: Pipe,  "Cigarettes     Quit date:      Years since quittin.8     Passive exposure: Past    Smokeless tobacco: Never   Vaping Use    Vaping Use: Never used   Substance and Sexual Activity    Alcohol use: Yes     Alcohol/week: 5.0 standard drinks of alcohol     Types: 5 Cans of beer per week     Comment: daily    Drug use: No    Sexual activity: Defer       Objective   Vital Signs:   /74   Pulse 75   Ht 166.4 cm (65.5\")   Wt 87.1 kg (192 lb)   SpO2 95% Comment: RA  BMI 31.46 kg/m²     Physical Exam  Constitutional:       Appearance: He is obese.   Eyes:      Comments: Glasses   Cardiovascular:      Rate and Rhythm: Normal rate and regular rhythm.      Heart sounds: No murmur heard.  Pulmonary:      Effort: Pulmonary effort is normal.      Breath sounds: Normal breath sounds.   Musculoskeletal:      Right lower leg: No edema.      Left lower leg: No edema.   Neurological:      Mental Status: He is alert and oriented to person, place, and time.        Result Review :    PFT Values          10/17/2022    11:00 10/23/2023    11:30   Pre Drug PFT Results   FVC 80 84   FEV1 81 82   FEF 25-75% 98 80   FEV1/FVC 76 74       Results for orders placed in visit on 10/23/23    Spirometry    Narrative  Spirometry    Performed by: Dax Gooden CMA  Authorized by: Hien Pimentel APRN  Pre Drug % Predicted  FVC: 84%  FEV1: 82%  FEF 25-75%: 80%  FEV1/FVC: 74%    Interpretation  Spirometry  Spirometry shows normal results. Post-bronchodilator the ratio shows normal results.  Review of FVL curve  Patient's effort is normal.      Results for orders placed in visit on 10/17/22    Pulmonary Function Test    Narrative  Pulmonary Function Test  Performed by: Marla Heller RRT  Authorized by: Hien Pimentel APRN    Pre Drug % Predicted  FVC: 80%  FEV1: 81%  FEF 25-75%: 98%  FEV1/FVC: 76%    Interpretation  Spirometry  Spirometry shows normal results.  Review of FVL curve  Patient's effort is normal.      Results " for orders placed in visit on 09/08/21    Pulmonary Function Test    Narrative  Pulmonary Function Test  Performed by: Marla Heller, RRT  Authorized by: Hien Pimentel APRN    Pre Drug % Predicted  FVC: 106%  FEV1: 99%  FEF 25-75%: 69%  FEV1/FVC: 72.62%    Interpretation  Spirometry There is reduced midflow suggesting small airway/airflow obstruction.  Review of FVL curve  Patient's effort is normal.      My interpretation of imaging:  none    My interpretation of labs: none      Assessment and Plan {CC Problem List  Visit Diagnosis  ROS  Review (Popup)  [employer] Maintenance  Quality  BestPractice  Medications  SmartSets  SnapShot Encounters  Media : 23}    Diagnoses and all orders for this visit:    1. Bronchiectasis without complication (Primary)  Comments:  Continue Spiriva and Symbicort.  Use albuterol as needed.  Monitor frequency of antibiotic use.  PFTs improved  Orders:  -     Spirometry    2. Centrilobular emphysema  Comments:  Continue to avoid smoking    3. COPD, group B, by GOLD 2017 classification  Comments:  Continue Spiriva and Symbicort.  Use albuterol as needed.  PFTs improved    4. Obstructive sleep apnea  Comments:  Compliant but no longer benefiting.  Sleep study greater than 10 years.  Will order updated titration  Orders:  -     Polysomnography 4 or More Parameters With CPAP; Future    5. Personal history of nicotine dependence  Comments:  Continue to avoid smoking.  He is aged out of low-dose lung cancer screening    6. Class 1 obesity due to excess calories with serious comorbidity and body mass index (BMI) of 31.0 to 31.9 in adult  Comments:  Contributes to underlying issues.  Education material provided    7. Environmental allergies  Comments:  Contributing to cough and postnasal drip.  Resume Flonase followed by azelastine if symptoms are not improving        Body mass index is 31.46 kg/m². Educational material provided after visit summary about elevated BMI                                                       [place] Sleepiness Scale    Situation Chance of Dozing or Sleeping   Sitting and reading 1 - slight chance of dosing or sleeping   Watching TV 1 - slight chance of dosing or sleeping   Sitting inactive in a public place 1 - slight chance of dosing or sleeping   Being a passenger in a motor vehicle for an hour or more 3 - high chance of dosing or sleeping   Lying down in the afternoon 1 - slight chance of dosing or sleeping   Sitting and talking to someone 1 - slight chance of dosing or sleeping   Sitting quietly after lunch (no alcohol) 2 - moderate chance of dosing or sleeping   Stopped for a few minutes in traffic while driving 1 - slight chance of dosing or sleeping   Total score (add the scores up) 11       SLEEP RELATED SYMPTOMS:   Nasal obstructions, Dry mouth, and Snoring     Hien Pimentel, APRN  10/23/2023  12:11 CDT    Follow Up   Return in about 6 months (around 4/23/2024).    Patient was given instructions and counseling regarding his condition or for health maintenance advice. Please see specific information pulled into the AVS if appropriate.

## 2023-09-29 ENCOUNTER — OFFICE VISIT (OUTPATIENT)
Dept: INTERNAL MEDICINE | Facility: CLINIC | Age: 78
End: 2023-09-29
Payer: COMMERCIAL

## 2023-09-29 VITALS
HEART RATE: 60 BPM | DIASTOLIC BLOOD PRESSURE: 79 MMHG | SYSTOLIC BLOOD PRESSURE: 148 MMHG | RESPIRATION RATE: 16 BRPM | HEIGHT: 66 IN | WEIGHT: 196 LBS | OXYGEN SATURATION: 98 % | TEMPERATURE: 98.6 F | BODY MASS INDEX: 31.5 KG/M2

## 2023-09-29 DIAGNOSIS — M25.562 ACUTE PAIN OF LEFT KNEE: Primary | ICD-10-CM

## 2023-09-29 NOTE — PROGRESS NOTES
Chief Complaint  Knee Pain (LEFT, two weeks ago )    Subjective        Justin Robledo presents to [facility]  Knee Pain   The incident occurred more than 1 week ago. The injury mechanism was a twisting injury. The pain is present in the left knee. The pain is at a severity of 6/10. He reports no foreign bodies present. The symptoms are aggravated by movement.     Patient is a 78-year-old male who presents with complaints of left knee pain. Reports he stepped off his truck and twisted his knee about 2 weeks ago. At first he reported not really having pain but began to have pain after a week with the pain worsening since it began. He wore a brace yesterday with noticeable improvement.  The patient's pain is aggravated with any movement. Reports not having pain and can forget there is a problem until he moves. The pain starts out as a stabbing pain and becomes an ache that will slowly resolve once he has stopped moving.  Patient had a left knee replacement in the fall 2020 by Dr. Qiu.    Past Medical History:   Diagnosis Date    Arthritis     BPH with obstruction/lower urinary tract symptoms     Chronic back pain     Class 1 obesity due to excess calories with serious comorbidity and body mass index (BMI) of 32.0 to 32.9 in adult 01/20/2020    COPD (chronic obstructive pulmonary disease)     Elevated cholesterol     Environmental allergies 09/21/2022    Essential hypertension 10/14/2019    GERD (gastroesophageal reflux disease)     Hyperlipidemia     Hypertension     Left bundle branch block     Lung infiltrate 12/20/2022    Right upper lobe, [facility], October 2022    Obstructive sleep apnea 01/20/2020    pt uses a cpap machine at home    Sleep apnea     CPAP     Past Surgical History:   Procedure Laterality Date    CARPAL TUNNEL RELEASE Right 09/29/2021    Procedure: CARPAL TUNNEL RELEASE right;  Surgeon: Luis Perdomo MD;  Location: [facility];  Service: Neurosurgery;   Laterality: Right;    CARPAL TUNNEL RELEASE Left 2023    Procedure: CARPAL TUNNEL RELEASE Left;  Surgeon: Luis Perdomo MD;  Location:  [facility];  Service: Neurosurgery;  Laterality: Left;    COLONOSCOPY      COLONOSCOPY N/A 2022    Procedure: COLONOSCOPY WITH ANESTHESIA;  Surgeon: Cortes Ribera DO;  Location:  [facility];  Service: Gastroenterology;  Laterality: N/A;  preop; hx of poylps   postop; polyps   PCP Katerine Mcclain     KNEE ARTHROPLASTY Right     KNEE MENISCAL REPAIR Left 2018    PENILE PROSTHESIS IMPLANT N/A 2022    Procedure: MALLEABLE PENILE PROSTHESIS PLACEMENT;  Surgeon: Ignacio Moon MD;  Location:  [facility];  Service: Urology;  Laterality: N/A;    ROTATOR CUFF REPAIR Right     SPINE SURGERY  1981    lower back ruptured disc    TOTAL SHOULDER ARTHROPLASTY W/ DISTAL CLAVICLE EXCISION Right 2020    Procedure: RIGHT REVERSE TOTAL SHOULDER  ARTHROPLASTY;  Surgeon: Suman Ventura MD;  Location:  [facility];  Service: Orthopedics     Social History     Socioeconomic History    Marital status:    Tobacco Use    Smoking status: Former     Packs/day: 1.00     Years: 50.00     Pack years: 50.00     Types: Pipe, Cigarettes     Quit date:      Years since quittin.     Passive exposure: Past    Smokeless tobacco: Never   Vaping Use    Vaping Use: Never used   Substance and Sexual Activity    Alcohol use: Yes     Alcohol/week: 5.0 standard drinks     Types: 5 Cans of beer per week     Comment: daily    Drug use: No    Sexual activity: Defer     Family History   Problem Relation Age of Onset    Arthritis Mother     Diabetes Mother     Osteoporosis Mother     Hypertension Mother     Kidney disease Mother     Arthritis Father     Hypertension Father     Stroke Father     Diabetes Sister     Obesity Sister     Cancer Brother     Colon cancer Neg Hx     Colon polyps Neg Hx        Objective   Vital Signs:  /79 (BP Location: Right arm, Patient  "Position: Sitting, Cuff Size: Adult)   Pulse 60   Temp 98.6 °F (37 °C) (Infrared)   Resp 16   Ht 167.6 cm (65.98\")   Wt 88.9 kg (196 lb)   SpO2 98%   BMI 31.65 kg/m²   Estimated body mass index is 31.65 kg/m² as calculated from the following:    Height as of this encounter: 167.6 cm (65.98\").    Weight as of this encounter: 88.9 kg (196 lb).          Physical Exam  Vitals and nursing note reviewed.   Constitutional:       General: He is not in acute distress.     Appearance: Normal appearance.   HENT:      Head: Normocephalic.      Nose: Nose normal.      Mouth/Throat:      Mouth: Mucous membranes are moist.      Pharynx: Oropharynx is clear.   Eyes:      Pupils: Pupils are equal, round, and reactive to light.   Cardiovascular:      Rate and Rhythm: Normal rate and regular rhythm.      Pulses: Normal pulses.      Heart sounds: Normal heart sounds. No murmur heard.  Pulmonary:      Effort: Pulmonary effort is normal. No respiratory distress.      Breath sounds: Normal breath sounds.   Abdominal:      General: Bowel sounds are normal.      Palpations: Abdomen is soft.   Musculoskeletal:         General: Normal range of motion.      Cervical back: Normal range of motion.      Left knee: No swelling, deformity, erythema, bony tenderness or crepitus. Decreased range of motion (unable to fully extend lower leg). No tenderness.      Instability Tests: Posterior drawer test negative. Medial Sena test positive. Lateral Sena test negative.        Legs:       Comments: Patient localizes pain to superior aspect of knee.    Skin:     General: Skin is warm and dry.      Capillary Refill: Capillary refill takes less than 2 seconds.   Neurological:      General: No focal deficit present.      Mental Status: He is alert.      Gait: Gait abnormal.          Result Review :             Assessment and Plan   Diagnoses and all orders for this visit:    1. Acute pain of left knee (Primary)  -     Ambulatory Referral to " Orthopedic Surgery      -Patient was advised to rest, use ice, continue wearing brace, and to take NSAIDs around-the-clock for the next 2 days.  -Will need to follow-up with orthopedic surgery as this is felt to be a torn meniscus.         Follow Up   Return if symptoms worsen or fail to improve.  Patient was given instructions and counseling regarding his condition or for health maintenance advice. Please see specific information pulled into the AVS if appropriate.

## 2023-10-02 ENCOUNTER — OFFICE VISIT (OUTPATIENT)
Dept: NEUROSURGERY | Facility: CLINIC | Age: 78
End: 2023-10-02
Payer: COMMERCIAL

## 2023-10-02 ENCOUNTER — HOSPITAL ENCOUNTER (OUTPATIENT)
Dept: GENERAL RADIOLOGY | Facility: HOSPITAL | Age: 78
Discharge: HOME | End: 2023-10-02
Admitting: NURSE PRACTITIONER
Payer: COMMERCIAL

## 2023-10-02 VITALS — BODY MASS INDEX: 32.47 KG/M2 | HEIGHT: 66 IN | WEIGHT: 202 LBS

## 2023-10-02 DIAGNOSIS — G56.02 LEFT CARPAL TUNNEL SYNDROME: Primary | ICD-10-CM

## 2023-10-02 DIAGNOSIS — M25.532 LEFT WRIST PAIN: ICD-10-CM

## 2023-10-02 DIAGNOSIS — Z87.891 FORMER SMOKER: ICD-10-CM

## 2023-10-02 DIAGNOSIS — E66.09 CLASS 1 OBESITY DUE TO EXCESS CALORIES WITH SERIOUS COMORBIDITY AND BODY MASS INDEX (BMI) OF 32.0 TO 32.9 IN ADULT: ICD-10-CM

## 2023-10-02 PROCEDURE — 73110 X-RAY EXAM OF WRIST: CPT

## 2023-10-02 PROCEDURE — 99024 POSTOP FOLLOW-UP VISIT: CPT | Performed by: NURSE PRACTITIONER

## 2023-10-02 NOTE — PATIENT INSTRUCTIONS
Advance Care Planning and Advance Directives     You make decisions on a daily basis - decisions about where you want to live, your career, your home, your life. Perhaps one of the most important decisions you face is your choice for future medical care. Take time to talk with your family and your healthcare team and start planning today.  Advance Care Planning is a process that can help you:  Understand possible future healthcare decisions in light of your own experiences  Reflect on those decision in light of your goals and values  Discuss your decisions with those closest to you and the healthcare professionals that care for you  Make a plan by creating a document that reflects your wishes    Surrogate Decision Maker  In the event of a medical emergency, which has left you unable to communicate or to make your own decisions, you would need someone to make decisions for you.  It is important to discuss your preferences for medical treatment with this person while you are in good health.     Qualities of a surrogate decision maker:  Willing to take on this role and responsibility  Knows what you want for future medical care  Willing to follow your wishes even if they don't agree with them  Able to make difficult medical decisions under stressful circumstances    Advance Directives  These are legal documents you can create that will guide your healthcare team and decision maker(s) when needed. These documents can be stored in the electronic medical record.    Living Will - a legal document to guide your care if you have a terminal condition or a serious illness and are unable to communicate. States vary by statute in document names/types, but most forms may include one or more of the following:        -  Directions regarding life-prolonging treatments        -  Directions regarding artificially provided nutrition/hydration        -  Choosing a healthcare decision maker        -  Direction regarding organ/tissue  donation    Durable Power of  for Healthcare - this document names an -in-fact to make medical decisions for you, but it may also allow this person to make personal and financial decisions for you. Please seek the advice of an  if you need this type of document.    **Advance Directives are not required and no one may discriminate against you if you do not sign one.    Medical Orders  Many states allow specific forms/orders signed by your physician to record your wishes for medical treatment in your current state of health. This form, signed in personal communication with your physician, addresses resuscitation and other medical interventions that you may or may not want.      For more information or to schedule a time with a [facility] Advance Care Planning Facilitator contact: [facility] or call [phone] and someone will contact you directly.BMI for Adults  What is BMI?  Body mass index (BMI) is a number that is calculated from a person's weight and height. BMI can help estimate how much of a person's weight is composed of fat. BMI does not measure body fat directly. Rather, it is an alternative to procedures that directly measure body fat, which can be difficult and expensive.  BMI can help identify people who may be at higher risk for certain medical problems.  What are BMI measurements used for?  BMI is used as a screening tool to identify possible weight problems. It helps determine whether a person is obese, overweight, a healthy weight, or underweight.  BMI is useful for:  Identifying a weight problem that may be related to a medical condition or may increase the risk for medical problems.  Promoting changes, such as changes in diet and exercise, to help reach a healthy weight. BMI screening can be repeated to see if these changes are working.  How is BMI calculated?  BMI involves measuring your weight in relation to your height. Both height and weight are measured,  "and the BMI is calculated from those numbers. This can be done either in English ([place] or metric measurements. Note that charts and online BMI calculators are available to help you find your BMI quickly and easily without having to do these calculations yourself.  To calculate your BMI in English ([place] measurements:    Measure your weight in pounds (lb).  Multiply the number of pounds by 703.  For example, for a person who weighs 180 lb, multiply that number by 703, which equals 126,540.  Measure your height in inches. Then multiply that number by itself to get a measurement called \"inches squared.\"  For example, for a person who is 70 inches tall, the \"inches squared\" measurement is 70 inches x 70 inches, which equals 4,900 inches squared.  Divide the total from step 2 (number of lb x 703) by the total from step 3 (inches squared): 126,540 ÷ 4,900 = 25.8. This is your BMI.    To calculate your BMI in metric measurements:  Measure your weight in kilograms (kg).  Measure your height in meters (m). Then multiply that number by itself to get a measurement called \"meters squared.\"  For example, for a person who is 1.75 m tall, the \"meters squared\" measurement is 1.75 m x 1.75 m, which is equal to 3.1 meters squared.  Divide the number of kilograms (your weight) by the meters squared number. In this example: 70 ÷ 3.1 = 22.6. This is your BMI.  What do the results mean?  BMI charts are used to identify whether you are underweight, normal weight, overweight, or obese. The following guidelines will be used:  Underweight: BMI less than 18.5.  Normal weight: BMI between 18.5 and 24.9.  Overweight: BMI between 25 and 29.9.  Obese: BMI of 30 or above.  Keep these notes in mind:  Weight includes both fat and muscle, so someone with a muscular build, such as an athlete, may have a BMI that is higher than 24.9. In cases like these, BMI is not an accurate measure of body fat.  To determine if excess body fat is the cause of a BMI " "of 25 or higher, further assessments may need to be done by a health care provider.  BMI is usually interpreted in the same way for men and women.  Where to find more information  For more information about BMI, including tools to quickly calculate your BMI, go to these websites:  Centers for Disease Control and Prevention: www.cdc.gov  American Heart Association: www.heart.org  National Heart, Lung, and Blood [place]: www.nhlbi.nih.gov  Summary  Body mass index (BMI) is a number that is calculated from a person's weight and height.  BMI may help estimate how much of a person's weight is composed of fat. BMI can help identify those who may be at higher risk for certain medical problems.  BMI can be measured using English measurements or metric measurements.  BMI charts are used to identify whether you are underweight, normal weight, overweight, or obese.  This information is not intended to replace advice given to you by your health care provider. Make sure you discuss any questions you have with your health care provider.  Document Revised: 09/09/2020 Document Reviewed: 07/17/2020  [employer] Patient Education © 2021 [employer] https://www.nhlbi.nih.gov/files/docs/public/heart/dash_brief.pdf\">   DASH Eating Plan  DASH stands for Dietary Approaches to Stop Hypertension. The DASH eating plan is a healthy eating plan that has been shown to:  Reduce high blood pressure (hypertension).  Reduce your risk for type 2 diabetes, heart disease, and stroke.  Help with weight loss.  What are tips for following this plan?  Reading food labels  Check food labels for the amount of salt (sodium) per serving. Choose foods with less than 5 percent of the Daily Value of sodium. Generally, foods with less than 300 milligrams (mg) of sodium per serving fit into this eating plan.  To find whole grains, look for the word \"whole\" as the first word in the ingredient list.  Shopping  Buy products labeled as \"low-sodium\" or \"no salt " "added.\"  Buy fresh foods. Avoid canned foods and pre-made or frozen meals.  Cooking  Avoid adding salt when cooking. Use salt-free seasonings or herbs instead of table salt or sea salt. Check with your health care provider or pharmacist before using salt substitutes.  Do not clemente foods. Cook foods using healthy methods such as baking, boiling, grilling, roasting, and broiling instead.  Cook with heart-healthy oils, such as olive, canola, avocado, soybean, or sunflower oil.  Meal planning    Eat a balanced diet that includes:  4 or more servings of fruits and 4 or more servings of vegetables each day. Try to fill one-half of your plate with fruits and vegetables.  6-8 servings of whole grains each day.  Less than 6 oz (170 g) of lean meat, poultry, or fish each day. A 3-oz (85-g) serving of meat is about the same size as a deck of cards. One egg equals 1 oz (28 g).  2-3 servings of low-fat dairy each day. One serving is 1 cup (237 mL).  1 serving of nuts, seeds, or beans 5 times each week.  2-3 servings of heart-healthy fats. Healthy fats called omega-3 fatty acids are found in foods such as walnuts, flaxseeds, fortified milks, and eggs. These fats are also found in cold-water fish, such as sardines, salmon, and mackerel.  Limit how much you eat of:  Canned or prepackaged foods.  Food that is high in trans fat, such as some fried foods.  Food that is high in saturated fat, such as fatty meat.  Desserts and other sweets, sugary drinks, and other foods with added sugar.  Full-fat dairy products.  Do not salt foods before eating.  Do not eat more than 4 egg yolks a week.  Try to eat at least 2 vegetarian meals a week.  Eat more home-cooked food and less restaurant, buffet, and fast food.    Lifestyle  When eating at a restaurant, ask that your food be prepared with less salt or no salt, if possible.  If you drink alcohol:  Limit how much you use to:  0-1 drink a day for women who are not pregnant.  0-2 drinks a day for " men.  Be aware of how much alcohol is in your drink. In the [place] one drink equals one 12 oz bottle of beer (355 mL), one 5 oz glass of wine (148 mL), or one 1½ oz glass of hard liquor (44 mL).  General information  Avoid eating more than 2,300 mg of salt a day. If you have hypertension, you may need to reduce your sodium intake to 1,500 mg a day.  Work with your health care provider to maintain a healthy body weight or to lose weight. Ask what an ideal weight is for you.  Get at least 30 minutes of exercise that causes your heart to beat faster (aerobic exercise) most days of the week. Activities may include walking, swimming, or biking.  Work with your health care provider or dietitian to adjust your eating plan to your individual calorie needs.  What foods should I eat?  Fruits  All fresh, dried, or frozen fruit. Canned fruit in natural juice (without added sugar).  Vegetables  Fresh or frozen vegetables (raw, steamed, roasted, or grilled). Low-sodium or reduced-sodium tomato and vegetable juice. Low-sodium or reduced-sodium tomato sauce and tomato paste. Low-sodium or reduced-sodium canned vegetables.  Grains  Whole-grain or whole-wheat bread. Whole-grain or whole-wheat pasta. Brown rice. Oatmeal. Quinoa. Bulgur. Whole-grain and low-sodium cereals. Karla bread. Low-fat, low-sodium crackers. Whole-wheat flour tortillas.  Meats and other proteins  Skinless chicken or turkey. Ground chicken or turkey. Pork with fat trimmed off. Fish and seafood. Egg whites. Dried beans, peas, or lentils. Unsalted nuts, nut butters, and seeds. Unsalted canned beans. Lean cuts of beef with fat trimmed off. Low-sodium, lean precooked or cured meat, such as sausages or meat loaves.  Dairy  Low-fat (1%) or fat-free (skim) milk. Reduced-fat, low-fat, or fat-free cheeses. Nonfat, low-sodium ricotta or cottage cheese. Low-fat or nonfat yogurt. Low-fat, low-sodium cheese.  Fats and oils  Soft margarine without trans fats. Vegetable oil.  Reduced-fat, low-fat, or light mayonnaise and salad dressings (reduced-sodium). Canola, safflower, olive, avocado, soybean, and sunflower oils. Avocado.  Seasonings and condiments  Herbs. Spices. Seasoning mixes without salt.  Other foods  Unsalted popcorn and pretzels. Fat-free sweets.  The items listed above may not be a complete list of foods and beverages you can eat. Contact a dietitian for more information.  What foods should I avoid?  Fruits  Canned fruit in a light or heavy syrup. Fried fruit. Fruit in cream or butter sauce.  Vegetables  Creamed or fried vegetables. Vegetables in a cheese sauce. Regular canned vegetables (not low-sodium or reduced-sodium). Regular canned tomato sauce and paste (not low-sodium or reduced-sodium). Regular tomato and vegetable juice (not low-sodium or reduced-sodium). Pickles. Olives.  Grains  Baked goods made with fat, such as croissants, muffins, or some breads. Dry pasta or rice meal packs.  Meats and other proteins  Fatty cuts of meat. Ribs. Fried meat. Agosto. Bologna, salami, and other precooked or cured meats, such as sausages or meat loaves. Fat from the back of a pig (fatback). Bratwurst. Salted nuts and seeds. Canned beans with added salt. Canned or smoked fish. Whole eggs or egg yolks. Chicken or turkey with skin.  Dairy  Whole or 2% milk, cream, and half-and-half. Whole or full-fat cream cheese. Whole-fat or sweetened yogurt. Full-fat cheese. Nondairy creamers. Whipped toppings. Processed cheese and cheese spreads.  Fats and oils  Butter. Stick margarine. Lard. Shortening. Ghee. Agosto fat. Tropical oils, such as coconut, palm kernel, or palm oil.  Seasonings and condiments  Onion salt, garlic salt, seasoned salt, table salt, and sea salt. Worcestershire sauce. Tartar sauce. Barbecue sauce. Teriyaki sauce. Soy sauce, including reduced-sodium. Steak sauce. Canned and packaged gravies. Fish sauce. Oyster sauce. Cocktail sauce. Store-bought horseradish. Ketchup. Mustard.  Meat flavorings and tenderizers. Bouillon cubes. Hot sauces. Pre-made or packaged marinades. Pre-made or packaged taco seasonings. Relishes. Regular salad dressings.  Other foods  Salted popcorn and pretzels.  The items listed above may not be a complete list of foods and beverages you should avoid. Contact a dietitian for more information.  Where to find more information  National Heart, Lung, and Blood [place]: www.nhlbi.nih.gov  American Heart Association: www.heart.org  Academy of Nutrition and Dietetics: www.eatright.org  National Kidney Foundation: www.kidney.org  Summary  The DASH eating plan is a healthy eating plan that has been shown to reduce high blood pressure (hypertension). It may also reduce your risk for type 2 diabetes, heart disease, and stroke.  When on the DASH eating plan, aim to eat more fresh fruits and vegetables, whole grains, lean proteins, low-fat dairy, and heart-healthy fats.  With the DASH eating plan, you should limit salt (sodium) intake to 2,300 mg a day. If you have hypertension, you may need to reduce your sodium intake to 1,500 mg a day.  Work with your health care provider or dietitian to adjust your eating plan to your individual calorie needs.  This information is not intended to replace advice given to you by your health care provider. Make sure you discuss any questions you have with your health care provider.  Document Revised: 11/20/2020 Document Reviewed: 11/20/2020  [employer] Patient Education © 2021 [employer] High-Protein and High-Calorie Diet  Eating high-protein and high-calorie foods can help you to gain weight, heal after an injury, and recover after an illness or surgery. The specific amount of daily protein and calories you need depends on:  Your body weight.  The reason this diet is recommended for you.  What is my plan?  Generally, a high-protein, high-calorie diet involves:  Eating 250-500 extra calories each day.  Making sure that you get enough of your  daily calories from protein. Ask your health care provider how many of your calories should come from protein.  Talk with a health care provider, such as a diet and nutrition specialist (dietitian), about how much protein and how many calories you need each day. Follow the diet as directed by your health care provider.  What are tips for following this plan?    Preparing meals  Add whole milk, half-and-half, or heavy cream to cereal, pudding, soup, or hot cocoa.  Add whole milk to instant breakfast drinks.  Add peanut butter to oatmeal or smoothies.  Add powdered milk to baked goods, smoothies, or milkshakes.  Add powdered milk, cream, or butter to mashed potatoes.  Add cheese to cooked vegetables.  Make whole-milk yogurt parfaits. Top them with granola, fruit, or nuts.  Add cottage cheese to your fruit.  Add avocado, cheese, or both to sandwiches or salads.  Add meat, poultry, or seafood to rice, pasta, casseroles, salads, and soups.  Use mayonnaise when making egg salad, chicken salad, or tuna salad.  Use peanut butter as a dip for vegetables or as a topping for pretzels, celery, or crackers.  Add beans to casseroles, dips, and spreads.  Add pureed beans to sauces and soups.  Replace calorie-free drinks with calorie-containing drinks, such as milk and fruit juice.  Replace water with milk or heavy cream when making foods such as oatmeal, pudding, or cocoa.  General instructions  Ask your health care provider if you should take a nutritional supplement.  Try to eat six small meals each day instead of three large meals.  Eat a balanced diet. In each meal, include one food that is high in protein.  Keep nutritious snacks available, such as nuts, trail mixes, dried fruit, and yogurt.  If you have kidney disease or diabetes, talk with your health care provider about how much protein is safe for you. Too much protein may put extra stress on your kidneys.  Drink your calories. Choose high-calorie drinks and have them  after your meals.  What high-protein foods should I eat?    Vegetables  Soybeans. Peas.  Grains  Quinoa. Bulgur wheat.  Meats and other proteins  Beef, pork, and poultry. Fish and seafood. Eggs. Tofu. Textured vegetable protein (TVP). Peanut butter. Nuts and seeds. Dried beans. Protein powders.  Dairy  Whole milk. Whole-milk yogurt. Powdered milk. Cheese. Cottage Cheese. Eggnog.  Beverages  High-protein supplement drinks. Soy milk.  Other foods  Protein bars.  The items listed above may not be a complete list of high-protein foods and beverages. Contact a dietitian for more options.  What high-calorie foods should I eat?  Fruits  Dried fruit. Fruit leather. Canned fruit in syrup. Fruit juice. Avocado.  Vegetables  Vegetables cooked in oil or butter. Fried potatoes.  Grains  Pasta. Quick breads. Muffins. Pancakes. Ready-to-eat cereal.  Meats and other proteins  Peanut butter. Nuts and seeds.  Dairy  Heavy cream. Whipped cream. Cream cheese. Sour cream. Ice cream. Custard. Pudding.  Beverages  Meal-replacement beverages. Nutrition shakes. Fruit juice. Sugar-sweetened soft drinks.  Seasonings and condiments  Salad dressing. Mayonnaise. Ashwin sauce. Fruit preserves or jelly. Honey. Syrup.  Sweets and desserts  Cake. Cookies. Pie. Pastries. Candy bars. Chocolate.  Fats and oils  Butter or margarine. Oil. Gravy.  Other foods  Meal-replacement bars.  The items listed above may not be a complete list of high-calorie foods and beverages. Contact a dietitian for more options.  Summary  A high-protein, high-calorie diet can help you gain weight or heal faster after an injury, illness, or surgery.  To increase your protein and calories, add ingredients such as whole milk, peanut butter, cheese, beans, meat, or seafood to meal items.  To get enough extra calories each day, include high-calorie foods and beverages at each meal.  Adding a high-calorie drink or shake can be an easy way to help you get enough calories each day.  Talk with your healthcare provider or dietitian about the best options for you.  This information is not intended to replace advice given to you by your health care provider. Make sure you discuss any questions you have with your health care provider.  Document Revised: 11/30/2018 Document Reviewed: 10/30/2018  [employer] Patient Education © 2021 [employer]

## 2023-10-02 NOTE — PROGRESS NOTES
"    Chief complaint:   Chief Complaint   Patient presents with    Left carpal tunnel syndrome     Pt here for 2wk f/u. Pt states since his last office visit his hand is very tender.        Subjective     HPI: This is a 78-year-old male gentleman who went to the operating room on August 9, 2023 for a left carpal tunnel release. The patient had been doing well. His numbness and tingling has resolved. He has been complaining of some tenderness around the incision. He said that on Friday after 15th he noticed some drainage from the incision. I did send Bactrim into the pharmacy for the patient and he has been taking it.  He comes back in today for reevaluation.  He did have an MRI of the wrist done with and without contrast.  He did finish the Bactrim and feels like overall the pain is about 40% better than what it was from before with antibiotics.  Denies any redness or drainage.  The pain does seem to be more prominent over the incision area and more radial.  He says it is present only whenever he puts pressure on the incision area.  He again denies any numbness.    Review of Systems   Musculoskeletal:  Positive for arthralgias and myalgias.   Neurological:  Negative for numbness.       Objective      Vital Signs  Ht 167.6 cm (65.98\")   Wt 91.6 kg (202 lb)   BMI 32.62 kg/m²     Physical Exam  Constitutional:       Appearance: He is well-developed.   HENT:      Head: Normocephalic.   Eyes:      Extraocular Movements: EOM normal.      Pupils: Pupils are equal, round, and reactive to light.   Pulmonary:      Effort: Pulmonary effort is normal.   Musculoskeletal:         General: Normal range of motion.      Cervical back: Normal range of motion.   Skin:     General: Skin is warm.   Neurological:      Mental Status: He is alert and oriented to person, place, and time.      GCS: GCS eye subscore is 4. GCS verbal subscore is 5. GCS motor subscore is 6.      Cranial Nerves: No cranial nerve deficit.      Sensory: No " sensory deficit.      Motor: Motor strength is normal.      Gait: Gait is intact. Gait normal.      Deep Tendon Reflexes: Reflexes are normal and symmetric.   Psychiatric:         Speech: Speech normal.         Behavior: Behavior normal.         Thought Content: Thought content normal.       Neurologic Exam     Mental Status   Oriented to person, place, and time.   Attention: normal. Concentration: normal.   Speech: speech is normal   Level of consciousness: alert  Normal comprehension.     Cranial Nerves     CN II   Visual fields full to confrontation.     CN III, IV, VI   Pupils are equal, round, and reactive to light.  Extraocular motions are normal.     CN V   Facial sensation intact.     CN VII   Facial expression full, symmetric.     CN VIII   CN VIII normal.     CN IX, X   CN IX normal.   CN X normal.     CN XI   CN XI normal.     CN XII   CN XII normal.     Motor Exam   Muscle bulk: normal    Strength   Strength 5/5 throughout.     Sensory Exam   Light touch normal.     Gait, Coordination, and Reflexes     Gait  Gait: normal    Reflexes   Reflexes 2+ except as noted.     Imaging review: MRI of the left wrist does not show any signs of active infection.  There is edema associated with arthritis along with concern for either enchondroma versus a bony infarct of the distal radial region but no compression along the carpal tunnel region        Assessment/Plan: I am going to send the patient for an x-ray of the wrist to make sure that there is no lesion present.  He is going to be meeting with an orthopedic surgeon tomorrow and will talk to him about the findings and if need be we can make a referral to Dr. Luiz Sorto for further evaluation.  I will plan to see him back in 8 weeks.  Their questions and concerns were addressed.  He was told that he can try Voltaren gel over the area of discomfort    Patient is a nonsmoker  The patient's Body mass index is 32.62 kg/m².. BMI is above normal parameters.  Recommendations include: educational material and nutrition counseling  Advance Care Planning   ACP discussion was held with the patient during this visit. Patient does not have an advance directive, information provided.   STEADI Fall Risk Assessment was completed, and patient is at LOW risk for falls.Assessment completed on:3/7/2023     Diagnoses and all orders for this visit:    1. Left carpal tunnel syndrome (Primary)    2. Left wrist pain  -     XR wrist 3+ vw left; Future    3. Former smoker    4. Class 1 obesity due to excess calories with serious comorbidity and body mass index (BMI) of 32.0 to 32.9 in adult        I discussed the patients findings and my recommendations with patient  Ozzy Naidu, APRN  10/02/23  14:44 CDT

## 2023-10-03 DIAGNOSIS — M25.532 LEFT WRIST PAIN: Primary | ICD-10-CM

## 2023-10-23 ENCOUNTER — OFFICE VISIT (OUTPATIENT)
Dept: PULMONOLOGY | Facility: CLINIC | Age: 78
End: 2023-10-23
Payer: COMMERCIAL

## 2023-10-23 VITALS
BODY MASS INDEX: 30.86 KG/M2 | OXYGEN SATURATION: 95 % | DIASTOLIC BLOOD PRESSURE: 74 MMHG | WEIGHT: 192 LBS | SYSTOLIC BLOOD PRESSURE: 140 MMHG | HEART RATE: 75 BPM | HEIGHT: 66 IN

## 2023-10-23 DIAGNOSIS — Z87.891 PERSONAL HISTORY OF NICOTINE DEPENDENCE: Chronic | ICD-10-CM

## 2023-10-23 DIAGNOSIS — J44.9 COPD, GROUP B, BY GOLD 2017 CLASSIFICATION: Chronic | ICD-10-CM

## 2023-10-23 DIAGNOSIS — J47.9 BRONCHIECTASIS WITHOUT COMPLICATION: Primary | Chronic | ICD-10-CM

## 2023-10-23 DIAGNOSIS — G47.33 OBSTRUCTIVE SLEEP APNEA: Chronic | ICD-10-CM

## 2023-10-23 DIAGNOSIS — Z91.09 ENVIRONMENTAL ALLERGIES: Chronic | ICD-10-CM

## 2023-10-23 DIAGNOSIS — J43.2 CENTRILOBULAR EMPHYSEMA: Chronic | ICD-10-CM

## 2023-10-23 DIAGNOSIS — E66.09 CLASS 1 OBESITY DUE TO EXCESS CALORIES WITH SERIOUS COMORBIDITY AND BODY MASS INDEX (BMI) OF 31.0 TO 31.9 IN ADULT: Chronic | ICD-10-CM

## 2023-10-23 RX ORDER — AZELASTINE 1 MG/ML
SPRAY, METERED NASAL
COMMUNITY
Start: 2023-10-02

## 2023-10-23 NOTE — PROCEDURES
Spirometry    Performed by: Dax Gooden CMA  Authorized by: Hien Pimentel APRN     Pre Drug % Predicted    FVC: 84%   FEV1: 82%   FEF 25-75%: 80%   FEV1/FVC: 74%    Interpretation   Spirometry   Spirometry shows normal results. Post-bronchodilator the ratio shows normal results.   Review of FVL curve   Patient's effort is normal.

## 2023-10-27 DIAGNOSIS — G62.9 PERIPHERAL POLYNEUROPATHY: ICD-10-CM

## 2023-10-27 RX ORDER — GABAPENTIN 400 MG/1
CAPSULE ORAL
Qty: 120 CAPSULE | Refills: 1 | OUTPATIENT
Start: 2023-10-27

## 2023-10-27 RX ORDER — GABAPENTIN 400 MG/1
800 CAPSULE ORAL 2 TIMES DAILY
Qty: 120 CAPSULE | Refills: 0
Start: 2023-10-27 | End: 2023-10-27 | Stop reason: SDUPTHER

## 2023-10-27 RX ORDER — GABAPENTIN 400 MG/1
800 CAPSULE ORAL 2 TIMES DAILY
Qty: 120 CAPSULE | Refills: 0 | Status: SHIPPED | OUTPATIENT
Start: 2023-10-27

## 2023-10-27 NOTE — TELEPHONE ENCOUNTER
PT NEEDS MEDICATION NOW    gabapentin (NEURONTIN) 400 MG capsule     SHE SAID [facility] HAS FAXED IT TWICE    CALL PT WIFE - [phone]

## 2023-11-01 DIAGNOSIS — G47.33 OBSTRUCTIVE SLEEP APNEA: Primary | ICD-10-CM

## 2023-11-30 NOTE — PROGRESS NOTES
Subjective    Mr. Robledo is 78 y.o. male    Chief Complaint: BPH    History of Present Illness    78-year-old male established patient follow-up for enlarged prostate and erectile dysfunction.  His LUTS are well controlled on tamsulosin 0.8 mg and finasteride.  He had placement of Coloplast Kanwal malleable penile implant 4/27/2022 for erectile dysfunction refractory to PDE inhibitor therapy in the setting of hypertension and hyperlipidemia.    He is pleased with his implant.       The following portions of the patient's history were reviewed and updated as appropriate: allergies, current medications, past family history, past medical history, past social history, past surgical history and problem list.    Review of Systems      Current Outpatient Medications:     acetaminophen (TYLENOL) 650 MG 8 hr tablet, Take 1 tablet by mouth 2 (Two) Times a Day., Disp: , Rfl:     albuterol sulfate  (90 Base) MCG/ACT inhaler, Inhale 2 puffs Every 4 (Four) Hours As Needed for Wheezing., Disp: 18 g, Rfl: 6    amLODIPine (NORVASC) 5 MG tablet, TAKE ONE TABLET BY MOUTH DAILY, Disp: 90 tablet, Rfl: 1    aspirin 81 MG EC tablet, Take 1 tablet by mouth Daily., Disp: , Rfl:     azelastine (ASTELIN) 0.1 % nasal spray, SPRAY TWO SPRAYS NASALLY TWICE DAILY AS DIRECTED, Disp: , Rfl:     betamethasone dipropionate 0.05 % cream, APPLY TO AFFECTED AREA AS DIRECTED DAILY, Disp: 15 g, Rfl: 1    bisoprolol (ZEBeta) 5 MG tablet, TAKE 1 TABLET BY MOUTH EVERY DAY, Disp: 90 tablet, Rfl: 1    calcium carbonate (OS-TASHIA) 600 MG tablet, Take 1 tablet by mouth 2 (Two) Times a Day., Disp: , Rfl:     Cholecalciferol (VITAMIN D3) 1000 units capsule, Take 2 capsules by mouth Daily., Disp: , Rfl:     coenzyme Q10 100 MG capsule, Take 1 capsule by mouth Daily., Disp: , Rfl:     Collagen-Boron-Hyaluronic Acid (Move Free Yakify) 10-5-3.3 MG tablet, Take 1 tablet by mouth Daily., Disp: , Rfl:     docusate sodium (Colace) 100 MG capsule, Take 1  capsule by mouth 2 (Two) Times a Day., Disp: 60 capsule, Rfl: 1    EPINEPHrine (EPIPEN) 0.3 MG/0.3ML solution auto-injector injection, INJECT 0.3ML INTO THE APPROPRIATE MUSCLE AS DIRECTED BY PRESCRIBER ONE TIME FOR ONE DOSE, Disp: , Rfl:     finasteride (PROSCAR) 5 MG tablet, Take 1 tablet by mouth Daily., Disp: 90 tablet, Rfl: 3    gabapentin (NEURONTIN) 400 MG capsule, Take 2 capsules by mouth 2 (Two) Times a Day., Disp: 120 capsule, Rfl: 0    Garlic 1000 MG capsule, Take 1 capsule by mouth Daily., Disp: , Rfl:     ibuprofen (ADVIL,MOTRIN) 200 MG tablet, Take 2 tablets by mouth Every 6 (Six) Hours As Needed for Mild Pain., Disp: , Rfl:     Krill Oil 300 MG capsule, Take 300 mg by mouth Daily., Disp: , Rfl:     lisinopril (PRINIVIL,ZESTRIL) 10 MG tablet, TAKE ONE TABLET BY MOUTH DAILY, Disp: 90 tablet, Rfl: 1    Loratadine 10 MG capsule, Take  by mouth., Disp: , Rfl:     Multiple Vitamins-Minerals (OCUVITE ADULT 50+ PO), Take 1 tablet by mouth Daily., Disp: , Rfl:     naproxen (NAPROSYN) 250 MG tablet, Take 1 tablet by mouth 2 (Two) Times a Day With Meals., Disp: 60 tablet, Rfl: 0    NON FORMULARY, CPAP, Disp: , Rfl:     omeprazole (priLOSEC) 20 MG capsule, Take 1 capsule by mouth Daily., Disp: , Rfl:     prednisoLONE acetate (PRED FORTE) 1 % ophthalmic suspension, INSTILL ONE DROP IN THE RIGHT EYE FOUR TIMES DAILY FOR 7 DAYS, Disp: , Rfl:     simvastatin (ZOCOR) 40 MG tablet, TAKE ONE TABLET BY MOUTH NIGHTLY, Disp: 90 tablet, Rfl: 1    SUPER B COMPLEX/C PO, Take 1 tablet by mouth Every Night., Disp: , Rfl:     tamsulosin (FLOMAX) 0.4 MG capsule 24 hr capsule, Take 2 capsules by mouth Every Night., Disp: 180 capsule, Rfl: 3    tiotropium bromide monohydrate (Spiriva Respimat) 2.5 MCG/ACT aerosol solution inhaler, Inhale 2 puffs Daily., Disp: 4 g, Rfl: 11    traMADol (ULTRAM) 50 MG tablet, Take 1 tablet by mouth Every 8 (Eight) Hours As Needed for Moderate Pain., Disp: 9 tablet, Rfl: 0    triamcinolone (KENALOG) 0.5  % ointment, Apply 1 application topically to the appropriate area as directed 2 (Two) Times a Day., Disp: 15 g, Rfl: 1    vitamin C (ASCORBIC ACID) 500 MG tablet, Take 1 tablet by mouth Daily., Disp: , Rfl:     budesonide-formoterol (Symbicort) 160-4.5 MCG/ACT inhaler, Inhale 2 puffs 2 (Two) Times a Day for 90 days., Disp: 30.6 each, Rfl: 3    cetirizine (zyrTEC) 10 MG tablet, Take 1 tablet by mouth Daily for 5 days., Disp: 5 tablet, Rfl: 0    Past Medical History:   Diagnosis Date    Arthritis     BPH with obstruction/lower urinary tract symptoms     Chronic back pain     Class 1 obesity due to excess calories with serious comorbidity and body mass index (BMI) of 32.0 to 32.9 in adult 01/20/2020    COPD (chronic obstructive pulmonary disease)     Elevated cholesterol     Environmental allergies 09/21/2022    Essential hypertension 10/14/2019    GERD (gastroesophageal reflux disease)     Hyperlipidemia     Hypertension     Left bundle branch block     Lung infiltrate 12/20/2022    Right upper lobe, [facility], October 2022    Obstructive sleep apnea 01/20/2020    pt uses a cpap machine at home    Sleep apnea     CPAP       Past Surgical History:   Procedure Laterality Date    CARPAL TUNNEL RELEASE Right 09/29/2021    Procedure: CARPAL TUNNEL RELEASE right;  Surgeon: Luis Perdomo MD;  Location: [facility] OR;  Service: Neurosurgery;  Laterality: Right;    CARPAL TUNNEL RELEASE Left 8/9/2023    Procedure: CARPAL TUNNEL RELEASE Left;  Surgeon: Luis Perdomo MD;  Location: [facility] OR;  Service: Neurosurgery;  Laterality: Left;    COLONOSCOPY      COLONOSCOPY N/A 04/13/2022    Procedure: COLONOSCOPY WITH ANESTHESIA;  Surgeon: Cortes Ribera DO;  Location: [facility] ENDOSCOPY;  Service: Gastroenterology;  Laterality: N/A;  preop; hx of poylps   postop; polyps   PCP Katerine Mcclain     KNEE ARTHROPLASTY Right     KNEE MENISCAL REPAIR Left 2018    PENILE PROSTHESIS IMPLANT N/A 4/27/2022    Procedure: MALLEABLE PENILE  "PROSTHESIS PLACEMENT;  Surgeon: Ignacio Moon MD;  Location:  [facility] OR;  Service: Urology;  Laterality: N/A;    ROTATOR CUFF REPAIR Right 2000    SPINE SURGERY  1981    lower back ruptured disc    TOTAL SHOULDER ARTHROPLASTY W/ DISTAL CLAVICLE EXCISION Right 2020    Procedure: RIGHT REVERSE TOTAL SHOULDER  ARTHROPLASTY;  Surgeon: Suman Ventura MD;  Location:  [facility] OR;  Service: Orthopedics       Social History     Socioeconomic History    Marital status:    Tobacco Use    Smoking status: Former     Packs/day: 1.00     Years: 50.00     Additional pack years: 0.00     Total pack years: 50.00     Types: Pipe, Cigarettes     Quit date:      Years since quittin.9     Passive exposure: Past    Smokeless tobacco: Never   Vaping Use    Vaping Use: Never used   Substance and Sexual Activity    Alcohol use: Yes     Alcohol/week: 5.0 standard drinks of alcohol     Types: 5 Cans of beer per week     Comment: daily    Drug use: No    Sexual activity: Defer       Family History   Problem Relation Age of Onset    Arthritis Mother     Diabetes Mother     Osteoporosis Mother     Hypertension Mother     Kidney disease Mother     Arthritis Father     Hypertension Father     Stroke Father     Diabetes Sister     Obesity Sister     Cancer Brother     Colon cancer Neg Hx     Colon polyps Neg Hx        Objective    Temp 97.8 °F (36.6 °C)   Ht 167.6 cm (66\")   Wt 86.2 kg (190 lb)   BMI 30.67 kg/m²     Physical Exam        Results for orders placed or performed in visit on 23   POC Urinalysis Dipstick, Multipro    Specimen: Urine   Result Value Ref Range    Color Yellow Yellow, Straw, Dark Yellow, Marta    Clarity, UA Clear Clear    Glucose, UA Negative Negative mg/dL    Bilirubin Negative Negative    Ketones, UA Negative Negative    Specific Gravity  1.015 1.005 - 1.030    Blood, UA Negative Negative    pH, Urine 7.0 5.0 - 8.0    Protein, POC Negative Negative mg/dL    Urobilinogen, UA Normal " Normal, 0.2 E.U./dL    Nitrite, UA Negative Negative    Leukocytes Negative Negative     Assessment and Plan    Diagnoses and all orders for this visit:    1. Benign prostatic hyperplasia, unspecified whether lower urinary tract symptoms present (Primary)  -     POC Urinalysis Dipstick, Multipro  -     finasteride (PROSCAR) 5 MG tablet; Take 1 tablet by mouth Daily.  Dispense: 90 tablet; Refill: 3  -     tamsulosin (FLOMAX) 0.4 MG capsule 24 hr capsule; Take 2 capsules by mouth Every Night.  Dispense: 180 capsule; Refill: 3    2. Erectile dysfunction due to arterial insufficiency      LUTS well-controlled on combination therapy.  Continue finasteride and tamsulosin.  Follow-up with me in 1 year in my [facility] or sooner as needed.       This document has been signed by MIRIAM Moon MD on December 8, 2023 10:54 CST

## 2023-12-07 ENCOUNTER — OFFICE VISIT (OUTPATIENT)
Dept: UROLOGY | Facility: CLINIC | Age: 78
End: 2023-12-07
Payer: COMMERCIAL

## 2023-12-07 VITALS — BODY MASS INDEX: 30.53 KG/M2 | HEIGHT: 66 IN | WEIGHT: 190 LBS | TEMPERATURE: 97.8 F

## 2023-12-07 DIAGNOSIS — N52.01 ERECTILE DYSFUNCTION DUE TO ARTERIAL INSUFFICIENCY: ICD-10-CM

## 2023-12-07 DIAGNOSIS — N40.0 BENIGN PROSTATIC HYPERPLASIA, UNSPECIFIED WHETHER LOWER URINARY TRACT SYMPTOMS PRESENT: Primary | ICD-10-CM

## 2023-12-07 LAB
BILIRUB BLD-MCNC: NEGATIVE MG/DL
CLARITY, POC: CLEAR
COLOR UR: YELLOW
GLUCOSE UR STRIP-MCNC: NEGATIVE MG/DL
KETONES UR QL: NEGATIVE
LEUKOCYTE EST, POC: NEGATIVE
NITRITE UR-MCNC: NEGATIVE MG/ML
PH UR: 7 [PH] (ref 5–8)
PROT UR STRIP-MCNC: NEGATIVE MG/DL
RBC # UR STRIP: NEGATIVE /UL
SP GR UR: 1.01 (ref 1–1.03)
UROBILINOGEN UR QL: NORMAL

## 2023-12-07 RX ORDER — TAMSULOSIN HYDROCHLORIDE 0.4 MG/1
2 CAPSULE ORAL NIGHTLY
Qty: 180 CAPSULE | Refills: 3 | Status: SHIPPED | OUTPATIENT
Start: 2023-12-07

## 2023-12-07 RX ORDER — FINASTERIDE 5 MG/1
5 TABLET, FILM COATED ORAL DAILY
Qty: 90 TABLET | Refills: 3 | Status: SHIPPED | OUTPATIENT
Start: 2023-12-07

## 2023-12-21 ENCOUNTER — PATIENT MESSAGE (OUTPATIENT)
Dept: INTERNAL MEDICINE | Facility: CLINIC | Age: 78
End: 2023-12-21
Payer: COMMERCIAL

## 2024-01-02 DIAGNOSIS — G62.9 PERIPHERAL POLYNEUROPATHY: ICD-10-CM

## 2024-01-02 RX ORDER — GABAPENTIN 400 MG/1
800 CAPSULE ORAL 2 TIMES DAILY
Qty: 120 CAPSULE | Refills: 0 | Status: SHIPPED | OUTPATIENT
Start: 2024-01-02

## 2024-01-02 NOTE — TELEPHONE ENCOUNTER
Rx Refill Note  Requested Prescriptions     Pending Prescriptions Disp Refills    gabapentin (NEURONTIN) 400 MG capsule [Pharmacy Med Name: gabapentin 400 mg capsule] 120 capsule 0     Sig: TAKE TWO CAPSULES BY MOUTH TWICE DAILY   Med last filled:  10/27/23  Last office visit with prescribing clinician: 7/28/2023   Last telemedicine visit with prescribing clinician: Visit date not found   Next office visit with prescribing clinician: Visit date not found     Compliance Drug Analysis, Ur - Urine, Clean Catch (07/27/2023 23:00)                           Would you like a call back once the refill request has been completed: [] Yes [] No    If the office needs to give you a call back, can they leave a voicemail: [] Yes [] No    Mena Hernandez RN  01/02/24, 11:03 CST

## 2024-01-04 DIAGNOSIS — I10 ESSENTIAL (PRIMARY) HYPERTENSION: ICD-10-CM

## 2024-01-04 DIAGNOSIS — I10 ESSENTIAL HYPERTENSION: ICD-10-CM

## 2024-01-04 RX ORDER — AMLODIPINE BESYLATE 5 MG/1
TABLET ORAL
Qty: 90 TABLET | Refills: 1 | Status: SHIPPED | OUTPATIENT
Start: 2024-01-04

## 2024-01-04 RX ORDER — LISINOPRIL 10 MG/1
TABLET ORAL
Qty: 90 TABLET | Refills: 1 | Status: SHIPPED | OUTPATIENT
Start: 2024-01-04

## 2024-01-04 NOTE — TELEPHONE ENCOUNTER
Rx Refill Note  Requested Prescriptions     Pending Prescriptions Disp Refills    amLODIPine (NORVASC) 5 MG tablet [Pharmacy Med Name: amlodipine 5 mg tablet] 90 tablet 1     Sig: TAKE ONE TABLET BY MOUTH DAILY    lisinopril (PRINIVIL,ZESTRIL) 10 MG tablet [Pharmacy Med Name: lisinopril 10 mg tablet] 90 tablet 1     Sig: TAKE ONE TABLET BY MOUTH DAILY      Last office visit with prescribing clinician: 09/29/2023  Last telemedicine visit with prescribing clinician: Visit date not found   Next office visit with prescribing clinician: Visit date not found                         Would you like a call back once the refill request has been completed: [] Yes [] No    If the office needs to give you a call back, can they leave a voicemail: [] Yes [] No    Jessie Paige MA  01/04/24, 07:57 CST

## 2024-01-08 ENCOUNTER — HOSPITAL ENCOUNTER (OUTPATIENT)
Dept: SLEEP CENTER | Age: 79
Discharge: HOME | End: 2024-01-10
Payer: COMMERCIAL

## 2024-01-08 PROCEDURE — 95810 POLYSOM 6/> YRS 4/> PARAM: CPT

## 2024-01-09 NOTE — PROGRESS NOTES
[facility]  [address] [place] [address]  Phone [phone] Fax [phone]     Sleep Study Technician Review    Patient Name:  Jose Brambila  :   1945  Referring Provider: Iris Cardona APRN    Brief History:  Jose Brambila is a 78 y.o. Male with a history of JUSTIN, obesity, COPD who is referred for a PSG/ split study.    Height:  5' 7\"  Weight: 180lbs  BMI:  28.1  Neck Circ: 17.0\"  Mallampati      3  ESS:  11    Type of Study: PSG  Time Stage Position Snore Hypopnea Obs Apnea Parker Apnea PAP O2   2200 w supine No No No No  RA   2300 2 left lateral No No No No  RA   2400 2 left lateral No No No No  RA   0100 2 left lateral No Yes No No  RA   0200 2 left lateral No No No No  RA   0300 2 left lateral No No No No  RA   0400 w supine No No No No  RA   0500        RA   0600        RA     Summary: This pt arrived on time to the sleep center for a PSG/ split study and was shown to his room. The pt slept in the right, left lateral and supine positions. The pt did not qualify in time for a split study.           The study was reviewed briefly with Jose Brambila.  Patient will be notified of the formal results and recommendations after the study is scored and interpreted.  The report will be sent to his referring provider.    Technician: LINDA Oliver

## 2024-01-20 DIAGNOSIS — G47.33 SLEEP APNEA, OBSTRUCTIVE: Primary | ICD-10-CM

## 2024-01-22 RX ORDER — AZELASTINE HYDROCHLORIDE 137 UG/1
SPRAY, METERED NASAL
Qty: 30 ML | Refills: 12 | Status: SHIPPED | OUTPATIENT
Start: 2024-01-22

## 2024-01-22 NOTE — TELEPHONE ENCOUNTER
Rx Refill Note  Requested Prescriptions     Pending Prescriptions Disp Refills    Azelastine HCl 137 MCG/SPRAY solution [Pharmacy Med Name: azelastine 137 mcg (0.1 %) nasal spray aerosol] 30 mL 12     Sig: SPRAY TWO SPRAYS NASALLY TWICE DAILY AS DIRECTED      Last office visit with prescribing clinician: 7/28/2023   Last telemedicine visit with prescribing clinician: Visit date not found   Next office visit with prescribing clinician: Visit date not found                         Would you like a call back once the refill request has been completed: [] Yes [] No    If the office needs to give you a call back, can they leave a voicemail: [] Yes [] No    Mena Hernandez RN  01/22/24, 09:24 CST

## 2024-01-30 ENCOUNTER — TELEPHONE (OUTPATIENT)
Dept: PULMONOLOGY | Facility: CLINIC | Age: 79
End: 2024-01-30
Payer: COMMERCIAL

## 2024-01-30 NOTE — TELEPHONE ENCOUNTER
Patient had left voicemail about wondering if he needs to do the 2nd night of the sleep study.  I called patient back and spoke to wife.  She is aware that the 2nd night is to determine what pressures, device, and if oxygen needed to treat obstructive sleep apnea.  She will discuss with patient that he does need to do 2nd night.  She will have patient to call back if he has any other questions.

## 2024-02-12 ENCOUNTER — OFFICE VISIT (OUTPATIENT)
Dept: INTERNAL MEDICINE | Facility: CLINIC | Age: 79
End: 2024-02-12
Payer: COMMERCIAL

## 2024-02-12 ENCOUNTER — TELEPHONE (OUTPATIENT)
Dept: INTERNAL MEDICINE | Facility: CLINIC | Age: 79
End: 2024-02-12

## 2024-02-12 VITALS
TEMPERATURE: 97.8 F | DIASTOLIC BLOOD PRESSURE: 79 MMHG | WEIGHT: 201 LBS | OXYGEN SATURATION: 97 % | BODY MASS INDEX: 32.3 KG/M2 | HEART RATE: 71 BPM | SYSTOLIC BLOOD PRESSURE: 140 MMHG | HEIGHT: 66 IN

## 2024-02-12 DIAGNOSIS — R05.9 COUGH, UNSPECIFIED TYPE: Primary | ICD-10-CM

## 2024-02-12 DIAGNOSIS — G62.9 PERIPHERAL POLYNEUROPATHY: ICD-10-CM

## 2024-02-12 DIAGNOSIS — J44.1 COPD WITH EXACERBATION: ICD-10-CM

## 2024-02-12 PROBLEM — L08.9 SOFT TISSUE INFECTION: Status: RESOLVED | Noted: 2021-10-15 | Resolved: 2024-02-12

## 2024-02-12 PROBLEM — M75.101 RIGHT ROTATOR CUFF TEAR ARTHROPATHY: Status: RESOLVED | Noted: 2020-01-12 | Resolved: 2024-02-12

## 2024-02-12 PROBLEM — M12.811 RIGHT ROTATOR CUFF TEAR ARTHROPATHY: Status: RESOLVED | Noted: 2020-01-12 | Resolved: 2024-02-12

## 2024-02-12 LAB
EXPIRATION DATE: NORMAL
INTERNAL CONTROL: NORMAL
Lab: NORMAL
S PYO AG THROAT QL: NEGATIVE

## 2024-02-12 PROCEDURE — 96372 THER/PROPH/DIAG INJ SC/IM: CPT | Performed by: FAMILY MEDICINE

## 2024-02-12 PROCEDURE — 99214 OFFICE O/P EST MOD 30 MIN: CPT | Performed by: FAMILY MEDICINE

## 2024-02-12 PROCEDURE — 87880 STREP A ASSAY W/OPTIC: CPT | Performed by: FAMILY MEDICINE

## 2024-02-12 RX ORDER — GABAPENTIN 400 MG/1
800 CAPSULE ORAL 2 TIMES DAILY
Qty: 120 CAPSULE | Refills: 0 | Status: SHIPPED | OUTPATIENT
Start: 2024-02-12

## 2024-02-12 RX ORDER — DOXYCYCLINE HYCLATE 100 MG/1
100 CAPSULE ORAL 2 TIMES DAILY
Qty: 14 CAPSULE | Refills: 0 | Status: SHIPPED | OUTPATIENT
Start: 2024-02-12 | End: 2024-02-19

## 2024-02-12 RX ORDER — METHYLPREDNISOLONE ACETATE 40 MG/ML
80 INJECTION, SUSPENSION INTRA-ARTICULAR; INTRALESIONAL; INTRAMUSCULAR; SOFT TISSUE ONCE
Status: COMPLETED | OUTPATIENT
Start: 2024-02-12 | End: 2024-02-12

## 2024-02-12 RX ORDER — METHYLPREDNISOLONE 4 MG/1
TABLET ORAL
Qty: 21 TABLET | Refills: 0 | Status: SHIPPED | OUTPATIENT
Start: 2024-02-12 | End: 2024-02-12

## 2024-02-12 RX ADMIN — METHYLPREDNISOLONE ACETATE 80 MG: 40 INJECTION, SUSPENSION INTRA-ARTICULAR; INTRALESIONAL; INTRAMUSCULAR; SOFT TISSUE at 13:44

## 2024-02-12 NOTE — TELEPHONE ENCOUNTER
Can patient come and be seen today by maybe Dr. Doyle.  Cimzia Pregnancy And Lactation Text: This medication crosses the placenta but can be considered safe in certain situations. Cimzia may be excreted in breast milk. Minoxidil Counseling: Minoxidil is a topical medication which can increase blood flow where it is applied. It is uncertain how this medication increases hair growth. Side effects are uncommon and include stinging and allergic reactions. Include Pregnancy/Lactation Warning?: No Simponi Pregnancy And Lactation Text: The risk during pregnancy and breastfeeding is uncertain with this medication. Erivedge Counseling- I discussed with the patient the risks of Erivedge including but not limited to nausea, vomiting, diarrhea, constipation, weight loss, changes in the sense of taste, decreased appetite, muscle spasms, and hair loss. The patient verbalized understanding of the proper use and possible adverse effects of Erivedge. All of the patient's questions and concerns were addressed. Cyclophosphamide Counseling:  I discussed with the patient the risks of cyclophosphamide including but not limited to hair loss, hormonal abnormalities, decreased fertility, abdominal pain, diarrhea, nausea and vomiting, bone marrow suppression and infection. The patient understands that monitoring is required while taking this medication. Benzoyl Peroxide Counseling: Patient counseled that medicine may cause skin irritation and bleach clothing. In the event of skin irritation, the patient was advised to reduce the amount of the drug applied or use it less frequently. The patient verbalized understanding of the proper use and possible adverse effects of benzoyl peroxide. All of the patient's questions and concerns were addressed. Cyclophosphamide Pregnancy And Lactation Text: This medication is Pregnancy Category D and it isn't considered safe during pregnancy. This medication is excreted in breast milk. Otezla Pregnancy And Lactation Text: This medication is Pregnancy Category C and it isn't known if it is safe during pregnancy. It is unknown if it is excreted in breast milk. Minocycline Pregnancy And Lactation Text: This medication is Pregnancy Category D and not consider safe during pregnancy. It is also excreted in breast milk. Skyrizi Counseling: I discussed with the patient the risks of risankizumab-rzaa including but not limited to immunosuppression, and serious infections. The patient understands that monitoring is required including a PPD at baseline and must alert us or the primary physician if symptoms of infection or other concerning signs are noted. Terbinafine Counseling: Patient counseling regarding adverse effects of terbinafine including but not limited to headache, diarrhea, rash, upset stomach, liver function test abnormalities, itching, taste/smell disturbance, nausea, abdominal pain, and flatulence. There is a rare possibility of liver failure that can occur when taking terbinafine. The patient understands that a baseline LFT and kidney function test may be required. The patient verbalized understanding of the proper use and possible adverse effects of terbinafine. All of the patient's questions and concerns were addressed. Topical Sulfur Applications Counseling: Topical Sulfur Counseling: Patient counseled that this medication may cause skin irritation or allergic reactions. In the event of skin irritation, the patient was advised to reduce the amount of the drug applied or use it less frequently. The patient verbalized understanding of the proper use and possible adverse effects of topical sulfur application. All of the patient's questions and concerns were addressed. Topical Sulfur Applications Pregnancy And Lactation Text: This medication is Pregnancy Category C and has an unknown safety profile during pregnancy. It is unknown if this topical medication is excreted in breast milk. Minoxidil Pregnancy And Lactation Text: This medication has not been assigned a Pregnancy Risk Category but animal studies failed to show danger with the topical medication. It is unknown if the medication is excreted in breast milk. Cosentyx Counseling:  I discussed with the patient the risks of Cosentyx including but not limited to worsening of Crohn's disease, immunosuppression, allergic reactions and infections. The patient understands that monitoring is required including a PPD at baseline and must alert us or the primary physician if symptoms of infection or other concerning signs are noted. Erivedge Pregnancy And Lactation Text: This medication is Pregnancy Category X and is absolutely contraindicated during pregnancy. It is unknown if it is excreted in breast milk. Finasteride Counseling:  I discussed with the patient the risks of use of finasteride including but not limited to decreased libido, decreased ejaculate volume, gynecomastia, and depression. Women should not handle medication. All of the patient's questions and concerns were addressed. Quinolones Counseling:  I discussed with the patient the risks of fluoroquinolones including but not limited to GI upset, allergic reaction, drug rash, diarrhea, dizziness, photosensitivity, yeast infections, liver function test abnormalities, tendonitis/tendon rupture. Cyclosporine Counseling:  I discussed with the patient the risks of cyclosporine including but not limited to hypertension, gingival hyperplasia,myelosuppression, immunosuppression, liver damage, kidney damage, neurotoxicity, lymphoma, and serious infections. The patient understands that monitoring is required including baseline blood pressure, CBC, CMP, lipid panel and uric acid, and then 1-2 times monthly CMP and blood pressure. Benzoyl Peroxide Pregnancy And Lactation Text: This medication is Pregnancy Category C. It is unknown if benzoyl peroxide is excreted in breast milk. Terbinafine Pregnancy And Lactation Text: This medication is Pregnancy Category B and is considered safe during pregnancy. It is also excreted in breast milk and breast feeding isn't recommended. Oxybutynin Counseling:  I discussed with the patient the risks of oxybutynin including but not limited to skin rash, drowsiness, dry mouth, difficulty urinating, and blurred vision. Oxybutynin Pregnancy And Lactation Text: This medication is Pregnancy Category B and is considered safe during pregnancy. It is unknown if it is excreted in breast milk. Carac Counseling:  I discussed with the patient the risks of Carac including but not limited to erythema, scaling, itching, weeping, crusting, and pain. Cosentyx Pregnancy And Lactation Text: This medication is Pregnancy Category B and is considered safe during pregnancy. It is unknown if this medication is excreted in breast milk. Wartpeel Counseling:  I discussed with the patient the risks of Wartpeel including but not limited to erythema, scaling, itching, weeping, crusting, and pain. Dupixent Counseling: I discussed with the patient the risks of dupilumab including but not limited to eye infection and irritation, cold sores, injection site reactions, worsening of asthma, allergic reactions and increased risk of parasitic infection. Live vaccines should be avoided while taking dupilumab. Dupilumab will also interact with certain medications such as warfarin and cyclosporine. The patient understands that monitoring is required and they must alert us or the primary physician if symptoms of infection or other concerning signs are noted. Stelara Counseling:  I discussed with the patient the risks of ustekinumab including but not limited to immunosuppression, malignancy, posterior leukoencephalopathy syndrome, and serious infections. The patient understands that monitoring is required including a PPD at baseline and must alert us or the primary physician if symptoms of infection or other concerning signs are noted. Cyclosporine Pregnancy And Lactation Text: This medication is Pregnancy Category C and it isn't know if it is safe during pregnancy. This medication is excreted in breast milk. Mirvaso Counseling: Douglas Craw is a topical medication which can decrease superficial blood flow where applied. Side effects are uncommon and include stinging, redness and allergic reactions. Finasteride Pregnancy And Lactation Text: This medication is absolutely contraindicated during pregnancy. It is unknown if it is excreted in breast milk. Quinolones Pregnancy And Lactation Text: This medication is Pregnancy Category C and it isn't know if it is safe during pregnancy. It is also excreted in breast milk. Mirvaso Pregnancy And Lactation Text: This medication has not been assigned a Pregnancy Risk Category. It is unknown if the medication is excreted in breast milk. Carac Pregnancy And Lactation Text: This medication is Pregnancy Category X and contraindicated in pregnancy and in women who may become pregnant. It is unknown if this medication is excreted in breast milk. Methotrexate Counseling:  Patient counseled regarding adverse effects of methotrexate including but not limited to nausea, vomiting, abnormalities in liver function tests. Patients may develop mouth sores, rash, diarrhea, and abnormalities in blood counts. The patient understands that monitoring is required including LFT's and blood counts. There is a rare possibility of scarring of the liver and lung problems that can occur when taking methotrexate. Persistent nausea, loss of appetite, pale stools, dark urine, cough, and shortness of breath should be reported immediately. Patient advised to discontinue methotrexate treatment at least three months before attempting to become pregnant. I discussed the need for folate supplements while taking methotrexate. These supplements can decrease side effects during methotrexate treatment. The patient verbalized understanding of the proper use and possible adverse effects of methotrexate. All of the patient's questions and concerns were addressed. Rifampin Counseling: I discussed with the patient the risks of rifampin including but not limited to liver damage, kidney damage, red-orange body fluids, nausea/vomiting and severe allergy. Cimetidine Counseling:  I discussed with the patient the risks of Cimetidine including but not limited to gynecomastia, headache, diarrhea, nausea, drowsiness, arrhythmias, pancreatitis, skin rashes, psychosis, bone marrow suppression and kidney toxicity. Azithromycin Counseling:  I discussed with the patient the risks of azithromycin including but not limited to GI upset, allergic reaction, drug rash, diarrhea, and yeast infections. Zyclara Counseling:  I discussed with the patient the risks of imiquimod including but not limited to erythema, scaling, itching, weeping, crusting, and pain. Patient understands that the inflammatory response to imiquimod is variable from person to person and was educated regarded proper titration schedule. If flu-like symptoms develop, patient knows to discontinue the medication and contact us. Propranolol Counseling:  I discussed with the patient the risks of propranolol including but not limited to low heart rate, low blood pressure, low blood sugar, restlessness and increased cold sensitivity. They should call the office if they experience any of these side effects. Gabapentin Counseling: I discussed with the patient the risks of gabapentin including but not limited to dizziness, somnolence, fatigue and ataxia. Rifampin Pregnancy And Lactation Text: This medication is Pregnancy Category C and it isn't know if it is safe during pregnancy. It is also excreted in breast milk and should not be used if you are breast feeding. Gabapentin Pregnancy And Lactation Text: This medication is Pregnancy Category C and isn't considered safe during pregnancy. It is excreted in breast milk. Methotrexate Pregnancy And Lactation Text: This medication is Pregnancy Category X and is known to cause fetal harm. This medication is excreted in breast milk. Propranolol Pregnancy And Lactation Text: This medication is Pregnancy Category C and it isn't known if it is safe during pregnancy. It is excreted in breast milk. Dupixent Pregnancy And Lactation Text: This medication likely crosses the placenta but the risk for the fetus is uncertain. This medication is excreted in breast milk. Calcipotriene Counseling:  I discussed with the patient the risks of calcipotriene including but not limited to erythema, scaling, itching, and irritation. Picato Counseling:  I discussed with the patient the risks of Picato including but not limited to erythema, scaling, itching, weeping, crusting, and pain. Doxepin Counseling:  Patient advised that the medication is sedating and not to drive a car after taking this medication. Patient informed of potential adverse effects including but not limited to dry mouth, urinary retention, and blurry vision. The patient verbalized understanding of the proper use and possible adverse effects of doxepin. All of the patient's questions and concerns were addressed. Enbrel Counseling:  I discussed with the patient the risks of etanercept including but not limited to myelosuppression, immunosuppression, autoimmune hepatitis, demyelinating diseases, lymphoma, and infections. The patient understands that monitoring is required including a PPD at baseline and must alert us or the primary physician if symptoms of infection or other concerning signs are noted. Taltz Counseling: I discussed with the patient the risks of ixekizumab including but not limited to immunosuppression, serious infections, worsening of inflammatory bowel disease and drug reactions. The patient understands that monitoring is required including a PPD at baseline and must alert us or the primary physician if symptoms of infection or other concerning signs are noted. Zyclara Pregnancy And Lactation Text: This medication is Pregnancy Category C. It is unknown if this medication is excreted in breast milk. Azithromycin Pregnancy And Lactation Text: This medication is considered safe during pregnancy and is also secreted in breast milk. Calcipotriene Pregnancy And Lactation Text: This medication has not been proven safe during pregnancy. It is unknown if this medication is excreted in breast milk. Birth Control Pills Counseling: Birth Control Pill Counseling: I discussed with the patient the potential side effects of OCPs including but not limited to increased risk of stroke, heart attack, thrombophlebitis, deep venous thrombosis, hepatic adenomas, breast changes, GI upset, headaches, and depression. The patient verbalized understanding of the proper use and possible adverse effects of OCPs. All of the patient's questions and concerns were addressed. Bactrim Counseling:  I discussed with the patient the risks of sulfa antibiotics including but not limited to GI upset, allergic reaction, drug rash, diarrhea, dizziness, photosensitivity, and yeast infections. Rarely, more serious reactions can occur including but not limited to aplastic anemia, agranulocytosis, methemoglobinemia, blood dyscrasias, liver or kidney failure, lung infiltrates or desquamative/blistering drug rashes. Prednisone Counseling:  I discussed with the patient the risks of prolonged use of prednisone including but not limited to weight gain, insomnia, osteoporosis, mood changes, diabetes, susceptibility to infection, glaucoma and high blood pressure. In cases where prednisone use is prolonged, patients should be monitored with blood pressure checks, serum glucose levels and an eye exam.  Additionally, the patient may need to be placed on GI prophylaxis, PCP prophylaxis, and calcium and vitamin D supplementation and/or a bisphosphonate. The patient verbalized understanding of the proper use and the possible adverse effects of prednisone. All of the patient's questions and concerns were addressed. Sarecycline Counseling: Patient advised regarding possible photosensitivity and discoloration of the teeth, skin, lips, tongue and gums. Patient instructed to avoid sunlight, if possible. When exposed to sunlight, patients should wear protective clothing, sunglasses, and sunscreen. The patient was instructed to call the office immediately if the following severe adverse effects occur:  hearing changes, easy bruising/bleeding, severe headache, or vision changes. The patient verbalized understanding of the proper use and possible adverse effects of sarecycline. All of the patient's questions and concerns were addressed. Protopic Counseling: Patient may experience a mild burning sensation during topical application. Protopic is not approved in children less than 3years of age. There have been case reports of hematologic and skin malignancies in patients using topical calcineurin inhibitors although causality is questionable. Glycopyrrolate Counseling:  I discussed with the patient the risks of glycopyrrolate including but not limited to skin rash, drowsiness, dry mouth, difficulty urinating, and blurred vision. Bactrim Pregnancy And Lactation Text: This medication is Pregnancy Category D and is known to cause fetal risk. It is also excreted in breast milk. Glycopyrrolate Pregnancy And Lactation Text: This medication is Pregnancy Category B and is considered safe during pregnancy. It is unknown if it is excreted breast milk. Opioid Counseling: I discussed with the patient the potential side effects of opioids including but not limited to addiction, altered mental status, and depression. I stressed avoiding alcohol, benzodiazepines, muscle relaxants and sleep aids unless specifically okayed by a physician. The patient verbalized understanding of the proper use and possible adverse effects of opioids. All of the patient's questions and concerns were addressed. They were instructed to flush the remaining pills down the toilet if they did not need them for pain. Doxepin Pregnancy And Lactation Text: This medication is Pregnancy Category C and it isn't known if it is safe during pregnancy. It is also excreted in breast milk and breast feeding isn't recommended. Tremfya Counseling: I discussed with the patient the risks of guselkumab including but not limited to immunosuppression, serious infections, worsening of inflammatory bowel disease and drug reactions. The patient understands that monitoring is required including a PPD at baseline and must alert us or the primary physician if symptoms of infection or other concerning signs are noted. Birth Control Pills Pregnancy And Lactation Text: This medication should be avoided if pregnant and for the first 30 days post-partum. 5-Fu Counseling: 5-Fluorouracil Counseling:  I discussed with the patient the risks of 5-fluorouracil including but not limited to erythema, scaling, itching, weeping, crusting, and pain. Tetracycline Counseling: Patient counseled regarding possible photosensitivity and increased risk for sunburn. Patient instructed to avoid sunlight, if possible. When exposed to sunlight, patients should wear protective clothing, sunglasses, and sunscreen. The patient was instructed to call the office immediately if the following severe adverse effects occur:  hearing changes, easy bruising/bleeding, severe headache, or vision changes. The patient verbalized understanding of the proper use and possible adverse effects of tetracycline. All of the patient's questions and concerns were addressed. Patient understands to avoid pregnancy while on therapy due to potential birth defects. Hydroxyzine Counseling: Patient advised that the medication is sedating and not to drive a car after taking this medication. Patient informed of potential adverse effects including but not limited to dry mouth, urinary retention, and blurry vision. The patient verbalized understanding of the proper use and possible adverse effects of hydroxyzine. All of the patient's questions and concerns were addressed. Protopic Pregnancy And Lactation Text: This medication is Pregnancy Category C. It is unknown if this medication is excreted in breast milk when applied topically. Humira Counseling:  I discussed with the patient the risks of adalimumab including but not limited to myelosuppression, immunosuppression, autoimmune hepatitis, demyelinating diseases, lymphoma, and serious infections. The patient understands that monitoring is required including a PPD at baseline and must alert us or the primary physician if symptoms of infection or other concerning signs are noted. Rhofade Counseling: Rhofade is a topical medication which can decrease superficial blood flow where applied. Side effects are uncommon and include stinging, redness and allergic reactions. Hydroxychloroquine Counseling:  I discussed with the patient that a baseline ophthalmologic exam is needed at the start of therapy and every year thereafter while on therapy. A CBC may also be warranted for monitoring. The side effects of this medication were discussed with the patient, including but not limited to agranulocytosis, aplastic anemia, seizures, rashes, retinopathy, and liver toxicity. Patient instructed to call the office should any adverse effect occur. The patient verbalized understanding of the proper use and possible adverse effects of Plaquenil. All the patient's questions and concerns were addressed. Spironolactone Counseling: Patient advised regarding risks of diarrhea, abdominal pain, hyperkalemia, birth defects (for female patients), liver toxicity and renal toxicity. The patient may need blood work to monitor liver and kidney function and potassium levels while on therapy. The patient verbalized understanding of the proper use and possible adverse effects of spironolactone. All of the patient's questions and concerns were addressed. Cephalexin Counseling: I counseled the patient regarding use of cephalexin as an antibiotic for prophylactic and/or therapeutic purposes. Cephalexin (commonly prescribed under brand name Keflex) is a cephalosporin antibiotic which is active against numerous classes of bacteria, including most skin bacteria. Side effects may include nausea, diarrhea, gastrointestinal upset, rash, hives, yeast infections, and in rare cases, hepatitis, kidney disease, seizures, fever, confusion, neurologic symptoms, and others. Patients with severe allergies to penicillin medications are cautioned that there is about a 10% incidence of cross-reactivity with cephalosporins. When possible, patients with penicillin allergies should use alternatives to cephalosporins for antibiotic therapy. Drysol Counseling:  I discussed with the patient the risks of drysol/aluminum chloride including but not limited to skin rash, itching, irritation, burning. Cephalexin Pregnancy And Lactation Text: This medication is Pregnancy Category B and considered safe during pregnancy. It is also excreted in breast milk but can be used safely for shorter doses. Xeljanz Counseling: Yasmin Na Counseling: I discussed with the patient the risks of Yasmin Na therapy including increased risk of infection, liver issues, headache, diarrhea, or cold symptoms. Live vaccines should be avoided. They were instructed to call if they have any problems. Opioid Pregnancy And Lactation Text: These medications can lead to premature delivery and should be avoided during pregnancy. These medications are also present in breast milk in small amounts. Hydroxyzine Pregnancy And Lactation Text: This medication is not safe during pregnancy and should not be taken. It is also excreted in breast milk and breast feeding isn't recommended. Spironolactone Pregnancy And Lactation Text: This medication can cause feminization of the male fetus and should be avoided during pregnancy. The active metabolite is also found in breast milk. Acitretin Counseling:  I discussed with the patient the risks of acitretin including but not limited to hair loss, dry lips/skin/eyes, liver damage, hyperlipidemia, depression/suicidal ideation, photosensitivity. Serious rare side effects can include but are not limited to pancreatitis, pseudotumor cerebri, bony changes, clot formation/stroke/heart attack. Patient understands that alcohol is contraindicated since it can result in liver toxicity and significantly prolong the elimination of the drug by many years. Ilumya Counseling: I discussed with the patient the risks of tildrakizumab including but not limited to immunosuppression, malignancy, posterior leukoencephalopathy syndrome, and serious infections. The patient understands that monitoring is required including a PPD at baseline and must alert us or the primary physician if symptoms of infection or other concerning signs are noted. Hydroxychloroquine Pregnancy And Lactation Text: This medication has been shown to cause fetal harm but it isn't assigned a Pregnancy Risk Category. There are small amounts excreted in breast milk. Clindamycin Counseling: I counseled the patient regarding use of clindamycin as an antibiotic for prophylactic and/or therapeutic purposes. Clindamycin is active against numerous classes of bacteria, including skin bacteria. Side effects may include nausea, diarrhea, gastrointestinal upset, rash, hives, yeast infections, and in rare cases, colitis. Libtayo Counseling- I discussed with the patient the risks of Libtayo including but not limited to nausea, vomiting, diarrhea, and bone or muscle pain. The patient verbalized understanding of the proper use and possible adverse effects of Libtayo. All of the patient's questions and concerns were addressed. SSKI Counseling:  I discussed with the patient the risks of SSKI including but not limited to thyroid abnormalities, metallic taste, GI upset, fever, headache, acne, arthralgias, paraesthesias, lymphadenopathy, easy bleeding, arrhythmias, and allergic reaction. Acitretin Pregnancy And Lactation Text: This medication is Pregnancy Category X and should not be given to women who are pregnant or may become pregnant in the future. This medication is excreted in breast milk. Drysol Pregnancy And Lactation Text: This medication is considered safe during pregnancy and breast feeding. Albendazole Counseling:  I discussed with the patient the risks of albendazole including but not limited to cytopenia, kidney damage, nausea/vomiting and severe allergy. The patient understands that this medication is being used in an off-label manner. Elidel Counseling: Patient may experience a mild burning sensation during topical application. Elidel is not approved in children less than 3years of age. There have been case reports of hematologic and skin malignancies in patients using topical calcineurin inhibitors although causality is questionable. Xelgaryz Pregnancy And Lactation Text: This medication is Pregnancy Category D and is not considered safe during pregnancy. The risk during breast feeding is also uncertain. Arava Counseling:  Patient counseled regarding adverse effects of Arava including but not limited to nausea, vomiting, abnormalities in liver function tests. Patients may develop mouth sores, rash, diarrhea, and abnormalities in blood counts. The patient understands that monitoring is required including LFTs and blood counts. There is a rare possibility of scarring of the liver and lung problems that can occur when taking methotrexate. Persistent nausea, loss of appetite, pale stools, dark urine, cough, and shortness of breath should be reported immediately. Patient advised to discontinue Arava treatment and consult with a physician prior to attempting conception. The patient will have to undergo a treatment to eliminate Arava from the body prior to conception. Infliximab Counseling:  I discussed with the patient the risks of infliximab including but not limited to myelosuppression, immunosuppression, autoimmune hepatitis, demyelinating diseases, lymphoma, and serious infections. The patient understands that monitoring is required including a PPD at baseline and must alert us or the primary physician if symptoms of infection or other concerning signs are noted. Bexarotene Counseling:  I discussed with the patient the risks of bexarotene including but not limited to hair loss, dry lips/skin/eyes, liver abnormalities, hyperlipidemia, pancreatitis, depression/suicidal ideation, photosensitivity, drug rash/allergic reactions, hypothyroidism, anemia, leukopenia, infection, cataracts, and teratogenicity. Patient understands that they will need regular blood tests to check lipid profile, liver function tests, white blood cell count, thyroid function tests and pregnancy test if applicable. Xolair Counseling:  Patient informed of potential adverse effects including but not limited to fever, muscle aches, rash and allergic reactions. The patient verbalized understanding of the proper use and possible adverse effects of Xolair. All of the patient's questions and concerns were addressed. Clindamycin Pregnancy And Lactation Text: This medication can be used in pregnancy if certain situations. Clindamycin is also present in breast milk. Sski Pregnancy And Lactation Text: This medication is Pregnancy Category D and isn't considered safe during pregnancy. It is excreted in breast milk. Libtayo Pregnancy And Lactation Text: This medication is contraindicated in pregnancy and when breast feeding. Fluconazole Counseling:  Patient counseled regarding adverse effects of fluconazole including but not limited to headache, diarrhea, nausea, upset stomach, liver function test abnormalities, taste disturbance, and stomach pain. There is a rare possibility of liver failure that can occur when taking fluconazole. The patient understands that monitoring of LFTs and kidney function test may be required, especially at baseline. The patient verbalized understanding of the proper use and possible adverse effects of fluconazole. All of the patient's questions and concerns were addressed. Solaraze Counseling:  I discussed with the patient the risks of Solaraze including but not limited to erythema, scaling, itching, weeping, crusting, and pain. Solaraze Pregnancy And Lactation Text: This medication is Pregnancy Category B and is considered safe. There is some data to suggest avoiding during the third trimester. It is unknown if this medication is excreted in breast milk. Albendazole Pregnancy And Lactation Text: This medication is Pregnancy Category C and it isn't known if it is safe during pregnancy. It is also excreted in breast milk. Doxycycline Counseling:  Patient counseled regarding possible photosensitivity and increased risk for sunburn. Patient instructed to avoid sunlight, if possible. When exposed to sunlight, patients should wear protective clothing, sunglasses, and sunscreen. The patient was instructed to call the office immediately if the following severe adverse effects occur:  hearing changes, easy bruising/bleeding, severe headache, or vision changes. The patient verbalized understanding of the proper use and possible adverse effects of doxycycline. All of the patient's questions and concerns were addressed. Clofazimine Counseling:  I discussed with the patient the risks of clofazimine including but not limited to skin and eye pigmentation, liver damage, nausea/vomiting, gastrointestinal bleeding and allergy. Xolair Pregnancy And Lactation Text: This medication is Pregnancy Category B and is considered safe during pregnancy. This medication is excreted in breast milk. Bexarotene Pregnancy And Lactation Text: This medication is Pregnancy Category X and should not be given to women who are pregnant or may become pregnant. This medication should not be used if you are breast feeding. Thalidomide Counseling: I discussed with the patient the risks of thalidomide including but not limited to birth defects, anxiety, weakness, chest pain, dizziness, cough and severe allergy. Niacinamide Counseling: I recommended taking niacin or niacinamide, also know as vitamin B3, twice daily. Recent evidence suggests that taking vitamin B3 (500 mg twice daily) can reduce the risk of actinic keratoses and non-melanoma skin cancers. Side effects of vitamin B3 include flushing and headache. Niacinamide Pregnancy And Lactation Text: These medications are considered safe during pregnancy. Griseofulvin Counseling:  I discussed with the patient the risks of griseofulvin including but not limited to photosensitivity, cytopenia, liver damage, nausea/vomiting and severe allergy. The patient understands that this medication is best absorbed when taken with a fatty meal (e.g., ice cream or french fries). Isotretinoin Counseling: Patient should get monthly blood tests, not donate blood, not drive at night if vision affected, not share medication, and not undergo elective surgery for 6 months after tx completed. Side effects reviewed, pt to contact office should one occur. Eucrisa Counseling: Patient may experience a mild burning sensation during topical application. [place] is not approved in children less than 3years of age. Ivermectin Counseling:  Patient instructed to take medication on an empty stomach with a full glass of water. Patient informed of potential adverse effects including but not limited to nausea, diarrhea, dizziness, itching, and swelling of the extremities or lymph nodes. The patient verbalized understanding of the proper use and possible adverse effects of ivermectin. All of the patient's questions and concerns were addressed. Topical Retinoid counseling:  Patient advised to apply a pea-sized amount only at bedtime and wait 30 minutes after washing their face before applying. If too drying, patient may add a non-comedogenic moisturizer. The patient verbalized understanding of the proper use and possible adverse effects of retinoids. All of the patient's questions and concerns were addressed. Rituxan Counseling:  I discussed with the patient the risks of Rituxan infusions. Side effects can include infusion reactions, severe drug rashes including mucocutaneous reactions, reactivation of latent hepatitis and other infections and rarely progressive multifocal leukoencephalopathy. All of the patient's questions and concerns were addressed. Doxycycline Pregnancy And Lactation Text: This medication is Pregnancy Category D and not consider safe during pregnancy. It is also excreted in breast milk but is considered safe for shorter treatment courses. Isotretinoin Pregnancy And Lactation Text: This medication is Pregnancy Category X and is considered extremely dangerous during pregnancy. It is unknown if it is excreted in breast milk. Azathioprine Counseling:  I discussed with the patient the risks of azathioprine including but not limited to myelosuppression, immunosuppression, hepatotoxicity, lymphoma, and infections. The patient understands that monitoring is required including baseline LFTs, Creatinine, possible TPMP genotyping and weekly CBCs for the first month and then every 2 weeks thereafter. The patient verbalized understanding of the proper use and possible adverse effects of azathioprine. All of the patient's questions and concerns were addressed. Erythromycin Counseling:  I discussed with the patient the risks of erythromycin including but not limited to GI upset, allergic reaction, drug rash, diarrhea, increase in liver enzymes, and yeast infections. Griseofulvin Pregnancy And Lactation Text: This medication is Pregnancy Category X and is known to cause serious birth defects. It is unknown if this medication is excreted in breast milk but breast feeding should be avoided. Itraconazole Counseling:  I discussed with the patient the risks of itraconazole including but not limited to liver damage, nausea/vomiting, neuropathy, and severe allergy. The patient understands that this medication is best absorbed when taken with acidic beverages such as non-diet cola or ginger ale. The patient understands that monitoring is required including baseline LFTs and repeat LFTs at intervals. The patient understands that they are to contact us or the primary physician if concerning signs are noted. Tazorac Counseling:  Patient advised that medication is irritating and drying. Patient may need to apply sparingly and wash off after an hour before eventually leaving it on overnight. The patient verbalized understanding of the proper use and possible adverse effects of tazorac. All of the patient's questions and concerns were addressed. Tranexamic Acid Counseling:  Patient advised of the small risk of bleeding problems with tranexamic acid. They were also instructed to call if they developed any nausea, vomiting or diarrhea. All of the patient's questions and concerns were addressed. Rituxan Pregnancy And Lactation Text: This medication is Pregnancy Category C and it isn't know if it is safe during pregnancy. It is unknown if this medication is excreted in breast milk but similar antibodies are known to be excreted. Colchicine Counseling:  Patient counseled regarding adverse effects including but not limited to stomach upset (nausea, vomiting, stomach pain, or diarrhea). Patient instructed to limit alcohol consumption while taking this medication. Colchicine may reduce blood counts especially with prolonged use. The patient understands that monitoring of kidney function and blood counts may be required, especially at baseline. The patient verbalized understanding of the proper use and possible adverse effects of colchicine. All of the patient's questions and concerns were addressed. Nsaids Counseling: NSAID Counseling: I discussed with the patient that NSAIDs should be taken with food. Prolonged use of NSAIDs can result in the development of stomach ulcers. Patient advised to stop taking NSAIDs if abdominal pain occurs. The patient verbalized understanding of the proper use and possible adverse effects of NSAIDs. All of the patient's questions and concerns were addressed. Erythromycin Pregnancy And Lactation Text: This medication is Pregnancy Category B and is considered safe during pregnancy. It is also excreted in breast milk. Azathioprine Pregnancy And Lactation Text: This medication is Pregnancy Category D and isn't considered safe during pregnancy. It is unknown if this medication is excreted in breast milk. Nsaids Pregnancy And Lactation Text: These medications are considered safe up to 30 weeks gestation. It is excreted in breast milk. High Dose Vitamin A Counseling: Side effects reviewed, pt to contact office should one occur. Hydroquinone Counseling:  Patient advised that medication may result in skin irritation, lightening (hypopigmentation), dryness, and burning. In the event of skin irritation, the patient was advised to reduce the amount of the drug applied or use it less frequently. Rarely, spots that are treated with hydroquinone can become darker (pseudoochronosis). Should this occur, patient instructed to stop medication and call the office. The patient verbalized understanding of the proper use and possible adverse effects of hydroquinone. All of the patient's questions and concerns were addressed. Siliq Counseling:  I discussed with the patient the risks of Siliq including but not limited to new or worsening depression, suicidal thoughts and behavior, immunosuppression, malignancy, posterior leukoencephalopathy syndrome, and serious infections. The patient understands that monitoring is required including a PPD at baseline and must alert us or the primary physician if symptoms of infection or other concerning signs are noted. There is also a special program designed to monitor depression which is required with Siliq. Tazorac Pregnancy And Lactation Text: This medication is not safe during pregnancy. It is unknown if this medication is excreted in breast milk. Tranexamic Acid Pregnancy And Lactation Text: It is unknown if this medication is safe during pregnancy or breast feeding. High Dose Vitamin A Pregnancy And Lactation Text: High dose vitamin A therapy is contraindicated during pregnancy and breast feeding. Detail Level: Simple Odomzo Counseling- I discussed with the patient the risks of Odomzo including but not limited to nausea, vomiting, diarrhea, constipation, weight loss, changes in the sense of taste, decreased appetite, muscle spasms, and hair loss. The patient verbalized understanding of the proper use and possible adverse effects of Odomzo. All of the patient's questions and concerns were addressed. Valtrex Counseling: I discussed with the patient the risks of valacyclovir including but not limited to kidney damage, nausea, vomiting and severe allergy. The patient understands that if the infection seems to be worsening or is not improving, they are to call. Cellcept Counseling:  I discussed with the patient the risks of mycophenolate mofetil including but not limited to infection/immunosuppression, GI upset, hypokalemia, hypercholesterolemia, bone marrow suppression, lymphoproliferative disorders, malignancy, GI ulceration/bleed/perforation, colitis, interstitial lung disease, kidney failure, progressive multifocal leukoencephalopathy, and birth defects. The patient understands that monitoring is required including a baseline creatinine and regular CBC testing. In addition, patient must alert us immediately if symptoms of infection or other concerning signs are noted. Metronidazole Counseling:  I discussed with the patient the risks of metronidazole including but not limited to seizures, nausea/vomiting, a metallic taste in the mouth, nausea/vomiting and severe allergy. Imiquimod Counseling:  I discussed with the patient the risks of imiquimod including but not limited to erythema, scaling, itching, weeping, crusting, and pain. Patient understands that the inflammatory response to imiquimod is variable from person to person and was educated regarded proper titration schedule. If flu-like symptoms develop, patient knows to discontinue the medication and contact us. Metronidazole Pregnancy And Lactation Text: This medication is Pregnancy Category B and considered safe during pregnancy. It is also excreted in breast milk. Topical Clindamycin Counseling: Patient counseled that this medication may cause skin irritation or allergic reactions. In the event of skin irritation, the patient was advised to reduce the amount of the drug applied or use it less frequently. The patient verbalized understanding of the proper use and possible adverse effects of clindamycin. All of the patient's questions and concerns were addressed. Dapsone Counseling: I discussed with the patient the risks of dapsone including but not limited to hemolytic anemia, agranulocytosis, rashes, methemoglobinemia, kidney failure, peripheral neuropathy, headaches, GI upset, and liver toxicity. Patients who start dapsone require monitoring including baseline LFTs and weekly CBCs for the first month, then every month thereafter. The patient verbalized understanding of the proper use and possible adverse effects of dapsone. All of the patient's questions and concerns were addressed. Dapsone Pregnancy And Lactation Text: This medication is Pregnancy Category C and is not considered safe during pregnancy or breast feeding. Valtrex Pregnancy And Lactation Text: this medication is Pregnancy Category B and is considered safe during pregnancy. This medication is not directly found in breast milk but it's metabolite acyclovir is present. Ketoconazole Counseling:   Patient counseled regarding improving absorption with orange juice. Adverse effects include but are not limited to breast enlargement, headache, diarrhea, nausea, upset stomach, liver function test abnormalities, taste disturbance, and stomach pain. There is a rare possibility of liver failure that can occur when taking ketoconazole. The patient understands that monitoring of LFTs may be required, especially at baseline. The patient verbalized understanding of the proper use and possible adverse effects of ketoconazole. All of the patient's questions and concerns were addressed. Simponi Counseling:  I discussed with the patient the risks of golimumab including but not limited to myelosuppression, immunosuppression, autoimmune hepatitis, demyelinating diseases, lymphoma, and serious infections. The patient understands that monitoring is required including a PPD at baseline and must alert us or the primary physician if symptoms of infection or other concerning signs are noted. Otezla Counseling: Faylene Erica Counseling: The side effects of Faylene Erica were discussed with the patient, including but not limited to worsening or new depression, weight loss, diarrhea, nausea, upper respiratory tract infection, and headache. Patient instructed to call the office should any adverse effect occur. The patient verbalized understanding of the proper use and possible adverse effects of Otezla. All the patient's questions and concerns were addressed. Minocycline Counseling: Patient advised regarding possible photosensitivity and discoloration of the teeth, skin, lips, tongue and gums. Patient instructed to avoid sunlight, if possible. When exposed to sunlight, patients should wear protective clothing, sunglasses, and sunscreen. The patient was instructed to call the office immediately if the following severe adverse effects occur:  hearing changes, easy bruising/bleeding, severe headache, or vision changes. The patient verbalized understanding of the proper use and possible adverse effects of minocycline. All of the patient's questions and concerns were addressed. Ketoconazole Pregnancy And Lactation Text: This medication is Pregnancy Category C and it isn't know if it is safe during pregnancy. It is also excreted in breast milk and breast feeding isn't recommended. Cimzia Counseling:  I discussed with the patient the risks of Cimzia including but not limited to immunosuppression, allergic reactions and infections. The patient understands that monitoring is required including a PPD at baseline and must alert us or the primary physician if symptoms of infection or other concerning signs are noted.

## 2024-02-12 NOTE — TELEPHONE ENCOUNTER
Caller: ZHOU HERZOG    Relationship: Emergency Contact    Best call back number: [phone]     What is the best time to reach you: ANY    Who are you requesting to speak with (clinical staff, provider,  specific staff member): CLINICAL    Do you know the name of the person who called: WIFE    What was the call regarding: SICK SINCE FRI . COUGHING, CONGESTION, HAS COPD, GETS A FEVER AT  NIGHT. HAS [place]EN [place] OF THE COUNTRY. WOULD LIKE TO KNOW IF AN APPT IS RECOMMENDED     Is it okay if the provider responds through MyChart: NO CALL BACK PREFERRED

## 2024-02-16 DIAGNOSIS — J44.9 COPD, GROUP B, BY GOLD 2017 CLASSIFICATION: Chronic | ICD-10-CM

## 2024-02-16 RX ORDER — BUDESONIDE AND FORMOTEROL FUMARATE DIHYDRATE 160; 4.5 UG/1; UG/1
2 AEROSOL RESPIRATORY (INHALATION)
Qty: 30.6 EACH | Refills: 3 | Status: SHIPPED | OUTPATIENT
Start: 2024-02-16 | End: 2024-05-16

## 2024-02-26 DIAGNOSIS — E78.5 HYPERLIPIDEMIA, UNSPECIFIED HYPERLIPIDEMIA TYPE: ICD-10-CM

## 2024-02-26 RX ORDER — SIMVASTATIN 40 MG
TABLET ORAL
Qty: 90 TABLET | Refills: 1 | Status: SHIPPED | OUTPATIENT
Start: 2024-02-26

## 2024-02-26 NOTE — TELEPHONE ENCOUNTER
Rx Refill Note  Requested Prescriptions     Pending Prescriptions Disp Refills    simvastatin (ZOCOR) 40 MG tablet [Pharmacy Med Name: simvastatin 40 mg tablet] 90 tablet 1     Sig: TAKE ONE TABLET BY MOUTH NIGHTLY      Last office visit with prescribing clinician: 7/28/2023   Last telemedicine visit with prescribing clinician: Visit date not found   Next office visit with prescribing clinician: Visit date not found                         Would you like a call back once the refill request has been completed: [] Yes [] No    If the office needs to give you a call back, can they leave a voicemail: [] Yes [] No    Mena Hernandez RN  02/26/24, 07:04 CST

## 2024-03-04 ENCOUNTER — OFFICE VISIT (OUTPATIENT)
Dept: INTERNAL MEDICINE | Facility: CLINIC | Age: 79
End: 2024-03-04
Payer: COMMERCIAL

## 2024-03-04 VITALS
HEART RATE: 58 BPM | DIASTOLIC BLOOD PRESSURE: 71 MMHG | OXYGEN SATURATION: 96 % | HEIGHT: 66 IN | TEMPERATURE: 97.5 F | BODY MASS INDEX: 31.34 KG/M2 | SYSTOLIC BLOOD PRESSURE: 162 MMHG | WEIGHT: 195 LBS

## 2024-03-04 DIAGNOSIS — I10 ESSENTIAL HYPERTENSION: ICD-10-CM

## 2024-03-04 DIAGNOSIS — J40 BRONCHITIS: Primary | ICD-10-CM

## 2024-03-04 PROCEDURE — 99213 OFFICE O/P EST LOW 20 MIN: CPT

## 2024-03-04 RX ORDER — LEVOFLOXACIN 500 MG/1
500 TABLET, FILM COATED ORAL DAILY
Qty: 5 TABLET | Refills: 0 | Status: SHIPPED | OUTPATIENT
Start: 2024-03-04 | End: 2024-03-09

## 2024-03-04 RX ORDER — LOSARTAN POTASSIUM 25 MG/1
25 TABLET ORAL DAILY
Qty: 30 TABLET | Refills: 1 | Status: SHIPPED | OUTPATIENT
Start: 2024-03-04

## 2024-03-04 NOTE — PROGRESS NOTES
"Chief Complaint  Cough (Just wont quit/)    Subjective        Justin Robledo presents to [facility] PRIMARY CARE  History of Present Illness  Patient is a 78-year-old male presenting today with complaints of ongoing cough. Previously seen 2/12/24 snf completed treatment of antibiotic and steroid with some improvement. Over the past week cough has steadily worsened. Cough is productive of \"cream\" colored sputum. Productive of sputum intermittently. No known fever. Cough is intermittent during the day and worse in the evenings and mornings. Coughing during the night some. Had used mucinex during acute illness with relief.     Past Medical History:   Diagnosis Date    Arthritis     BPH with obstruction/lower urinary tract symptoms     Chronic back pain     Class 1 obesity due to excess calories with serious comorbidity and body mass index (BMI) of 32.0 to 32.9 in adult 01/20/2020    COPD (chronic obstructive pulmonary disease)     Elevated cholesterol     Environmental allergies 09/21/2022    Essential hypertension 10/14/2019    GERD (gastroesophageal reflux disease)     Hyperlipidemia     Hypertension     Left bundle branch block     Lung infiltrate 12/20/2022    Right upper lobe, [facility], October 2022    Obstructive sleep apnea 01/20/2020    pt uses a cpap machine at home    Sleep apnea     CPAP     Past Surgical History:   Procedure Laterality Date    CARPAL TUNNEL RELEASE Right 09/29/2021    Procedure: CARPAL TUNNEL RELEASE right;  Surgeon: Luis Perdomo MD;  Location: [facility] OR;  Service: Neurosurgery;  Laterality: Right;    CARPAL TUNNEL RELEASE Left 8/9/2023    Procedure: CARPAL TUNNEL RELEASE Left;  Surgeon: Luis Perdomo MD;  Location: [facility] OR;  Service: Neurosurgery;  Laterality: Left;    COLONOSCOPY      COLONOSCOPY N/A 04/13/2022    Procedure: COLONOSCOPY WITH ANESTHESIA;  Surgeon: Cortes Ribera DO;  Location: [facility] ENDOSCOPY;  Service: Gastroenterology;  Laterality: " "N/A;  preop; hx of poylps   postop; polyps   PCP Katerine Mcclain     KNEE ARTHROPLASTY Right     KNEE MENISCAL REPAIR Left 2018    PENILE PROSTHESIS IMPLANT N/A 2022    Procedure: MALLEABLE PENILE PROSTHESIS PLACEMENT;  Surgeon: Ignacio Moon MD;  Location:  [facility] OR;  Service: Urology;  Laterality: N/A;    ROTATOR CUFF REPAIR Right 2000    SPINE SURGERY  1981    lower back ruptured disc    TOTAL SHOULDER ARTHROPLASTY W/ DISTAL CLAVICLE EXCISION Right 2020    Procedure: RIGHT REVERSE TOTAL SHOULDER  ARTHROPLASTY;  Surgeon: Suman Ventura MD;  Location:  [facility] OR;  Service: Orthopedics     Social History     Socioeconomic History    Marital status:    Tobacco Use    Smoking status: Former     Current packs/day: 0.00     Average packs/day: 1 pack/day for 50.0 years (50.0 ttl pk-yrs)     Types: Pipe, Cigarettes     Start date:      Quit date:      Years since quittin.1     Passive exposure: Past    Smokeless tobacco: Never   Vaping Use    Vaping status: Never Used   Substance and Sexual Activity    Alcohol use: Yes     Alcohol/week: 5.0 standard drinks of alcohol     Types: 5 Cans of beer per week     Comment: daily    Drug use: No    Sexual activity: Defer     Family History   Problem Relation Age of Onset    Arthritis Mother     Diabetes Mother     Osteoporosis Mother     Hypertension Mother     Kidney disease Mother     Arthritis Father     Hypertension Father     Stroke Father     Diabetes Sister     Obesity Sister     Cancer Brother     Colon cancer Neg Hx     Colon polyps Neg Hx        Review of Systems  Review of systems is negative unless otherwise specified in HPI.    Objective   Vital Signs:  /71 (BP Location: Left arm, Patient Position: Sitting, Cuff Size: Adult)   Pulse 58   Temp 97.5 °F (36.4 °C) (Infrared)   Ht 167.6 cm (66\")   Wt 88.5 kg (195 lb)   SpO2 96%   BMI 31.47 kg/m²   Estimated body mass index is 31.47 kg/m² as calculated from the " "following:    Height as of this encounter: 167.6 cm (66\").    Weight as of this encounter: 88.5 kg (195 lb).         Physical Exam  Vitals and nursing note reviewed.   Constitutional:       Appearance: He is obese.   HENT:      Head: Normocephalic.      Nose: Nose normal.      Mouth/Throat:      Mouth: Mucous membranes are moist.      Pharynx: Oropharynx is clear.   Eyes:      Pupils: Pupils are equal, round, and reactive to light.   Cardiovascular:      Rate and Rhythm: Normal rate and regular rhythm.      Pulses: Normal pulses.      Heart sounds: Normal heart sounds.   Pulmonary:      Effort: Pulmonary effort is normal. No respiratory distress.      Breath sounds: Normal breath sounds.   Abdominal:      General: Bowel sounds are normal.      Palpations: Abdomen is soft.   Musculoskeletal:         General: Normal range of motion.      Cervical back: Normal range of motion.   Skin:     General: Skin is warm and dry.      Capillary Refill: Capillary refill takes less than 2 seconds.   Neurological:      General: No focal deficit present.      Mental Status: He is alert.              Result Review :  The following data was reviewed by: SALONI Card on 03/04/2024:  CMP          7/27/2023    13:21   CMP   Glucose 107    BUN 12    Creatinine 0.77    EGFR 91.6    Sodium 141    Potassium 4.1    Chloride 106    Calcium 9.4    Total Protein 6.8    Albumin 4.1    Globulin 2.7    Total Bilirubin 0.3    Alkaline Phosphatase 49    AST (SGOT) 16    ALT (SGPT) 15    Albumin/Globulin Ratio 1.5    BUN/Creatinine Ratio 15.6    Anion Gap 11.0      CBC w/diff          7/27/2023    13:21   CBC w/Diff   WBC 4.95    RBC 3.94    Hemoglobin 12.7    Hematocrit 39.8    .0    MCH 32.2    MCHC 31.9    RDW 13.3    Platelets 154    Neutrophil Rel % 57.0    Immature Granulocyte Rel % 0.4    Lymphocyte Rel % 29.7    Monocyte Rel % 9.7    Eosinophil Rel % 2.6    Basophil Rel % 0.6               Assessment and Plan   Diagnoses and all " orders for this visit:    1. Bronchitis (Primary)  -     levoFLOXacin (Levaquin) 500 MG tablet; Take 1 tablet by mouth Daily for 5 days.  Dispense: 5 tablet; Refill: 0    2. Essential hypertension  -     losartan (Cozaar) 25 MG tablet; Take 1 tablet by mouth Daily.  Dispense: 30 tablet; Refill: 1      - With persistent cough that is productive will prescribe antibiotic.   - To rule out lisinopril as contributor to cough will change to losartan. Encouraged patient to monitor blood pressure with being found to be hypertensive today and adjusting medication.   - Ensured patient is taking omeprazole to treat potential reflux as cause for cough.         Follow Up   Return if symptoms worsen or fail to improve.  Patient was given instructions and counseling regarding his condition or for health maintenance advice. Please see specific information pulled into the AVS if appropriate.     Signed by:    SALONI Card Date: 03/07/24

## 2024-03-07 ENCOUNTER — TELEPHONE (OUTPATIENT)
Dept: INTERNAL MEDICINE | Facility: CLINIC | Age: 79
End: 2024-03-07
Payer: COMMERCIAL

## 2024-03-07 RX ORDER — CLONIDINE HYDROCHLORIDE 0.1 MG/1
0.1 TABLET ORAL 2 TIMES DAILY
Qty: 60 TABLET | Refills: 2 | Status: SHIPPED | OUTPATIENT
Start: 2024-03-07

## 2024-03-07 NOTE — TELEPHONE ENCOUNTER
Patient woke up this morning with a blood pressure of 227/127. Patient does not have any symptoms. He has had one cup of coffee. BP after re-check is 208/101. Per SALONI Pardo patient was to take another losartan 25 mg. Patient wife then stated patient had not taken any of his BP medication this morning. I instructed her to have patient take normal medications and re-check BP in about 30 min. If still high, call me back and we will ask Sherice about adding the extra losartan.     Patient called back after 30 min and Bp was still elevated. Patient will take the 25 mg of losartan and call back with reading.

## 2024-03-11 DIAGNOSIS — I10 ESSENTIAL HYPERTENSION: ICD-10-CM

## 2024-03-11 RX ORDER — BISOPROLOL FUMARATE 5 MG/1
5 TABLET, FILM COATED ORAL DAILY
Qty: 150 TABLET | Refills: 3 | Status: SHIPPED | OUTPATIENT
Start: 2024-03-11

## 2024-03-11 NOTE — TELEPHONE ENCOUNTER
Rx Refill Note  Requested Prescriptions     Pending Prescriptions Disp Refills    bisoprolol (ZEBeta) 5 MG tablet [Pharmacy Med Name: bisoprolol fumarate 5 mg tablet] 150 tablet 3     Sig: TAKE ONE TABLET BY MOUTH DAILY      Last office visit with prescribing clinician: 7/28/2023   Last telemedicine visit with prescribing clinician: Visit date not found   Next office visit with prescribing clinician: Visit date not found                         Would you like a call back once the refill request has been completed: [] Yes [] No    If the office needs to give you a call back, can they leave a voicemail: [] Yes [] No    Mena Hernandez RN  03/11/24, 07:04 CDT

## 2024-03-13 DIAGNOSIS — G62.9 PERIPHERAL POLYNEUROPATHY: ICD-10-CM

## 2024-03-13 RX ORDER — GABAPENTIN 400 MG/1
800 CAPSULE ORAL 2 TIMES DAILY
Qty: 120 CAPSULE | Refills: 1 | Status: SHIPPED | OUTPATIENT
Start: 2024-03-13

## 2024-03-13 NOTE — TELEPHONE ENCOUNTER
Would you be okay with calling in for patient? He ran out of medication. SALONI Pardo patient.     Rx Refill Note  Requested Prescriptions     Pending Prescriptions Disp Refills    gabapentin (NEURONTIN) 400 MG capsule 120 capsule 0     Sig: Take 2 capsules by mouth 2 (Two) Times a Day.      Last office visit with prescribing clinician: 3/4/24  Last telemedicine visit with prescribing clinician: Visit date not found   Next office visit with prescribing clinician: Visit date not found     Compliance Drug Analysis, Ur - Urine, Clean Catch (07/27/2023 23:00)                           Would you like a call back once the refill request has been completed: [] Yes [] No    If the office needs to give you a call back, can they leave a voicemail: [] Yes [] No    Mena Hernandez RN  03/13/24, 15:50 CDT

## 2024-03-13 NOTE — TELEPHONE ENCOUNTER
Pt's spouse called stating [facility] sent over a Medication refill for pt's Gabapentin and it's not been filled yet.  I discussed with her Sherice is out of clinic today and would fill tomorrow and to not let themselves run out, as we have 48 hours to refill.    She voiced understanding and forgot Sherice was out of clinic today.    Pt has none remaining

## 2024-03-14 PROBLEM — J44.9 COPD, GROUP B, BY GOLD 2017 CLASSIFICATION: Chronic | Status: RESOLVED | Noted: 2019-10-29 | Resolved: 2024-03-14

## 2024-03-14 NOTE — PROGRESS NOTES
Chief Complaint  Bronchiectasis without complication    Subjective    History of Present Illness {CC  Problem List  Visit Diagnosis   Encounters  Notes  Medications  Labs  Result Review Imaging  Media: 23}    Justin Robledo presents to [facility]ULMONARY [facility] CR[facility]ICAL [facility]ARE MEDICINE for:    History of Present Illness  Management of his COPD, emphysema, and bronchiectasis. Most recent FEV1 82. He is a former smoker. He is a 1 pack/day smoker for 50 years. He quit in 2011.  Last CT of the chest April 2023 showing emphysema, 2 mm nodule left lower lobe and improving 6 mm nodule right upper lobe. No f/u needed     He has shortness of breath and productive coughing daily. He is on Spiriva and Symbicort. He would like 90 day supply for spiriva. He has a rescue inhaler and nebulizer he can use when needed. He seldom requires them.     He is on CPAP for sleep apnea. He is compliant with this but not benefiting from it like he used to.  Last office visit I ordered a titration study. Completed on 3-28. Still awaiting on results.     Last office visit he reported increased coughing and postnasal drainage.  He took himself off of his allergy medication.  I encouraged him to resume his Flonase followed by azelastine if not better. He resumed them. He is better now.         Prior to Admission medications    Medication Sig Start Date End Date Taking? Authorizing Provider   acetaminophen (TYLENOL) 650 MG 8 hr tablet Take 1 tablet by mouth 2 (Two) Times a Day.    Provider, MD Cristian   albuterol sulfate  (90 Base) MCG/ACT inhaler Inhale 2 puffs Every 4 (Four) Hours As Needed for Wheezing. 4/11/22   Hien Pimentel APRN   amLODIPine (NORVASC) 5 MG tablet TAKE ONE TABLET BY MOUTH DAILY 1/4/24   Katerine Mcclain APRN   aspirin 81 MG EC tablet Take 1 tablet by mouth Daily.    Provider, MD Cristian   Azelastine HCl 137 MCG/SPRAY solution SPRAY TWO SPRAYS NASALLY TWICE DAILY AS DIRECTED  1/22/24   Katerine Mcclain APRN   betamethasone dipropionate 0.05 % cream APPLY TO AFFECTED AREA AS DIRECTED DAILY 8/1/23   Katerine Mcclain APRN   bisoprolol (ZEBeta) 5 MG tablet TAKE ONE TABLET BY MOUTH DAILY 3/11/24   Katerine Mcclain APRN   budesonide-formoterol (Symbicort) 160-4.5 MCG/ACT inhaler Inhale 2 puffs 2 (Two) Times a Day for 90 days. 2/16/24 5/16/24  Katerine Mcclain APRN   calcium carbonate (OS-TASHIA) 600 MG tablet Take 1 tablet by mouth 2 (Two) Times a Day.    Cristian Pfeiffer MD   cetirizine (zyrTEC) 10 MG tablet Take 1 tablet by mouth Daily for 5 days. 7/22/22 7/27/23  Abimael Christian MD   Cholecalciferol (VITAMIN D3) 1000 units capsule Take 2 capsules by mouth Daily.    Cristian Pfeiffer MD   cloNIDine (Catapres) 0.1 MG tablet Take 1 tablet by mouth 2 (Two) Times a Day. 3/7/24   Katerine Mcclain APRN   coenzyme Q10 100 MG capsule Take 1 capsule by mouth Daily.    Cristian Pfeiffer MD   Collagen-Boron-Hyaluronic Acid (Move Free Spotzer) 10-5-3.3 MG tablet Take 1 tablet by mouth Daily.    Cristian Pfeiffer MD   docusate sodium (Colace) 100 MG capsule Take 1 capsule by mouth 2 (Two) Times a Day. 4/27/22   Ignacio Moon MD   EPINEPHrine (EPIPEN) 0.3 MG/0.3ML solution auto-injector injection INJECT 0.3ML INTO THE APPROPRIATE MUSCLE AS DIRECTED BY PRESCRIBER ONE TIME FOR ONE DOSE 7/28/23   Cristian Pfeiffer MD   finasteride (PROSCAR) 5 MG tablet Take 1 tablet by mouth Daily. 12/7/23   Ignacio Moon MD   gabapentin (NEURONTIN) 400 MG capsule Take 2 capsules by mouth 2 (Two) Times a Day. 3/13/24   Yan Doyle MD   Garlic 1000 MG capsule Take 1 capsule by mouth Daily.    Cristian Pfeiffer MD   ibuprofen (ADVIL,MOTRIN) 200 MG tablet Take 2 tablets by mouth Every 6 (Six) Hours As Needed for Mild Pain.    Provider, Cristian, MD   Krill Oil 300 MG capsule Take 300 mg by mouth Daily.    Provider, MD Cristian  "  losartan (Cozaar) 25 MG tablet Take 1 tablet by mouth Daily. 3/4/24   Ozzy Hernandez APRN   Multiple Vitamins-Minerals (OCUVITE ADULT 50+ PO) Take 1 tablet by mouth Daily.    ProviderCristian MD   omeprazole (priLOSEC) 20 MG capsule Take 1 capsule by mouth Daily.    Cristian Pfeiffer MD   simvastatin (ZOCOR) 40 MG tablet TAKE ONE TABLET BY MOUTH NIGHTLY 24   Katerine Mcclain APRN   SUPER B COMPLEX/C PO Take 1 tablet by mouth Every Night.    ProviderCristian MD   tamsulosin (FLOMAX) 0.4 MG capsule 24 hr capsule Take 2 capsules by mouth Every Night. 23   Ignacio oMon MD   tiotropium bromide monohydrate (Spiriva Respimat) 2.5 MCG/ACT aerosol solution inhaler Inhale 2 puffs Daily. 3/23/23   Hien Pimentel APRN   triamcinolone (KENALOG) 0.5 % ointment Apply 1 application topically to the appropriate area as directed 2 (Two) Times a Day. 23   Katerine Mcclain APRN   vitamin C (ASCORBIC ACID) 500 MG tablet Take 1 tablet by mouth Daily.    Provider, MD Cristian       Social History     Socioeconomic History    Marital status:    Tobacco Use    Smoking status: Former     Current packs/day: 0.00     Average packs/day: 1 pack/day for 50.0 years (50.0 ttl pk-yrs)     Types: Pipe, Cigarettes     Start date:      Quit date:      Years since quittin.2     Passive exposure: Past    Smokeless tobacco: Never   Vaping Use    Vaping status: Never Used   Substance and Sexual Activity    Alcohol use: Yes     Alcohol/week: 5.0 standard drinks of alcohol     Types: 5 Cans of beer per week     Comment: daily    Drug use: No    Sexual activity: Defer       Objective   Vital Signs:   /70   Pulse 63   Ht 167.6 cm (66\")   Wt 86.2 kg (190 lb)   SpO2 96% Comment: RA  BMI 30.67 kg/m²     Physical Exam  Constitutional:       Appearance: He is obese.   Eyes:      Comments: glasses   Cardiovascular:      Rate and Rhythm: Normal rate and regular rhythm.      " Heart sounds: No murmur heard.  Pulmonary:      Effort: Pulmonary effort is normal.      Breath sounds: Normal breath sounds.   Musculoskeletal:      Right lower leg: No edema.      Left lower leg: No edema.   Neurological:      Mental Status: He is alert and oriented to person, place, and time.        Result Review :    PFT Values          10/17/2022    11:00 10/23/2023    11:30   Pre Drug PFT Results   FVC 80 84   FEV1 81 82   FEF 25-75% 98 80   FEV1/FVC 76 74     My interpretation of the PFT : none    Results for orders placed in visit on 10/23/23    Spirometry    Narrative  Spirometry    Performed by: Dax Gooden CMA  Authorized by: Hien Pimentel APRN  Pre Drug % Predicted  FVC: 84%  FEV1: 82%  FEF 25-75%: 80%  FEV1/FVC: 74%    Interpretation  Spirometry  Spirometry shows normal results. Post-bronchodilator the ratio shows normal results.  Review of FVL curve  Patient's effort is normal.      Results for orders placed in visit on 10/17/22    Pulmonary Function Test    Narrative  Pulmonary Function Test  Performed by: Marla Heller RRT  Authorized by: Hien Pimentel APRN    Pre Drug % Predicted  FVC: 80%  FEV1: 81%  FEF 25-75%: 98%  FEV1/FVC: 76%    Interpretation  Spirometry  Spirometry shows normal results.  Review of FVL curve  Patient's effort is normal.      Results for orders placed in visit on 09/08/21    Pulmonary Function Test    Narrative  Pulmonary Function Test  Performed by: Marla Heller RRT  Authorized by: Hien Pimentel APRN    Pre Drug % Predicted  FVC: 106%  FEV1: 99%  FEF 25-75%: 69%  FEV1/FVC: 72.62%    Interpretation  Spirometry There is reduced midflow suggesting small airway/airflow obstruction.  Review of FVL curve  Patient's effort is normal.      My interpretation of imaging:  none    My interpretation of labs: none      Assessment and Plan {CC Problem List  Visit Diagnosis  ROS  Review (Popup)  Health Maintenance  Quality  BestPractice  Medications   SmartSets  SnapShot Encounters  Media : 23}    Diagnoses and all orders for this visit:    1. Bronchiectasis without complication (Primary)  Comments:  Continue spiriva and symbicort. Refills sent to pharmacy. PFTs with f/u. Abx x 1 round in March. Monitor    2. Stage 1 mild COPD by GOLD classification  Comments:  Continue spiriva and symbicort. Refills sent to pharmacy. PFTs with f/u  Orders:  -     tiotropium bromide monohydrate (Spiriva Respimat) 2.5 MCG/ACT aerosol solution inhaler; Inhale 2 puffs Daily for 90 days.  Dispense: 12 g; Refill: 3    3. Centrilobular emphysema  Comments:  Stable on last imaging. PFTs with f/u    4. Obstructive sleep apnea  Comments:  Awaiting titration results. Will reach out to sleep lab    5. Personal history of nicotine dependence  Comments:  Continue to avoid smoking. Aged out of low dose screening    6. Class 1 obesity due to excess calories with serious comorbidity and body mass index (BMI) of 31.0 to 31.9 in adult  Comments:  Contributes to underlying health concerns. Educational material provided    7. Environmental allergies  Comments:  Much better on flonase and astelin        Body mass index is 30.67 kg/m². Educational material provided after visit summary about elevated BMI      SALONI Alegria  4/15/2024  12:03 CDT    Follow Up   Return in about 6 months (around 10/15/2024) for FVL.    Patient was given instructions and counseling regarding his condition or for health maintenance advice. Please see specific information pulled into the AVS if appropriate.

## 2024-04-03 DIAGNOSIS — J42 CHRONIC BRONCHITIS, UNSPECIFIED CHRONIC BRONCHITIS TYPE: ICD-10-CM

## 2024-04-03 DIAGNOSIS — J44.9 CHRONIC OBSTRUCTIVE PULMONARY DISEASE, UNSPECIFIED COPD TYPE: ICD-10-CM

## 2024-04-03 NOTE — TELEPHONE ENCOUNTER
PT NEEDS THE FOLLOWING SENT TO [facility] SINCE [facility] DOES NOT HAVE ANY SPIRIVA AND THE PHARMACY SAID THERE IS NO REFILLS ON THE PRESCRIPTION:    tiotropium bromide monohydrate (Spiriva Respimat) 2.5 MCG/ACT aerosol solution inhaler

## 2024-04-03 NOTE — TELEPHONE ENCOUNTER
Rx Refill Note  Requested Prescriptions     Pending Prescriptions Disp Refills    tiotropium bromide monohydrate (Spiriva Respimat) 2.5 MCG/ACT aerosol solution inhaler 4 g 11     Sig: Inhale 2 puffs Daily.      Last office visit with prescribing clinician: 03/04/2024   Last telemedicine visit with prescribing clinician: Visit date not found   Next office visit with prescribing clinician: Visit date not found                         Would you like a call back once the refill request has been completed: [] Yes [] No    If the office needs to give you a call back, can they leave a voicemail: [] Yes [] No    Jessie Paige MA  04/03/24, 09:42 CDT

## 2024-04-04 RX ORDER — TIOTROPIUM BROMIDE INHALATION SPRAY 3.12 UG/1
2 SPRAY, METERED RESPIRATORY (INHALATION)
Qty: 4 G | Refills: 11 | Status: SHIPPED | OUTPATIENT
Start: 2024-04-04

## 2024-04-10 ENCOUNTER — TELEPHONE (OUTPATIENT)
Dept: INTERNAL MEDICINE | Facility: CLINIC | Age: 79
End: 2024-04-10
Payer: COMMERCIAL

## 2024-04-10 NOTE — TELEPHONE ENCOUNTER
Pt's spouse stopped by the clinic today and stated she wanted to change their pharmacy to [facility] in [place], and I updated.

## 2024-04-15 ENCOUNTER — OFFICE VISIT (OUTPATIENT)
Dept: PULMONOLOGY | Facility: CLINIC | Age: 79
End: 2024-04-15
Payer: COMMERCIAL

## 2024-04-15 VITALS
SYSTOLIC BLOOD PRESSURE: 132 MMHG | OXYGEN SATURATION: 96 % | WEIGHT: 190 LBS | HEART RATE: 63 BPM | HEIGHT: 66 IN | BODY MASS INDEX: 30.53 KG/M2 | DIASTOLIC BLOOD PRESSURE: 70 MMHG

## 2024-04-15 DIAGNOSIS — Z91.09 ENVIRONMENTAL ALLERGIES: Chronic | ICD-10-CM

## 2024-04-15 DIAGNOSIS — J47.9 BRONCHIECTASIS WITHOUT COMPLICATION: Primary | Chronic | ICD-10-CM

## 2024-04-15 DIAGNOSIS — J43.2 CENTRILOBULAR EMPHYSEMA: Chronic | ICD-10-CM

## 2024-04-15 DIAGNOSIS — J44.9 STAGE 1 MILD COPD BY GOLD CLASSIFICATION: Chronic | ICD-10-CM

## 2024-04-15 DIAGNOSIS — G47.33 OBSTRUCTIVE SLEEP APNEA: Chronic | ICD-10-CM

## 2024-04-15 DIAGNOSIS — E66.09 CLASS 1 OBESITY DUE TO EXCESS CALORIES WITH SERIOUS COMORBIDITY AND BODY MASS INDEX (BMI) OF 31.0 TO 31.9 IN ADULT: Chronic | ICD-10-CM

## 2024-04-15 DIAGNOSIS — Z87.891 PERSONAL HISTORY OF NICOTINE DEPENDENCE: Chronic | ICD-10-CM

## 2024-04-15 PROCEDURE — 99214 OFFICE O/P EST MOD 30 MIN: CPT | Performed by: NURSE PRACTITIONER

## 2024-04-15 RX ORDER — TIOTROPIUM BROMIDE INHALATION SPRAY 3.12 UG/1
2 SPRAY, METERED RESPIRATORY (INHALATION)
Qty: 12 G | Refills: 3 | Status: SHIPPED | OUTPATIENT
Start: 2024-04-15 | End: 2024-07-14

## 2024-04-17 DIAGNOSIS — I10 ESSENTIAL HYPERTENSION: ICD-10-CM

## 2024-04-17 RX ORDER — LOSARTAN POTASSIUM 25 MG/1
25 TABLET ORAL DAILY
Qty: 30 TABLET | Refills: 1 | Status: SHIPPED | OUTPATIENT
Start: 2024-04-17

## 2024-04-17 NOTE — TELEPHONE ENCOUNTER
Caller: ZHOU HERZOG    Relationship: Emergency Contact-  VERBAL    Best call back number: [phone] (Mobile)     Requested Prescriptions:   Requested Prescriptions     Pending Prescriptions Disp Refills    losartan (Cozaar) 25 MG tablet 30 tablet 1     Sig: Take 1 tablet by mouth Daily.        Pharmacy where request should be sent: [facility] [address] [place] [address] [phone] [facility] [phone]      Last office visit with prescribing clinician: 7/28/2023   Last telemedicine visit with prescribing clinician: Visit date not found   Next office visit with prescribing clinician: Visit date not found     Additional details provided by patient: REQUESTING A 90 DAY SUPPLY    Does the patient have less than a 3 day supply:  [] Yes  [x] No    Ailyn Pimentel Rep   04/17/24 08:24 CDT

## 2024-04-17 NOTE — TELEPHONE ENCOUNTER
Rx Refill Note  Requested Prescriptions     Pending Prescriptions Disp Refills    losartan (Cozaar) 25 MG tablet 30 tablet 1     Sig: Take 1 tablet by mouth Daily.      Last office visit with prescribing clinician: 7/28/2023   Last telemedicine visit with prescribing clinician: Visit date not found   Next office visit with prescribing clinician: Visit date not found                         Would you like a call back once the refill request has been completed: [] Yes [] No    If the office needs to give you a call back, can they leave a voicemail: [] Yes [] No    Mena Hernandez RN  04/17/24, 08:26 CDT

## 2024-04-18 DIAGNOSIS — G47.33 SLEEP APNEA, OBSTRUCTIVE: Primary | ICD-10-CM

## 2024-04-19 ENCOUNTER — FOLLOWUP TELEPHONE ENCOUNTER (OUTPATIENT)
Dept: SLEEP CENTER | Age: 79
End: 2024-04-19

## 2024-04-19 NOTE — PROGRESS NOTES
[facility]  [address] [place] [address]  [id number]  Phone [phone] Fax [phone]     Patient Name: Jose Brambila 2024  : 1945  Age: 78 y.o.  Patient Address: [address] [place] [address] [place] [address]       Patient Phone: [phone] (home)     REFERRAL  Referred to: DME provider of patient's choice  Jose Brambila is referred for the following:    DME Equipment HPCPS Code Setting   Auto Adjusting CPAP device with flex or comparable pressure relief per comfort  8cm to 14cm   Heated Humidifier  Patient Choice       Replinishible PAP Supplies, 1 year supply  Item HPCPS Code Frequency   Mask of choice  or  1 per 3 months   Nasal Mask cushion/pillows  or  2 per 30 days   Full Face Mask Interface  1 per 30 days   Headgear  1 per 6 months   Tubing, length of choice  or  1 per 3 months   Water Chamber  1 per 6 months   Chinstrap  1 per 6 months   Disposable Filters  2 per 30 days   Reusable Filters  1 per 6 months     Diagnoses:  Obstructive sleep apnea (G47.33)  Length of Need: Lifetime, 99    Ordering Provider: Jonathan Ng M.D.  NPI: [id number]         Signature:       Date: 2024   Electronically Signed by Jonathan Ng M.D.

## 2024-04-30 DIAGNOSIS — G47.33 OBSTRUCTIVE SLEEP APNEA: ICD-10-CM

## 2024-05-09 ENCOUNTER — LAB (OUTPATIENT)
Dept: INTERNAL MEDICINE | Facility: CLINIC | Age: 79
End: 2024-05-09
Payer: COMMERCIAL

## 2024-05-09 ENCOUNTER — TELEPHONE (OUTPATIENT)
Dept: INTERNAL MEDICINE | Facility: CLINIC | Age: 79
End: 2024-05-09
Payer: COMMERCIAL

## 2024-05-09 DIAGNOSIS — Z79.899 ENCOUNTER FOR LONG-TERM (CURRENT) USE OF OTHER MEDICATIONS: Primary | ICD-10-CM

## 2024-05-09 DIAGNOSIS — G62.9 PERIPHERAL POLYNEUROPATHY: ICD-10-CM

## 2024-05-09 RX ORDER — GABAPENTIN 400 MG/1
800 CAPSULE ORAL 2 TIMES DAILY
Qty: 120 CAPSULE | Refills: 1 | Status: SHIPPED | OUTPATIENT
Start: 2024-05-09

## 2024-05-15 LAB — DRUGS UR: NORMAL

## 2024-06-12 DIAGNOSIS — I10 ESSENTIAL HYPERTENSION: ICD-10-CM

## 2024-06-12 RX ORDER — LOSARTAN POTASSIUM 25 MG/1
25 TABLET ORAL DAILY
Qty: 60 TABLET | Refills: 6 | Status: SHIPPED | OUTPATIENT
Start: 2024-06-12

## 2024-06-12 NOTE — TELEPHONE ENCOUNTER
Rx Refill Note  Requested Prescriptions     Pending Prescriptions Disp Refills    losartan (COZAAR) 25 MG tablet [Pharmacy Med Name: Losartan Potassium 25 MG Oral Tablet] 60 tablet 0     Sig: Take 1 tablet by mouth once daily      Last office visit with prescribing clinician: 3/4/24  Last telemedicine visit with prescribing clinician: Visit date not found   Next office visit with prescribing clinician: Visit date not found                         Would you like a call back once the refill request has been completed: [] Yes [] No    If the office needs to give you a call back, can they leave a voicemail: [] Yes [] No    Mena Hernandez RN  06/12/24, 10:04 CDT

## 2024-07-01 ENCOUNTER — OFFICE VISIT (OUTPATIENT)
Dept: INTERNAL MEDICINE | Facility: CLINIC | Age: 79
End: 2024-07-01
Payer: COMMERCIAL

## 2024-07-01 VITALS
HEIGHT: 66 IN | TEMPERATURE: 97.9 F | SYSTOLIC BLOOD PRESSURE: 138 MMHG | OXYGEN SATURATION: 94 % | HEART RATE: 72 BPM | DIASTOLIC BLOOD PRESSURE: 78 MMHG | BODY MASS INDEX: 31.66 KG/M2 | RESPIRATION RATE: 17 BRPM | WEIGHT: 197 LBS

## 2024-07-01 DIAGNOSIS — E66.9 CLASS 1 OBESITY: ICD-10-CM

## 2024-07-01 DIAGNOSIS — Z12.5 SPECIAL SCREENING FOR MALIGNANT NEOPLASM OF PROSTATE: ICD-10-CM

## 2024-07-01 DIAGNOSIS — Z00.00 ROUTINE GENERAL MEDICAL EXAMINATION AT A HEALTH CARE FACILITY: Primary | ICD-10-CM

## 2024-07-01 PROCEDURE — 99397 PER PM REEVAL EST PAT 65+ YR: CPT | Performed by: NURSE PRACTITIONER

## 2024-07-01 RX ORDER — ONDANSETRON 4 MG/1
4 TABLET, FILM COATED ORAL EVERY 8 HOURS PRN
Qty: 30 TABLET | Refills: 1 | Status: SHIPPED | OUTPATIENT
Start: 2024-07-01

## 2024-07-01 NOTE — PROGRESS NOTES
Subjective     Chief Complaint   Patient presents with   • Annual Exam       History of Present Illness    Pt comes in today for his annual exam. He states overall doing well. Denies any chest pain. Has chronic shortness of breath. Currently not a smoker. Today is his birthday. Bowels are normal. Colonoscopy is up to date. No bladder dysfunction except occasional stream issues. He wants to lose weight. His wife lost weight on Wegovy. Had been on metformin in [place].     Otherwise complete ROS reviewed and negative except as mentioned in the HPI.    Past Medical History:   Past Medical History:   Diagnosis Date   • Arthritis    • BPH with obstruction/lower urinary tract symptoms    • Chronic back pain    • Class 1 obesity due to excess calories with serious comorbidity and body mass index (BMI) of 32.0 to 32.9 in adult 01/20/2020   • COPD (chronic obstructive pulmonary disease)    • Elevated cholesterol    • Environmental allergies 09/21/2022   • Essential hypertension 10/14/2019   • GERD (gastroesophageal reflux disease)    • Hyperlipidemia    • Hypertension    • Left bundle branch block    • Lung infiltrate 12/20/2022    Right upper lobe, CT [facility], October 2022   • Obstructive sleep apnea 01/20/2020    pt uses a cpap machine at home   • Sleep apnea     CPAP     Past Surgical History:  Past Surgical History:   Procedure Laterality Date   • CARPAL TUNNEL RELEASE Right 09/29/2021    Procedure: CARPAL TUNNEL RELEASE right;  Surgeon: Luis Perdomo MD;  Location: [facility] OR;  Service: Neurosurgery;  Laterality: Right;   • CARPAL TUNNEL RELEASE Left 8/9/2023    Procedure: CARPAL TUNNEL RELEASE Left;  Surgeon: Luis Perdomo MD;  Location: [facility] OR;  Service: Neurosurgery;  Laterality: Left;   • COLONOSCOPY     • COLONOSCOPY N/A 04/13/2022    Procedure: COLONOSCOPY WITH ANESTHESIA;  Surgeon: Cortes Ribera DO;  Location: [facility] ENDOSCOPY;  Service: Gastroenterology;  Laterality: N/A;  preop; hx of  poylps   postop; polyps   PCP Katerine Mcclain    • KNEE ARTHROPLASTY Right    • KNEE MENISCAL REPAIR Left 2018   • PENILE PROSTHESIS IMPLANT N/A 4/27/2022    Procedure: MALLEABLE PENILE PROSTHESIS PLACEMENT;  Surgeon: Ignacio Moon MD;  Location:  [facility] OR;  Service: Urology;  Laterality: N/A;   • ROTATOR CUFF REPAIR Right 2000   • SPINE SURGERY  1981    lower back ruptured disc   • TOTAL SHOULDER ARTHROPLASTY W/ DISTAL CLAVICLE EXCISION Right 01/14/2020    Procedure: RIGHT REVERSE TOTAL SHOULDER  ARTHROPLASTY;  Surgeon: Suman Ventura MD;  Location:  [facility] OR;  Service: Orthopedics     Social History:  reports that he quit smoking about 13 years ago. His smoking use included pipe and cigarettes. He started smoking about 63 years ago. He has a 50 pack-year smoking history. He has been exposed to tobacco smoke. He has never used smokeless tobacco. He reports current alcohol use of about 5.0 standard drinks of alcohol per week. He reports that he does not use drugs.    Family History: family history includes Arthritis in his father and mother; Cancer in his brother; Diabetes in his mother and sister; Hypertension in his father and mother; Kidney disease in his mother; Obesity in his sister; Osteoporosis in his mother; Stroke in his father.       Allergies:  No Known Allergies  Medications:  Prior to Admission medications    Medication Sig Start Date End Date Taking? Authorizing Provider   acetaminophen (TYLENOL) 650 MG 8 hr tablet Take 1 tablet by mouth 2 (Two) Times a Day.    Provider, MD Cristian   albuterol sulfate  (90 Base) MCG/ACT inhaler Inhale 2 puffs Every 4 (Four) Hours As Needed for Wheezing. 4/11/22   Hien Pimentel APRN   amLODIPine (NORVASC) 5 MG tablet TAKE ONE TABLET BY MOUTH DAILY 1/4/24   Katerine Mcclain APRN   aspirin 81 MG EC tablet Take 1 tablet by mouth Daily.    Provider, MD Cristian   Azelastine HCl 137 MCG/SPRAY solution SPRAY TWO SPRAYS NASALLY TWICE  DAILY AS DIRECTED 1/22/24   Katerine Mcclain APRN   betamethasone dipropionate 0.05 % cream APPLY TO AFFECTED AREA AS DIRECTED DAILY 8/1/23   Katerine Mcclain APRN   bisoprolol (ZEBeta) 5 MG tablet TAKE ONE TABLET BY MOUTH DAILY 3/11/24   Katerine Mcclain APRN   budesonide-formoterol (Symbicort) 160-4.5 MCG/ACT inhaler Inhale 2 puffs 2 (Two) Times a Day for 90 days. 2/16/24 5/16/24  Katernie Mcclain APRN   calcium carbonate (OS-TASHIA) 600 MG tablet Take 1 tablet by mouth 2 (Two) Times a Day.    Cristian Pfeiffer MD   cetirizine (zyrTEC) 10 MG tablet Take 1 tablet by mouth Daily for 5 days. 7/22/22 7/27/23  Abimael Christian MD   Cholecalciferol (VITAMIN D3) 1000 units capsule Take 2 capsules by mouth Daily.    Cristian Pfeiffer MD   cloNIDine (Catapres) 0.1 MG tablet Take 1 tablet by mouth 2 (Two) Times a Day. 3/7/24   Katerine Mcclain APRN   coenzyme Q10 100 MG capsule Take 1 capsule by mouth Daily.    Cristian Pfeiffer MD   Collagen-Boron-Hyaluronic Acid (Pintics) 10-5-3.3 MG tablet Take 1 tablet by mouth Daily.    Cristian Pfeiffer MD   docusate sodium (Colace) 100 MG capsule Take 1 capsule by mouth 2 (Two) Times a Day. 4/27/22   Ignacio Moon MD   EPINEPHrine (EPIPEN) 0.3 MG/0.3ML solution auto-injector injection INJECT 0.3ML INTO THE APPROPRIATE MUSCLE AS DIRECTED BY PRESCRIBER ONE TIME FOR ONE DOSE 7/28/23   Cristian Pfeiffer MD   finasteride (PROSCAR) 5 MG tablet Take 1 tablet by mouth Daily. 12/7/23   Ignacio Moon MD   gabapentin (NEURONTIN) 400 MG capsule Take 2 capsules by mouth 2 (Two) Times a Day. 5/9/24   Katerine Mcclain APRN   Garlic 1000 MG capsule Take 1 capsule by mouth Daily.    Cristian Pfeiffer MD   ibuprofen (ADVIL,MOTRIN) 200 MG tablet Take 2 tablets by mouth Every 6 (Six) Hours As Needed for Mild Pain.    Provider, MD Cristian   Krill Oil 300 MG capsule Take 300 mg by mouth Daily.     "Cristian Pfeiffer MD   losartan (COZAAR) 25 MG tablet Take 1 tablet by mouth once daily 6/12/24   Katerine Mcclain APRN   Multiple Vitamins-Minerals (OCUVITE ADULT 50+ PO) Take 1 tablet by mouth Daily.    Cristian Pfeiffer MD   NON FORMULARY CPAP    Cristian Pfeiffer MD   omeprazole (priLOSEC) 20 MG capsule Take 1 capsule by mouth Daily.    Cristian Pfeiffer MD   simvastatin (ZOCOR) 40 MG tablet TAKE ONE TABLET BY MOUTH NIGHTLY 2/26/24   Katerine Mcclain APRN   SUPER B COMPLEX/C PO Take 1 tablet by mouth Every Night.    Cristian Pfeiffer MD   tamsulosin (FLOMAX) 0.4 MG capsule 24 hr capsule Take 2 capsules by mouth Every Night. 12/7/23   Ignacio Moon MD   tiotropium bromide monohydrate (Spiriva Respimat) 2.5 MCG/ACT aerosol solution inhaler Inhale 2 puffs Daily. 4/4/24   Katerine Mcclain APRN   tiotropium bromide monohydrate (Spiriva Respimat) 2.5 MCG/ACT aerosol solution inhaler Inhale 2 puffs Daily for 90 days. 4/15/24 7/14/24  Hien Pimentel APRN   triamcinolone (KENALOG) 0.5 % ointment Apply 1 application topically to the appropriate area as directed 2 (Two) Times a Day. 6/13/23   Katerine Mcclain APRN   vitamin C (ASCORBIC ACID) 500 MG tablet Take 1 tablet by mouth Daily.    Cristian Pfeiffer MD       Objective     Vital Signs: /78   Pulse 72   Temp 97.9 °F (36.6 °C)   Resp 17   Ht 167.6 cm (66\")   Wt 89.4 kg (197 lb)   SpO2 94%   BMI 31.80 kg/m²     Physical Exam    Physical Exam  Vitals reviewed.   Constitutional:       Appearance: He is well-developed. He is obese.   HENT:      Head: Normocephalic and atraumatic.      Right Ear: Tympanic membrane normal.      Left Ear: Tympanic membrane normal.   Eyes:      Pupils: Pupils are equal, round, and reactive to light.   Neck:      Vascular: No JVD.   Cardiovascular:      Rate and Rhythm: Normal rate and regular rhythm.   Pulmonary:      Effort: Pulmonary effort is normal.      Breath " sounds: Normal breath sounds.      Comments: Midly diminished otherwise clear.   Abdominal:      General: Bowel sounds are normal.      Palpations: Abdomen is soft.      Comments: Protuberant abdomen.    Musculoskeletal:         General: No deformity.      Cervical back: Normal range of motion and neck supple.   Lymphadenopathy:      Cervical: No cervical adenopathy.   Skin:     General: Skin is warm and dry.   Neurological:      General: No focal deficit present.      Mental Status: He is alert and oriented to person, place, and time.   Psychiatric:         Behavior: Behavior normal.         Thought Content: Thought content normal.         Judgment: Judgment normal.     Results Reviewed:  Glucose   Date Value Ref Range Status   07/27/2023 107 (H) 65 - 99 mg/dL Final   12/22/2020 92 74 - 109 mg/dL Final     BUN   Date Value Ref Range Status   07/27/2023 12 8 - 23 mg/dL Final   12/22/2020 16 8 - 23 mg/dL Final     Creatinine   Date Value Ref Range Status   07/27/2023 0.77 0.76 - 1.27 mg/dL Final   12/22/2020 0.8 0.5 - 1.2 mg/dL Final     Sodium   Date Value Ref Range Status   07/27/2023 141 136 - 145 mmol/L Final   12/22/2020 139 136 - 145 mmol/L Final     Potassium   Date Value Ref Range Status   07/27/2023 4.1 3.5 - 5.2 mmol/L Final   12/22/2020 4.1 3.5 - 5.0 mmol/L Final     Chloride   Date Value Ref Range Status   07/27/2023 106 98 - 107 mmol/L Final   12/22/2020 102 98 - 111 mmol/L Final     CO2   Date Value Ref Range Status   07/27/2023 24.0 22.0 - 29.0 mmol/L Final   12/22/2020 25 22 - 29 mmol/L Final     Calcium   Date Value Ref Range Status   07/27/2023 9.4 8.6 - 10.5 mg/dL Final   12/22/2020 9.4 8.8 - 10.2 mg/dL Final     ALT (SGPT)   Date Value Ref Range Status   07/27/2023 15 1 - 41 U/L Final     AST (SGOT)   Date Value Ref Range Status   07/27/2023 16 1 - 40 U/L Final     WBC   Date Value Ref Range Status   07/27/2023 4.95 3.40 - 10.80 10*3/mm3 Final   03/10/2023 4.4 3.4 - 10.8 x10E3/uL Final    12/22/2020 6.6 4.8 - 10.8 K/uL Final     Hematocrit   Date Value Ref Range Status   07/27/2023 39.8 37.5 - 51.0 % Final   12/22/2020 40.7 (L) 42.0 - 52.0 % Final     Platelets   Date Value Ref Range Status   07/27/2023 154 140 - 450 10*3/mm3 Final   12/22/2020 175 130 - 400 K/uL Final     Triglycerides   Date Value Ref Range Status   03/10/2023 109 0 - 149 mg/dL Final     HDL Cholesterol   Date Value Ref Range Status   03/10/2023 53 >39 mg/dL Final     LDL Chol Calc (NIH)   Date Value Ref Range Status   03/10/2023 90 0 - 99 mg/dL Final     Hemoglobin A1C   Date Value Ref Range Status   09/17/2021 5.4 % Final       Results        Assessment / Plan     Assessment/Plan:  Diagnoses and all orders for this visit:    1. Routine general medical examination at a health care facility (Primary)  -     CBC & Differential  -     Comprehensive Metabolic Panel  -     Lipid Panel  -     TSH  -     Vitamin B12  -     Hemoglobin A1c  -     T4, Free    2. Special screening for malignant neoplasm of prostate  -     PSA Screen    3. Class 1 obesity  -     Semaglutide-Weight Management 0.25 MG/0.5ML solution auto-injector; Inject 0.5 mL under the skin into the appropriate area as directed 1 (One) Time Per Week.  Dispense: 2 mL; Refill: 0  -     ondansetron (Zofran) 4 MG tablet; Take 1 tablet by mouth Every 8 (Eight) Hours As Needed for Nausea or Vomiting.  Dispense: 30 tablet; Refill: 1      Will have lab work here today. Discussed weight management and importance of maintaining a healthy weight. Discussed Vitamin D intake and the importance of adequate vitamin D for both Bone Health and a healthy immune system.  Discussed daily exercise and the importance of same, in regards to a healthy heart as well as helping to maintain his weight and improving his mental health.  BMI is elevated wegovy is being started.  Colonoscopy is up to date. PSA is pending.     Assessment & Plan        Return in about 1 month (around 8/1/2024). unless  patient needs to be seen sooner or acute issues arise.    Code Status: Full.   Patient or patient representative verbalized consent for the use of Ambient Listening during the visit with  SALONI Kaur for chart documentation. 7/1/2024  10:58 CDT    I have discussed the patient results/orders and and plan/recommendation with them at today's visit.      Signed by:    SALONI Kaur Date: 07/01/24

## 2024-07-02 LAB
ALBUMIN SERPL-MCNC: 4.3 G/DL (ref 3.8–4.8)
ALP SERPL-CCNC: 65 IU/L (ref 44–121)
ALT SERPL-CCNC: 24 IU/L (ref 0–44)
AST SERPL-CCNC: 25 IU/L (ref 0–40)
BASOPHILS # BLD AUTO: 0.1 X10E3/UL (ref 0–0.2)
BASOPHILS NFR BLD AUTO: 1 %
BILIRUB SERPL-MCNC: 0.5 MG/DL (ref 0–1.2)
BUN SERPL-MCNC: 14 MG/DL (ref 8–27)
BUN/CREAT SERPL: 15 (ref 10–24)
CALCIUM SERPL-MCNC: 9.2 MG/DL (ref 8.6–10.2)
CHLORIDE SERPL-SCNC: 104 MMOL/L (ref 96–106)
CHOLEST SERPL-MCNC: 189 MG/DL (ref 100–199)
CO2 SERPL-SCNC: 23 MMOL/L (ref 20–29)
CREAT SERPL-MCNC: 0.94 MG/DL (ref 0.76–1.27)
EGFRCR SERPLBLD CKD-EPI 2021: 82 ML/MIN/1.73
EOSINOPHIL # BLD AUTO: 0.3 X10E3/UL (ref 0–0.4)
EOSINOPHIL NFR BLD AUTO: 6 %
ERYTHROCYTE [DISTWIDTH] IN BLOOD BY AUTOMATED COUNT: 12.2 % (ref 11.6–15.4)
GLOBULIN SER CALC-MCNC: 2.4 G/DL (ref 1.5–4.5)
GLUCOSE SERPL-MCNC: 108 MG/DL (ref 70–99)
HBA1C MFR BLD: 5.7 % (ref 4.8–5.6)
HCT VFR BLD AUTO: 39.2 % (ref 37.5–51)
HDLC SERPL-MCNC: 55 MG/DL
HGB BLD-MCNC: 13.4 G/DL (ref 13–17.7)
IMM GRANULOCYTES # BLD AUTO: 0 X10E3/UL (ref 0–0.1)
IMM GRANULOCYTES NFR BLD AUTO: 0 %
LDLC SERPL CALC-MCNC: 112 MG/DL (ref 0–99)
LYMPHOCYTES # BLD AUTO: 1.1 X10E3/UL (ref 0.7–3.1)
LYMPHOCYTES NFR BLD AUTO: 25 %
MCH RBC QN AUTO: 33.6 PG (ref 26.6–33)
MCHC RBC AUTO-ENTMCNC: 34.2 G/DL (ref 31.5–35.7)
MCV RBC AUTO: 98 FL (ref 79–97)
MONOCYTES # BLD AUTO: 0.7 X10E3/UL (ref 0.1–0.9)
MONOCYTES NFR BLD AUTO: 15 %
NEUTROPHILS # BLD AUTO: 2.4 X10E3/UL (ref 1.4–7)
NEUTROPHILS NFR BLD AUTO: 53 %
PLATELET # BLD AUTO: 156 X10E3/UL (ref 150–450)
POTASSIUM SERPL-SCNC: 3.8 MMOL/L (ref 3.5–5.2)
PROT SERPL-MCNC: 6.7 G/DL (ref 6–8.5)
PSA SERPL-MCNC: 0.3 NG/ML (ref 0–4)
RBC # BLD AUTO: 3.99 X10E6/UL (ref 4.14–5.8)
SODIUM SERPL-SCNC: 141 MMOL/L (ref 134–144)
T4 FREE SERPL-MCNC: 1.11 NG/DL (ref 0.82–1.77)
TRIGL SERPL-MCNC: 126 MG/DL (ref 0–149)
TSH SERPL DL<=0.005 MIU/L-ACNC: 1.39 UIU/ML (ref 0.45–4.5)
VIT B12 SERPL-MCNC: 592 PG/ML (ref 232–1245)
VLDLC SERPL CALC-MCNC: 22 MG/DL (ref 5–40)
WBC # BLD AUTO: 4.5 X10E3/UL (ref 3.4–10.8)

## 2024-07-17 DIAGNOSIS — G62.9 PERIPHERAL POLYNEUROPATHY: ICD-10-CM

## 2024-07-17 RX ORDER — GABAPENTIN 400 MG/1
800 CAPSULE ORAL 2 TIMES DAILY
Qty: 120 CAPSULE | Refills: 0 | Status: SHIPPED | OUTPATIENT
Start: 2024-07-17

## 2024-08-01 ENCOUNTER — OFFICE VISIT (OUTPATIENT)
Dept: INTERNAL MEDICINE | Facility: CLINIC | Age: 79
End: 2024-08-01
Payer: COMMERCIAL

## 2024-08-01 VITALS
TEMPERATURE: 97.9 F | SYSTOLIC BLOOD PRESSURE: 137 MMHG | HEART RATE: 69 BPM | RESPIRATION RATE: 18 BRPM | WEIGHT: 187 LBS | HEIGHT: 66 IN | BODY MASS INDEX: 30.05 KG/M2 | OXYGEN SATURATION: 96 % | DIASTOLIC BLOOD PRESSURE: 78 MMHG

## 2024-08-01 DIAGNOSIS — E66.9 CLASS 1 OBESITY: Primary | ICD-10-CM

## 2024-08-01 PROCEDURE — 99213 OFFICE O/P EST LOW 20 MIN: CPT | Performed by: NURSE PRACTITIONER

## 2024-08-01 RX ORDER — SEMAGLUTIDE 0.5 MG/.5ML
0.5 INJECTION, SOLUTION SUBCUTANEOUS WEEKLY
Qty: 2 ML | Refills: 0 | Status: SHIPPED | OUTPATIENT
Start: 2024-08-01

## 2024-08-01 NOTE — PROGRESS NOTES
Subjective     Chief Complaint   Patient presents with    Obesity       History of Present Illness  The patient is a 79-year-old male who presents for follow-up.    The patient has been on a regimen of Wegovy 0.25 mg weekly for the past month, having received four injections to date. He has experienced a weight loss of 10 pounds. He reports no difficulty in administering the injection and denies experiencing nausea, vomiting, bloating, or flatulence. He does, however, report occasional constipation. His dietary habits have improved and he is making efforts to improve his diet. His respiratory function is satisfactory.    SOCIAL HISTORY  He has cut down on his alcohol intake.    Patient's PMR from outside medical facility reviewed and noted.    Otherwise complete ROS reviewed and negative except as mentioned in the HPI.    Past Medical History:   Past Medical History:   Diagnosis Date    Arthritis     BPH with obstruction/lower urinary tract symptoms     Chronic back pain     Class 1 obesity due to excess calories with serious comorbidity and body mass index (BMI) of 32.0 to 32.9 in adult 01/20/2020    COPD (chronic obstructive pulmonary disease)     Elevated cholesterol     Environmental allergies 09/21/2022    Essential hypertension 10/14/2019    GERD (gastroesophageal reflux disease)     Hyperlipidemia     Hypertension     Left bundle branch block     Lung infiltrate 12/20/2022    Right upper lobe, [facility], October 2022    Obstructive sleep apnea 01/20/2020    pt uses a cpap machine at home    Sleep apnea     CPAP     Past Surgical History:  Past Surgical History:   Procedure Laterality Date    CARPAL TUNNEL RELEASE Right 09/29/2021    Procedure: CARPAL TUNNEL RELEASE right;  Surgeon: Luis Perdomo MD;  Location: [facility] OR;  Service: Neurosurgery;  Laterality: Right;    CARPAL TUNNEL RELEASE Left 8/9/2023    Procedure: CARPAL TUNNEL RELEASE Left;  Surgeon: Luis Perdomo MD;  Location: [facility] OR;   Service: Neurosurgery;  Laterality: Left;    COLONOSCOPY      COLONOSCOPY N/A 04/13/2022    Procedure: COLONOSCOPY WITH ANESTHESIA;  Surgeon: Cortes Ribera DO;  Location:  [facility];  Service: Gastroenterology;  Laterality: N/A;  preop; hx of poylps   postop; polyps   PCP Katerine Mcclain     KNEE ARTHROPLASTY Right     KNEE MENISCAL REPAIR Left 2018    PENILE PROSTHESIS IMPLANT N/A 4/27/2022    Procedure: MALLEABLE PENILE PROSTHESIS PLACEMENT;  Surgeon: Ignacio Moon MD;  Location:  [facility] OR;  Service: Urology;  Laterality: N/A;    ROTATOR CUFF REPAIR Right 2000    SPINE SURGERY  1981    lower back ruptured disc    TOTAL SHOULDER ARTHROPLASTY W/ DISTAL CLAVICLE EXCISION Right 01/14/2020    Procedure: RIGHT REVERSE TOTAL SHOULDER  ARTHROPLASTY;  Surgeon: Suman Ventura MD;  Location:  [facility] OR;  Service: Orthopedics     Social History:  reports that he quit smoking about 13 years ago. His smoking use included pipe and cigarettes. He started smoking about 63 years ago. He has a 50 pack-year smoking history. He has been exposed to tobacco smoke. He has never used smokeless tobacco. He reports current alcohol use of about 5.0 standard drinks of alcohol per week. He reports that he does not use drugs.    Family History: family history includes Arthritis in his father and mother; Cancer in his brother; Diabetes in his mother and sister; Hypertension in his father and mother; Kidney disease in his mother; Obesity in his sister; Osteoporosis in his mother; Stroke in his father.       Allergies:  No Known Allergies  Medications:  Prior to Admission medications    Medication Sig Start Date End Date Taking? Authorizing Provider   acetaminophen (TYLENOL) 650 MG 8 hr tablet Take 1 tablet by mouth 2 (Two) Times a Day.    Provider, MD Cristian   albuterol sulfate  (90 Base) MCG/ACT inhaler Inhale 2 puffs Every 4 (Four) Hours As Needed for Wheezing. 4/11/22   Hien Pimentel APRN   amLODIPine  (NORVASC) 5 MG tablet TAKE ONE TABLET BY MOUTH DAILY 1/4/24   Katerine Mcclain APRN   aspirin 81 MG EC tablet Take 1 tablet by mouth Daily.    Cristian Pfeiffer MD   Azelastine HCl 137 MCG/SPRAY solution SPRAY TWO SPRAYS NASALLY TWICE DAILY AS DIRECTED 1/22/24   Katerine Mcclain APRN   betamethasone dipropionate 0.05 % cream APPLY TO AFFECTED AREA AS DIRECTED DAILY 8/1/23   Katerine Mcclain APRN   bisoprolol (ZEBeta) 5 MG tablet TAKE ONE TABLET BY MOUTH DAILY 3/11/24   Katerine Mcclain APRN   budesonide-formoterol (Symbicort) 160-4.5 MCG/ACT inhaler Inhale 2 puffs 2 (Two) Times a Day for 90 days. 2/16/24 5/16/24  Katerine Mcclain APRN   calcium carbonate (OS-TASHIA) 600 MG tablet Take 1 tablet by mouth 2 (Two) Times a Day.    Cristian Pfeiffer MD   cetirizine (zyrTEC) 10 MG tablet Take 1 tablet by mouth Daily for 5 days. 7/22/22 7/27/23  Abimael Christian MD   Cholecalciferol (VITAMIN D3) 1000 units capsule Take 2 capsules by mouth Daily.    Cristian Pfeiffer MD   cloNIDine (Catapres) 0.1 MG tablet Take 1 tablet by mouth 2 (Two) Times a Day. 3/7/24   Katerine Mcclain APRN   coenzyme Q10 100 MG capsule Take 1 capsule by mouth Daily.    Cristian Pfeiffer MD   Collagen-Boron-Hyaluronic Acid (Move Free PadSquad) 10-5-3.3 MG tablet Take 1 tablet by mouth Daily.    Cristian Pfeiffer MD   docusate sodium (Colace) 100 MG capsule Take 1 capsule by mouth 2 (Two) Times a Day. 4/27/22   Ignacio Moon MD   EPINEPHrine (EPIPEN) 0.3 MG/0.3ML solution auto-injector injection INJECT 0.3ML INTO THE APPROPRIATE MUSCLE AS DIRECTED BY PRESCRIBER ONE TIME FOR ONE DOSE 7/28/23   Cristian Pfeiffer MD   finasteride (PROSCAR) 5 MG tablet Take 1 tablet by mouth Daily. 12/7/23   Ignacio Moon MD   gabapentin (NEURONTIN) 400 MG capsule Take 2 capsules by mouth twice daily 7/17/24   Katerine Mcclain APRN   Garlic 1000 MG capsule Take 1 capsule by mouth  Daily.    Cristian Pfeiffer MD   ibuprofen (ADVIL,MOTRIN) 200 MG tablet Take 2 tablets by mouth Every 6 (Six) Hours As Needed for Mild Pain.    Cristian Pfeiffer MD   Krill Oil 300 MG capsule Take 300 mg by mouth Daily.    Cristian Pfeiffer MD   losartan (COZAAR) 25 MG tablet Take 1 tablet by mouth once daily 6/12/24   Katerine Mcclain APRN   Multiple Vitamins-Minerals (OCUVITE ADULT 50+ PO) Take 1 tablet by mouth Daily.    Cristian Pfeiffer MD   NON FORMULARY CPAP    Cristian Pfeiffer MD   omeprazole (priLOSEC) 20 MG capsule Take 1 capsule by mouth Daily.    Cristian Pfeiffer MD   ondansetron (Zofran) 4 MG tablet Take 1 tablet by mouth Every 8 (Eight) Hours As Needed for Nausea or Vomiting. 7/1/24   Katerine Mcclain APRN   Semaglutide-Weight Management (Wegovy) 0.5 MG/0.5ML solution auto-injector Inject 0.5 mL under the skin into the appropriate area as directed 1 (One) Time Per Week. 8/1/24   Katerine Mcclain APRN   simvastatin (ZOCOR) 40 MG tablet TAKE ONE TABLET BY MOUTH NIGHTLY 2/26/24   Katerine Mcclain APRN   SUPER B COMPLEX/C PO Take 1 tablet by mouth Every Night.    Cristian Pfeiffer MD   tamsulosin (FLOMAX) 0.4 MG capsule 24 hr capsule Take 2 capsules by mouth Every Night. 12/7/23   Ignacio Moon MD   tiotropium bromide monohydrate (Spiriva Respimat) 2.5 MCG/ACT aerosol solution inhaler Inhale 2 puffs Daily. 4/4/24   Katerine Mcclain APRN   tiotropium bromide monohydrate (Spiriva Respimat) 2.5 MCG/ACT aerosol solution inhaler Inhale 2 puffs Daily for 90 days. 4/15/24 7/14/24  Hien Pimentel APRN   triamcinolone (KENALOG) 0.5 % ointment Apply 1 application topically to the appropriate area as directed 2 (Two) Times a Day. 6/13/23   Katerine Mcclain APRN   vitamin C (ASCORBIC ACID) 500 MG tablet Take 1 tablet by mouth Daily.    Cristian Pfeiffer MD   Semaglutide-Weight Management 0.25 MG/0.5ML solution auto-injector  "Inject 0.5 mL under the skin into the appropriate area as directed 1 (One) Time Per Week. 7/1/24 8/1/24  Katerine Mcclain APRN       Objective     Vital Signs: /78   Pulse 69   Temp 97.9 °F (36.6 °C)   Resp 18   Ht 167.6 cm (66\")   Wt 84.8 kg (187 lb)   SpO2 96%   BMI 30.18 kg/m²     Physical Exam    Physical Exam  Vitals reviewed.   Constitutional:       Appearance: He is well-developed. He is obese.   HENT:      Head: Normocephalic and atraumatic.   Eyes:      Pupils: Pupils are equal, round, and reactive to light.   Neck:      Vascular: No JVD.   Cardiovascular:      Rate and Rhythm: Normal rate and regular rhythm.   Pulmonary:      Effort: Pulmonary effort is normal.      Breath sounds: Normal breath sounds.   Abdominal:      General: Bowel sounds are normal.      Palpations: Abdomen is soft.   Musculoskeletal:         General: No deformity.      Cervical back: Normal range of motion and neck supple.   Lymphadenopathy:      Cervical: No cervical adenopathy.   Skin:     General: Skin is warm and dry.   Neurological:      Mental Status: He is alert and oriented to person, place, and time.   Psychiatric:         Behavior: Behavior normal.         Thought Content: Thought content normal.         Judgment: Judgment normal.       Results Reviewed:  Glucose   Date Value Ref Range Status   07/01/2024 108 (H) 70 - 99 mg/dL Final   07/27/2023 107 (H) 65 - 99 mg/dL Final   12/22/2020 92 74 - 109 mg/dL Final     BUN   Date Value Ref Range Status   07/01/2024 14 8 - 27 mg/dL Final   07/27/2023 12 8 - 23 mg/dL Final   12/22/2020 16 8 - 23 mg/dL Final     Creatinine   Date Value Ref Range Status   07/01/2024 0.94 0.76 - 1.27 mg/dL Final   07/27/2023 0.77 0.76 - 1.27 mg/dL Final   12/22/2020 0.8 0.5 - 1.2 mg/dL Final     Sodium   Date Value Ref Range Status   07/01/2024 141 134 - 144 mmol/L Final   07/27/2023 141 136 - 145 mmol/L Final   12/22/2020 139 136 - 145 mmol/L Final     Potassium   Date Value Ref " Range Status   07/01/2024 3.8 3.5 - 5.2 mmol/L Final   07/27/2023 4.1 3.5 - 5.2 mmol/L Final   12/22/2020 4.1 3.5 - 5.0 mmol/L Final     Chloride   Date Value Ref Range Status   07/01/2024 104 96 - 106 mmol/L Final   07/27/2023 106 98 - 107 mmol/L Final   12/22/2020 102 98 - 111 mmol/L Final     CO2   Date Value Ref Range Status   07/27/2023 24.0 22.0 - 29.0 mmol/L Final   12/22/2020 25 22 - 29 mmol/L Final     Total CO2   Date Value Ref Range Status   07/01/2024 23 20 - 29 mmol/L Final     Calcium   Date Value Ref Range Status   07/01/2024 9.2 8.6 - 10.2 mg/dL Final   07/27/2023 9.4 8.6 - 10.5 mg/dL Final   12/22/2020 9.4 8.8 - 10.2 mg/dL Final     ALT (SGPT)   Date Value Ref Range Status   07/01/2024 24 0 - 44 IU/L Final   07/27/2023 15 1 - 41 U/L Final     AST (SGOT)   Date Value Ref Range Status   07/01/2024 25 0 - 40 IU/L Final   07/27/2023 16 1 - 40 U/L Final     WBC   Date Value Ref Range Status   07/01/2024 4.5 3.4 - 10.8 x10E3/uL Final   12/22/2020 6.6 4.8 - 10.8 K/uL Final     Hematocrit   Date Value Ref Range Status   07/01/2024 39.2 37.5 - 51.0 % Final   07/27/2023 39.8 37.5 - 51.0 % Final   12/22/2020 40.7 (L) 42.0 - 52.0 % Final     Platelets   Date Value Ref Range Status   07/01/2024 156 150 - 450 x10E3/uL Final   07/27/2023 154 140 - 450 10*3/mm3 Final   12/22/2020 175 130 - 400 K/uL Final     Triglycerides   Date Value Ref Range Status   07/01/2024 126 0 - 149 mg/dL Final     HDL Cholesterol   Date Value Ref Range Status   07/01/2024 55 >39 mg/dL Final     LDL Chol Calc ([facility])   Date Value Ref Range Status   07/01/2024 112 (H) 0 - 99 mg/dL Final     Hemoglobin A1C   Date Value Ref Range Status   07/01/2024 5.7 (H) 4.8 - 5.6 % Final     Comment:              Prediabetes: 5.7 - 6.4           Diabetes: >6.4           Glycemic control for adults with diabetes: <7.0     09/17/2021 5.4 % Final       Results        Assessment / Plan     Assessment/Plan:  Diagnoses and all orders for this visit:    1. Class  1 obesity (Primary)  -     Semaglutide-Weight Management (Wegovy) 0.5 MG/0.5ML solution auto-injector; Inject 0.5 mL under the skin into the appropriate area as directed 1 (One) Time Per Week.  Dispense: 2 mL; Refill: 0        Assessment & Plan  1. Weight management.  The dosage of Wegovy will be escalated.    No follow-ups on file. unless patient needs to be seen sooner or acute issues arise.    Code Status: Full.   Patient or patient representative verbalized consent for the use of Ambient Listening during the visit with  SALONI Kaur for chart documentation. 8/1/2024  10:26 CDT  I have discussed the patient results/orders and and plan/recommendation with them at today's visit.      Signed by:    SALONI Kaur Date: 08/01/24

## 2024-08-17 DIAGNOSIS — G62.9 PERIPHERAL POLYNEUROPATHY: ICD-10-CM

## 2024-08-19 DIAGNOSIS — G62.9 PERIPHERAL POLYNEUROPATHY: ICD-10-CM

## 2024-08-19 RX ORDER — GABAPENTIN 400 MG/1
800 CAPSULE ORAL 2 TIMES DAILY
Qty: 120 CAPSULE | Refills: 0 | Status: SHIPPED | OUTPATIENT
Start: 2024-08-19

## 2024-08-19 RX ORDER — GABAPENTIN 400 MG/1
800 CAPSULE ORAL 2 TIMES DAILY
Qty: 120 CAPSULE | Refills: 0 | OUTPATIENT
Start: 2024-08-19

## 2024-08-29 RX ORDER — SEMAGLUTIDE 1 MG/.5ML
1 INJECTION, SOLUTION SUBCUTANEOUS WEEKLY
Qty: 2 ML | Refills: 1 | Status: SHIPPED | OUTPATIENT
Start: 2024-08-29

## 2024-09-03 ENCOUNTER — OFFICE VISIT (OUTPATIENT)
Dept: INTERNAL MEDICINE | Facility: CLINIC | Age: 79
End: 2024-09-03
Payer: COMMERCIAL

## 2024-09-03 VITALS
WEIGHT: 187 LBS | HEIGHT: 66 IN | DIASTOLIC BLOOD PRESSURE: 79 MMHG | HEART RATE: 77 BPM | BODY MASS INDEX: 30.05 KG/M2 | TEMPERATURE: 97.8 F | RESPIRATION RATE: 17 BRPM | OXYGEN SATURATION: 97 % | SYSTOLIC BLOOD PRESSURE: 123 MMHG

## 2024-09-03 DIAGNOSIS — H69.92 EUSTACHIAN TUBE DISORDER, LEFT: Primary | ICD-10-CM

## 2024-09-03 PROCEDURE — 99213 OFFICE O/P EST LOW 20 MIN: CPT | Performed by: NURSE PRACTITIONER

## 2024-09-03 RX ORDER — PREDNISONE 20 MG/1
TABLET ORAL
Qty: 7 TABLET | Refills: 0 | Status: SHIPPED | OUTPATIENT
Start: 2024-09-03

## 2024-09-03 NOTE — PROGRESS NOTES
Subjective     Chief Complaint   Patient presents with    Earache     left       History of Present Illness  The patient is a 79-year-old male who presents for evaluation of left ear pain.    He experiences intermittent sharp pain in his left ear, but reports no discharge from the ear. He also mentions an increase in nasal congestion and mucus production, which he attributes to his nightly use of a CPAP machine. He denies having any fever. Additionally, he describes a sensation of fluid in his right ear.        Otherwise complete ROS reviewed and negative except as mentioned in the HPI.    Past Medical History:   Past Medical History:   Diagnosis Date    Arthritis     BPH with obstruction/lower urinary tract symptoms     Chronic back pain     Class 1 obesity due to excess calories with serious comorbidity and body mass index (BMI) of 32.0 to 32.9 in adult 01/20/2020    COPD (chronic obstructive pulmonary disease)     Elevated cholesterol     Environmental allergies 09/21/2022    Essential hypertension 10/14/2019    GERD (gastroesophageal reflux disease)     Hyperlipidemia     Hypertension     Left bundle branch block     Lung infiltrate 12/20/2022    Right upper lobe, CT [employer], October 2022    Obstructive sleep apnea 01/20/2020    pt uses a cpap machine at home    Sleep apnea     CPAP     Past Surgical History:  Past Surgical History:   Procedure Laterality Date    CARPAL TUNNEL RELEASE Right 09/29/2021    Procedure: CARPAL TUNNEL RELEASE right;  Surgeon: Luis Perdomo MD;  Location: [facility] OR;  Service: Neurosurgery;  Laterality: Right;    CARPAL TUNNEL RELEASE Left 8/9/2023    Procedure: CARPAL TUNNEL RELEASE Left;  Surgeon: Luis Perdomo MD;  Location: [facility] OR;  Service: Neurosurgery;  Laterality: Left;    COLONOSCOPY      COLONOSCOPY N/A 04/13/2022    Procedure: COLONOSCOPY WITH ANESTHESIA;  Surgeon: Cortes Ribera DO;  Location: [facility] ENDOSCOPY;  Service: Gastroenterology;  Laterality:  N/A;  preop; hx of poylps   postop; polyps   PCP Katerine Mcclain     KNEE ARTHROPLASTY Right     KNEE MENISCAL REPAIR Left 2018    PENILE PROSTHESIS IMPLANT N/A 4/27/2022    Procedure: MALLEABLE PENILE PROSTHESIS PLACEMENT;  Surgeon: Ignacio Moon MD;  Location:  [facility] OR;  Service: Urology;  Laterality: N/A;    ROTATOR CUFF REPAIR Right 2000    SPINE SURGERY  1981    lower back ruptured disc    TOTAL SHOULDER ARTHROPLASTY W/ DISTAL CLAVICLE EXCISION Right 01/14/2020    Procedure: RIGHT REVERSE TOTAL SHOULDER  ARTHROPLASTY;  Surgeon: Suman Ventura MD;  Location:  [facility] OR;  Service: Orthopedics     Social History:  reports that he quit smoking about 13 years ago. His smoking use included pipe and cigarettes. He started smoking about 63 years ago. He has a 50 pack-year smoking history. He has been exposed to tobacco smoke. He has never used smokeless tobacco. He reports current alcohol use of about 5.0 standard drinks of alcohol per week. He reports that he does not use drugs.    Family History: family history includes Arthritis in his father and mother; Cancer in his brother; Diabetes in his mother and sister; Hypertension in his father and mother; Kidney disease in his mother; Obesity in his sister; Osteoporosis in his mother; Stroke in his father.       Allergies:  No Known Allergies  Medications:  Prior to Admission medications    Medication Sig Start Date End Date Taking? Authorizing Provider   acetaminophen (TYLENOL) 650 MG 8 hr tablet Take 1 tablet by mouth 2 (Two) Times a Day.    Provider, MD Cristian   albuterol sulfate  (90 Base) MCG/ACT inhaler Inhale 2 puffs Every 4 (Four) Hours As Needed for Wheezing. 4/11/22   Hien Pimentel APRN   amLODIPine (NORVASC) 5 MG tablet TAKE ONE TABLET BY MOUTH DAILY 1/4/24   Katerine Mcclain APRN   aspirin 81 MG EC tablet Take 1 tablet by mouth Daily.    Provider, MD Cristian   Azelastine HCl 137 MCG/SPRAY solution SPRAY TWO SPRAYS  NASALLY TWICE DAILY AS DIRECTED 1/22/24   Katerine Mcclain APRN   betamethasone dipropionate 0.05 % cream APPLY TO AFFECTED AREA AS DIRECTED DAILY 8/1/23   Katerine Mcclain APRN   bisoprolol (ZEBeta) 5 MG tablet TAKE ONE TABLET BY MOUTH DAILY 3/11/24   Katerine Mcclain APRN   budesonide-formoterol (Symbicort) 160-4.5 MCG/ACT inhaler Inhale 2 puffs 2 (Two) Times a Day for 90 days. 2/16/24 5/16/24  Katerine Mcclain APRN   calcium carbonate (OS-TASHIA) 600 MG tablet Take 1 tablet by mouth 2 (Two) Times a Day.    Cristian Pfeiffer MD   cetirizine (zyrTEC) 10 MG tablet Take 1 tablet by mouth Daily for 5 days. 7/22/22 7/27/23  Abimael Christian MD   Cholecalciferol (VITAMIN D3) 1000 units capsule Take 2 capsules by mouth Daily.    Cristian Pfeiffer MD   cloNIDine (Catapres) 0.1 MG tablet Take 1 tablet by mouth 2 (Two) Times a Day. 3/7/24   Katerine Mcclain APRN   coenzyme Q10 100 MG capsule Take 1 capsule by mouth Daily.    Cristian Pfeiffer MD   Collagen-Boron-Hyaluronic Acid (Move Free Patient Home Monitoring) 10-5-3.3 MG tablet Take 1 tablet by mouth Daily.    Cristian Pfeiffer MD   docusate sodium (Colace) 100 MG capsule Take 1 capsule by mouth 2 (Two) Times a Day. 4/27/22   Ignacio Moon MD   EPINEPHrine (EPIPEN) 0.3 MG/0.3ML solution auto-injector injection INJECT 0.3ML INTO THE APPROPRIATE MUSCLE AS DIRECTED BY PRESCRIBER ONE TIME FOR ONE DOSE 7/28/23   Cristian Pfeiffer MD   finasteride (PROSCAR) 5 MG tablet Take 1 tablet by mouth Daily. 12/7/23   Ignacio Moon MD   gabapentin (NEURONTIN) 400 MG capsule Take 2 capsules by mouth twice daily 8/19/24   Katerine Mcclain APRN   Garlic 1000 MG capsule Take 1 capsule by mouth Daily.    Cristian Pfeiffer MD   ibuprofen (ADVIL,MOTRIN) 200 MG tablet Take 2 tablets by mouth Every 6 (Six) Hours As Needed for Mild Pain.    Provider, MD Cristian   Krill Oil 300 MG capsule Take 300 mg by mouth Daily.     "Cristian Pfeiffer MD   losartan (COZAAR) 25 MG tablet Take 1 tablet by mouth once daily 6/12/24   Katerine Mcclain APRN   Multiple Vitamins-Minerals (OCUVITE ADULT 50+ PO) Take 1 tablet by mouth Daily.    Cristian Pfeiffer MD   NON FORMULARY CPAP    Cristian Pfeiffer MD   omeprazole (priLOSEC) 20 MG capsule Take 1 capsule by mouth Daily.    Cristian Pfeiffer MD   ondansetron (Zofran) 4 MG tablet Take 1 tablet by mouth Every 8 (Eight) Hours As Needed for Nausea or Vomiting. 7/1/24   Katerine Mcclain APRN   Semaglutide-Weight Management (Wegovy) 1 MG/0.5ML solution auto-injector Inject 0.5 mL under the skin into the appropriate area as directed 1 (One) Time Per Week. 8/29/24   Katerine Mcclain APRN   simvastatin (ZOCOR) 40 MG tablet TAKE ONE TABLET BY MOUTH NIGHTLY 2/26/24   Katerine Mcclain APRN   SUPER B COMPLEX/C PO Take 1 tablet by mouth Every Night.    Cristian Pfeiffer MD   tamsulosin (FLOMAX) 0.4 MG capsule 24 hr capsule Take 2 capsules by mouth Every Night. 12/7/23   Ignacio Moon MD   tiotropium bromide monohydrate (Spiriva Respimat) 2.5 MCG/ACT aerosol solution inhaler Inhale 2 puffs Daily. 4/4/24   Katerine Mcclain APRN   tiotropium bromide monohydrate (Spiriva Respimat) 2.5 MCG/ACT aerosol solution inhaler Inhale 2 puffs Daily for 90 days. 4/15/24 7/14/24  Hien Pimentel APRN   triamcinolone (KENALOG) 0.5 % ointment Apply 1 application topically to the appropriate area as directed 2 (Two) Times a Day. 6/13/23   Katerine Mcclain APRN   vitamin C (ASCORBIC ACID) 500 MG tablet Take 1 tablet by mouth Daily.    Cristian Pfeiffer MD       Objective     Vital Signs: /79   Pulse 77   Temp 97.8 °F (36.6 °C)   Resp 17   Ht 167.6 cm (66\")   Wt 84.8 kg (187 lb)   SpO2 97%   BMI 30.18 kg/m²     Physical Exam  Fluid noted in the right ear.  Normal lung sounds.  Physical Exam  Vitals reviewed.   Constitutional:       Appearance: " He is well-developed. He is obese.   HENT:      Head: Normocephalic and atraumatic.      Right Ear: Tympanic membrane is bulging.      Left Ear: Tympanic membrane is bulging.   Eyes:      Pupils: Pupils are equal, round, and reactive to light.   Neck:      Vascular: No JVD.   Cardiovascular:      Rate and Rhythm: Normal rate and regular rhythm.   Pulmonary:      Effort: Pulmonary effort is normal.      Breath sounds: Normal breath sounds.   Abdominal:      General: Bowel sounds are normal.      Palpations: Abdomen is soft.   Musculoskeletal:         General: No deformity.      Cervical back: Normal range of motion and neck supple.   Lymphadenopathy:      Cervical: No cervical adenopathy.   Skin:     General: Skin is warm and dry.   Neurological:      Mental Status: He is alert and oriented to person, place, and time.   Psychiatric:         Behavior: Behavior normal.         Thought Content: Thought content normal.         Judgment: Judgment normal.       Results Reviewed:  Glucose   Date Value Ref Range Status   07/01/2024 108 (H) 70 - 99 mg/dL Final   07/27/2023 107 (H) 65 - 99 mg/dL Final   12/22/2020 92 74 - 109 mg/dL Final     BUN   Date Value Ref Range Status   07/01/2024 14 8 - 27 mg/dL Final   07/27/2023 12 8 - 23 mg/dL Final   12/22/2020 16 8 - 23 mg/dL Final     Creatinine   Date Value Ref Range Status   07/01/2024 0.94 0.76 - 1.27 mg/dL Final   07/27/2023 0.77 0.76 - 1.27 mg/dL Final   12/22/2020 0.8 0.5 - 1.2 mg/dL Final     Sodium   Date Value Ref Range Status   07/01/2024 141 134 - 144 mmol/L Final   07/27/2023 141 136 - 145 mmol/L Final   12/22/2020 139 136 - 145 mmol/L Final     Potassium   Date Value Ref Range Status   07/01/2024 3.8 3.5 - 5.2 mmol/L Final   07/27/2023 4.1 3.5 - 5.2 mmol/L Final   12/22/2020 4.1 3.5 - 5.0 mmol/L Final     Chloride   Date Value Ref Range Status   07/01/2024 104 96 - 106 mmol/L Final   07/27/2023 106 98 - 107 mmol/L Final   12/22/2020 102 98 - 111 mmol/L Final     CO2    Date Value Ref Range Status   07/27/2023 24.0 22.0 - 29.0 mmol/L Final   12/22/2020 25 22 - 29 mmol/L Final     Total CO2   Date Value Ref Range Status   07/01/2024 23 20 - 29 mmol/L Final     Calcium   Date Value Ref Range Status   07/01/2024 9.2 8.6 - 10.2 mg/dL Final   07/27/2023 9.4 8.6 - 10.5 mg/dL Final   12/22/2020 9.4 8.8 - 10.2 mg/dL Final     ALT (SGPT)   Date Value Ref Range Status   07/01/2024 24 0 - 44 IU/L Final   07/27/2023 15 1 - 41 U/L Final     AST (SGOT)   Date Value Ref Range Status   07/01/2024 25 0 - 40 IU/L Final   07/27/2023 16 1 - 40 U/L Final     WBC   Date Value Ref Range Status   07/01/2024 4.5 3.4 - 10.8 x10E3/uL Final   12/22/2020 6.6 4.8 - 10.8 K/uL Final     Hematocrit   Date Value Ref Range Status   07/01/2024 39.2 37.5 - 51.0 % Final   07/27/2023 39.8 37.5 - 51.0 % Final   12/22/2020 40.7 (L) 42.0 - 52.0 % Final     Platelets   Date Value Ref Range Status   07/01/2024 156 150 - 450 x10E3/uL Final   07/27/2023 154 140 - 450 10*3/mm3 Final   12/22/2020 175 130 - 400 K/uL Final     Triglycerides   Date Value Ref Range Status   07/01/2024 126 0 - 149 mg/dL Final     HDL Cholesterol   Date Value Ref Range Status   07/01/2024 55 >39 mg/dL Final     LDL Chol Calc (NIH)   Date Value Ref Range Status   07/01/2024 112 (H) 0 - 99 mg/dL Final     Hemoglobin A1C   Date Value Ref Range Status   07/01/2024 5.7 (H) 4.8 - 5.6 % Final     Comment:              Prediabetes: 5.7 - 6.4           Diabetes: >6.4           Glycemic control for adults with diabetes: <7.0     09/17/2021 5.4 % Final       Results        Assessment / Plan     Assessment/Plan:  Diagnoses and all orders for this visit:    1. Eustachian tube disorder, left (Primary)  -     predniSONE (DELTASONE) 20 MG tablet; Take 2 pills today then 1 pill daily for 3 days then 1/2 pill daily for 4 days then stop.  Dispense: 7 tablet; Refill: 0      Assessment & Plan  1. Left ear pain.  He reports sharp pain in the left ear without drainage.  Examination reveals significant fluid buildup but no signs of infection. He is advised to apply 1 to 2 drops of olive oil or sweet oil in the ear using a dropper. A prescription for oral steroids has been provided to help alleviate the fluid buildup. If he experiences severe pain followed by drainage, he should inform the clinic immediately as eardrops may then be necessary.      No follow-ups on file. unless patient needs to be seen sooner or acute issues arise.    Code Status: Full.   Patient or patient representative verbalized consent for the use of Ambient Listening during the visit with  SALONI Kaur for chart documentation. 9/3/2024  09:26 CDT  I have discussed the patient results/orders and and plan/recommendation with them at today's visit.      Signed by:    SALONI Kaur Date: 09/03/24

## 2024-09-13 DIAGNOSIS — G62.9 PERIPHERAL POLYNEUROPATHY: ICD-10-CM

## 2024-09-13 RX ORDER — EPINEPHRINE 0.3 MG/.3ML
0.3 INJECTION SUBCUTANEOUS ONCE
Qty: 1 EACH | Refills: 0 | Status: SHIPPED | OUTPATIENT
Start: 2024-09-13 | End: 2024-09-13

## 2024-09-13 RX ORDER — GABAPENTIN 400 MG/1
800 CAPSULE ORAL 2 TIMES DAILY
Qty: 120 CAPSULE | Refills: 0 | Status: SHIPPED | OUTPATIENT
Start: 2024-09-13

## 2024-09-13 NOTE — TELEPHONE ENCOUNTER
Rx Refill Note  Requested Prescriptions     Pending Prescriptions Disp Refills    gabapentin (NEURONTIN) 400 MG capsule [Pharmacy Med Name: Gabapentin 400 MG Oral Capsule] 120 capsule 0     Sig: Take 2 capsules by mouth twice daily    EPINEPHrine (EPIPEN) 0.3 MG/0.3ML solution auto-injector injection 1 each 0     Sig: Inject 0.3 mL under the skin into the appropriate area as directed 1 (One) Time for 1 dose.      Last office visit with prescribing clinician: 9/3/2024   Last telemedicine visit with prescribing clinician: Visit date not found   Next office visit with prescribing clinician: 10/1/2024                         Would you like a call back once the refill request has been completed: [] Yes [] No    If the office needs to give you a call back, can they leave a voicemail: [] Yes [] No    Jessie Paige MA  09/13/24, 14:52 CDT

## 2024-09-23 ENCOUNTER — OFFICE VISIT (OUTPATIENT)
Dept: INTERNAL MEDICINE | Facility: CLINIC | Age: 79
End: 2024-09-23
Payer: COMMERCIAL

## 2024-09-23 ENCOUNTER — TELEPHONE (OUTPATIENT)
Dept: INTERNAL MEDICINE | Facility: CLINIC | Age: 79
End: 2024-09-23

## 2024-09-23 VITALS
RESPIRATION RATE: 16 BRPM | WEIGHT: 188 LBS | OXYGEN SATURATION: 96 % | HEART RATE: 78 BPM | HEIGHT: 66 IN | BODY MASS INDEX: 30.22 KG/M2 | TEMPERATURE: 98 F | SYSTOLIC BLOOD PRESSURE: 147 MMHG | DIASTOLIC BLOOD PRESSURE: 76 MMHG

## 2024-09-23 DIAGNOSIS — H69.92 EUSTACHIAN TUBE DISORDER, LEFT: Primary | ICD-10-CM

## 2024-09-23 DIAGNOSIS — E66.9 CLASS 1 OBESITY: ICD-10-CM

## 2024-09-23 DIAGNOSIS — H93.19 TINNITUS, UNSPECIFIED LATERALITY: ICD-10-CM

## 2024-09-23 DIAGNOSIS — H92.03 OTALGIA OF BOTH EARS: ICD-10-CM

## 2024-09-23 PROCEDURE — 99213 OFFICE O/P EST LOW 20 MIN: CPT | Performed by: NURSE PRACTITIONER

## 2024-09-23 RX ORDER — CIPROFLOXACIN AND DEXAMETHASONE 3; 1 MG/ML; MG/ML
4 SUSPENSION/ DROPS AURICULAR (OTIC) 2 TIMES DAILY
Qty: 7.5 ML | Refills: 1 | Status: SHIPPED | OUTPATIENT
Start: 2024-09-23

## 2024-09-23 RX ORDER — SEMAGLUTIDE 2.68 MG/ML
2 INJECTION, SOLUTION SUBCUTANEOUS WEEKLY
Qty: 3 ML | Refills: 1 | Status: SHIPPED | OUTPATIENT
Start: 2024-09-23 | End: 2024-09-23

## 2024-09-23 RX ORDER — EPINEPHRINE 0.3 MG/.3ML
INJECTION SUBCUTANEOUS
COMMUNITY
Start: 2024-09-14

## 2024-09-23 RX ORDER — SEMAGLUTIDE 1.7 MG/.75ML
1.7 INJECTION, SOLUTION SUBCUTANEOUS WEEKLY
Qty: 3 ML | Refills: 1 | Status: SHIPPED | OUTPATIENT
Start: 2024-09-23

## 2024-10-01 ENCOUNTER — OFFICE VISIT (OUTPATIENT)
Dept: INTERNAL MEDICINE | Facility: CLINIC | Age: 79
End: 2024-10-01
Payer: COMMERCIAL

## 2024-10-01 VITALS
HEART RATE: 78 BPM | OXYGEN SATURATION: 96 % | SYSTOLIC BLOOD PRESSURE: 154 MMHG | WEIGHT: 185 LBS | RESPIRATION RATE: 18 BRPM | TEMPERATURE: 97.6 F | HEIGHT: 66 IN | BODY MASS INDEX: 29.73 KG/M2 | DIASTOLIC BLOOD PRESSURE: 88 MMHG

## 2024-10-01 DIAGNOSIS — E66.811 CLASS 1 OBESITY: Primary | ICD-10-CM

## 2024-10-01 PROCEDURE — 99213 OFFICE O/P EST LOW 20 MIN: CPT | Performed by: NURSE PRACTITIONER

## 2024-10-01 NOTE — PROGRESS NOTES
Subjective     Chief Complaint   Patient presents with    Obesity       History of Present Illness  The patient is a 79-year-old male who presents for follow-up on Wegovy.    He reports that his ears have been blocked since yesterday, which he believes may be due to the use of Q-tips. He has an upcoming appointment with an audiologist. He also mentions that he took the first dose of the increased Wegovy medication yesterday and has not experienced any adverse effects so far. He denies experiencing any abdominal pain, vomiting, excessive burping, or flatulence.    Patient's PMR from outside medical facility reviewed and noted.    Otherwise complete ROS reviewed and negative except as mentioned in the HPI    Past Medical History:   Past Medical History:   Diagnosis Date    Arthritis     BPH with obstruction/lower urinary tract symptoms     Chronic back pain     Class 1 obesity due to excess calories with serious comorbidity and body mass index (BMI) of 32.0 to 32.9 in adult 01/20/2020    COPD (chronic obstructive pulmonary disease)     Elevated cholesterol     Environmental allergies 09/21/2022    Essential hypertension 10/14/2019    GERD (gastroesophageal reflux disease)     Hyperlipidemia     Hypertension     Left bundle branch block     Lung infiltrate 12/20/2022    Right upper lobe, [facility], October 2022    Obstructive sleep apnea 01/20/2020    pt uses a cpap machine at home    Sleep apnea     CPAP     Past Surgical History:  Past Surgical History:   Procedure Laterality Date    CARPAL TUNNEL RELEASE Right 09/29/2021    Procedure: CARPAL TUNNEL RELEASE right;  Surgeon: Luis Perdomo MD;  Location:  [facility] OR;  Service: Neurosurgery;  Laterality: Right;    CARPAL TUNNEL RELEASE Left 8/9/2023    Procedure: CARPAL TUNNEL RELEASE Left;  Surgeon: Luis Perdomo MD;  Location:  [facility] OR;  Service: Neurosurgery;  Laterality: Left;    COLONOSCOPY      COLONOSCOPY N/A 04/13/2022    Procedure: COLONOSCOPY  WITH ANESTHESIA;  Surgeon: Cortes Ribera DO;  Location:  [facility];  Service: Gastroenterology;  Laterality: N/A;  preop; hx of poylps   postop; polyps   PCP Katerine Mcclain     KNEE ARTHROPLASTY Right     KNEE MENISCAL REPAIR Left 2018    PENILE PROSTHESIS IMPLANT N/A 4/27/2022    Procedure: MALLEABLE PENILE PROSTHESIS PLACEMENT;  Surgeon: Ignacio Moon MD;  Location:  [facility] OR;  Service: Urology;  Laterality: N/A;    ROTATOR CUFF REPAIR Right 2000    SPINE SURGERY  1981    lower back ruptured disc    TOTAL SHOULDER ARTHROPLASTY W/ DISTAL CLAVICLE EXCISION Right 01/14/2020    Procedure: RIGHT REVERSE TOTAL SHOULDER  ARTHROPLASTY;  Surgeon: Suman Ventura MD;  Location:  [facility] OR;  Service: Orthopedics     Social History:  reports that he quit smoking about 13 years ago. His smoking use included pipe and cigarettes. He started smoking about 63 years ago. He has a 50 pack-year smoking history. He has been exposed to tobacco smoke. He has never used smokeless tobacco. He reports current alcohol use of about 5.0 standard drinks of alcohol per week. He reports that he does not use drugs.    Family History: family history includes Arthritis in his father and mother; Cancer in his brother; Diabetes in his mother and sister; Hypertension in his father and mother; Kidney disease in his mother; Obesity in his sister; Osteoporosis in his mother; Stroke in his father.       Allergies:  No Known Allergies  Medications:  Prior to Admission medications    Medication Sig Start Date End Date Taking? Authorizing Provider   acetaminophen (TYLENOL) 650 MG 8 hr tablet Take 1 tablet by mouth 2 (Two) Times a Day.    Provider, MD Cristian   albuterol sulfate  (90 Base) MCG/ACT inhaler Inhale 2 puffs Every 4 (Four) Hours As Needed for Wheezing. 4/11/22   Hien Pimentel APRN   amLODIPine (NORVASC) 5 MG tablet TAKE ONE TABLET BY MOUTH DAILY 1/4/24   Katerine Mcclain APRN   aspirin 81 MG EC tablet  Take 1 tablet by mouth Daily.    Cristian Pfeiffer MD   Azelastine HCl 137 MCG/SPRAY solution SPRAY TWO SPRAYS NASALLY TWICE DAILY AS DIRECTED 1/22/24   Katerine Mcclain APRN   betamethasone dipropionate 0.05 % cream APPLY TO AFFECTED AREA AS DIRECTED DAILY 8/1/23   Katerine Mcclain APRN   bisoprolol (ZEBeta) 5 MG tablet TAKE ONE TABLET BY MOUTH DAILY 3/11/24   Katerine Mcclain APRN   budesonide-formoterol (Symbicort) 160-4.5 MCG/ACT inhaler Inhale 2 puffs 2 (Two) Times a Day for 90 days. 2/16/24 5/16/24  Katerine Mcclain APRN   calcium carbonate (OS-TASHIA) 600 MG tablet Take 1 tablet by mouth 2 (Two) Times a Day.    Cristian Pfeiffer MD   cetirizine (zyrTEC) 10 MG tablet Take 1 tablet by mouth Daily for 5 days. 7/22/22 7/27/23  Abimael Christian MD   Cholecalciferol (VITAMIN D3) 1000 units capsule Take 2 capsules by mouth Daily.    Cristian Pfeiffer MD   ciprofloxacin-dexAMETHasone (Ciprodex) 0.3-0.1 % otic suspension Administer 4 drops into the left ear 2 (Two) Times a Day. 9/23/24   Katerine Mcclain APRN   cloNIDine (Catapres) 0.1 MG tablet Take 1 tablet by mouth 2 (Two) Times a Day. 3/7/24   Katerine Mcclain APRN   coenzyme Q10 100 MG capsule Take 1 capsule by mouth Daily.    Cristian Pfeiffer MD   Collagen-Boron-Hyaluronic Acid ([facility]) 10-5-3.3 MG tablet Take 1 tablet by mouth Daily.    Cristian Pfeiffer MD   docusate sodium (Colace) 100 MG capsule Take 1 capsule by mouth 2 (Two) Times a Day. 4/27/22   Ignacio Moon MD   EPINEPHrine (EPIPEN) 0.3 MG/0.3ML solution auto-injector injection INJECT CONTENTS OF 1 PEN AS NEEDED FOR ALLERGIC REACTION 9/14/24   Cristian Pfeiffer MD   finasteride (PROSCAR) 5 MG tablet Take 1 tablet by mouth Daily. 12/7/23   Ignacio Moon MD   gabapentin (NEURONTIN) 400 MG capsule Take 2 capsules by mouth twice daily 9/13/24   Katerine Mcclain APRN   Garlic 1000 MG capsule Take 1  capsule by mouth Daily.    Cristian Pfeiffer MD   ibuprofen (ADVIL,MOTRIN) 200 MG tablet Take 2 tablets by mouth Every 6 (Six) Hours As Needed for Mild Pain.    Cristian Pfeiffer MD   Krill Oil 300 MG capsule Take 300 mg by mouth Daily.    Cristian Pfeiffer MD   losartan (COZAAR) 25 MG tablet Take 1 tablet by mouth once daily 6/12/24   Katerine Mcclain APRN   Multiple Vitamins-Minerals (OCUVITE ADULT 50+ PO) Take 1 tablet by mouth Daily.    Cristian Pfeiffer MD   NON FORMULARY CPAP    Cristian Pfeiffer MD   omeprazole (priLOSEC) 20 MG capsule Take 1 capsule by mouth Daily.    Cristian Pfeiffer MD   ondansetron (Zofran) 4 MG tablet Take 1 tablet by mouth Every 8 (Eight) Hours As Needed for Nausea or Vomiting. 7/1/24   Katerine Mcclain APRN   predniSONE (DELTASONE) 20 MG tablet Take 2 pills today then 1 pill daily for 3 days then 1/2 pill daily for 4 days then stop.  Patient not taking: Reported on 9/23/2024 9/3/24   Katerine Mcclain APRN   Semaglutide-Weight Management (Wegovy) 1.7 MG/0.75ML solution auto-injector Inject 0.75 mL under the skin into the appropriate area as directed 1 (One) Time Per Week. 9/23/24   Katerine Mcclain APRN   simvastatin (ZOCOR) 40 MG tablet TAKE ONE TABLET BY MOUTH NIGHTLY 2/26/24   Katerine Mcclain APRN   SUPER B COMPLEX/C PO Take 1 tablet by mouth Every Night.    Cristian Pfeiffer MD   tamsulosin (FLOMAX) 0.4 MG capsule 24 hr capsule Take 2 capsules by mouth Every Night. 12/7/23   Ignacio Moon MD   tiotropium bromide monohydrate (Spiriva Respimat) 2.5 MCG/ACT aerosol solution inhaler Inhale 2 puffs Daily. 4/4/24   Katerine Mcclain APRN   tiotropium bromide monohydrate (Spiriva Respimat) 2.5 MCG/ACT aerosol solution inhaler Inhale 2 puffs Daily for 90 days. 4/15/24 7/14/24  Loren, Hien M, APRN   triamcinolone (KENALOG) 0.5 % ointment Apply 1 application topically to the appropriate area as directed 2  "(Two) Times a Day. 6/13/23   Johny Katerinetera Rodriguez SALONI   vitamin C (ASCORBIC ACID) 500 MG tablet Take 1 tablet by mouth Daily.    Provider, MD Cristian       Objective     Vital Signs: /88   Pulse 78   Temp 97.6 °F (36.4 °C)   Resp 18   Ht 167.6 cm (66\")   Wt 83.9 kg (185 lb)   SpO2 96%   BMI 29.86 kg/m²     Physical Exam    Physical Exam  Vitals reviewed.   Constitutional:       Appearance: He is well-developed. He is obese.   HENT:      Head: Normocephalic and atraumatic.      Right Ear: Tympanic membrane normal.      Left Ear: Tympanic membrane normal.   Eyes:      Pupils: Pupils are equal, round, and reactive to light.   Neck:      Vascular: No JVD.   Cardiovascular:      Rate and Rhythm: Normal rate and regular rhythm.   Pulmonary:      Effort: Pulmonary effort is normal.   Abdominal:      General: Bowel sounds are normal.      Palpations: Abdomen is soft.   Musculoskeletal:         General: No deformity.      Cervical back: Normal range of motion and neck supple.   Lymphadenopathy:      Cervical: No cervical adenopathy.   Skin:     General: Skin is warm and dry.   Neurological:      Mental Status: He is alert and oriented to person, place, and time.   Psychiatric:         Behavior: Behavior normal.         Thought Content: Thought content normal.         Judgment: Judgment normal.                 Results Reviewed:  Glucose   Date Value Ref Range Status   07/01/2024 108 (H) 70 - 99 mg/dL Final   07/27/2023 107 (H) 65 - 99 mg/dL Final   12/22/2020 92 74 - 109 mg/dL Final     BUN   Date Value Ref Range Status   07/01/2024 14 8 - 27 mg/dL Final   07/27/2023 12 8 - 23 mg/dL Final   12/22/2020 16 8 - 23 mg/dL Final     Creatinine   Date Value Ref Range Status   07/01/2024 0.94 0.76 - 1.27 mg/dL Final   07/27/2023 0.77 0.76 - 1.27 mg/dL Final   12/22/2020 0.8 0.5 - 1.2 mg/dL Final     Sodium   Date Value Ref Range Status   07/01/2024 141 134 - 144 mmol/L Final   07/27/2023 141 136 - 145 mmol/L " Final   12/22/2020 139 136 - 145 mmol/L Final     Potassium   Date Value Ref Range Status   07/01/2024 3.8 3.5 - 5.2 mmol/L Final   07/27/2023 4.1 3.5 - 5.2 mmol/L Final   12/22/2020 4.1 3.5 - 5.0 mmol/L Final     Chloride   Date Value Ref Range Status   07/01/2024 104 96 - 106 mmol/L Final   07/27/2023 106 98 - 107 mmol/L Final   12/22/2020 102 98 - 111 mmol/L Final     CO2   Date Value Ref Range Status   07/27/2023 24.0 22.0 - 29.0 mmol/L Final   12/22/2020 25 22 - 29 mmol/L Final     Total CO2   Date Value Ref Range Status   07/01/2024 23 20 - 29 mmol/L Final     Calcium   Date Value Ref Range Status   07/01/2024 9.2 8.6 - 10.2 mg/dL Final   07/27/2023 9.4 8.6 - 10.5 mg/dL Final   12/22/2020 9.4 8.8 - 10.2 mg/dL Final     ALT (SGPT)   Date Value Ref Range Status   07/01/2024 24 0 - 44 IU/L Final   07/27/2023 15 1 - 41 U/L Final     AST (SGOT)   Date Value Ref Range Status   07/01/2024 25 0 - 40 IU/L Final   07/27/2023 16 1 - 40 U/L Final     WBC   Date Value Ref Range Status   07/01/2024 4.5 3.4 - 10.8 x10E3/uL Final   12/22/2020 6.6 4.8 - 10.8 K/uL Final     Hematocrit   Date Value Ref Range Status   07/01/2024 39.2 37.5 - 51.0 % Final   07/27/2023 39.8 37.5 - 51.0 % Final   12/22/2020 40.7 (L) 42.0 - 52.0 % Final     Platelets   Date Value Ref Range Status   07/01/2024 156 150 - 450 x10E3/uL Final   07/27/2023 154 140 - 450 10*3/mm3 Final   12/22/2020 175 130 - 400 K/uL Final     Triglycerides   Date Value Ref Range Status   07/01/2024 126 0 - 149 mg/dL Final     HDL Cholesterol   Date Value Ref Range Status   07/01/2024 55 >39 mg/dL Final     LDL Chol Calc (NIH)   Date Value Ref Range Status   07/01/2024 112 (H) 0 - 99 mg/dL Final     Hemoglobin A1C   Date Value Ref Range Status   07/01/2024 5.7 (H) 4.8 - 5.6 % Final     Comment:              Prediabetes: 5.7 - 6.4           Diabetes: >6.4           Glycemic control for adults with diabetes: <7.0     09/17/2021 5.4 % Final       Results        Assessment /  Plan     Assessment/Plan:  Diagnoses and all orders for this visit:    1. Class 1 obesity (Primary)       He should be starting WEgovy 1.7 mg instead of 1 mg.   Assessment & Plan  1. Ear Congestion.  He reports bilateral ear congestion that started yesterday, potentially exacerbated by cleaning his ears with Q-tips. Examination reveals significant earwax buildup, particularly in the left ear, but no signs of infection. He is advised to apply a few drops of olive oil in his ears to soften the wax and facilitate its removal. He has a scheduled appointment with a hearing doctor, though the exact date is unknown.    2. Weight Management.  His BMI is currently under 30, indicating a shift from the obese to the overweight category. He has lost 2 pounds since August. He is advised to follow his wife's diet for potentially better weight loss outcomes. The dosage of Wegovy will be increased to 1.7 mg at the next scheduled time. If he tolerates the 1.7 mg dosage well over the next few months without significant weight loss, the dosage will be further increased to 2.4 mg.    3. Elevated Blood Pressure.  His blood pressure is slightly elevated today, which may be related to his ear discomfort. No immediate changes to his blood pressure management plan are recommended at this time.    Follow-up  Return in 2 months for follow-up.      Return in about 2 months (around 12/1/2024). unless patient needs to be seen sooner or acute issues arise.    Code Status: full.   Patient or patient representative verbalized consent for the use of Ambient Listening during the visit with  SALONI Kaur for chart documentation. 10/3/2024  17:37 CDT  I have discussed the patient results/orders and and plan/recommendation with them at today's visit.      Signed by:    SALONI Kaur Date: 10/03/24

## 2024-10-02 PROBLEM — M75.122 NONTRAUMATIC COMPLETE TEAR OF LEFT ROTATOR CUFF: Status: ACTIVE | Noted: 2024-10-02

## 2024-10-02 NOTE — PROGRESS NOTES
[facility] Note - Established Patient    NAME:  Jose Brambila   : 1945  MRN: [id number]    10/3/2024    CHIEF COMPLAINT:  follow up left shoulder pain, repeat injection    HISTORY OF PRESENT ILLNESS:   The patient is a 79 y.o. male who returns today for follow up of left shoulder pain, requesting repeat injection. It has been NUMBER: 3 months since last injection. Patient denies any recent trauma, fall, or other other injury. Pain is rated PAIN SCALE NUMBERS: 6 today.    Past Medical History:        Diagnosis Date    COPD (chronic obstructive pulmonary disease) (HCC)     GERD (gastroesophageal reflux disease)     Hyperlipidemia     Hypertension     LBBB (left bundle branch block)     Sleep apnea     CPAP       Past Surgical History:        Procedure Laterality Date    BACK SURGERY      LUMBAR (\"disc removal\"    KNEE ARTHROSCOPY Bilateral     ROTATOR CUFF REPAIR Left     SHOULDER ARTHROPLASTY Right 2020    TOTAL KNEE ARTHROPLASTY Left 2020    LEFT TOTAL KNEE ARTHROPLASTY performed by Nain Bonner MD at [facility] OR       Current Medications:   Prior to Admission medications    Medication Sig Start Date End Date Taking? Authorizing Provider   ondansetron (ZOFRAN) 4 MG tablet Take 1 tablet by mouth daily as needed for Nausea or Vomiting 20   Nain Bonner MD   rivaroxaban (XARELTO) 10 MG TABS tablet Take 1 tablet by mouth daily (with breakfast) 20   Nain Bonner MD   amLODIPine (NORVASC) 5 MG tablet Take 5 mg by mouth nightly    Tesha Neumann MD   lisinopril (PRINIVIL;ZESTRIL) 20 MG tablet Take 20 mg by mouth nightly    Tesha Neumann MD   simvastatin (ZOCOR) 40 MG tablet Take 40 mg by mouth nightly    Tesha Neumann MD   tiotropium (SPIRIVA) 18 MCG inhalation capsule Inhale 18 mcg into the lungs nightly    Tesha Neumann MD   bisoprolol (ZEBETA) 5 MG tablet Take 5 mg by mouth daily    Tesha Neumann MD   budesonide-formoterol (SYMBICORT) 160-4.5

## 2024-10-03 ENCOUNTER — OFFICE VISIT (OUTPATIENT)
Age: 79
End: 2024-10-03

## 2024-10-03 VITALS — WEIGHT: 153 LBS | BODY MASS INDEX: 24.59 KG/M2 | HEIGHT: 66 IN

## 2024-10-03 DIAGNOSIS — M75.122 NONTRAUMATIC COMPLETE TEAR OF LEFT ROTATOR CUFF: Primary | ICD-10-CM

## 2024-10-03 RX ORDER — BUPIVACAINE HYDROCHLORIDE 2.5 MG/ML
2 INJECTION, SOLUTION INFILTRATION; PERINEURAL ONCE
Status: COMPLETED | OUTPATIENT
Start: 2024-10-03 | End: 2024-10-03

## 2024-10-03 RX ORDER — SEMAGLUTIDE 1.7 MG/.75ML
1.7 INJECTION, SOLUTION SUBCUTANEOUS WEEKLY
COMMUNITY
Start: 2024-09-23

## 2024-10-03 RX ORDER — TRIAMCINOLONE ACETONIDE 40 MG/ML
40 INJECTION, SUSPENSION INTRA-ARTICULAR; INTRAMUSCULAR ONCE
Status: COMPLETED | OUTPATIENT
Start: 2024-10-03 | End: 2024-10-03

## 2024-10-03 RX ORDER — LIDOCAINE HYDROCHLORIDE 10 MG/ML
2 INJECTION, SOLUTION INFILTRATION; PERINEURAL ONCE
Status: COMPLETED | OUTPATIENT
Start: 2024-10-03 | End: 2024-10-03

## 2024-10-03 RX ORDER — ATORVASTATIN CALCIUM 10 MG/1
10 TABLET, FILM COATED ORAL DAILY
COMMUNITY

## 2024-10-03 RX ADMIN — BUPIVACAINE HYDROCHLORIDE 5 MG: 2.5 INJECTION, SOLUTION INFILTRATION; PERINEURAL at 13:36

## 2024-10-03 RX ADMIN — TRIAMCINOLONE ACETONIDE 40 MG: 40 INJECTION, SUSPENSION INTRA-ARTICULAR; INTRAMUSCULAR at 13:38

## 2024-10-03 RX ADMIN — LIDOCAINE HYDROCHLORIDE 2 ML: 10 INJECTION, SOLUTION INFILTRATION; PERINEURAL at 13:37

## 2024-10-10 NOTE — PROGRESS NOTES
Chief Complaint  Chronic bronchitis, unspecified chronic bronchitis type    Subjective    History of Present Illness {CC  Problem List  Visit Diagnosis   Encounters  Notes  Medications  Labs  Result Review Imaging  Media: 23}    Justin Robledo presents to [facility] for:    History of Present Illness  Management of his COPD, emphysema, and bronchiectasis. Most recent FEV1 82. He is a former smoker. He is a 1 pack/day smoker for 50 years. He quit in 2011.  Last CT of the chest April 2023 showing emphysema, 2 mm nodule left lower lobe and improving 6 mm nodule right upper lobe. No f/u needed.  He has aged out of low-dose lung cancer screening.     He has shortness of breath and productive coughing daily. He is on Spiriva and Symbicort. He has a rescue inhaler and nebulizer he can use when needed. He seldom requires them.  He feels like all of these are helping him well.  He does not need refills.     He is on CPAP for sleep apnea. He is compliant with this but not benefiting from it like he used to.  I ordered a titration study which was completed January 2024 at [facility].  They did adjust his device.  He is compliant with it but still not benefiting.  He would like to be referred for the inspire device.  His AHI was 30.  He is obese and also has hypertension, hyperlipidemia and COPD.    Cough and congestion aggravated by underlying allergies.  He is doing well on his Zyrtec and azelastine.       Prior to Admission medications    Medication Sig Start Date End Date Taking? Authorizing Provider   acetaminophen (TYLENOL) 650 MG 8 hr tablet Take 1 tablet by mouth 2 (Two) Times a Day.    Provider, MD Cristian   albuterol sulfate  (90 Base) MCG/ACT inhaler Inhale 2 puffs Every 4 (Four) Hours As Needed for Wheezing. 4/11/22   Hien Pimentel APRN   amLODIPine (NORVASC) 5 MG tablet TAKE ONE TABLET BY MOUTH DAILY 1/4/24   Katerine Mcclain APRN    aspirin 81 MG EC tablet Take 1 tablet by mouth Daily.    Cristian Pfeiffer MD   Azelastine HCl 137 MCG/SPRAY solution SPRAY TWO SPRAYS NASALLY TWICE DAILY AS DIRECTED 1/22/24   Katerine Mcclain APRN   betamethasone dipropionate 0.05 % cream APPLY TO AFFECTED AREA AS DIRECTED DAILY 8/1/23   Katerine Mcclain APRN   bisoprolol (ZEBeta) 5 MG tablet TAKE ONE TABLET BY MOUTH DAILY 3/11/24   Katerine Mcclain APRN   budesonide-formoterol (Symbicort) 160-4.5 MCG/ACT inhaler Inhale 2 puffs 2 (Two) Times a Day for 90 days. 2/16/24 5/16/24  Katerine Mcclain APRN   calcium carbonate (OS-TASHIA) 600 MG tablet Take 1 tablet by mouth 2 (Two) Times a Day.    Cristian Pfeiffer MD   cetirizine (zyrTEC) 10 MG tablet Take 1 tablet by mouth Daily for 5 days. 7/22/22 7/27/23  Abimael Christian MD   Cholecalciferol (VITAMIN D3) 1000 units capsule Take 2 capsules by mouth Daily.    Cristian Pfeiffer MD   cloNIDine (Catapres) 0.1 MG tablet Take 1 tablet by mouth 2 (Two) Times a Day. 3/7/24   Katerine Mcclain APRN   coenzyme Q10 100 MG capsule Take 1 capsule by mouth Daily.    Cristian Pfeiffer MD   Collagen-Boron-Hyaluronic Acid (Move Free Energy Micro) 10-5-3.3 MG tablet Take 1 tablet by mouth Daily.    Cristian Pfeiffer MD   docusate sodium (Colace) 100 MG capsule Take 1 capsule by mouth 2 (Two) Times a Day. 4/27/22   Ignacio Moon MD   EPINEPHrine (EPIPEN) 0.3 MG/0.3ML solution auto-injector injection INJECT 0.3ML INTO THE APPROPRIATE MUSCLE AS DIRECTED BY PRESCRIBER ONE TIME FOR ONE DOSE 7/28/23   Cristian Pfeiffer MD   finasteride (PROSCAR) 5 MG tablet Take 1 tablet by mouth Daily. 12/7/23   Ignacio Moon MD   gabapentin (NEURONTIN) 400 MG capsule Take 2 capsules by mouth twice daily 8/19/24   Katerine Mcclain APRN   Garlic 1000 MG capsule Take 1 capsule by mouth Daily.    Provider, MD Cristian   ibuprofen (ADVIL,MOTRIN) 200 MG tablet Take 2 tablets  by mouth Every 6 (Six) Hours As Needed for Mild Pain.    Cristian Pfeiffer MD   Krill Oil 300 MG capsule Take 300 mg by mouth Daily.    Cristian Pfeiffer MD   losartan (COZAAR) 25 MG tablet Take 1 tablet by mouth once daily 6/12/24   Katerine Mcclain APRN   Multiple Vitamins-Minerals (OCUVITE ADULT 50+ PO) Take 1 tablet by mouth Daily.    Cristian Pfeiffer MD   NON FORMULARY CPAP    Cristian Pfeiffer MD   omeprazole (priLOSEC) 20 MG capsule Take 1 capsule by mouth Daily.    Cristian Pfeiffer MD   ondansetron (Zofran) 4 MG tablet Take 1 tablet by mouth Every 8 (Eight) Hours As Needed for Nausea or Vomiting. 7/1/24   Katerine Mcclain APRN   predniSONE (DELTASONE) 20 MG tablet Take 2 pills today then 1 pill daily for 3 days then 1/2 pill daily for 4 days then stop. 9/3/24   Katerine Mcclain APRN   Semaglutide-Weight Management (Wegovy) 1 MG/0.5ML solution auto-injector Inject 0.5 mL under the skin into the appropriate area as directed 1 (One) Time Per Week. 8/29/24   Katerine Mcclain APRN   simvastatin (ZOCOR) 40 MG tablet TAKE ONE TABLET BY MOUTH NIGHTLY 2/26/24   Katerine Mcclain APRN   SUPER B COMPLEX/C PO Take 1 tablet by mouth Every Night.    Cristian Pfeiffer MD   tamsulosin (FLOMAX) 0.4 MG capsule 24 hr capsule Take 2 capsules by mouth Every Night. 12/7/23   Ignacio Moon MD   tiotropium bromide monohydrate (Spiriva Respimat) 2.5 MCG/ACT aerosol solution inhaler Inhale 2 puffs Daily. 4/4/24   Katerine Mcclain APRN   tiotropium bromide monohydrate (Spiriva Respimat) 2.5 MCG/ACT aerosol solution inhaler Inhale 2 puffs Daily for 90 days. 4/15/24 7/14/24  Hien Pimentel APRN   triamcinolone (KENALOG) 0.5 % ointment Apply 1 application topically to the appropriate area as directed 2 (Two) Times a Day. 6/13/23   Katerine Mcclain APRN   vitamin C (ASCORBIC ACID) 500 MG tablet Take 1 tablet by mouth Daily.    Provider, MD Cristian  "      Social History     Socioeconomic History    Marital status:    Tobacco Use    Smoking status: Former     Current packs/day: 0.00     Average packs/day: 1 pack/day for 50.0 years (50.0 ttl pk-yrs)     Types: Pipe, Cigarettes     Start date:      Quit date:      Years since quittin.8     Passive exposure: Past    Smokeless tobacco: Never   Vaping Use    Vaping status: Never Used   Substance and Sexual Activity    Alcohol use: Yes     Alcohol/week: 5.0 standard drinks of alcohol     Types: 5 Cans of beer per week     Comment: daily    Drug use: No    Sexual activity: Defer       Objective   Vital Signs:   /70   Pulse 96   Ht 165.1 cm (65\")   Wt 83 kg (183 lb)   SpO2 96% Comment: RA  BMI 30.45 kg/m²     Physical Exam  Constitutional:       Appearance: He is obese.   Eyes:      Comments: glasses   Cardiovascular:      Rate and Rhythm: Normal rate and regular rhythm.      Heart sounds: No murmur heard.  Pulmonary:      Effort: Pulmonary effort is normal.      Breath sounds: Normal breath sounds.   Musculoskeletal:      Right lower leg: No edema.      Left lower leg: No edema.   Neurological:      Mental Status: He is alert and oriented to person, place, and time.        Result Review :    PFT Values          10/23/2023    11:30 2024    14:00   Pre Drug PFT Results   FVC 84 109   FEV1 82 107   FEF 25-75% 80 90   FEV1/FVC 74 76.35       Results for orders placed in visit on 24    Spirometry    Narrative  Spirometry    Performed by: Dax Gooden CMA  Authorized by: Hien Pimentel APRN  Pre Drug % Predicted  FVC: 109%  FEV1: 107%  FEF 25-75%: 90%  FEV1/FVC: 76.35%    Interpretation  Spirometry  Spirometry shows normal results.  Review of FVL curve  Patient's effort is normal.      Results for orders placed in visit on 10/23/23    Spirometry    Narrative  Spirometry    Performed by: Dax Gooden CMA  Authorized by: Hien Pimentel APRN  Pre Drug % Predicted  FVC: " 84%  FEV1: 82%  FEF 25-75%: 80%  FEV1/FVC: 74%    Interpretation  Spirometry  Spirometry shows normal results. Post-bronchodilator the ratio shows normal results.  Review of FVL curve  Patient's effort is normal.      Results for orders placed in visit on 10/17/22    Pulmonary Function Test    Narrative  Pulmonary Function Test  Performed by: Marla Heller, RRT  Authorized by: Hien Pimentel APRN    Pre Drug % Predicted  FVC: 80%  FEV1: 81%  FEF 25-75%: 98%  FEV1/FVC: 76%    Interpretation  Spirometry  Spirometry shows normal results.  Review of FVL curve  Patient's effort is normal.      My interpretation of imaging:  none    My interpretation of labs: none      Assessment and Plan {CC Problem List  Visit Diagnosis  ROS  Review (Popup)  ShareYourCart Maintenance  Quality  BestPractice  Medications  SmartSets  SnapShot Encounters  Media : 23}    Diagnoses and all orders for this visit:    1. Bronchiectasis without complication (Primary)  Comments:  PFTs have normalized.  He has not had any recent exacerbations.  Monitor  Orders:  -     Spirometry    2. Environmental allergies  Comments:  Contributes to cough and congestion.  Better with Zyrtec and azelastine.  Please continue    3. Class 1 obesity due to excess calories with serious comorbidity and body mass index (BMI) of 31.0 to 31.9 in adult  Comments:  Contributes to shortness of breath.  Education material provided    4. Centrilobular emphysema  Comments:  Continue to avoid smoking.  Alpha testing not warranted at his age    5. Stage 1 mild COPD by GOLD classification  Comments:  PFTs today showing no obstruction.  Continue Spiriva and Symbicort.  Use albuterol as needed    6. Obstructive sleep apnea  Comments:  Updated sleep study.  He is compliant with CPAP but not benefiting.  Will refer him for inspire  Orders:  -     Ambulatory Referral to ENT (Otolaryngology)    7. Personal history of nicotine dependence  Comments:  Continue to avoid  smoking.  He is aged out of low-dose lung cancer screening guidelines        Body mass index is 30.45 kg/m². Educational material provided after visit summary about elevated BMI        Hien Pimentel, SALONI  11/4/2024  14:39 CST    Follow Up   Return in about 6 months (around 5/4/2025).    Patient was given instructions and counseling regarding his condition or for health maintenance advice. Please see specific information pulled into the AVS if appropriate.

## 2024-10-17 ENCOUNTER — TELEPHONE (OUTPATIENT)
Dept: INTERNAL MEDICINE | Facility: CLINIC | Age: 79
End: 2024-10-17
Payer: COMMERCIAL

## 2024-10-17 DIAGNOSIS — G62.9 PERIPHERAL POLYNEUROPATHY: ICD-10-CM

## 2024-10-17 RX ORDER — GABAPENTIN 400 MG/1
800 CAPSULE ORAL 2 TIMES DAILY
Qty: 120 CAPSULE | Refills: 0 | Status: SHIPPED | OUTPATIENT
Start: 2024-10-18

## 2024-10-20 DIAGNOSIS — I10 ESSENTIAL (PRIMARY) HYPERTENSION: ICD-10-CM

## 2024-10-21 RX ORDER — AMLODIPINE BESYLATE 5 MG/1
5 TABLET ORAL DAILY
Qty: 90 TABLET | Refills: 3 | Status: SHIPPED | OUTPATIENT
Start: 2024-10-21

## 2024-10-21 NOTE — TELEPHONE ENCOUNTER
Rx Refill Note  Requested Prescriptions     Pending Prescriptions Disp Refills    amLODIPine (NORVASC) 5 MG tablet [Pharmacy Med Name: amLODIPine Besylate 5 MG Oral Tablet] 90 tablet 0     Sig: Take 1 tablet by mouth once daily      Last office visit with prescribing clinician: 10/1/2024   Last telemedicine visit with prescribing clinician: Visit date not found   Next office visit with prescribing clinician: 12/2/2024                         Would you like a call back once the refill request has been completed: [] Yes [] No    If the office needs to give you a call back, can they leave a voicemail: [] Yes [] No    Mena Hernandez RN  10/21/24, 07:04 CDT

## 2024-10-29 ENCOUNTER — TELEPHONE (OUTPATIENT)
Dept: UROLOGY | Facility: CLINIC | Age: 79
End: 2024-10-29
Payer: COMMERCIAL

## 2024-10-29 NOTE — TELEPHONE ENCOUNTER
Called patient and left a voicemail to let him know that we were rescheduling his appointment with Dr. Moon from 12/2/24 to 12/5/24 at 1:00pm in [place].  I also mentioned in the voicemail that I saw he has an appointment in [place] on 11/4/24, so if he would rather see Dr. Moon on 11/4/24 in [place], we could easily reschedule him to this day.  I asked that he give our office a call back if he wishes to change his appointment date/time, or move to [place].    Hub is okay to give this information to the patient if he calls.  He may be rescheduled in either [place] or [place] with Dr. Moon, whatever his preference is.

## 2024-11-04 ENCOUNTER — OFFICE VISIT (OUTPATIENT)
Dept: PULMONOLOGY | Facility: CLINIC | Age: 79
End: 2024-11-04
Payer: COMMERCIAL

## 2024-11-04 VITALS
SYSTOLIC BLOOD PRESSURE: 116 MMHG | OXYGEN SATURATION: 96 % | DIASTOLIC BLOOD PRESSURE: 70 MMHG | BODY MASS INDEX: 30.49 KG/M2 | WEIGHT: 183 LBS | HEIGHT: 65 IN | HEART RATE: 96 BPM

## 2024-11-04 DIAGNOSIS — E66.811 CLASS 1 OBESITY DUE TO EXCESS CALORIES WITH SERIOUS COMORBIDITY AND BODY MASS INDEX (BMI) OF 31.0 TO 31.9 IN ADULT: Chronic | ICD-10-CM

## 2024-11-04 DIAGNOSIS — J47.9 BRONCHIECTASIS WITHOUT COMPLICATION: Primary | Chronic | ICD-10-CM

## 2024-11-04 DIAGNOSIS — J43.2 CENTRILOBULAR EMPHYSEMA: Chronic | ICD-10-CM

## 2024-11-04 DIAGNOSIS — Z87.891 PERSONAL HISTORY OF NICOTINE DEPENDENCE: Chronic | ICD-10-CM

## 2024-11-04 DIAGNOSIS — J44.9 STAGE 1 MILD COPD BY GOLD CLASSIFICATION: Chronic | ICD-10-CM

## 2024-11-04 DIAGNOSIS — Z91.09 ENVIRONMENTAL ALLERGIES: Chronic | ICD-10-CM

## 2024-11-04 DIAGNOSIS — E66.09 CLASS 1 OBESITY DUE TO EXCESS CALORIES WITH SERIOUS COMORBIDITY AND BODY MASS INDEX (BMI) OF 31.0 TO 31.9 IN ADULT: Chronic | ICD-10-CM

## 2024-11-04 DIAGNOSIS — G47.33 OBSTRUCTIVE SLEEP APNEA: Chronic | ICD-10-CM

## 2024-11-04 PROCEDURE — 99214 OFFICE O/P EST MOD 30 MIN: CPT | Performed by: NURSE PRACTITIONER

## 2024-11-04 PROCEDURE — 94375 RESPIRATORY FLOW VOLUME LOOP: CPT | Performed by: NURSE PRACTITIONER

## 2024-11-04 NOTE — PROCEDURES
Spirometry    Performed by: Dax Gooden CMA  Authorized by: Hien Pimentel APRN     Pre Drug % Predicted    FVC: 109%   FEV1: 107%   FEF 25-75%: 90%   FEV1/FVC: 76.35%    Interpretation   Spirometry   Spirometry shows normal results.   Review of FVL curve   Patient's effort is normal.

## 2024-11-23 DIAGNOSIS — G62.9 PERIPHERAL POLYNEUROPATHY: ICD-10-CM

## 2024-11-25 RX ORDER — GABAPENTIN 400 MG/1
800 CAPSULE ORAL 2 TIMES DAILY
Qty: 120 CAPSULE | Refills: 0 | Status: SHIPPED | OUTPATIENT
Start: 2024-11-25

## 2024-11-25 RX ORDER — SEMAGLUTIDE 1.7 MG/.75ML
INJECTION, SOLUTION SUBCUTANEOUS
Qty: 4 ML | Refills: 0 | Status: SHIPPED | OUTPATIENT
Start: 2024-11-25 | End: 2024-12-02 | Stop reason: DRUGHIGH

## 2024-11-25 NOTE — TELEPHONE ENCOUNTER
Rx Refill Note  Requested Prescriptions     Pending Prescriptions Disp Refills    gabapentin (NEURONTIN) 400 MG capsule 120 capsule 0     Sig: Take 2 capsules by mouth 2 (Two) Times a Day.      Last office visit with prescribing clinician: 10/1/2024   Last telemedicine visit with prescribing clinician: Visit date not found   Next office visit with prescribing clinician: 12/2/2024                         Would you like a call back once the refill request has been completed: [] Yes [] No    If the office needs to give you a call back, can they leave a voicemail: [] Yes [] No    Mena Hernandez RN  11/25/24, 07:29 CST

## 2024-11-25 NOTE — TELEPHONE ENCOUNTER
Rx Refill Note  Requested Prescriptions     Pending Prescriptions Disp Refills    Wegovy 1.7 MG/0.75ML solution auto-injector [Pharmacy Med Name: Wegovy 1.7 MG/0.75ML Subcutaneous Solution Auto-injector] 4 mL 0     Sig: INJECT 0.75 ML SUB Q INTO THE SKIN IN THE APPROPRIATE AREA AS DIRECTED 1 TIME PER WEEK      Last office visit with prescribing clinician: 10/1/2024   Last telemedicine visit with prescribing clinician: Visit date not found   Next office visit with prescribing clinician: 11/23/2024                         Would you like a call back once the refill request has been completed: [] Yes [] No    If the office needs to give you a call back, can they leave a voicemail: [] Yes [] No    Mena Hernandez RN  11/25/24, 07:30 CST

## 2024-12-02 ENCOUNTER — OFFICE VISIT (OUTPATIENT)
Dept: INTERNAL MEDICINE | Facility: CLINIC | Age: 79
End: 2024-12-02
Payer: COMMERCIAL

## 2024-12-02 VITALS
OXYGEN SATURATION: 97 % | DIASTOLIC BLOOD PRESSURE: 74 MMHG | TEMPERATURE: 97.7 F | WEIGHT: 185 LBS | BODY MASS INDEX: 30.82 KG/M2 | HEIGHT: 65 IN | RESPIRATION RATE: 19 BRPM | SYSTOLIC BLOOD PRESSURE: 133 MMHG | HEART RATE: 76 BPM

## 2024-12-02 DIAGNOSIS — G62.9 PERIPHERAL POLYNEUROPATHY: ICD-10-CM

## 2024-12-02 DIAGNOSIS — E66.811 CLASS 1 OBESITY: Primary | ICD-10-CM

## 2024-12-02 PROCEDURE — 99213 OFFICE O/P EST LOW 20 MIN: CPT | Performed by: NURSE PRACTITIONER

## 2024-12-02 RX ORDER — SEMAGLUTIDE 2.4 MG/.75ML
2.4 INJECTION, SOLUTION SUBCUTANEOUS WEEKLY
Qty: 3 ML | Refills: 6 | Status: SHIPPED | OUTPATIENT
Start: 2024-12-02

## 2024-12-02 NOTE — PROGRESS NOTES
Subjective     Chief Complaint   Patient presents with    Obesity       History of Present Illness  The patient is a 79-year-old male who presents for a follow-up visit. He is accompanied by an adult female.    He reports a weight gain of 2 pounds, despite having received 6 injections of Wegovy 1.7 mg.    He mentions a persistent cough, which he attributes to using a plasma cutter without a respirator. He reports no stress.    He has been experiencing earaches and has recently refilled his prescription for ear drops, which he finds helpful, although slow-acting. He has a history of ear issues, including a perforated eardrum in the past. He is scheduled to see Dr. Josh Almazan, an ENT specialist, on 01/06/2025, and Dr. Evie Solorzano on 01/15/2025.    He has discontinued his daily use of Tylenol.      Patient's PMR from outside medical facility reviewed and noted.      Otherwise complete ROS reviewed and negative.    Past Medical History:   Past Medical History:   Diagnosis Date    Arthritis     BPH with obstruction/lower urinary tract symptoms     Chronic back pain     Class 1 obesity due to excess calories with serious comorbidity and body mass index (BMI) of 32.0 to 32.9 in adult 01/20/2020    COPD (chronic obstructive pulmonary disease)     Elevated cholesterol     Environmental allergies 09/21/2022    Essential hypertension 10/14/2019    GERD (gastroesophageal reflux disease)     Hyperlipidemia     Hypertension     Left bundle branch block     Lung infiltrate 12/20/2022    Right upper lobe, [facility], October 2022    Obstructive sleep apnea 01/20/2020    pt uses a cpap machine at home    Sleep apnea     CPAP     Past Surgical History:  Past Surgical History:   Procedure Laterality Date    CARPAL TUNNEL RELEASE Right 09/29/2021    Procedure: CARPAL TUNNEL RELEASE right;  Surgeon: Luis Perdomo MD;  Location: [facility];  Service: Neurosurgery;  Laterality: Right;    CARPAL TUNNEL RELEASE Left 8/9/2023     Procedure: CARPAL TUNNEL RELEASE Left;  Surgeon: Luis Perdomo MD;  Location:  [facility];  Service: Neurosurgery;  Laterality: Left;    COLONOSCOPY      COLONOSCOPY N/A 04/13/2022    Procedure: COLONOSCOPY WITH ANESTHESIA;  Surgeon: Cortes Ribera DO;  Location:  [facility];  Service: Gastroenterology;  Laterality: N/A;  preop; hx of poylps   postop; polyps   PCP Katerine Mcclain     KNEE ARTHROPLASTY Right     KNEE MENISCAL REPAIR Left 2018    PENILE PROSTHESIS IMPLANT N/A 4/27/2022    Procedure: MALLEABLE PENILE PROSTHESIS PLACEMENT;  Surgeon: Ignacio Moon MD;  Location:  [facility];  Service: Urology;  Laterality: N/A;    ROTATOR CUFF REPAIR Right 2000    SPINE SURGERY  1981    lower back ruptured disc    TOTAL SHOULDER ARTHROPLASTY W/ DISTAL CLAVICLE EXCISION Right 01/14/2020    Procedure: RIGHT REVERSE TOTAL SHOULDER  ARTHROPLASTY;  Surgeon: Suman Ventura MD;  Location:  [facility];  Service: Orthopedics     Social History:  reports that he quit smoking about 13 years ago. His smoking use included pipe and cigarettes. He started smoking about 63 years ago. He has a 50 pack-year smoking history. He has been exposed to tobacco smoke. He has never used smokeless tobacco. He reports current alcohol use of about 5.0 standard drinks of alcohol per week. He reports that he does not use drugs.    Family History: family history includes Arthritis in his father and mother; Cancer in his brother; Diabetes in his mother and sister; Hypertension in his father and mother; Kidney disease in his mother; Obesity in his sister; Osteoporosis in his mother; Stroke in his father.       Allergies:  No Known Allergies  Medications:  Prior to Admission medications    Medication Sig Start Date End Date Taking? Authorizing Provider   acetaminophen (TYLENOL) 650 MG 8 hr tablet Take 1 tablet by mouth 2 (Two) Times a Day.    Provider, MD Cristian   albuterol sulfate  (90 Base) MCG/ACT inhaler Inhale 2  puffs Every 4 (Four) Hours As Needed for Wheezing. 4/11/22   Hien Pimentel APRN   amLODIPine (NORVASC) 5 MG tablet Take 1 tablet by mouth once daily 10/21/24   Katerine Mcclain APRN   aspirin 81 MG EC tablet Take 1 tablet by mouth Daily.    Cristian Pfeiffer MD   Azelastine HCl 137 MCG/SPRAY solution SPRAY TWO SPRAYS NASALLY TWICE DAILY AS DIRECTED 1/22/24   Katerine Mcclain APRN   betamethasone dipropionate 0.05 % cream APPLY TO AFFECTED AREA AS DIRECTED DAILY 8/1/23   Katerine Mcclain APRN   bisoprolol (ZEBeta) 5 MG tablet TAKE ONE TABLET BY MOUTH DAILY 3/11/24   Katerine Mcclain APRN   budesonide-formoterol (Symbicort) 160-4.5 MCG/ACT inhaler Inhale 2 puffs 2 (Two) Times a Day for 90 days. 2/16/24 5/16/24  Katerine Mcclain APRN   calcium carbonate (OS-TASHIA) 600 MG tablet Take 1 tablet by mouth 2 (Two) Times a Day.    Cristian Pfeiffer MD   cetirizine (zyrTEC) 10 MG tablet Take 1 tablet by mouth Daily for 5 days. 7/22/22 7/27/23  Abimael Christian MD   Cholecalciferol (VITAMIN D3) 1000 units capsule Take 2 capsules by mouth Daily.    Cristian Pfeiffer MD   ciprofloxacin-dexAMETHasone (Ciprodex) 0.3-0.1 % otic suspension Administer 4 drops into the left ear 2 (Two) Times a Day. 9/23/24   Katerine Mcclain APRN   cloNIDine (Catapres) 0.1 MG tablet Take 1 tablet by mouth 2 (Two) Times a Day. 3/7/24   Katerine Mcclain APRN   coenzyme Q10 100 MG capsule Take 1 capsule by mouth Daily.    Cristian Pfeiffer MD   Collagen-Boron-Hyaluronic Acid (Move Free [employer]) 10-5-3.3 MG tablet Take 1 tablet by mouth Daily.    Cristian Pfeiffer MD   docusate sodium (Colace) 100 MG capsule Take 1 capsule by mouth 2 (Two) Times a Day. 4/27/22   Ignacio Moon MD   EPINEPHrine (EPIPEN) 0.3 MG/0.3ML solution auto-injector injection INJECT CONTENTS OF 1 PEN AS NEEDED FOR ALLERGIC REACTION 9/14/24   Provider, MD Cristian   finasteride (PROSCAR) 5 MG  tablet Take 1 tablet by mouth Daily. 12/7/23   Ignacio Moon MD   gabapentin (NEURONTIN) 400 MG capsule Take 2 capsules by mouth 2 (Two) Times a Day. 11/25/24   Katerine Mcclain APRN   Garlic 1000 MG capsule Take 1 capsule by mouth Daily.    Cristian Pfeiffer MD   ibuprofen (ADVIL,MOTRIN) 200 MG tablet Take 2 tablets by mouth Every 6 (Six) Hours As Needed for Mild Pain.    Cristian Pefiffer MD   Krill Oil 300 MG capsule Take 300 mg by mouth Daily.    Cristian Pfeiffer MD   losartan (COZAAR) 25 MG tablet Take 1 tablet by mouth once daily 6/12/24   Katerine Mcclain APRN   Multiple Vitamins-Minerals (OCUVITE ADULT 50+ PO) Take 1 tablet by mouth Daily.    Cristian Pfeiffer MD   NON FORMULARY CPAP    Cristian Pfeiffer MD   omeprazole (priLOSEC) 20 MG capsule Take 1 capsule by mouth Daily.    Cristian Pfeiffer MD   ondansetron (Zofran) 4 MG tablet Take 1 tablet by mouth Every 8 (Eight) Hours As Needed for Nausea or Vomiting. 7/1/24   Katerine Mcclain APRN   Semaglutide-Weight Management (Wegovy) 2.4 MG/0.75ML solution auto-injector Inject 2.4 mg under the skin into the appropriate area as directed 1 (One) Time Per Week. 12/2/24   Katerine Mcclain APRN   simvastatin (ZOCOR) 40 MG tablet TAKE ONE TABLET BY MOUTH NIGHTLY 2/26/24   Katerine Mcclain APRN   SUPER B COMPLEX/C PO Take 1 tablet by mouth Every Night.    Cristian Pfeiffer MD   tamsulosin (FLOMAX) 0.4 MG capsule 24 hr capsule Take 2 capsules by mouth Every Night. 12/7/23   Ignacio Moon MD   tiotropium bromide monohydrate (Spiriva Respimat) 2.5 MCG/ACT aerosol solution inhaler Inhale 2 puffs Daily. 4/4/24   Katerine Mcclain APRN   tiotropium bromide monohydrate (Spiriva Respimat) 2.5 MCG/ACT aerosol solution inhaler Inhale 2 puffs Daily for 90 days. 4/15/24 7/14/24  Hien Pimentel APRN   triamcinolone (KENALOG) 0.5 % ointment Apply 1 application topically to the appropriate area  "as directed 2 (Two) Times a Day. 6/13/23   Katerine Mcclain APRN   vitamin C (ASCORBIC ACID) 500 MG tablet Take 1 tablet by mouth Daily.    Provider, Historical, MD   Wegovy 1.7 MG/0.75ML solution auto-injector INJECT 0.75 ML SUB Q INTO THE SKIN IN THE APPROPRIATE AREA AS DIRECTED 1 TIME PER WEEK 11/25/24 12/2/24  Katerine Mcclain APRN       Objective     Vital Signs: /74   Pulse 76   Temp 97.7 °F (36.5 °C)   Resp 19   Ht 165.1 cm (65\")   Wt 83.9 kg (185 lb)   SpO2 97%   BMI 30.79 kg/m²     Physical Exam  Left ear appears cloudy with a film over it. No perforation noted.  Physical Exam  Vitals reviewed.   Constitutional:       Appearance: He is well-developed. He is obese.   HENT:      Head: Normocephalic and atraumatic.      Comments: Cloudy TM     Right Ear: Tympanic membrane normal.   Eyes:      Pupils: Pupils are equal, round, and reactive to light.   Neck:      Vascular: No JVD.   Cardiovascular:      Rate and Rhythm: Normal rate and regular rhythm.   Pulmonary:      Effort: Pulmonary effort is normal.      Breath sounds: Normal breath sounds.   Abdominal:      General: Bowel sounds are normal.      Palpations: Abdomen is soft.   Musculoskeletal:         General: No deformity.      Cervical back: Normal range of motion and neck supple.   Lymphadenopathy:      Cervical: No cervical adenopathy.   Skin:     General: Skin is warm and dry.   Neurological:      Mental Status: He is alert and oriented to person, place, and time.   Psychiatric:         Behavior: Behavior normal.         Thought Content: Thought content normal.         Judgment: Judgment normal.         Results Reviewed:  Glucose   Date Value Ref Range Status   07/01/2024 108 (H) 70 - 99 mg/dL Final   07/27/2023 107 (H) 65 - 99 mg/dL Final   12/22/2020 92 74 - 109 mg/dL Final     BUN   Date Value Ref Range Status   07/01/2024 14 8 - 27 mg/dL Final   07/27/2023 12 8 - 23 mg/dL Final   12/22/2020 16 8 - 23 mg/dL Final "     Creatinine   Date Value Ref Range Status   07/01/2024 0.94 0.76 - 1.27 mg/dL Final   07/27/2023 0.77 0.76 - 1.27 mg/dL Final   12/22/2020 0.8 0.5 - 1.2 mg/dL Final     Sodium   Date Value Ref Range Status   07/01/2024 141 134 - 144 mmol/L Final   07/27/2023 141 136 - 145 mmol/L Final   12/22/2020 139 136 - 145 mmol/L Final     Potassium   Date Value Ref Range Status   07/01/2024 3.8 3.5 - 5.2 mmol/L Final   07/27/2023 4.1 3.5 - 5.2 mmol/L Final   12/22/2020 4.1 3.5 - 5.0 mmol/L Final     Chloride   Date Value Ref Range Status   07/01/2024 104 96 - 106 mmol/L Final   07/27/2023 106 98 - 107 mmol/L Final   12/22/2020 102 98 - 111 mmol/L Final     CO2   Date Value Ref Range Status   07/27/2023 24.0 22.0 - 29.0 mmol/L Final   12/22/2020 25 22 - 29 mmol/L Final     Total CO2   Date Value Ref Range Status   07/01/2024 23 20 - 29 mmol/L Final     Calcium   Date Value Ref Range Status   07/01/2024 9.2 8.6 - 10.2 mg/dL Final   07/27/2023 9.4 8.6 - 10.5 mg/dL Final   12/22/2020 9.4 8.8 - 10.2 mg/dL Final     ALT (SGPT)   Date Value Ref Range Status   07/01/2024 24 0 - 44 IU/L Final   07/27/2023 15 1 - 41 U/L Final     AST (SGOT)   Date Value Ref Range Status   07/01/2024 25 0 - 40 IU/L Final   07/27/2023 16 1 - 40 U/L Final     WBC   Date Value Ref Range Status   07/01/2024 4.5 3.4 - 10.8 x10E3/uL Final   12/22/2020 6.6 4.8 - 10.8 K/uL Final     Hematocrit   Date Value Ref Range Status   07/01/2024 39.2 37.5 - 51.0 % Final   07/27/2023 39.8 37.5 - 51.0 % Final   12/22/2020 40.7 (L) 42.0 - 52.0 % Final     Platelets   Date Value Ref Range Status   07/01/2024 156 150 - 450 x10E3/uL Final   07/27/2023 154 140 - 450 10*3/mm3 Final   12/22/2020 175 130 - 400 K/uL Final     Triglycerides   Date Value Ref Range Status   07/01/2024 126 0 - 149 mg/dL Final     HDL Cholesterol   Date Value Ref Range Status   07/01/2024 55 >39 mg/dL Final     LDL Chol Calc (NIH)   Date Value Ref Range Status   07/01/2024 112 (H) 0 - 99 mg/dL Final      Hemoglobin A1C   Date Value Ref Range Status   07/01/2024 5.7 (H) 4.8 - 5.6 % Final     Comment:              Prediabetes: 5.7 - 6.4           Diabetes: >6.4           Glycemic control for adults with diabetes: <7.0     09/17/2021 5.4 % Final       Results        Assessment / Plan     Assessment/Plan:  Diagnoses and all orders for this visit:    1. Class 1 obesity (Primary)  -     Semaglutide-Weight Management (Wegovy) 2.4 MG/0.75ML solution auto-injector; Inject 2.4 mg under the skin into the appropriate area as directed 1 (One) Time Per Week.  Dispense: 3 mL; Refill: 6    2. Peripheral polyneuropathy  -     ToxASSURE Select 13 (MW) - Urine, Clean Catch      Assessment & Plan  1. Weight management.  He has gained 2 pounds despite being on Wegovy 1.7 mg. The dosage of Wegovy will be increased to 2.4 mg. If he does not start losing weight on the increased dosage, he may need to start weighing his food.    2. Medication management.  A drug screen will be conducted today to ensure compliance with the prescribed medications. He will continue taking gabapentin as previously prescribed.    3. Ear pain.  His left ear appears cloudy with a film over it, although there is no perforation. He is advised to apply sweet oil to his ears regularly. The drops previously prescribed have been refilled and provide slow relief. If the pain persists, further evaluation may be needed.    4. Health Maintenance.  He should continue with his scheduled appointments with Dr. Josh Almazan on January 6th and Dr. Evie Solorzano on January 15th.    Follow-up  Return in 3 months for follow up.    Return in about 3 months (around 3/2/2025) for Recheck. unless patient needs to be seen sooner or acute issues arise.    Code Status: Full.     Patient or patient representative verbalized consent for the use of Ambient Listening during the visit with  SALONI Karu for chart documentation. 12/3/2024  14:48 CST  I have discussed the  patient results/orders and and plan/recommendation with them at today's visit.      Signed by:    SALONI Kaur Date: 12/03/24

## 2024-12-10 LAB — DRUGS UR: NORMAL

## 2024-12-29 DIAGNOSIS — G62.9 PERIPHERAL POLYNEUROPATHY: ICD-10-CM

## 2024-12-30 RX ORDER — GABAPENTIN 400 MG/1
800 CAPSULE ORAL 2 TIMES DAILY
Qty: 120 CAPSULE | Refills: 0 | Status: SHIPPED | OUTPATIENT
Start: 2024-12-30

## 2025-01-02 ENCOUNTER — OFFICE VISIT (OUTPATIENT)
Dept: INTERNAL MEDICINE | Facility: CLINIC | Age: 80
End: 2025-01-02
Payer: COMMERCIAL

## 2025-01-02 VITALS
HEART RATE: 87 BPM | HEIGHT: 65 IN | SYSTOLIC BLOOD PRESSURE: 126 MMHG | OXYGEN SATURATION: 97 % | DIASTOLIC BLOOD PRESSURE: 75 MMHG | TEMPERATURE: 98 F | WEIGHT: 186 LBS | BODY MASS INDEX: 30.99 KG/M2 | RESPIRATION RATE: 18 BRPM

## 2025-01-02 DIAGNOSIS — R20.8 SKIN PAIN: ICD-10-CM

## 2025-01-02 DIAGNOSIS — R91.8 LUNG NODULES: ICD-10-CM

## 2025-01-02 DIAGNOSIS — Z12.2 SCREENING FOR LUNG CANCER: Primary | ICD-10-CM

## 2025-01-02 DIAGNOSIS — E66.811 CLASS 1 OBESITY: ICD-10-CM

## 2025-01-02 RX ORDER — LIDOCAINE 40 MG/G
1 CREAM TOPICAL DAILY
Qty: 28 G | Refills: 1 | Status: SHIPPED | OUTPATIENT
Start: 2025-01-02

## 2025-01-02 NOTE — PROGRESS NOTES
Subjective     Chief Complaint   Patient presents with    Pain     Skin Pain       History of Present Illness  The patient is a 79-year-old male who presents for evaluation of skin pain.    He reports experiencing pain in his skin, particularly on the dorsal aspect of his hands, which appears to be progressively extending up his arms. He describes the sensation as abnormal and notes that the pain intensifies upon touch. He does not experience any similar symptoms in his legs. He has been supplementing with collagen for approximately one month and has found temporary relief from using hand cream.    He is currently on a regimen of Wegovy, with a dosage of 2.4 mg, and has exhausted his supply. He was unable to refill his prescription on 12/31/2024 due to pharmacy restrictions, which required him to wait until 01/02/2025. He is seeking advice on whether to continue with Wegovy or switch to Zepbound, considering the cost implications.    He has an appointment with Sae in April 2025 for his routine 6-month checkup. He is scheduled to see ENT on Monday.    SOCIAL HISTORY  The patient quit smoking in 2009 or 2010 after smoking for 53 years. He smoked a pipe for the last 20 years and before that he smoked a pack a day.    MEDICATIONS  Current: Wegovy    Otherwise complete ROS reviewed and negative except as mentioned in the HPI.    Past Medical History:   Past Medical History:   Diagnosis Date    Arthritis     BPH with obstruction/lower urinary tract symptoms     Chronic back pain     Class 1 obesity due to excess calories with serious comorbidity and body mass index (BMI) of 32.0 to 32.9 in adult 01/20/2020    COPD (chronic obstructive pulmonary disease)     Elevated cholesterol     Environmental allergies 09/21/2022    Essential hypertension 10/14/2019    GERD (gastroesophageal reflux disease)     Hyperlipidemia     Hypertension     Left bundle branch block     Lung infiltrate 12/20/2022    Right upper lobe, CT  [facility], October 2022    Obstructive sleep apnea 01/20/2020    pt uses a cpap machine at home    Sleep apnea     CPAP     Past Surgical History:  Past Surgical History:   Procedure Laterality Date    CARPAL TUNNEL RELEASE Right 09/29/2021    Procedure: CARPAL TUNNEL RELEASE right;  Surgeon: Luis Perdomo MD;  Location: [facility] OR;  Service: Neurosurgery;  Laterality: Right;    CARPAL TUNNEL RELEASE Left 8/9/2023    Procedure: CARPAL TUNNEL RELEASE Left;  Surgeon: Luis Perdomo MD;  Location:  [facility] OR;  Service: Neurosurgery;  Laterality: Left;    COLONOSCOPY      COLONOSCOPY N/A 04/13/2022    Procedure: COLONOSCOPY WITH ANESTHESIA;  Surgeon: Cortes Ribera DO;  Location: [facility] ENDOSCOPY;  Service: Gastroenterology;  Laterality: N/A;  preop; hx of poylps   postop; polyps   PCP Katerine Mcclain     KNEE ARTHROPLASTY Right     KNEE MENISCAL REPAIR Left 2018    PENILE PROSTHESIS IMPLANT N/A 4/27/2022    Procedure: MALLEABLE PENILE PROSTHESIS PLACEMENT;  Surgeon: Ignacio Moon MD;  Location: [facility] OR;  Service: Urology;  Laterality: N/A;    ROTATOR CUFF REPAIR Right 2000    SPINE SURGERY  1981    lower back ruptured disc    TOTAL SHOULDER ARTHROPLASTY W/ DISTAL CLAVICLE EXCISION Right 01/14/2020    Procedure: RIGHT REVERSE TOTAL SHOULDER  ARTHROPLASTY;  Surgeon: Suman Ventura MD;  Location: [facility] OR;  Service: Orthopedics     Social History:  reports that he quit smoking about 14 years ago. His smoking use included pipe and cigarettes. He started smoking about 64 years ago. He has a 50 pack-year smoking history. He has been exposed to tobacco smoke. He has never used smokeless tobacco. He reports current alcohol use of about 5.0 standard drinks of alcohol per week. He reports that he does not use drugs.    Family History: family history includes Arthritis in his father and mother; Cancer in his brother; Diabetes in his mother and sister; Hypertension in his father and mother; Kidney disease in  his mother; Obesity in his sister; Osteoporosis in his mother; Stroke in his father.       Allergies:  No Known Allergies  Medications:  Prior to Admission medications    Medication Sig Start Date End Date Taking? Authorizing Provider   acetaminophen (TYLENOL) 650 MG 8 hr tablet Take 1 tablet by mouth 2 (Two) Times a Day.    Cristian Pfeiffer MD   albuterol sulfate  (90 Base) MCG/ACT inhaler Inhale 2 puffs Every 4 (Four) Hours As Needed for Wheezing. 4/11/22   Hien Pimentel APRN   amLODIPine (NORVASC) 5 MG tablet Take 1 tablet by mouth once daily 10/21/24   Katerine Mcclain APRN   aspirin 81 MG EC tablet Take 1 tablet by mouth Daily.    Cristian Pfeiffer MD   Azelastine HCl 137 MCG/SPRAY solution SPRAY TWO SPRAYS NASALLY TWICE DAILY AS DIRECTED 1/22/24   Katerine Mcclain APRN   betamethasone dipropionate 0.05 % cream APPLY TO AFFECTED AREA AS DIRECTED DAILY 8/1/23   Katerine Mcclain APRN   bisoprolol (ZEBeta) 5 MG tablet TAKE ONE TABLET BY MOUTH DAILY 3/11/24   Katerine Mcclain APRN   budesonide-formoterol (Symbicort) 160-4.5 MCG/ACT inhaler Inhale 2 puffs 2 (Two) Times a Day for 90 days. 2/16/24 5/16/24  Katerine Mcclain APRN   calcium carbonate (OS-TASHIA) 600 MG tablet Take 1 tablet by mouth 2 (Two) Times a Day.    Cristian Pfeiffer MD   cetirizine (zyrTEC) 10 MG tablet Take 1 tablet by mouth Daily for 5 days. 7/22/22 7/27/23  Abimael Christian MD   Cholecalciferol (VITAMIN D3) 1000 units capsule Take 2 capsules by mouth Daily.    Cristian Pfeiffer MD   ciprofloxacin-dexAMETHasone (Ciprodex) 0.3-0.1 % otic suspension Administer 4 drops into the left ear 2 (Two) Times a Day. 9/23/24   Katerine Mcclain APRN   cloNIDine (Catapres) 0.1 MG tablet Take 1 tablet by mouth 2 (Two) Times a Day. 3/7/24   Mcclain, Katerine Jennifer, APRN   coenzyme Q10 100 MG capsule Take 1 capsule by mouth Daily.    Provider, MD Cristian   Collagen-Boron-Hyaluronic Acid  (Move Free [facility]) 10-5-3.3 MG tablet Take 1 tablet by mouth Daily.    Cristian Pfeiffer MD   docusate sodium (Colace) 100 MG capsule Take 1 capsule by mouth 2 (Two) Times a Day. 4/27/22   Ignacio Moon MD   EPINEPHrine (EPIPEN) 0.3 MG/0.3ML solution auto-injector injection INJECT CONTENTS OF 1 PEN AS NEEDED FOR ALLERGIC REACTION 9/14/24   Cristian Pfeiffer MD   finasteride (PROSCAR) 5 MG tablet Take 1 tablet by mouth Daily. 12/7/23   Ignacio Moon MD   gabapentin (NEURONTIN) 400 MG capsule Take 2 capsules by mouth twice daily 12/30/24   Katerine Mcclain APRN   Garlic 1000 MG capsule Take 1 capsule by mouth Daily.    Cristian Pfeiffer MD   ibuprofen (ADVIL,MOTRIN) 200 MG tablet Take 2 tablets by mouth Every 6 (Six) Hours As Needed for Mild Pain.    Cristian Pfeiffer MD   Krill Oil 300 MG capsule Take 300 mg by mouth Daily.    Cristian Pfeiffer MD   losartan (COZAAR) 25 MG tablet Take 1 tablet by mouth once daily 6/12/24   Katerine Mcclain APRN   Multiple Vitamins-Minerals (OCUVITE ADULT 50+ PO) Take 1 tablet by mouth Daily.    Cristian Pfeiffer MD   NON FORMULARY CPAP    Cristian Pfeiffer MD   omeprazole (priLOSEC) 20 MG capsule Take 1 capsule by mouth Daily.    Cristian Pfeiffer MD   ondansetron (Zofran) 4 MG tablet Take 1 tablet by mouth Every 8 (Eight) Hours As Needed for Nausea or Vomiting. 7/1/24   Katerine Mcclain APRN   Semaglutide-Weight Management (Wegovy) 2.4 MG/0.75ML solution auto-injector Inject 2.4 mg under the skin into the appropriate area as directed 1 (One) Time Per Week. 12/2/24   Katerine Mcclain APRN   simvastatin (ZOCOR) 40 MG tablet TAKE ONE TABLET BY MOUTH NIGHTLY 2/26/24   Katerine Mcclain APRN   SUPER B COMPLEX/C PO Take 1 tablet by mouth Every Night.    Cristian Pfeiffer MD   tamsulosin (FLOMAX) 0.4 MG capsule 24 hr capsule Take 2 capsules by mouth Every Night. 12/7/23   Ignacio Moon MD  "  tiotropium bromide monohydrate (Spiriva Respimat) 2.5 MCG/ACT aerosol solution inhaler Inhale 2 puffs Daily. 4/4/24   Katerine Mcclain APRN   tiotropium bromide monohydrate (Spiriva Respimat) 2.5 MCG/ACT aerosol solution inhaler Inhale 2 puffs Daily for 90 days. 4/15/24 7/14/24  Hien Pimentel APRN   triamcinolone (KENALOG) 0.5 % ointment Apply 1 application topically to the appropriate area as directed 2 (Two) Times a Day. 6/13/23   Katerine Mcclain APRN   vitamin C (ASCORBIC ACID) 500 MG tablet Take 1 tablet by mouth Daily.    Provider, MD Cristian       Objective     Vital Signs: /75   Pulse 87   Temp 98 °F (36.7 °C)   Resp 18   Ht 165.1 cm (65\")   Wt 84.4 kg (186 lb)   SpO2 97%   BMI 30.95 kg/m²     Physical Exam    Physical Exam  Vitals reviewed.   Constitutional:       Appearance: He is well-developed.   HENT:      Head: Normocephalic and atraumatic.   Eyes:      Pupils: Pupils are equal, round, and reactive to light.   Neck:      Vascular: No JVD.   Cardiovascular:      Rate and Rhythm: Normal rate and regular rhythm.   Pulmonary:      Effort: Pulmonary effort is normal.   Abdominal:      General: Bowel sounds are normal.      Palpations: Abdomen is soft.   Musculoskeletal:         General: No deformity.      Cervical back: Normal range of motion and neck supple.   Lymphadenopathy:      Cervical: No cervical adenopathy.   Skin:     General: Skin is warm and dry.   Neurological:      Mental Status: He is alert and oriented to person, place, and time.   Psychiatric:         Behavior: Behavior normal.         Thought Content: Thought content normal.         Judgment: Judgment normal.                 Results Reviewed:  Glucose   Date Value Ref Range Status   07/01/2024 108 (H) 70 - 99 mg/dL Final   07/27/2023 107 (H) 65 - 99 mg/dL Final   12/22/2020 92 74 - 109 mg/dL Final     BUN   Date Value Ref Range Status   07/01/2024 14 8 - 27 mg/dL Final   07/27/2023 12 8 - 23 mg/dL " Final   12/22/2020 16 8 - 23 mg/dL Final     Creatinine   Date Value Ref Range Status   07/01/2024 0.94 0.76 - 1.27 mg/dL Final   07/27/2023 0.77 0.76 - 1.27 mg/dL Final   12/22/2020 0.8 0.5 - 1.2 mg/dL Final     Sodium   Date Value Ref Range Status   07/01/2024 141 134 - 144 mmol/L Final   07/27/2023 141 136 - 145 mmol/L Final   12/22/2020 139 136 - 145 mmol/L Final     Potassium   Date Value Ref Range Status   07/01/2024 3.8 3.5 - 5.2 mmol/L Final   07/27/2023 4.1 3.5 - 5.2 mmol/L Final   12/22/2020 4.1 3.5 - 5.0 mmol/L Final     Chloride   Date Value Ref Range Status   07/01/2024 104 96 - 106 mmol/L Final   07/27/2023 106 98 - 107 mmol/L Final   12/22/2020 102 98 - 111 mmol/L Final     CO2   Date Value Ref Range Status   07/27/2023 24.0 22.0 - 29.0 mmol/L Final   12/22/2020 25 22 - 29 mmol/L Final     Total CO2   Date Value Ref Range Status   07/01/2024 23 20 - 29 mmol/L Final     Calcium   Date Value Ref Range Status   07/01/2024 9.2 8.6 - 10.2 mg/dL Final   07/27/2023 9.4 8.6 - 10.5 mg/dL Final   12/22/2020 9.4 8.8 - 10.2 mg/dL Final     ALT (SGPT)   Date Value Ref Range Status   07/01/2024 24 0 - 44 IU/L Final   07/27/2023 15 1 - 41 U/L Final     AST (SGOT)   Date Value Ref Range Status   07/01/2024 25 0 - 40 IU/L Final   07/27/2023 16 1 - 40 U/L Final     WBC   Date Value Ref Range Status   07/01/2024 4.5 3.4 - 10.8 x10E3/uL Final   12/22/2020 6.6 4.8 - 10.8 K/uL Final     Hematocrit   Date Value Ref Range Status   07/01/2024 39.2 37.5 - 51.0 % Final   07/27/2023 39.8 37.5 - 51.0 % Final   12/22/2020 40.7 (L) 42.0 - 52.0 % Final     Platelets   Date Value Ref Range Status   07/01/2024 156 150 - 450 x10E3/uL Final   07/27/2023 154 140 - 450 10*3/mm3 Final   12/22/2020 175 130 - 400 K/uL Final     Triglycerides   Date Value Ref Range Status   07/01/2024 126 0 - 149 mg/dL Final     HDL Cholesterol   Date Value Ref Range Status   07/01/2024 55 >39 mg/dL Final     LDL Chol Calc ([facility])   Date Value Ref Range Status    07/01/2024 112 (H) 0 - 99 mg/dL Final     Hemoglobin A1C   Date Value Ref Range Status   07/01/2024 5.7 (H) 4.8 - 5.6 % Final     Comment:              Prediabetes: 5.7 - 6.4           Diabetes: >6.4           Glycemic control for adults with diabetes: <7.0     09/17/2021 5.4 % Final       Results        Assessment / Plan     Assessment/Plan:  Diagnoses and all orders for this visit:    1. Screening for lung cancer (Primary)  -      CT Chest Low Dose Cancer Screening WO; Future    2. Lung nodules  -      CT Chest Low Dose Cancer Screening WO; Future    3. Class 1 obesity  -     Tirzepatide-Weight Management (ZEPBOUND) 10 MG/0.5ML solution auto-injector; Inject 0.5 mL under the skin into the appropriate area as directed 1 (One) Time Per Week.  Dispense: 2 mL; Refill: 1    4. Skin pain  -     lidocaine (LMX) 4 % cream; Apply 1 Application topically to the appropriate area as directed Daily.  Dispense: 28 g; Refill: 1      Assessment & Plan  1. Pain in the skin.  The discomfort is likely due to the natural thinning of the skin associated with aging, resulting in nerve endings being closer to the surface. This can cause sensitivity and pain upon touch. A prescription for lidocaine cream has been provided to alleviate the tenderness. He is advised to apply a small amount to the most affected areas. Additionally, he is encouraged to maintain skin hydration through regular use of hand cream and ensure adequate fluid intake.    2. Weight management.  A prescription for Zepbound 10 mg has been issued. He is currently on Wegovy 2.4 mg, which is the maximum dose. The transition to Zepbound is intended to manage costs better. He is advised to start at the 5 mg dose if there are issues with the pharmacy filling the 10 mg dose.    3. Health maintenance.  A CT scan of the chest is due and should be scheduled. He has an appointment with Sae in April for his routine 6-month checkup.          No follow-ups on file. unless  patient needs to be seen sooner or acute issues arise.    Code Status: Full.   Patient or patient representative verbalized consent for the use of Ambient Listening during the visit with  SALONI Kaur for chart documentation. 1/2/2025  13:00 CST  I have discussed the patient results/orders and and plan/recommendation with them at today's visit.      Signed by:    SALONI Kaur Date: 01/02/25

## 2025-01-06 ENCOUNTER — OFFICE VISIT (OUTPATIENT)
Dept: OTOLARYNGOLOGY | Facility: CLINIC | Age: 80
End: 2025-01-06
Payer: COMMERCIAL

## 2025-01-06 VITALS
BODY MASS INDEX: 30.99 KG/M2 | HEIGHT: 65 IN | HEART RATE: 79 BPM | SYSTOLIC BLOOD PRESSURE: 111 MMHG | WEIGHT: 186 LBS | TEMPERATURE: 98.2 F | DIASTOLIC BLOOD PRESSURE: 63 MMHG

## 2025-01-06 DIAGNOSIS — G47.33 OSA (OBSTRUCTIVE SLEEP APNEA): Primary | ICD-10-CM

## 2025-01-06 DIAGNOSIS — Z78.9 INTOLERANCE OF CONTINUOUS POSITIVE AIRWAY PRESSURE (CPAP) VENTILATION: ICD-10-CM

## 2025-01-06 DIAGNOSIS — R06.83 SNORING: ICD-10-CM

## 2025-01-06 DIAGNOSIS — R53.83 OTHER FATIGUE: ICD-10-CM

## 2025-01-06 PROCEDURE — 99213 OFFICE O/P EST LOW 20 MIN: CPT | Performed by: NURSE PRACTITIONER

## 2025-01-06 NOTE — PROGRESS NOTES
YOB: 1945  Location: [facility]  Location Address: [address] [place] [address], [facility], [address] [place] [address]  Location Phone: [phone]    Chief Complaint   Patient presents with    Sleep Apnea       History of Present Illness  Justin Robledo is a 79 y.o. male.  Justin Robledo is here for evaluation of ENT complaints. The patient has had problems with sleep apnea, daytime somnolence, snoring, and intolerance to CPAP. He is interested in the inspire.    Justin Robledo was first diagnosed with sleep apnea 10+ years ago.   He has tried using cpap with several different masks/settings for 10+ years.   Currently patient is wearing cpap 6-8 hours per night but is not willing to continue use.  Patient has not been able to tolerate oral device.    EPWORTH: 14  AHI: 30.8 on 3/28/2024  BMI 30.95      Past Medical History:   Diagnosis Date    Arthritis     BPH with obstruction/lower urinary tract symptoms     Chronic back pain     Class 1 obesity due to excess calories with serious comorbidity and body mass index (BMI) of 32.0 to 32.9 in adult 2020    COPD (chronic obstructive pulmonary disease)     Elevated cholesterol     Environmental allergies 2022    Essential hypertension 10/14/2019    GERD (gastroesophageal reflux disease)     Hyperlipidemia     Hypertension     Left bundle branch block     Lung infiltrate 2022    Right upper lobe, CT [facility], 2022    Obstructive sleep apnea 2020    pt uses a cpap machine at home    Sleep apnea     CPAP       Past Surgical History:   Procedure Laterality Date    CARPAL TUNNEL RELEASE Right 2021    Procedure: CARPAL TUNNEL RELEASE right;  Surgeon: Luis Perdomo MD;  Location:  [facility];  Service: Neurosurgery;  Laterality: Right;    CARPAL TUNNEL RELEASE Left 2023    Procedure: CARPAL TUNNEL RELEASE Left;  Surgeon: Luis Perdomo MD;  Location:  [facility];  Service: Neurosurgery;  Laterality: Left;    COLONOSCOPY       COLONOSCOPY N/A 04/13/2022    Procedure: COLONOSCOPY WITH ANESTHESIA;  Surgeon: Cortes Ribera DO;  Location:  [facility];  Service: Gastroenterology;  Laterality: N/A;  preop; hx of poylps   postop; polyps   PCP Katerine Mcclain     KNEE ARTHROPLASTY Right     KNEE MENISCAL REPAIR Left 2018    PENILE PROSTHESIS IMPLANT N/A 4/27/2022    Procedure: MALLEABLE PENILE PROSTHESIS PLACEMENT;  Surgeon: Ignacio Moon MD;  Location:  [facility];  Service: Urology;  Laterality: N/A;    ROTATOR CUFF REPAIR Right 2000    SPINE SURGERY  1981    lower back ruptured disc    TOTAL SHOULDER ARTHROPLASTY W/ DISTAL CLAVICLE EXCISION Right 01/14/2020    Procedure: RIGHT REVERSE TOTAL SHOULDER  ARTHROPLASTY;  Surgeon: Suman Ventura MD;  Location:  [facility];  Service: Orthopedics       Outpatient Medications Marked as Taking for the 1/6/25 encounter (Office Visit) with Josh Almazan APRN   Medication Sig Dispense Refill    albuterol sulfate  (90 Base) MCG/ACT inhaler Inhale 2 puffs Every 4 (Four) Hours As Needed for Wheezing. 18 g 6    amLODIPine (NORVASC) 5 MG tablet Take 1 tablet by mouth once daily 90 tablet 3    aspirin 81 MG EC tablet Take 1 tablet by mouth Daily.      Azelastine HCl 137 MCG/SPRAY solution SPRAY TWO SPRAYS NASALLY TWICE DAILY AS DIRECTED 30 mL 12    bisoprolol (ZEBeta) 5 MG tablet TAKE ONE TABLET BY MOUTH DAILY 150 tablet 3    calcium carbonate (OS-TASHIA) 600 MG tablet Take 1 tablet by mouth 2 (Two) Times a Day.      Cholecalciferol (VITAMIN D3) 1000 units capsule Take 2 capsules by mouth Daily.      coenzyme Q10 100 MG capsule Take 1 capsule by mouth Daily.      Collagen-Boron-Hyaluronic Acid (Move Free Ultra [place]) 10-5-3.3 MG tablet Take 1 tablet by mouth Daily.      finasteride (PROSCAR) 5 MG tablet Take 1 tablet by mouth Daily. 90 tablet 3    gabapentin (NEURONTIN) 400 MG capsule Take 2 capsules by mouth twice daily 120 capsule 0    Garlic 1000 MG capsule Take 1 capsule by  mouth Daily.      ibuprofen (ADVIL,MOTRIN) 200 MG tablet Take 2 tablets by mouth Every 6 (Six) Hours As Needed for Mild Pain.      Krill Oil 300 MG capsule Take 300 mg by mouth Daily.      losartan (COZAAR) 25 MG tablet Take 1 tablet by mouth once daily 60 tablet 6    Multiple Vitamins-Minerals (OCUVITE ADULT 50+ PO) Take 1 tablet by mouth Daily.      omeprazole (priLOSEC) 20 MG capsule Take 1 capsule by mouth Daily.      simvastatin (ZOCOR) 40 MG tablet TAKE ONE TABLET BY MOUTH NIGHTLY 90 tablet 1    SUPER B COMPLEX/C PO Take 1 tablet by mouth Every Night.      tamsulosin (FLOMAX) 0.4 MG capsule 24 hr capsule Take 2 capsules by mouth Every Night. 180 capsule 3    tiotropium bromide monohydrate (Spiriva Respimat) 2.5 MCG/ACT aerosol solution inhaler Inhale 2 puffs Daily. 4 g 11    Tirzepatide-Weight Management (ZEPBOUND) 10 MG/0.5ML solution auto-injector Inject 0.5 mL under the skin into the appropriate area as directed 1 (One) Time Per Week. 2 mL 1    vitamin C (ASCORBIC ACID) 500 MG tablet Take 1 tablet by mouth Daily.         Patient has no known allergies.    Family History   Problem Relation Age of Onset    Arthritis Mother     Diabetes Mother     Osteoporosis Mother     Hypertension Mother     Kidney disease Mother     Arthritis Father     Hypertension Father     Stroke Father     Diabetes Sister     Obesity Sister     Cancer Brother     Colon cancer Neg Hx     Colon polyps Neg Hx        Social History     Socioeconomic History    Marital status:    Tobacco Use    Smoking status: Former     Current packs/day: 0.00     Average packs/day: 1 pack/day for 50.0 years (50.0 ttl pk-yrs)     Types: Pipe, Cigarettes     Start date:      Quit date:      Years since quittin.0     Passive exposure: Past    Smokeless tobacco: Never   Vaping Use    Vaping status: Never Used   Substance and Sexual Activity    Alcohol use: Yes     Alcohol/week: 5.0 standard drinks of alcohol     Types: 5 Cans of beer per  week     Comment: daily    Drug use: No    Sexual activity: Defer       Review of Systems   Constitutional:  Positive for fatigue.   HENT: Negative.     Respiratory:  Positive for apnea.        Vitals:    01/06/25 1300   BP: 111/63   Pulse: 79   Temp: 98.2 °F (36.8 °C)       Body mass index is 30.95 kg/m².    Objective     Physical Exam  Vitals reviewed.   Constitutional:       Appearance: Normal appearance. He is obese.   HENT:      Head: Normocephalic.      Right Ear: External ear normal.      Left Ear: External ear normal.      Nose: Nose normal.      Mouth/Throat:      Lips: Pink.      Comments: Niño III  Musculoskeletal:      Cervical back: Full passive range of motion without pain.   Neurological:      Mental Status: He is alert.   Psychiatric:         Behavior: Behavior is cooperative.         Assessment & Plan   Diagnoses and all orders for this visit:    1. VINEET (obstructive sleep apnea) (Primary)  -     Case Request; Standing  -     Basic Metabolic Panel; Future  -     CBC (No Diff); Future  -     ECG 12 Lead; Future  -     XR Chest 1 View; Future  -     Case Request    2. Other fatigue  -     Case Request; Standing  -     Basic Metabolic Panel; Future  -     CBC (No Diff); Future  -     ECG 12 Lead; Future  -     XR Chest 1 View; Future  -     Case Request    3. Snoring  -     Case Request; Standing  -     Basic Metabolic Panel; Future  -     CBC (No Diff); Future  -     ECG 12 Lead; Future  -     XR Chest 1 View; Future  -     Case Request    4. Intolerance of continuous positive airway pressure (CPAP) ventilation  -     Case Request; Standing  -     Basic Metabolic Panel; Future  -     CBC (No Diff); Future  -     ECG 12 Lead; Future  -     XR Chest 1 View; Future  -     Case Request    Other orders  -     Follow Anesthesia Guidelines / Protocol; Future  -     Follow Anesthesia Guidelines / Protocol; Standing      Videosleep endoscopy (N/A)  Orders Placed This Encounter   Procedures    XR Chest 1  View     Standing Status:   Future     Standing Expiration Date:   2026     Order Specific Question:   Reason for Exam:     Answer:   Preoperative     Order Specific Question:   Release to patient     Answer:   Routine Release [[id number]]    Basic Metabolic Panel     Standing Status:   Future     Standing Expiration Date:   2026     Order Specific Question:   Release to patient     Answer:   Routine Release [[id number]]    CBC (No Diff)     Standing Status:   Future     Standing Expiration Date:   2026     Order Specific Question:   Release to patient     Answer:   Routine Release [[id number]]    ECG 12 Lead     Standing Status:   Future     Standing Expiration Date:   2026     Order Specific Question:   Reason for Exam:     Answer:   Preoperative     Order Specific Question:   Release to patient     Answer:   Routine Release [[id number]]     Video sleep endoscopy discussed with patient, he wishes to proceed    Return in about 7 weeks (around 2025) for post op, Recheck Dr. Saeed.       Patient Instructions   CONTACT INFORMATION:  The main office phone number is [phone]. For emergencies after hours and on weekends, this number will convert over to our answering service and the on call provider will answer. Please try to keep non emergent phone calls/ questions to office hours 9am-5pm Monday through Friday.      [facility]  As an alternative, you can sign up and use the Epic MyChart system for more direct and quicker access for non emergent questions/ problems.  [employer] [facility] [employer] allows you to send messages to your doctor, view your test results, renew your prescriptions, schedule appointments, and more. To sign up, go to [email or web address] and click on the Sign Up Now link in the New User? box. Enter your [facility] Activation Code exactly as it appears below along with the last four digits of your Social Security Number and your Date of Birth () to complete the sign-up process. If  you do not sign up before the expiration date, you must request a new code.     [employer] Activation Code: Activation code not generated  Current [employer] Status: Active     If you have questions, you can email [email or web address] or call [phone] to talk to our [employer] staff. Remember, MyChart is NOT to be used for urgent needs. For medical emergencies, dial 911.     IF YOU SMOKE OR USE TOBACCO PLEASE READ THE FOLLOWING:  Why is smoking bad for me?  Smoking increases the risk of heart disease, lung disease, vascular disease, stroke, and cancer. If you smoke, STOP!        IF YOU SMOKE OR USE TOBACCO PLEASE READ THE FOLLOWING:  Why is smoking bad for me?  Smoking increases the risk of heart disease, lung disease, vascular disease, stroke, and cancer. If you smoke, STOP!     For more information:  [employer]uit Now [place]  1-800-QUIT-NOW  [email or web address]

## 2025-01-06 NOTE — PATIENT INSTRUCTIONS
CONTACT INFORMATION:  The main office phone number is [phone]. For emergencies after hours and on weekends, this number will convert over to our answering service and the on call provider will answer. Please try to keep non emergent phone calls/ questions to office hours 9am-5pm Monday through Friday.      [facility]  As an alternative, you can sign up and use the Epic MyChart system for more direct and quicker access for non emergent questions/ problems.  [employer] allows you to send messages to your doctor, view your test results, renew your prescriptions, schedule appointments, and more. To sign up, go to [employer] and click on the Sign Up Now link in the New User? box. Enter your [facility] Activation Code exactly as it appears below along with the last four digits of your Social Security Number and your Date of Birth () to complete the sign-up process. If you do not sign up before the expiration date, you must request a new code.     [facility] Activation Code: Activation code not generated  Current [facility] Status: Active     If you have questions, you can email [email or web address] or call [phone] to talk to our [employer] staff. Remember, [employer] is NOT to be used for urgent needs. For medical emergencies, dial 911.     IF YOU SMOKE OR USE TOBACCO PLEASE READ THE FOLLOWING:  Why is smoking bad for me?  Smoking increases the risk of heart disease, lung disease, vascular disease, stroke, and cancer. If you smoke, STOP!        IF YOU SMOKE OR USE TOBACCO PLEASE READ THE FOLLOWING:  Why is smoking bad for me?  Smoking increases the risk of heart disease, lung disease, vascular disease, stroke, and cancer. If you smoke, STOP!     For more information:  [facility]uit Now [place]  -QUIT-NOW  [email or web address]

## 2025-01-06 NOTE — H&P (VIEW-ONLY)
YOB: 1945  Location: [facility]  Location Address: [address] [place] [address], [facility], [address] [place] [address]  Location Phone: [phone]    Chief Complaint   Patient presents with    Sleep Apnea       History of Present Illness  Justin Robledo is a 79 y.o. male.  Justin Robledo is here for evaluation of ENT complaints. The patient has had problems with sleep apnea, daytime somnolence, snoring, and intolerance to CPAP. He is interested in the inspire.    Justin Robledo was first diagnosed with sleep apnea 10+ years ago.   He has tried using cpap with several different masks/settings for 10+ years.   Currently patient is wearing cpap 6-8 hours per night but is not willing to continue use.  Patient has not been able to tolerate oral device.    EPWORTH: 14  AHI: 30.8 on 3/28/2024  BMI 30.95      Past Medical History:   Diagnosis Date    Arthritis     BPH with obstruction/lower urinary tract symptoms     Chronic back pain     Class 1 obesity due to excess calories with serious comorbidity and body mass index (BMI) of 32.0 to 32.9 in adult 2020    COPD (chronic obstructive pulmonary disease)     Elevated cholesterol     Environmental allergies 2022    Essential hypertension 10/14/2019    GERD (gastroesophageal reflux disease)     Hyperlipidemia     Hypertension     Left bundle branch block     Lung infiltrate 2022    Right upper lobe, CT [facility], 2022    Obstructive sleep apnea 2020    pt uses a cpap machine at home    Sleep apnea     CPAP       Past Surgical History:   Procedure Laterality Date    CARPAL TUNNEL RELEASE Right 2021    Procedure: CARPAL TUNNEL RELEASE right;  Surgeon: Luis Perdomo MD;  Location:  [facility];  Service: Neurosurgery;  Laterality: Right;    CARPAL TUNNEL RELEASE Left 2023    Procedure: CARPAL TUNNEL RELEASE Left;  Surgeon: Luis Perdomo MD;  Location:  [facility];  Service: Neurosurgery;  Laterality: Left;    COLONOSCOPY       COLONOSCOPY N/A 04/13/2022    Procedure: COLONOSCOPY WITH ANESTHESIA;  Surgeon: Cortes Ribera DO;  Location:  [facility];  Service: Gastroenterology;  Laterality: N/A;  preop; hx of poylps   postop; polyps   PCP Katerine Mcclain     KNEE ARTHROPLASTY Right     KNEE MENISCAL REPAIR Left 2018    PENILE PROSTHESIS IMPLANT N/A 4/27/2022    Procedure: MALLEABLE PENILE PROSTHESIS PLACEMENT;  Surgeon: Ignacio Moon MD;  Location:  [facility];  Service: Urology;  Laterality: N/A;    ROTATOR CUFF REPAIR Right 2000    SPINE SURGERY  1981    lower back ruptured disc    TOTAL SHOULDER ARTHROPLASTY W/ DISTAL CLAVICLE EXCISION Right 01/14/2020    Procedure: RIGHT REVERSE TOTAL SHOULDER  ARTHROPLASTY;  Surgeon: Suman Ventura MD;  Location:  [facility];  Service: Orthopedics       Outpatient Medications Marked as Taking for the 1/6/25 encounter (Office Visit) with Josh Almazan APRN   Medication Sig Dispense Refill    albuterol sulfate  (90 Base) MCG/ACT inhaler Inhale 2 puffs Every 4 (Four) Hours As Needed for Wheezing. 18 g 6    amLODIPine (NORVASC) 5 MG tablet Take 1 tablet by mouth once daily 90 tablet 3    aspirin 81 MG EC tablet Take 1 tablet by mouth Daily.      Azelastine HCl 137 MCG/SPRAY solution SPRAY TWO SPRAYS NASALLY TWICE DAILY AS DIRECTED 30 mL 12    bisoprolol (ZEBeta) 5 MG tablet TAKE ONE TABLET BY MOUTH DAILY 150 tablet 3    calcium carbonate (OS-TASHIA) 600 MG tablet Take 1 tablet by mouth 2 (Two) Times a Day.      Cholecalciferol (VITAMIN D3) 1000 units capsule Take 2 capsules by mouth Daily.      coenzyme Q10 100 MG capsule Take 1 capsule by mouth Daily.      Collagen-Boron-Hyaluronic Acid (Move Free Ultra [place]isyon) 10-5-3.3 MG tablet Take 1 tablet by mouth Daily.      finasteride (PROSCAR) 5 MG tablet Take 1 tablet by mouth Daily. 90 tablet 3    gabapentin (NEURONTIN) 400 MG capsule Take 2 capsules by mouth twice daily 120 capsule 0    Garlic 1000 MG capsule Take 1 capsule by  mouth Daily.      ibuprofen (ADVIL,MOTRIN) 200 MG tablet Take 2 tablets by mouth Every 6 (Six) Hours As Needed for Mild Pain.      Krill Oil 300 MG capsule Take 300 mg by mouth Daily.      losartan (COZAAR) 25 MG tablet Take 1 tablet by mouth once daily 60 tablet 6    Multiple Vitamins-Minerals (OCUVITE ADULT 50+ PO) Take 1 tablet by mouth Daily.      omeprazole (priLOSEC) 20 MG capsule Take 1 capsule by mouth Daily.      simvastatin (ZOCOR) 40 MG tablet TAKE ONE TABLET BY MOUTH NIGHTLY 90 tablet 1    SUPER B COMPLEX/C PO Take 1 tablet by mouth Every Night.      tamsulosin (FLOMAX) 0.4 MG capsule 24 hr capsule Take 2 capsules by mouth Every Night. 180 capsule 3    tiotropium bromide monohydrate (Spiriva Respimat) 2.5 MCG/ACT aerosol solution inhaler Inhale 2 puffs Daily. 4 g 11    Tirzepatide-Weight Management (ZEPBOUND) 10 MG/0.5ML solution auto-injector Inject 0.5 mL under the skin into the appropriate area as directed 1 (One) Time Per Week. 2 mL 1    vitamin C (ASCORBIC ACID) 500 MG tablet Take 1 tablet by mouth Daily.         Patient has no known allergies.    Family History   Problem Relation Age of Onset    Arthritis Mother     Diabetes Mother     Osteoporosis Mother     Hypertension Mother     Kidney disease Mother     Arthritis Father     Hypertension Father     Stroke Father     Diabetes Sister     Obesity Sister     Cancer Brother     Colon cancer Neg Hx     Colon polyps Neg Hx        Social History     Socioeconomic History    Marital status:    Tobacco Use    Smoking status: Former     Current packs/day: 0.00     Average packs/day: 1 pack/day for 50.0 years (50.0 ttl pk-yrs)     Types: Pipe, Cigarettes     Start date:      Quit date:      Years since quittin.0     Passive exposure: Past    Smokeless tobacco: Never   Vaping Use    Vaping status: Never Used   Substance and Sexual Activity    Alcohol use: Yes     Alcohol/week: 5.0 standard drinks of alcohol     Types: 5 Cans of beer per  week     Comment: daily    Drug use: No    Sexual activity: Defer       Review of Systems   Constitutional:  Positive for fatigue.   HENT: Negative.     Respiratory:  Positive for apnea.        Vitals:    01/06/25 1300   BP: 111/63   Pulse: 79   Temp: 98.2 °F (36.8 °C)       Body mass index is 30.95 kg/m².    Objective     Physical Exam  Vitals reviewed.   Constitutional:       Appearance: Normal appearance. He is obese.   HENT:      Head: Normocephalic.      Right Ear: External ear normal.      Left Ear: External ear normal.      Nose: Nose normal.      Mouth/Throat:      Lips: Pink.      Comments: Niño III  Musculoskeletal:      Cervical back: Full passive range of motion without pain.   Neurological:      Mental Status: He is alert.   Psychiatric:         Behavior: Behavior is cooperative.         Assessment & Plan   Diagnoses and all orders for this visit:    1. VINEET (obstructive sleep apnea) (Primary)  -     Case Request; Standing  -     Basic Metabolic Panel; Future  -     CBC (No Diff); Future  -     ECG 12 Lead; Future  -     XR Chest 1 View; Future  -     Case Request    2. Other fatigue  -     Case Request; Standing  -     Basic Metabolic Panel; Future  -     CBC (No Diff); Future  -     ECG 12 Lead; Future  -     XR Chest 1 View; Future  -     Case Request    3. Snoring  -     Case Request; Standing  -     Basic Metabolic Panel; Future  -     CBC (No Diff); Future  -     ECG 12 Lead; Future  -     XR Chest 1 View; Future  -     Case Request    4. Intolerance of continuous positive airway pressure (CPAP) ventilation  -     Case Request; Standing  -     Basic Metabolic Panel; Future  -     CBC (No Diff); Future  -     ECG 12 Lead; Future  -     XR Chest 1 View; Future  -     Case Request    Other orders  -     Follow Anesthesia Guidelines / Protocol; Future  -     Follow Anesthesia Guidelines / Protocol; Standing      Videosleep endoscopy (N/A)  Orders Placed This Encounter   Procedures    XR Chest 1  View     Standing Status:   Future     Standing Expiration Date:   2026     Order Specific Question:   Reason for Exam:     Answer:   Preoperative     Order Specific Question:   Release to patient     Answer:   Routine Release [[id number]]    Basic Metabolic Panel     Standing Status:   Future     Standing Expiration Date:   2026     Order Specific Question:   Release to patient     Answer:   Routine Release [[id number]]    CBC (No Diff)     Standing Status:   Future     Standing Expiration Date:   2026     Order Specific Question:   Release to patient     Answer:   Routine Release [[id number]]    ECG 12 Lead     Standing Status:   Future     Standing Expiration Date:   2026     Order Specific Question:   Reason for Exam:     Answer:   Preoperative     Order Specific Question:   Release to patient     Answer:   Routine Release [[id number]]     Video sleep endoscopy discussed with patient, he wishes to proceed    Return in about 7 weeks (around 2025) for post op, Recheck Dr. Saeed.       Patient Instructions   CONTACT INFORMATION:  The main office phone number is [phone]. For emergencies after hours and on weekends, this number will convert over to our answering service and the on call provider will answer. Please try to keep non emergent phone calls/ questions to office hours 9am-5pm Monday through Friday.      [employer]  As an alternative, you can sign up and use the Epic MyChart system for more direct and quicker access for non emergent questions/ problems.  [facility] allows you to send messages to your doctor, view your test results, renew your prescriptions, schedule appointments, and more. To sign up, go to Fleck and click on the Sign Up Now link in the New User? box. Enter your [employer] Activation Code exactly as it appears below along with the last four digits of your Social Security Number and your Date of Birth () to complete the sign-up process. If  you do not sign up before the expiration date, you must request a new code.     [employer] Activation Code: Activation code not generated  Current [employer]er Status: Active     If you have questions, you can email [email or web address] or call [phone] to talk to our [employer] staff. Remember, MyChart is NOT to be used for urgent needs. For medical emergencies, dial 911.     IF YOU SMOKE OR USE TOBACCO PLEASE READ THE FOLLOWING:  Why is smoking bad for me?  Smoking increases the risk of heart disease, lung disease, vascular disease, stroke, and cancer. If you smoke, STOP!        IF YOU SMOKE OR USE TOBACCO PLEASE READ THE FOLLOWING:  Why is smoking bad for me?  Smoking increases the risk of heart disease, lung disease, vascular disease, stroke, and cancer. If you smoke, STOP!     For more information:  [employer]uit Now [place]  1-800-QUIT-NOW  [email or web address]

## 2025-01-13 NOTE — PROGRESS NOTES
Orthopaedic Clinic Note - Established Patient    NAME:  Jose Brambila   : 1945  MRN: [id number]      1/15/2025      CHIEF COMPLAINT:  follow up Left shoulder pain, repeat injection      HISTORY OF PRESENT ILLNESS:   The patient is a 79 y.o. male who returns today for follow up of left shoulder pain, requesting repeat injection. It has been 3 months since last injection. Patient denies any recent trauma, fall, or other other injury.     Past Medical History:        Diagnosis Date    COPD (chronic obstructive pulmonary disease) (HCC)     GERD (gastroesophageal reflux disease)     Hyperlipidemia     Hypertension     LBBB (left bundle branch block)     Sleep apnea     CPAP       Past Surgical History:        Procedure Laterality Date    BACK SURGERY      LUMBAR (\"disc removal\"    KNEE ARTHROSCOPY Bilateral     ROTATOR CUFF REPAIR Left     SHOULDER ARTHROPLASTY Right 2020    TOTAL KNEE ARTHROPLASTY Left 2020    LEFT TOTAL KNEE ARTHROPLASTY performed by Nain Bonner MD at [facility] OR       Current Medications:   Prior to Admission medications    Medication Sig Start Date End Date Taking? Authorizing Provider   fluticasone (FLONASE) 50 MCG/ACT nasal spray 2 sprays daily 25  Yes ProviderTesha MD   atorvastatin (LIPITOR) 10 MG tablet Take 1 tablet by mouth daily   Yes Provider, Historical, MD   WEGOVY 1.7 MG/0.75ML SOAJ SC injection Inject 1.7 mg into the skin once a week 24  Yes ProviderTesha MD   amLODIPine (NORVASC) 5 MG tablet Take 1 tablet by mouth nightly   Yes Tesha Neumann MD   simvastatin (ZOCOR) 40 MG tablet Take 1 tablet by mouth nightly   Yes Tesha Neumann MD   tiotropium (SPIRIVA) 18 MCG inhalation capsule Inhale 1 capsule into the lungs nightly   Yes Tesha Neumann MD   bisoprolol (ZEBETA) 5 MG tablet Take 1 tablet by mouth daily   Yes Tesha Neumann MD   budesonide-formoterol (SYMBICORT) 160-4.5 MCG/ACT AERO Inhale 2 puffs into the lungs 2

## 2025-01-15 ENCOUNTER — OFFICE VISIT (OUTPATIENT)
Age: 80
End: 2025-01-15
Payer: COMMERCIAL

## 2025-01-15 ENCOUNTER — PRE-ADMISSION TESTING (OUTPATIENT)
Dept: PREADMISSION TESTING | Facility: HOSPITAL | Age: 80
End: 2025-01-15
Payer: COMMERCIAL

## 2025-01-15 ENCOUNTER — HOSPITAL ENCOUNTER (OUTPATIENT)
Dept: GENERAL RADIOLOGY | Facility: HOSPITAL | Age: 80
Discharge: HOME | End: 2025-01-15
Payer: COMMERCIAL

## 2025-01-15 VITALS — HEIGHT: 66 IN | WEIGHT: 186 LBS | BODY MASS INDEX: 29.89 KG/M2

## 2025-01-15 VITALS
HEIGHT: 66 IN | RESPIRATION RATE: 18 BRPM | OXYGEN SATURATION: 94 % | DIASTOLIC BLOOD PRESSURE: 69 MMHG | BODY MASS INDEX: 29.73 KG/M2 | WEIGHT: 184.97 LBS | HEART RATE: 81 BPM | SYSTOLIC BLOOD PRESSURE: 123 MMHG

## 2025-01-15 DIAGNOSIS — G47.33 OSA (OBSTRUCTIVE SLEEP APNEA): ICD-10-CM

## 2025-01-15 DIAGNOSIS — R53.83 OTHER FATIGUE: ICD-10-CM

## 2025-01-15 DIAGNOSIS — R06.83 SNORING: ICD-10-CM

## 2025-01-15 DIAGNOSIS — M75.122 NONTRAUMATIC COMPLETE TEAR OF LEFT ROTATOR CUFF: Primary | ICD-10-CM

## 2025-01-15 DIAGNOSIS — Z78.9 INTOLERANCE OF CONTINUOUS POSITIVE AIRWAY PRESSURE (CPAP) VENTILATION: ICD-10-CM

## 2025-01-15 LAB
ANION GAP SERPL CALCULATED.3IONS-SCNC: 12 MMOL/L (ref 5–15)
BUN SERPL-MCNC: 18 MG/DL (ref 8–23)
BUN/CREAT SERPL: 24 (ref 7–25)
CALCIUM SPEC-SCNC: 9.7 MG/DL (ref 8.6–10.5)
CHLORIDE SERPL-SCNC: 102 MMOL/L (ref 98–107)
CO2 SERPL-SCNC: 27 MMOL/L (ref 22–29)
CREAT SERPL-MCNC: 0.75 MG/DL (ref 0.76–1.27)
DEPRECATED RDW RBC AUTO: 47.8 FL (ref 37–54)
EGFRCR SERPLBLD CKD-EPI 2021: 91.8 ML/MIN/1.73
ERYTHROCYTE [DISTWIDTH] IN BLOOD BY AUTOMATED COUNT: 13.4 % (ref 12.3–15.4)
GLUCOSE SERPL-MCNC: 91 MG/DL (ref 65–99)
HCT VFR BLD AUTO: 40.3 % (ref 37.5–51)
HGB BLD-MCNC: 13.2 G/DL (ref 13–17.7)
MCH RBC QN AUTO: 32 PG (ref 26.6–33)
MCHC RBC AUTO-ENTMCNC: 32.8 G/DL (ref 31.5–35.7)
MCV RBC AUTO: 97.6 FL (ref 79–97)
PLATELET # BLD AUTO: 156 10*3/MM3 (ref 140–450)
PMV BLD AUTO: 10.8 FL (ref 6–12)
POTASSIUM SERPL-SCNC: 3.8 MMOL/L (ref 3.5–5.2)
RBC # BLD AUTO: 4.13 10*6/MM3 (ref 4.14–5.8)
SODIUM SERPL-SCNC: 141 MMOL/L (ref 136–145)
WBC NRBC COR # BLD AUTO: 4.97 10*3/MM3 (ref 3.4–10.8)

## 2025-01-15 PROCEDURE — 71045 X-RAY EXAM CHEST 1 VIEW: CPT

## 2025-01-15 PROCEDURE — 36415 COLL VENOUS BLD VENIPUNCTURE: CPT

## 2025-01-15 PROCEDURE — 93005 ELECTROCARDIOGRAM TRACING: CPT

## 2025-01-15 PROCEDURE — 80048 BASIC METABOLIC PNL TOTAL CA: CPT

## 2025-01-15 PROCEDURE — 85027 COMPLETE CBC AUTOMATED: CPT

## 2025-01-15 PROCEDURE — 20610 DRAIN/INJ JOINT/BURSA W/O US: CPT

## 2025-01-15 RX ORDER — FLUTICASONE PROPIONATE 50 MCG
2 SPRAY, SUSPENSION (ML) NASAL DAILY
COMMUNITY
Start: 2025-01-08

## 2025-01-15 RX ORDER — BUPIVACAINE HYDROCHLORIDE 2.5 MG/ML
2 INJECTION, SOLUTION INFILTRATION; PERINEURAL ONCE
Status: COMPLETED | OUTPATIENT
Start: 2025-01-15 | End: 2025-01-15

## 2025-01-15 RX ORDER — TRIAMCINOLONE ACETONIDE 40 MG/ML
40 INJECTION, SUSPENSION INTRA-ARTICULAR; INTRAMUSCULAR ONCE
Status: COMPLETED | OUTPATIENT
Start: 2025-01-15 | End: 2025-01-15

## 2025-01-15 RX ORDER — LIDOCAINE HYDROCHLORIDE 10 MG/ML
2 INJECTION, SOLUTION INFILTRATION; PERINEURAL ONCE
Status: COMPLETED | OUTPATIENT
Start: 2025-01-15 | End: 2025-01-15

## 2025-01-15 RX ADMIN — TRIAMCINOLONE ACETONIDE 40 MG: 40 INJECTION, SUSPENSION INTRA-ARTICULAR; INTRAMUSCULAR at 13:05

## 2025-01-15 RX ADMIN — BUPIVACAINE HYDROCHLORIDE 5 MG: 2.5 INJECTION, SOLUTION INFILTRATION; PERINEURAL at 13:04

## 2025-01-15 RX ADMIN — LIDOCAINE HYDROCHLORIDE 2 ML: 10 INJECTION, SOLUTION INFILTRATION; PERINEURAL at 13:05

## 2025-01-15 NOTE — DISCHARGE INSTRUCTIONS
Preparing for Surgery  Follow these instructions before the procedure:  Several days or weeks before your procedure  Medication(s) you need to stop   _______ days/week prior to surgery: INSTRUCTED TO HOLD ZEPBOUND/WEGOVY FOR AT LEAST 1 WEEK PRIOR TO SURGERY      Ask your health care provider about:  Changing or stopping your regular medicines. This is especially important if you are taking diabetes medicines or blood thinners.  Taking medicines such as aspirin and ibuprofen. These medicines can thin your blood. Do not take these medicines unless your health care provider tells you to take them.  Taking over-the-counter medicines, vitamins, herbs, and supplements.    Contact your surgeon if you:  Develop a fever of more than 100.4°F (38°C) or other feelings of illness during the 48 hours before your surgery.  Have symptoms that get worse.  Have questions or concerns about your surgery.  If you are going home the same day of your surgery you will need to arrange for a responsible adult, age 18 years old or older, to drive you home from the hospital and stay with you for 24 hours. Verification of the  will be made prior to any procedure requiring sedation. You may not go home in a taxi or any form of public transportation by yourself.     Day before your procedure  Medication(s) you need to stop the day before your surgery:LOSARTAN    24 hours before your procedure DO NOT drink alcoholic beverages or smoke.  24 hours before your procedure STOP taking Erectile Dysfunction medication (i.e.,Cialis, Viagra)   You may be asked to shower with a germ-killing soap.  Day of your procedure   You may take the following medication(s) the morning of surgery with a sip of water: BISOPROLOL, GABAPENTIN, OMEPRAZOLE      8 hours before your scheduled arrival time, STOP all food, any dairy products, and full liquids. This includes hard candy, chewing gum or mints. This is extremely important to prevent serious complications.      Up to 2 hours before your scheduled arrival time, you may have clear liquids no cream, powder, or pulp of any kind. Safe options are water, black coffee, plain tea, soda, Gatorade/Powerade, clear broth, apple juice.    2 hours before your scheduled arrival time, STOP drinking clear liquids.    You may need to take another shower with a germ-killing soap before you leave home in the morning. Do not use perfumes, colognes, or body lotions.  Wear comfortable loose-fitting clothing.  Remove all jewelry including body piercing and rings, dark colored nail polish, and make up prior to arrival at the hospital. Leave all valuables at home.   Bring your hearing aids if you rely on them.  Do not wear contact lenses. If you wear eyeglasses remember to bring a case to store them in while you are in surgery.  Do not use denture adhesives since you will be asked to remove them during your surgery.    You do not need to bring your home medications into the hospital.   Bring your sleep apnea device with you on the day of your surgery (if this applies to you).  If you have an Inspire implant for sleep apnea, please bring the remote with you on the day of surgery.  If you wear portable oxygen, bring it with you.   If you are staying overnight, you may bring a bag of items you may need such as slippers, robe and a change of clothes for your discharge. You may want to leave these items in the car until you are ready for them since your family will take your belongings when you leave the pre-operative area.  Arrive at the hospital as scheduled by the office. You will be asked to arrive 2 hours prior to your surgery time in order to prepare for your procedure.  When you arrive at the hospital  Go to the registration desk located at the main entrance of the hospital.  After registration is completed, you will be given a beeper and a sticker sheet. Take the stickers to Outpatient Surgery and place in the tray at the end of the desk to  notify the staff that you have arrived and registered.   Return to the lobby to wait. You are not always called back according to the time of arrival but rather the time your doctor will be ready.  When your beeper lights up and vibrates proceed through the double doors, under the stairs, and a member of the Outpatient Surgery staff will escort you to your preoperative room.

## 2025-01-16 LAB
QT INTERVAL: 444 MS
QTC INTERVAL: 495 MS

## 2025-01-17 ENCOUNTER — HOSPITAL ENCOUNTER (OUTPATIENT)
Facility: HOSPITAL | Age: 80
Setting detail: HOSPITAL OUTPATIENT SURGERY
Discharge: HOME | End: 2025-01-17
Attending: OTOLARYNGOLOGY | Admitting: OTOLARYNGOLOGY
Payer: COMMERCIAL

## 2025-01-17 ENCOUNTER — ANESTHESIA EVENT (OUTPATIENT)
Dept: PERIOP | Facility: HOSPITAL | Age: 80
End: 2025-01-17
Payer: COMMERCIAL

## 2025-01-17 ENCOUNTER — ANESTHESIA (OUTPATIENT)
Dept: PERIOP | Facility: HOSPITAL | Age: 80
End: 2025-01-17
Payer: COMMERCIAL

## 2025-01-17 VITALS
HEART RATE: 79 BPM | TEMPERATURE: 97 F | OXYGEN SATURATION: 96 % | DIASTOLIC BLOOD PRESSURE: 79 MMHG | SYSTOLIC BLOOD PRESSURE: 131 MMHG | RESPIRATION RATE: 18 BRPM

## 2025-01-17 PROCEDURE — 42975 DISE EVAL SLP DO BRTH FLX DX: CPT | Performed by: OTOLARYNGOLOGY

## 2025-01-17 PROCEDURE — 25010000002 PROPOFOL 1000 MG/100ML EMULSION: Performed by: NURSE ANESTHETIST, CERTIFIED REGISTERED

## 2025-01-17 RX ORDER — ONDANSETRON 2 MG/ML
4 INJECTION INTRAMUSCULAR; INTRAVENOUS ONCE AS NEEDED
Status: DISCONTINUED | OUTPATIENT
Start: 2025-01-17 | End: 2025-01-17 | Stop reason: HOSPADM

## 2025-01-17 RX ORDER — SODIUM CHLORIDE 9 MG/ML
40 INJECTION, SOLUTION INTRAVENOUS AS NEEDED
Status: DISCONTINUED | OUTPATIENT
Start: 2025-01-17 | End: 2025-01-17 | Stop reason: HOSPADM

## 2025-01-17 RX ORDER — IBUPROFEN 600 MG/1
600 TABLET, FILM COATED ORAL EVERY 6 HOURS PRN
Status: DISCONTINUED | OUTPATIENT
Start: 2025-01-17 | End: 2025-01-17 | Stop reason: HOSPADM

## 2025-01-17 RX ORDER — FENTANYL CITRATE 50 UG/ML
50 INJECTION, SOLUTION INTRAMUSCULAR; INTRAVENOUS
Status: DISCONTINUED | OUTPATIENT
Start: 2025-01-17 | End: 2025-01-17 | Stop reason: HOSPADM

## 2025-01-17 RX ORDER — OXYMETAZOLINE HYDROCHLORIDE 0.05 G/100ML
2 SPRAY NASAL
Status: COMPLETED | OUTPATIENT
Start: 2025-01-17 | End: 2025-01-17

## 2025-01-17 RX ORDER — SODIUM CHLORIDE 0.9 % (FLUSH) 0.9 %
3 SYRINGE (ML) INJECTION AS NEEDED
Status: DISCONTINUED | OUTPATIENT
Start: 2025-01-17 | End: 2025-01-17 | Stop reason: HOSPADM

## 2025-01-17 RX ORDER — HYDROCODONE BITARTRATE AND ACETAMINOPHEN 10; 325 MG/1; MG/1
1 TABLET ORAL EVERY 4 HOURS PRN
Status: DISCONTINUED | OUTPATIENT
Start: 2025-01-17 | End: 2025-01-17 | Stop reason: HOSPADM

## 2025-01-17 RX ORDER — SODIUM CHLORIDE, SODIUM LACTATE, POTASSIUM CHLORIDE, CALCIUM CHLORIDE 600; 310; 30; 20 MG/100ML; MG/100ML; MG/100ML; MG/100ML
100 INJECTION, SOLUTION INTRAVENOUS CONTINUOUS
Status: DISCONTINUED | OUTPATIENT
Start: 2025-01-17 | End: 2025-01-17 | Stop reason: HOSPADM

## 2025-01-17 RX ORDER — NALOXONE HCL 0.4 MG/ML
0.4 VIAL (ML) INJECTION AS NEEDED
Status: DISCONTINUED | OUTPATIENT
Start: 2025-01-17 | End: 2025-01-17 | Stop reason: HOSPADM

## 2025-01-17 RX ORDER — LABETALOL HYDROCHLORIDE 5 MG/ML
5 INJECTION, SOLUTION INTRAVENOUS
Status: DISCONTINUED | OUTPATIENT
Start: 2025-01-17 | End: 2025-01-17 | Stop reason: HOSPADM

## 2025-01-17 RX ORDER — SODIUM CHLORIDE 0.9 % (FLUSH) 0.9 %
3 SYRINGE (ML) INJECTION EVERY 12 HOURS SCHEDULED
Status: DISCONTINUED | OUTPATIENT
Start: 2025-01-17 | End: 2025-01-17 | Stop reason: HOSPADM

## 2025-01-17 RX ORDER — ONDANSETRON 4 MG/1
4 TABLET, ORALLY DISINTEGRATING ORAL ONCE AS NEEDED
Status: DISCONTINUED | OUTPATIENT
Start: 2025-01-17 | End: 2025-01-17 | Stop reason: HOSPADM

## 2025-01-17 RX ORDER — PROPOFOL 10 MG/ML
INJECTION, EMULSION INTRAVENOUS AS NEEDED
Status: DISCONTINUED | OUTPATIENT
Start: 2025-01-17 | End: 2025-01-17 | Stop reason: SURG

## 2025-01-17 RX ORDER — HYDROCODONE BITARTRATE AND ACETAMINOPHEN 5; 325 MG/1; MG/1
1 TABLET ORAL EVERY 4 HOURS PRN
Status: DISCONTINUED | OUTPATIENT
Start: 2025-01-17 | End: 2025-01-17 | Stop reason: HOSPADM

## 2025-01-17 RX ORDER — FLUMAZENIL 0.1 MG/ML
0.2 INJECTION INTRAVENOUS AS NEEDED
Status: DISCONTINUED | OUTPATIENT
Start: 2025-01-17 | End: 2025-01-17 | Stop reason: HOSPADM

## 2025-01-17 RX ORDER — SODIUM CHLORIDE, SODIUM LACTATE, POTASSIUM CHLORIDE, CALCIUM CHLORIDE 600; 310; 30; 20 MG/100ML; MG/100ML; MG/100ML; MG/100ML
1000 INJECTION, SOLUTION INTRAVENOUS CONTINUOUS
Status: DISCONTINUED | OUTPATIENT
Start: 2025-01-17 | End: 2025-01-17 | Stop reason: HOSPADM

## 2025-01-17 RX ORDER — SODIUM CHLORIDE 0.9 % (FLUSH) 0.9 %
3-10 SYRINGE (ML) INJECTION AS NEEDED
Status: DISCONTINUED | OUTPATIENT
Start: 2025-01-17 | End: 2025-01-17 | Stop reason: HOSPADM

## 2025-01-17 RX ORDER — LIDOCAINE HYDROCHLORIDE 10 MG/ML
0.5 INJECTION, SOLUTION EPIDURAL; INFILTRATION; INTRACAUDAL; PERINEURAL ONCE AS NEEDED
Status: DISCONTINUED | OUTPATIENT
Start: 2025-01-17 | End: 2025-01-17 | Stop reason: HOSPADM

## 2025-01-17 RX ADMIN — PROPOFOL 50 MG: 10 INJECTION, EMULSION INTRAVENOUS at 08:04

## 2025-01-17 RX ADMIN — Medication 2 SPRAY: at 07:38

## 2025-01-17 RX ADMIN — PROPOFOL 100 MG: 10 INJECTION, EMULSION INTRAVENOUS at 08:01

## 2025-01-17 NOTE — OP NOTE
.OPERATIVE NOTE  1/17/2025    NAME: Justin Robledo    YOB: 1945  MRN: [id number]    PRE-OPERATIVE DIAGNOSIS:    VINEET (obstructive sleep apnea) [G47.33]  Other fatigue [R53.83]  Snoring [R06.83]  Intolerance of continuous positive airway pressure (CPAP) ventilation [Z78.9]    POST-OPERATIVE DIAGNOSIS:   Post-Op Diagnosis Codes:     * VINEET (obstructive sleep apnea) [G47.33]     * Other fatigue [R53.83]     * Snoring [R06.83]     * Intolerance of continuous positive airway pressure (CPAP) ventilation [Z78.9]    PROCEDURE PERFORMED:   Drug-induced video sleep endoscopy    SURGEON:   Turner Saeed MD    ASSISTANT(S):   None    ANESTHESIA:   IV sedation    INDICATIONS: The patient is a 79 y.o. male with VINEET (obstructive sleep apnea) [G47.33]  Other fatigue [R53.83]  Snoring [R06.83]  Intolerance of continuous positive airway pressure (CPAP) ventilation [Z78.9]    PROCEDURE:  The patient was brought to the operating room, given IV sedation, and prepped and draped in the usual manner.     The flexible endoscope was inserted to examine both sides of the nose as well as the pharynx and larynx.     The VOTE score at baseline was nearly complete AP collapse with essentially no or very minimal lateral collapse.  With simulated jaw advancement and tongue advancement, the hypopharyngeal obstruction and secondarily the palatal collapse also improved.    Hypopharyngeal and endolaryngeal examination demonstrated moderate lymphoid hyperplasia at the base of the tongue associate with moderate interarytenoid edema.     In summary, there was no significant evidence of complete concentric palatal obstruction and the patient therefore is a candidate for inspire implantation.      The patient was transported upon completion of the procedure to the postanesthesia care unit in stable condition.    SPECIMENS:  None    COMPLICATIONS: NONE    ESTIMATED BLOOD LOSS:  None    Turner Saeed MD  1/17/2025

## 2025-01-17 NOTE — ANESTHESIA PREPROCEDURE EVALUATION
Anesthesia Evaluation     no history of anesthetic complications:   NPO Solid Status: > 8 hours  NPO Liquid Status: > 8 hours           Airway   Mallampati: II  TM distance: >3 FB  No difficulty expected  Dental      Pulmonary    (+) a smoker (2011) Former, COPD,sleep apnea on CPAP  Cardiovascular   Exercise tolerance: good (4-7 METS)    (+) hypertension, hyperlipidemia      Neuro/Psych  (-) seizures, TIA, CVA  GI/Hepatic/Renal/Endo    (+) GERD  (-) liver disease, no renal disease, diabetes    Musculoskeletal     Abdominal    Substance History      OB/GYN          Other   arthritis,                 Anesthesia Plan    ASA 2     MAC     intravenous induction     Anesthetic plan, risks, benefits, and alternatives have been provided, discussed and informed consent has been obtained with: patient.    CODE STATUS:

## 2025-01-17 NOTE — ANESTHESIA POSTPROCEDURE EVALUATION
Patient: Justin Robledo    Procedure Summary       Date: 01/17/25 Room / [facility]:  [facility]  [facility]    Anesthesia Start: 0756 Anesthesia Stop: 0813    Procedure: Videosleep endoscopy Diagnosis:       VINEET (obstructive sleep apnea)      Other fatigue      Snoring      Intolerance of continuous positive airway pressure (CPAP) ventilation      (VINEET (obstructive sleep apnea) [G47.33])      (Other fatigue [R53.83])      (Snoring [R06.83])      (Intolerance of continuous positive airway pressure (CPAP) ventilation [Z78.9])    Surgeons: Turner Saeed MD Provider: Ned Tinajero CRNA    Anesthesia Type: MAC ASA Status: 2            Anesthesia Type: MAC    Vitals  Vitals Value Taken Time   /65 01/17/25 0821   Temp     Pulse 81 01/17/25 0823   Resp     SpO2 96 % 01/17/25 0823   Vitals shown include unfiled device data.        Post Anesthesia Care and Evaluation    Patient location during evaluation: PHASE II  Patient participation: complete - patient participated  Level of consciousness: awake and alert  Pain management: adequate    Airway patency: patent  Anesthetic complications: No anesthetic complications  PONV Status: none  Cardiovascular status: acceptable and stable  Respiratory status: acceptable and unassisted  Hydration status: acceptable

## 2025-01-23 ENCOUNTER — HOSPITAL ENCOUNTER (OUTPATIENT)
Dept: CT IMAGING | Facility: HOSPITAL | Age: 80
Discharge: HOME | End: 2025-01-23
Admitting: NURSE PRACTITIONER
Payer: COMMERCIAL

## 2025-01-23 DIAGNOSIS — R91.8 LUNG NODULES: ICD-10-CM

## 2025-01-23 DIAGNOSIS — Z12.2 SCREENING FOR LUNG CANCER: ICD-10-CM

## 2025-01-23 PROCEDURE — 71271 CT THORAX LUNG CANCER SCR C-: CPT

## 2025-01-24 RX ORDER — AZELASTINE HYDROCHLORIDE 137 UG/1
SPRAY, METERED NASAL
Qty: 30 ML | Refills: 2 | Status: SHIPPED | OUTPATIENT
Start: 2025-01-24

## 2025-01-25 DIAGNOSIS — G62.9 PERIPHERAL POLYNEUROPATHY: ICD-10-CM

## 2025-01-25 DIAGNOSIS — N40.0 BENIGN PROSTATIC HYPERPLASIA, UNSPECIFIED WHETHER LOWER URINARY TRACT SYMPTOMS PRESENT: ICD-10-CM

## 2025-01-27 RX ORDER — GABAPENTIN 400 MG/1
800 CAPSULE ORAL 2 TIMES DAILY
Qty: 120 CAPSULE | Refills: 0 | Status: SHIPPED | OUTPATIENT
Start: 2025-01-27

## 2025-01-27 RX ORDER — TAMSULOSIN HYDROCHLORIDE 0.4 MG/1
CAPSULE ORAL
Qty: 180 CAPSULE | Refills: 0 | OUTPATIENT
Start: 2025-01-27

## 2025-01-27 RX ORDER — FINASTERIDE 5 MG/1
5 TABLET, FILM COATED ORAL DAILY
Qty: 90 TABLET | Refills: 0 | OUTPATIENT
Start: 2025-01-27

## 2025-01-27 NOTE — TELEPHONE ENCOUNTER
Rx Refill Note  Requested Prescriptions     Pending Prescriptions Disp Refills    gabapentin (NEURONTIN) 400 MG capsule [Pharmacy Med Name: Gabapentin 400 MG Oral Capsule] 120 capsule 0     Sig: Take 2 capsules by mouth twice daily      Last office visit with prescribing clinician: 1/2/2025   Last telemedicine visit with prescribing clinician: Visit date not found   Next office visit with prescribing clinician: 3/3/2025                         Would you like a call back once the refill request has been completed: [] Yes [] No    If the office needs to give you a call back, can they leave a voicemail: [] Yes [] No    Mena Hernandez RN  01/27/25, 07:10 CST

## 2025-01-31 DIAGNOSIS — N40.0 BENIGN PROSTATIC HYPERPLASIA, UNSPECIFIED WHETHER LOWER URINARY TRACT SYMPTOMS PRESENT: ICD-10-CM

## 2025-02-03 RX ORDER — TAMSULOSIN HYDROCHLORIDE 0.4 MG/1
CAPSULE ORAL
Qty: 180 CAPSULE | Refills: 0 | OUTPATIENT
Start: 2025-02-03

## 2025-02-10 ENCOUNTER — TELEPHONE (OUTPATIENT)
Dept: UROLOGY | Facility: CLINIC | Age: 80
End: 2025-02-10

## 2025-02-10 DIAGNOSIS — N40.0 BENIGN PROSTATIC HYPERPLASIA, UNSPECIFIED WHETHER LOWER URINARY TRACT SYMPTOMS PRESENT: ICD-10-CM

## 2025-02-10 RX ORDER — TAMSULOSIN HYDROCHLORIDE 0.4 MG/1
2 CAPSULE ORAL NIGHTLY
Qty: 60 CAPSULE | Refills: 0 | Status: SHIPPED | OUTPATIENT
Start: 2025-02-10 | End: 2025-02-27 | Stop reason: SDUPTHER

## 2025-02-10 NOTE — TELEPHONE ENCOUNTER
Caller: SRI HERZOG     Relationship: SELF    Best call back number: [phone] (home)      Requested Prescriptions:   Requested Prescriptions     Pending Prescriptions Disp Refills    tamsulosin (FLOMAX) 0.4 MG capsule 24 hr capsule 180 capsule 3     Sig: Take 2 capsules by mouth Every Night.        Pharmacy where request should be sent:      Last office visit with prescribing clinician: 12/7/2023   Last telemedicine visit with prescribing clinician: Visit date not found   Next office visit with prescribing clinician: Visit date not found     Additional details provided by patient: PT IS COMPLETELY OUT OF MEDICATION PLEASE SEND TO [facility] ASAP -- PLEASE CALL PT ONCE SENT IN BECAUSE MYCHART ISNT ALWAYS WORKING FOR HIM     Does the patient have less than a 3 day supply:  [x] Yes  [] No    Would you like a call back once the refill request has been completed: [x] Yes [] No    If the office needs to give you a call back, can they leave a voicemail: [x] Yes [] No    Ailyn Raymond Rep   02/10/25 08:58 CST

## 2025-02-10 NOTE — PROGRESS NOTES
Subjective    Mr. Robledo is 79 y.o. male    Chief Complaint: BPH    History of Present Illness    79-year-old male established patient follow-up for enlarged prostate and erectile dysfunction.  His LUTS are well controlled on tamsulosin 0.8 mg and finasteride.  He had placement of Coloplast Kanwal malleable penile implant 4/27/2022 for erectile dysfunction refractory to PDE inhibitor therapy in the setting of hypertension and hyperlipidemia.    He is pleased with his implant.       The following portions of the patient's history were reviewed and updated as appropriate: allergies, current medications, past family history, past medical history, past social history, past surgical history and problem list.    Review of Systems      Current Outpatient Medications:     acetaminophen (TYLENOL) 650 MG 8 hr tablet, Take 1 tablet by mouth 2 (Two) Times a Day., Disp: , Rfl:     albuterol sulfate  (90 Base) MCG/ACT inhaler, Inhale 2 puffs Every 4 (Four) Hours As Needed for Wheezing., Disp: 18 g, Rfl: 6    amLODIPine (NORVASC) 5 MG tablet, Take 1 tablet by mouth once daily, Disp: 90 tablet, Rfl: 3    aspirin 81 MG EC tablet, Take 1 tablet by mouth Daily., Disp: , Rfl:     Azelastine HCl 137 MCG/SPRAY solution, USE 2 SPRAY(S) IN EACH NOSTRIL TWICE DAILY AS DIRECTED, Disp: 30 mL, Rfl: 2    betamethasone dipropionate 0.05 % cream, APPLY TO AFFECTED AREA AS DIRECTED DAILY, Disp: 15 g, Rfl: 1    bisoprolol (ZEBeta) 5 MG tablet, TAKE ONE TABLET BY MOUTH DAILY, Disp: 150 tablet, Rfl: 3    calcium carbonate (OS-TASHIA) 600 MG tablet, Take 1 tablet by mouth 2 (Two) Times a Day., Disp: , Rfl:     Cholecalciferol (VITAMIN D3) 1000 units capsule, Take 2 capsules by mouth Daily., Disp: , Rfl:     ciprofloxacin-dexAMETHasone (Ciprodex) 0.3-0.1 % otic suspension, Administer 4 drops into the left ear 2 (Two) Times a Day., Disp: 7.5 mL, Rfl: 1    cloNIDine (Catapres) 0.1 MG tablet, Take 1 tablet by mouth 2 (Two) Times a Day. (Patient  taking differently: Take 1 tablet by mouth 2 (Two) Times a Day As Needed for High Blood Pressure.), Disp: 60 tablet, Rfl: 2    coenzyme Q10 100 MG capsule, Take 1 capsule by mouth Daily., Disp: , Rfl:     Collagen-Boron-Hyaluronic Acid (Move Free Bulzi Media) 10-5-3.3 MG tablet, Take 1 tablet by mouth Daily., Disp: , Rfl:     docusate sodium (Colace) 100 MG capsule, Take 1 capsule by mouth 2 (Two) Times a Day., Disp: 60 capsule, Rfl: 1    EPINEPHrine (EPIPEN) 0.3 MG/0.3ML solution auto-injector injection, INJECT CONTENTS OF 1 PEN AS NEEDED FOR ALLERGIC REACTION, Disp: , Rfl:     finasteride (PROSCAR) 5 MG tablet, Take 1 tablet by mouth Daily., Disp: 90 tablet, Rfl: 5    fluticasone (FLONASE) 50 MCG/ACT nasal spray, 2 sprays Daily., Disp: , Rfl:     gabapentin (NEURONTIN) 400 MG capsule, Take 2 capsules by mouth twice daily, Disp: 120 capsule, Rfl: 0    Garlic 1000 MG capsule, Take 1 capsule by mouth Daily., Disp: , Rfl:     ibuprofen (ADVIL,MOTRIN) 200 MG tablet, Take 2 tablets by mouth Every 6 (Six) Hours As Needed for Mild Pain., Disp: , Rfl:     Krill Oil 300 MG capsule, Take 300 mg by mouth Daily., Disp: , Rfl:     lidocaine (LMX) 4 % cream, Apply 1 Application topically to the appropriate area as directed Daily., Disp: 28 g, Rfl: 1    losartan (COZAAR) 25 MG tablet, Take 1 tablet by mouth once daily, Disp: 60 tablet, Rfl: 6    Multiple Vitamins-Minerals (OCUVITE ADULT 50+ PO), Take 1 tablet by mouth Daily., Disp: , Rfl:     NON FORMULARY, CPAP, Disp: , Rfl:     omeprazole (priLOSEC) 20 MG capsule, Take 1 capsule by mouth Daily., Disp: , Rfl:     ondansetron (Zofran) 4 MG tablet, Take 1 tablet by mouth Every 8 (Eight) Hours As Needed for Nausea or Vomiting., Disp: 30 tablet, Rfl: 1    simvastatin (ZOCOR) 40 MG tablet, Take 1 tablet by mouth nightly, Disp: 90 tablet, Rfl: 1    SUPER B COMPLEX/C PO, Take 1 tablet by mouth Every Night., Disp: , Rfl:     tamsulosin (FLOMAX) 0.4 MG capsule 24 hr capsule, Take  2 capsules by mouth Every Night., Disp: 180 capsule, Rfl: 5    tiotropium bromide monohydrate (Spiriva Respimat) 2.5 MCG/ACT aerosol solution inhaler, Inhale 2 puffs Daily., Disp: 4 g, Rfl: 11    Tirzepatide-Weight Management (ZEPBOUND) 10 MG/0.5ML solution auto-injector, Inject 0.5 mL under the skin into the appropriate area as directed 1 (One) Time Per Week., Disp: 2 mL, Rfl: 1    triamcinolone (KENALOG) 0.5 % ointment, Apply 1 application topically to the appropriate area as directed 2 (Two) Times a Day., Disp: 15 g, Rfl: 1    vitamin C (ASCORBIC ACID) 500 MG tablet, Take 1 tablet by mouth Daily., Disp: , Rfl:     budesonide-formoterol (Symbicort) 160-4.5 MCG/ACT inhaler, Inhale 2 puffs 2 (Two) Times a Day for 90 days., Disp: 30.6 each, Rfl: 3    cetirizine (zyrTEC) 10 MG tablet, Take 1 tablet by mouth Daily for 5 days., Disp: 5 tablet, Rfl: 0    tiotropium bromide monohydrate (Spiriva Respimat) 2.5 MCG/ACT aerosol solution inhaler, Inhale 2 puffs Daily for 90 days., Disp: 12 g, Rfl: 3    Past Medical History:   Diagnosis Date    Arthritis     BPH with obstruction/lower urinary tract symptoms     Chronic back pain     Class 1 obesity due to excess calories with serious comorbidity and body mass index (BMI) of 32.0 to 32.9 in adult 01/20/2020    COPD (chronic obstructive pulmonary disease)     Elevated cholesterol     Environmental allergies 09/21/2022    Essential hypertension 10/14/2019    GERD (gastroesophageal reflux disease)     Hyperlipidemia     Hypertension     Left bundle branch block     Lung infiltrate 12/20/2022    Right upper lobe, CT [facility], October 2022    Obstructive sleep apnea 01/20/2020    pt uses a cpap machine at home    Sleep apnea     CPAP       Past Surgical History:   Procedure Laterality Date    CARPAL TUNNEL RELEASE Right 09/29/2021    Procedure: CARPAL TUNNEL RELEASE right;  Surgeon: Luis Perdomo MD;  Location[facility];  Service: Neurosurgery;  Laterality: Right;    CARPAL  TUNNEL RELEASE Left 2023    Procedure: CARPAL TUNNEL RELEASE Left;  Surgeon: Luis Perdomo MD;  Location:  [facility] OR;  Service: Neurosurgery;  Laterality: Left;    COLONOSCOPY      COLONOSCOPY N/A 2022    Procedure: COLONOSCOPY WITH ANESTHESIA;  Surgeon: Cortes Ribera DO;  Location:  [facility] END[facility];  Service: Gastroenterology;  Laterality: N/A;  preop; hx of poylps   postop; polyps   PCP Katerine Mcclain     KNEE ARTHROPLASTY Right     KNEE MENISCAL REPAIR Left 2018    PENILE PROSTHESIS IMPLANT N/A 2022    Procedure: MALLEABLE PENILE PROSTHESIS PLACEMENT;  Surgeon: Ignacio Moon MD;  Location:  [facility] OR;  Service: Urology;  Laterality: N/A;    ROTATOR CUFF REPAIR Right     SLEEP ENDOSCOPY N/A 2025    Procedure: Videosleep endoscopy;  Surgeon: Turner Saeed MD;  Location:  [facility] OR;  Service: ENT;  Laterality: N/A;    SPINE SURGERY      lower back ruptured disc    TOTAL SHOULDER ARTHROPLASTY W/ DISTAL CLAVICLE EXCISION Right 2020    Procedure: RIGHT REVERSE TOTAL SHOULDER  ARTHROPLASTY;  Surgeon: Suman Ventura MD;  Location:  [facility] OR;  Service: Orthopedics       Social History     Socioeconomic History    Marital status:    Tobacco Use    Smoking status: Former     Current packs/day: 0.00     Average packs/day: 1 pack/day for 50.0 years (50.0 ttl pk-yrs)     Types: Pipe, Cigarettes     Start date:      Quit date:      Years since quittin.1     Passive exposure: Past    Smokeless tobacco: Never   Vaping Use    Vaping status: Never Used   Substance and Sexual Activity    Alcohol use: Yes     Alcohol/week: 5.0 standard drinks of alcohol     Types: 5 Cans of beer per week     Comment: social    Drug use: No    Sexual activity: Defer       Family History   Problem Relation Age of Onset    Arthritis Mother     Diabetes Mother     Osteoporosis Mother     Hypertension Mother     Kidney disease Mother     Arthritis Father     Hypertension  "Father     Stroke Father     Diabetes Sister     Obesity Sister     Cancer Brother     Colon cancer Neg Hx     Colon polyps Neg Hx        Objective    Temp 98.2 °F (36.8 °C)   Ht 167.6 cm (66\")   Wt 72.6 kg (160 lb)   BMI 25.82 kg/m²     Physical Exam        Results for orders placed or performed in visit on 02/27/25   POC Urinalysis Dipstick, Multipro    Collection Time: 02/27/25  9:44 AM    Specimen: Urine   Result Value Ref Range    Color Yellow Yellow, Straw, Dark Yellow, Marta    Clarity, UA Clear Clear    Glucose, UA Negative Negative mg/dL    Bilirubin Negative Negative    Ketones, UA 40 mg/dL (A) Negative    Specific Gravity  1.020 1.005 - 1.030    Blood, UA Negative Negative    pH, Urine 6.0 5.0 - 8.0    Protein, POC Trace (A) Negative mg/dL    Urobilinogen, UA Normal Normal, 0.2 E.U./dL    Nitrite, UA Negative Negative    Leukocytes Negative Negative     Assessment and Plan    Diagnoses and all orders for this visit:    1. Benign prostatic hyperplasia, unspecified whether lower urinary tract symptoms present (Primary)  -     POC Urinalysis Dipstick, Multipro  -     finasteride (PROSCAR) 5 MG tablet; Take 1 tablet by mouth Daily.  Dispense: 90 tablet; Refill: 5  -     tamsulosin (FLOMAX) 0.4 MG capsule 24 hr capsule; Take 2 capsules by mouth Every Night.  Dispense: 180 capsule; Refill: 5    2. Erectile dysfunction due to arterial insufficiency      LUTS well-controlled on combination therapy.  Continue finasteride and tamsulosin.  Follow-up with me in 1 year in my [place] clinic or sooner as needed.       This document has been signed by MIRIAM Moon MD on February 27, 2025 10:48 CST              "

## 2025-02-10 NOTE — TELEPHONE ENCOUNTER
Called pt. To let him know he needs an appt. Since we have not seen him since 2023. Pt. V/u. Advised pt. I will send in temporary supply of Flomax since he is completely out but pt. Will need f/u next available in [place]. Pt. Transferred to  to get an appt.

## 2025-02-23 DIAGNOSIS — E78.5 HYPERLIPIDEMIA, UNSPECIFIED HYPERLIPIDEMIA TYPE: ICD-10-CM

## 2025-02-24 RX ORDER — SIMVASTATIN 40 MG
40 TABLET ORAL NIGHTLY
Qty: 90 TABLET | Refills: 1 | Status: SHIPPED | OUTPATIENT
Start: 2025-02-24

## 2025-02-24 NOTE — TELEPHONE ENCOUNTER
Rx Refill Note  Requested Prescriptions     Pending Prescriptions Disp Refills    simvastatin (ZOCOR) 40 MG tablet [Pharmacy Med Name: Simvastatin 40 MG Oral Tablet] 90 tablet 0     Sig: Take 1 tablet by mouth nightly      Last office visit with prescribing clinician: 1/2/2025   Last telemedicine visit with prescribing clinician: Visit date not found   Next office visit with prescribing clinician: 3/3/2025                         Would you like a call back once the refill request has been completed: [] Yes [] No    If the office needs to give you a call back, can they leave a voicemail: [] Yes [] No    Mena Hernandez RN  02/24/25, 07:05 CST

## 2025-02-27 ENCOUNTER — OFFICE VISIT (OUTPATIENT)
Dept: UROLOGY | Facility: CLINIC | Age: 80
End: 2025-02-27
Payer: COMMERCIAL

## 2025-02-27 VITALS — TEMPERATURE: 98.2 F | BODY MASS INDEX: 25.71 KG/M2 | HEIGHT: 66 IN | WEIGHT: 160 LBS

## 2025-02-27 DIAGNOSIS — N52.01 ERECTILE DYSFUNCTION DUE TO ARTERIAL INSUFFICIENCY: ICD-10-CM

## 2025-02-27 DIAGNOSIS — N40.0 BENIGN PROSTATIC HYPERPLASIA, UNSPECIFIED WHETHER LOWER URINARY TRACT SYMPTOMS PRESENT: Primary | ICD-10-CM

## 2025-02-27 LAB
BILIRUB BLD-MCNC: NEGATIVE MG/DL
CLARITY, POC: CLEAR
COLOR UR: YELLOW
GLUCOSE UR STRIP-MCNC: NEGATIVE MG/DL
KETONES UR QL: ABNORMAL
LEUKOCYTE EST, POC: NEGATIVE
NITRITE UR-MCNC: NEGATIVE MG/ML
PH UR: 6 [PH] (ref 5–8)
PROT UR STRIP-MCNC: ABNORMAL MG/DL
RBC # UR STRIP: NEGATIVE /UL
SP GR UR: 1.02 (ref 1–1.03)
UROBILINOGEN UR QL: NORMAL

## 2025-02-27 RX ORDER — FINASTERIDE 5 MG/1
5 TABLET, FILM COATED ORAL DAILY
Qty: 90 TABLET | Refills: 5 | Status: SHIPPED | OUTPATIENT
Start: 2025-02-27

## 2025-02-27 RX ORDER — TAMSULOSIN HYDROCHLORIDE 0.4 MG/1
2 CAPSULE ORAL NIGHTLY
Qty: 180 CAPSULE | Refills: 5 | Status: SHIPPED | OUTPATIENT
Start: 2025-02-27

## 2025-03-03 ENCOUNTER — OFFICE VISIT (OUTPATIENT)
Dept: INTERNAL MEDICINE | Facility: CLINIC | Age: 80
End: 2025-03-03
Payer: COMMERCIAL

## 2025-03-03 VITALS
WEIGHT: 169 LBS | HEIGHT: 66 IN | RESPIRATION RATE: 12 BRPM | OXYGEN SATURATION: 96 % | DIASTOLIC BLOOD PRESSURE: 80 MMHG | BODY MASS INDEX: 27.16 KG/M2 | SYSTOLIC BLOOD PRESSURE: 137 MMHG | HEART RATE: 80 BPM | TEMPERATURE: 97.8 F

## 2025-03-03 DIAGNOSIS — E66.811 CLASS 1 OBESITY: ICD-10-CM

## 2025-03-03 DIAGNOSIS — G62.9 PERIPHERAL POLYNEUROPATHY: ICD-10-CM

## 2025-03-03 DIAGNOSIS — Z79.899 LONG TERM USE OF DRUG: Primary | ICD-10-CM

## 2025-03-03 PROCEDURE — 99214 OFFICE O/P EST MOD 30 MIN: CPT | Performed by: NURSE PRACTITIONER

## 2025-03-03 NOTE — PROGRESS NOTES
Subjective     Chief Complaint   Patient presents with    Obesity       History of Present Illness    Pt presents for medication follow up. Pt is taking Gabapentin 800 mg BID without issue. Pt denies negative side effects and states it controls his symptoms well. Pt denies chest pain/SOB. Pt admits to normal bowel and bladder function. Pt last drug screen was in December. Pt is taking Tirzepatide 10mg, Jan 184 lbs, today 169 lbs. Pt reports he is losing about 2lbs/week weight. Pt reports diarrhea with medication, but denies it being an issue.       Past Medical History:   Past Medical History:   Diagnosis Date    Arthritis     BPH with obstruction/lower urinary tract symptoms     Chronic back pain     Class 1 obesity due to excess calories with serious comorbidity and body mass index (BMI) of 32.0 to 32.9 in adult 01/20/2020    COPD (chronic obstructive pulmonary disease)     Elevated cholesterol     Environmental allergies 09/21/2022    Essential hypertension 10/14/2019    GERD (gastroesophageal reflux disease)     Hyperlipidemia     Hypertension     Left bundle branch block     Lung infiltrate 12/20/2022    Right upper lobe, CT [facility], October 2022    Obstructive sleep apnea 01/20/2020    pt uses a cpap machine at home    Sleep apnea     CPAP     Past Surgical History:  Past Surgical History:   Procedure Laterality Date    CARPAL TUNNEL RELEASE Right 09/29/2021    Procedure: CARPAL TUNNEL RELEASE right;  Surgeon: Luis Perdomo MD;  Location: [facility] OR;  Service: Neurosurgery;  Laterality: Right;    CARPAL TUNNEL RELEASE Left 8/9/2023    Procedure: CARPAL TUNNEL RELEASE Left;  Surgeon: Luis Perdomo MD;  Location: [facility] OR;  Service: Neurosurgery;  Laterality: Left;    COLONOSCOPY      COLONOSCOPY N/A 04/13/2022    Procedure: COLONOSCOPY WITH ANESTHESIA;  Surgeon: Cortes Ribera DO;  Location: [facility] ENDOSCOPY;  Service: Gastroenterology;  Laterality: N/A;  preop; hx of poylps   postop; polyps    PCP Katerine Mcclain     KNEE ARTHROPLASTY Right     KNEE MENISCAL REPAIR Left 2018    PENILE PROSTHESIS IMPLANT N/A 4/27/2022    Procedure: MALLEABLE PENILE PROSTHESIS PLACEMENT;  Surgeon: Ignacio Moon MD;  Location:  [facility] OR;  Service: Urology;  Laterality: N/A;    ROTATOR CUFF REPAIR Right 2000    SLEEP ENDOSCOPY N/A 1/17/2025    Procedure: Videosleep endoscopy;  Surgeon: Turner Saeed MD;  Location:  [facility] OR;  Service: ENT;  Laterality: N/A;    SPINE SURGERY  1981    lower back ruptured disc    TOTAL SHOULDER ARTHROPLASTY W/ DISTAL CLAVICLE EXCISION Right 01/14/2020    Procedure: RIGHT REVERSE TOTAL SHOULDER  ARTHROPLASTY;  Surgeon: Suman Ventura MD;  Location:  [facility] OR;  Service: Orthopedics     Social History:  reports that he quit smoking about 14 years ago. His smoking use included pipe and cigarettes. He started smoking about 64 years ago. He has a 50 pack-year smoking history. He has been exposed to tobacco smoke. He has never used smokeless tobacco. He reports current alcohol use of about 5.0 standard drinks of alcohol per week. He reports that he does not use drugs.    Family History: family history includes Arthritis in his father and mother; Cancer in his brother; Diabetes in his mother and sister; Hypertension in his father and mother; Kidney disease in his mother; Obesity in his sister; Osteoporosis in his mother; Stroke in his father.       Allergies:  Allergies   Allergen Reactions    Bee Venom Swelling     Medications:  Prior to Admission medications    Medication Sig Start Date End Date Taking? Authorizing Provider   acetaminophen (TYLENOL) 650 MG 8 hr tablet Take 1 tablet by mouth 2 (Two) Times a Day.    Provider, MD Cristian   albuterol sulfate  (90 Base) MCG/ACT inhaler Inhale 2 puffs Every 4 (Four) Hours As Needed for Wheezing. 4/11/22   Hien Pimentel APRN   amLODIPine (NORVASC) 5 MG tablet Take 1 tablet by mouth once daily 10/21/24   Katerine Mcclain  SALONI Rodriguez   aspirin 81 MG EC tablet Take 1 tablet by mouth Daily.    ProviderCristian MD   Azelastine HCl 137 MCG/SPRAY solution USE 2 SPRAY(S) IN EACH NOSTRIL TWICE DAILY AS DIRECTED 1/24/25   Katerine Mcclain APRN   betamethasone dipropionate 0.05 % cream APPLY TO AFFECTED AREA AS DIRECTED DAILY 8/1/23   Katerine Mcclain APRN   bisoprolol (ZEBeta) 5 MG tablet TAKE ONE TABLET BY MOUTH DAILY 3/11/24   Katerine Mcclain APRN   budesonide-formoterol (Symbicort) 160-4.5 MCG/ACT inhaler Inhale 2 puffs 2 (Two) Times a Day for 90 days. 2/16/24 1/17/25  Katerine Mcclain APRN   calcium carbonate (OS-TASHIA) 600 MG tablet Take 1 tablet by mouth 2 (Two) Times a Day.    Cristian Pfeiffer MD   cetirizine (zyrTEC) 10 MG tablet Take 1 tablet by mouth Daily for 5 days. 7/22/22 7/27/23  Abimael Christian MD   Cholecalciferol (VITAMIN D3) 1000 units capsule Take 2 capsules by mouth Daily.    ProviderCristian MD   ciprofloxacin-dexAMETHasone (Ciprodex) 0.3-0.1 % otic suspension Administer 4 drops into the left ear 2 (Two) Times a Day. 9/23/24   Katerine Mcclain APRN   cloNIDine (Catapres) 0.1 MG tablet Take 1 tablet by mouth 2 (Two) Times a Day.  Patient taking differently: Take 1 tablet by mouth 2 (Two) Times a Day As Needed for High Blood Pressure. 3/7/24   Katerine Mcclain APRN   coenzyme Q10 100 MG capsule Take 1 capsule by mouth Daily.    ProviderCristian MD   Collagen-Boron-Hyaluronic Acid (Move Free MemberConnection) 10-5-3.3 MG tablet Take 1 tablet by mouth Daily.    Cristian Pfeiffer MD   docusate sodium (Colace) 100 MG capsule Take 1 capsule by mouth 2 (Two) Times a Day. 4/27/22   Ignacio Moon MD   EPINEPHrine (EPIPEN) 0.3 MG/0.3ML solution auto-injector injection INJECT CONTENTS OF 1 PEN AS NEEDED FOR ALLERGIC REACTION 9/14/24   Provider, MD Cristian   finasteride (PROSCAR) 5 MG tablet Take 1 tablet by mouth Daily. 2/27/25   Ignacio Moon MD    fluticasone (FLONASE) 50 MCG/ACT nasal spray 2 sprays Daily. 1/8/25   Cristian Pfeiffer MD   gabapentin (NEURONTIN) 400 MG capsule Take 2 capsules by mouth twice daily 1/27/25   Katerine Mcclain APRN   Garlic 1000 MG capsule Take 1 capsule by mouth Daily.    Cristian Pfeiffer MD   ibuprofen (ADVIL,MOTRIN) 200 MG tablet Take 2 tablets by mouth Every 6 (Six) Hours As Needed for Mild Pain.    Cristian Pfeiffer MD   Krill Oil 300 MG capsule Take 300 mg by mouth Daily.    Cristian Pfeiffer MD   lidocaine (LMX) 4 % cream Apply 1 Application topically to the appropriate area as directed Daily. 1/2/25   Katerine Mcclain APRN   losartan (COZAAR) 25 MG tablet Take 1 tablet by mouth once daily 6/12/24   Katerine Mcclain APRN   Multiple Vitamins-Minerals (OCUVITE ADULT 50+ PO) Take 1 tablet by mouth Daily.    Cristian Pfeiffer MD   NON FORMULARY CPAP    Cristian Pfeiffer MD   omeprazole (priLOSEC) 20 MG capsule Take 1 capsule by mouth Daily.    Cristian Pfeiffer MD   ondansetron (Zofran) 4 MG tablet Take 1 tablet by mouth Every 8 (Eight) Hours As Needed for Nausea or Vomiting. 7/1/24   Katerine Mcclain APRN   simvastatin (ZOCOR) 40 MG tablet Take 1 tablet by mouth nightly 2/24/25   Katerine Mcclain APRN   SUPER B COMPLEX/C PO Take 1 tablet by mouth Every Night.    Cristian Pfeiffer MD   tamsulosin (FLOMAX) 0.4 MG capsule 24 hr capsule Take 2 capsules by mouth Every Night. 2/27/25   Ignacio Moon MD   tiotropium bromide monohydrate (Spiriva Respimat) 2.5 MCG/ACT aerosol solution inhaler Inhale 2 puffs Daily. 4/4/24   Katerine Mcclain APRN   tiotropium bromide monohydrate (Spiriva Respimat) 2.5 MCG/ACT aerosol solution inhaler Inhale 2 puffs Daily for 90 days. 4/15/24 7/14/24  Hien Pimentel APRN   Tirzepatide-Weight Management (ZEPBOUND) 10 MG/0.5ML solution auto-injector Inject 0.5 mL under the skin into the appropriate area as directed 1  "(One) Time Per Week. 1/2/25   Katerine Mcclain APRN   triamcinolone (KENALOG) 0.5 % ointment Apply 1 application topically to the appropriate area as directed 2 (Two) Times a Day. 6/13/23   Katerine Mcclain APRN   vitamin C (ASCORBIC ACID) 500 MG tablet Take 1 tablet by mouth Daily.    Provider, MD Cristian       Objective     Vital Signs: /80 (BP Location: Right arm, Patient Position: Sitting)   Pulse 80   Temp 97.8 °F (36.6 °C)   Resp 12   Ht 167.6 cm (65.98\")   Wt 76.7 kg (169 lb)   SpO2 96%   BMI 27.29 kg/m²     Physical Exam    Physical Exam  Vitals reviewed.   Constitutional:       Appearance: He is well-developed.   HENT:      Head: Normocephalic and atraumatic.      Right Ear: Tympanic membrane normal.      Left Ear: Tympanic membrane normal.   Eyes:      Pupils: Pupils are equal, round, and reactive to light.   Neck:      Vascular: No JVD.   Cardiovascular:      Rate and Rhythm: Normal rate and regular rhythm.      Heart sounds: Normal heart sounds.   Pulmonary:      Effort: Pulmonary effort is normal.      Breath sounds: Normal breath sounds.   Abdominal:      General: Bowel sounds are normal.      Palpations: Abdomen is soft.   Musculoskeletal:         General: No deformity.      Cervical back: Normal range of motion and neck supple.   Lymphadenopathy:      Cervical: No cervical adenopathy.   Skin:     General: Skin is warm and dry.   Neurological:      Mental Status: He is alert and oriented to person, place, and time.   Psychiatric:         Behavior: Behavior normal.         Thought Content: Thought content normal.         Judgment: Judgment normal.          Results Reviewed:  Glucose   Date Value Ref Range Status   01/15/2025 91 65 - 99 mg/dL Final   12/22/2020 92 74 - 109 mg/dL Final     BUN   Date Value Ref Range Status   01/15/2025 18 8 - 23 mg/dL Final   12/22/2020 16 8 - 23 mg/dL Final     Creatinine   Date Value Ref Range Status   01/15/2025 0.75 (L) 0.76 - 1.27 " mg/dL Final   12/22/2020 0.8 0.5 - 1.2 mg/dL Final     Sodium   Date Value Ref Range Status   01/15/2025 141 136 - 145 mmol/L Final   12/22/2020 139 136 - 145 mmol/L Final     Potassium   Date Value Ref Range Status   01/15/2025 3.8 3.5 - 5.2 mmol/L Final   12/22/2020 4.1 3.5 - 5.0 mmol/L Final     Chloride   Date Value Ref Range Status   01/15/2025 102 98 - 107 mmol/L Final   12/22/2020 102 98 - 111 mmol/L Final     CO2   Date Value Ref Range Status   01/15/2025 27.0 22.0 - 29.0 mmol/L Final   12/22/2020 25 22 - 29 mmol/L Final     Calcium   Date Value Ref Range Status   01/15/2025 9.7 8.6 - 10.5 mg/dL Final   12/22/2020 9.4 8.8 - 10.2 mg/dL Final     ALT (SGPT)   Date Value Ref Range Status   07/01/2024 24 0 - 44 IU/L Final   07/27/2023 15 1 - 41 U/L Final     AST (SGOT)   Date Value Ref Range Status   07/01/2024 25 0 - 40 IU/L Final   07/27/2023 16 1 - 40 U/L Final     WBC   Date Value Ref Range Status   01/15/2025 4.97 3.40 - 10.80 10*3/mm3 Final   07/01/2024 4.5 3.4 - 10.8 x10E3/uL Final   12/22/2020 6.6 4.8 - 10.8 K/uL Final     Hematocrit   Date Value Ref Range Status   01/15/2025 40.3 37.5 - 51.0 % Final   12/22/2020 40.7 (L) 42.0 - 52.0 % Final     Platelets   Date Value Ref Range Status   01/15/2025 156 140 - 450 10*3/mm3 Final   12/22/2020 175 130 - 400 K/uL Final     Triglycerides   Date Value Ref Range Status   07/01/2024 126 0 - 149 mg/dL Final     HDL Cholesterol   Date Value Ref Range Status   07/01/2024 55 >39 mg/dL Final     LDL Chol Calc (NIH)   Date Value Ref Range Status   07/01/2024 112 (H) 0 - 99 mg/dL Final     Hemoglobin A1C   Date Value Ref Range Status   07/01/2024 5.7 (H) 4.8 - 5.6 % Final     Comment:              Prediabetes: 5.7 - 6.4           Diabetes: >6.4           Glycemic control for adults with diabetes: <7.0     09/17/2021 5.4 % Final       Results        Assessment / Plan     Assessment/Plan:  Diagnoses and all orders for this visit:    1. Long term use of drug (Primary)    2.  Peripheral polyneuropathy    3. Class 1 obesity      Assessment & Plan      Pt takes gabapentin 800 mg BID for peripheral polyneuropathy, which is well controlled at this time. No refills needed at this time. CSA signed today, UDS up to date. Tirzepatide will be continued on current dose, as he is still losing 2lbs/week. No refills needed at this time.     Return in about 3 months (around 6/3/2025) for Next scheduled follow up. unless patient needs to be seen sooner or acute issues arise.    Code Status: Full  I have discussed the patient results/orders and and plan/recommendation with them at today's visit.      Signed by:    SALONI Kaur Date: 03/03/25

## 2025-03-05 DIAGNOSIS — G62.9 PERIPHERAL POLYNEUROPATHY: ICD-10-CM

## 2025-03-05 RX ORDER — GABAPENTIN 400 MG/1
800 CAPSULE ORAL 2 TIMES DAILY
Qty: 120 CAPSULE | Refills: 0 | Status: SHIPPED | OUTPATIENT
Start: 2025-03-05

## 2025-03-05 NOTE — TELEPHONE ENCOUNTER
Rx Refill Note  Requested Prescriptions     Pending Prescriptions Disp Refills    gabapentin (NEURONTIN) 400 MG capsule 120 capsule 0     Sig: Take 2 capsules by mouth 2 (Two) Times a Day.      Last office visit with prescribing clinician: 3/3/2025   Last telemedicine visit with prescribing clinician: Visit date not found   Next office visit with prescribing clinician: 6/27/2025                         Would you like a call back once the refill request has been completed: [] Yes [] No    If the office needs to give you a call back, can they leave a voicemail: [] Yes [] No    Mena Hernandez RN  03/05/25, 12:22 CST

## 2025-03-10 DIAGNOSIS — E66.811 CLASS 1 OBESITY: ICD-10-CM

## 2025-03-10 RX ORDER — TIRZEPATIDE 10 MG/.5ML
0.5 INJECTION, SOLUTION SUBCUTANEOUS WEEKLY
Qty: 4 ML | Refills: 0 | Status: SHIPPED | OUTPATIENT
Start: 2025-03-10

## 2025-03-10 NOTE — TELEPHONE ENCOUNTER
Rx Refill Note  Requested Prescriptions     Pending Prescriptions Disp Refills    Zepbound 10 MG/0.5ML solution auto-injector [Pharmacy Med Name: Zepbound 10 MG/0.5ML Subcutaneous Solution Auto-injector] 4 mL 0     Sig: INJECT 0.5 ML SUBCUTANEOUSLY  ONCE A WEEK      Last office visit with prescribing clinician: 3/3/2025   Last telemedicine visit with prescribing clinician: Visit date not found   Next office visit with prescribing clinician: 6/27/2025                         Would you like a call back once the refill request has been completed: [] Yes [] No    If the office needs to give you a call back, can they leave a voicemail: [] Yes [] No    Mena Hernandez RN  03/10/25, 10:09 CDT

## 2025-03-11 ENCOUNTER — OFFICE VISIT (OUTPATIENT)
Dept: OTOLARYNGOLOGY | Facility: CLINIC | Age: 80
End: 2025-03-11
Payer: COMMERCIAL

## 2025-03-11 VITALS — WEIGHT: 169 LBS | HEIGHT: 66 IN | BODY MASS INDEX: 27.16 KG/M2

## 2025-03-11 DIAGNOSIS — R53.83 OTHER FATIGUE: ICD-10-CM

## 2025-03-11 DIAGNOSIS — G47.33 OSA (OBSTRUCTIVE SLEEP APNEA): Primary | ICD-10-CM

## 2025-03-11 DIAGNOSIS — Z78.9 INTOLERANCE OF CONTINUOUS POSITIVE AIRWAY PRESSURE (CPAP) VENTILATION: ICD-10-CM

## 2025-03-11 DIAGNOSIS — R06.83 SNORING: ICD-10-CM

## 2025-03-11 RX ORDER — MUPIROCIN 20 MG/G
1 OINTMENT TOPICAL 2 TIMES DAILY
Qty: 22 G | Refills: 0 | Status: SHIPPED | OUTPATIENT
Start: 2025-03-11 | End: 2025-03-25

## 2025-03-11 RX ORDER — CHLORHEXIDINE GLUCONATE 40 MG/ML
1 SOLUTION TOPICAL DAILY
Qty: 90 ML | Refills: 0 | Status: SHIPPED | OUTPATIENT
Start: 2025-03-11 | End: 2025-03-14

## 2025-03-11 RX ORDER — CEFUROXIME AXETIL 500 MG/1
500 TABLET ORAL 2 TIMES DAILY
Qty: 20 TABLET | Refills: 0 | Status: SHIPPED | OUTPATIENT
Start: 2025-03-11 | End: 2025-03-21

## 2025-03-11 NOTE — H&P (VIEW-ONLY)
YOB: 1945  Location: [facility]  Location Address: [address] [place] [address], [facility], [address] [place] [address]  Location Phone: [phone]    No chief complaint on file.      History of Present Illness  Justin Robledo is a 79 y.o. male.  Justin Robledo is here for follow up of ENT complaints. The patient has had problems with daytime somnolence, fatigue and sleep apnea   Patient was first diagnosed with sleep apnea several years ago   He has tried various masks with multiple settings without success.   He is currently wearing cpap approximately 4 hours per night but pulls it off his face frequently   Patient is not able to tolerate oral device        EPWORTH: 14  AHI: 30.8 on 3/28/2024  BMI 27.29    Past Medical History:   Diagnosis Date    Arthritis     BPH with obstruction/lower urinary tract symptoms     Chronic back pain     Class 1 obesity due to excess calories with serious comorbidity and body mass index (BMI) of 32.0 to 32.9 in adult 2020    COPD (chronic obstructive pulmonary disease)     Elevated cholesterol     Environmental allergies 2022    Essential hypertension 10/14/2019    GERD (gastroesophageal reflux disease)     Hyperlipidemia     Hypertension     Left bundle branch block     Lung infiltrate 2022    Right upper lobe, CT [facility], 2022    Obstructive sleep apnea 2020    pt uses a cpap machine at home    Sleep apnea     CPAP       Past Surgical History:   Procedure Laterality Date    CARPAL TUNNEL RELEASE Right 2021    Procedure: CARPAL TUNNEL RELEASE right;  Surgeon: Luis Perdomo MD;  Location: [facility] OR;  Service: Neurosurgery;  Laterality: Right;    CARPAL TUNNEL RELEASE Left 2023    Procedure: CARPAL TUNNEL RELEASE Left;  Surgeon: Luis Perdomo MD;  Location: [facility] OR;  Service: Neurosurgery;  Laterality: Left;    COLONOSCOPY      COLONOSCOPY N/A 2022    Procedure: COLONOSCOPY WITH ANESTHESIA;  Surgeon: Bacilio  Cortes PEARL DO;  Location:  [facility] ENDOSCOPY;  Service: Gastroenterology;  Laterality: N/A;  preop; hx of poylps   postop; polyps   PCP Katerine Mcclain     KNEE ARTHROPLASTY Right     KNEE MENISCAL REPAIR Left 2018    PENILE PROSTHESIS IMPLANT N/A 2022    Procedure: MALLEABLE PENILE PROSTHESIS PLACEMENT;  Surgeon: Ignacio Moon MD;  Location:  [facility] OR;  Service: Urology;  Laterality: N/A;    ROTATOR CUFF REPAIR Right     SLEEP ENDOSCOPY N/A 2025    Procedure: Videosleep endoscopy;  Surgeon: Turner Saeed MD;  Location:  [facility] OR;  Service: ENT;  Laterality: N/A;    SPINE SURGERY  1981    lower back ruptured disc    TOTAL SHOULDER ARTHROPLASTY W/ DISTAL CLAVICLE EXCISION Right 2020    Procedure: RIGHT REVERSE TOTAL SHOULDER  ARTHROPLASTY;  Surgeon: Suman Ventura MD;  Location:  [facility] OR;  Service: Orthopedics       No outpatient medications have been marked as taking for the 25 encounter (Appointment) with Turner Saeed MD.       Bee venom    Family History   Problem Relation Age of Onset    Arthritis Mother     Diabetes Mother     Osteoporosis Mother     Hypertension Mother     Kidney disease Mother     Arthritis Father     Hypertension Father     Stroke Father     Diabetes Sister     Obesity Sister     Cancer Brother     Colon cancer Neg Hx     Colon polyps Neg Hx        Social History     Socioeconomic History    Marital status:    Tobacco Use    Smoking status: Former     Current packs/day: 0.00     Average packs/day: 1 pack/day for 50.0 years (50.0 ttl pk-yrs)     Types: Pipe, Cigarettes     Start date:      Quit date:      Years since quittin.1     Passive exposure: Past    Smokeless tobacco: Never   Vaping Use    Vaping status: Never Used   Substance and Sexual Activity    Alcohol use: Yes     Alcohol/week: 5.0 standard drinks of alcohol     Types: 5 Cans of beer per week     Comment: social    Drug use: No    Sexual activity: Defer        Review of Systems - Negative except fatigue, snoring daytime somnolence        There were no vitals filed for this visit.    There is no height or weight on file to calculate BMI.    Objective     Physical Exam  Vitals reviewed.   Constitutional:       Appearance: He is obese.   HENT:      Head: Normocephalic.      Right Ear: External ear normal.      Left Ear: External ear normal.      Nose: Nose normal.      Mouth/Throat:      Lips: Pink.      Mouth: Mucous membranes are moist.      Comments: Niño III     Musculoskeletal:      Cervical back: Full passive range of motion without pain.   Neurological:      Mental Status: He is alert.           Assessment & Plan   Diagnoses and all orders for this visit:    1. VINEET (obstructive sleep apnea) (Primary)  -     Case Request; Standing  -     Follow Anesthesia Guidelines / Protocol; Future  -     Comprehensive Metabolic Panel; Future  -     CBC and Differential; Future  -     Follow Anesthesia Guidelines / Protocol; Standing  -     Verify NPO Status; Standing  -     SCD (Sequential Compression Device) - To Be Placed on Patient in Pre-Op; Standing  -     Patient to Void Prior to Transfer to OR; Standing  -     Instructions for Nursing; Standing  -     Case Request    2. Other fatigue  -     Case Request; Standing  -     Follow Anesthesia Guidelines / Protocol; Future  -     Comprehensive Metabolic Panel; Future  -     CBC and Differential; Future  -     Follow Anesthesia Guidelines / Protocol; Standing  -     Verify NPO Status; Standing  -     SCD (Sequential Compression Device) - To Be Placed on Patient in Pre-Op; Standing  -     Patient to Void Prior to Transfer to OR; Standing  -     Instructions for Nursing; Standing  -     Case Request    3. Snoring  -     Case Request; Standing  -     Follow Anesthesia Guidelines / Protocol; Future  -     Comprehensive Metabolic Panel; Future  -     CBC and Differential; Future  -     Follow Anesthesia Guidelines / Protocol;  Standing  -     Verify NPO Status; Standing  -     SCD (Sequential Compression Device) - To Be Placed on Patient in Pre-Op; Standing  -     Patient to Void Prior to Transfer to OR; Standing  -     Instructions for Nursing; Standing  -     Case Request    4. Intolerance of continuous positive airway pressure (CPAP) ventilation  -     Case Request; Standing  -     Follow Anesthesia Guidelines / Protocol; Future  -     Comprehensive Metabolic Panel; Future  -     CBC and Differential; Future  -     Follow Anesthesia Guidelines / Protocol; Standing  -     Verify NPO Status; Standing  -     SCD (Sequential Compression Device) - To Be Placed on Patient in Pre-Op; Standing  -     Patient to Void Prior to Transfer to OR; Standing  -     Instructions for Nursing; Standing  -     Case Request    Other orders  -     Chlorhexidine Gluconate 4 % solution; Apply 1 Application topically Daily for 3 days.  Dispense: 90 mL; Refill: 0  -     mupirocin (BACTROBAN) 2 % ointment; Apply 1 Application topically to the appropriate area as directed 2 (Two) Times a Day for 14 days.  Dispense: 22 g; Refill: 0  -     cefuroxime (CEFTIN) 500 MG tablet; Take 1 tablet by mouth 2 (Two) Times a Day for 10 days.  Dispense: 20 tablet; Refill: 0      * Surgery not found *  No orders of the defined types were placed in this encounter.    No follow-ups on file.     The risk benefits and options of HYPOGLOSSAL NERVE STIMULATION DEVICE IMPLANT (Inspire) were discussed with the patient including the risks of bleeding, infection, the need for implant removal, malfunctioning, battery replacement and other issues were discussed at length.  Was also discussed that the patient needed to limit movement of the chest and arm for 10 to 12 days postoperatively in order to limit development of seroma or hematoma and therefore subsequent infection.  The patient understands risk benefits and options and wishes to proceed.    Patient and family were instructed on the  proper use of scheduled prescription drugs including their impact on driving and the potential effects during pregnancy.  The potential for overdose was discussed and their safe storage and proper disposal.  The website www.kbml.[place].gov which contains other materials in this regard.       Shave facial hair 1 days prior to surgery. You may have mustache or sideburns, but no facial hair from bottom lip down.   Shave chest hair 3 days prior to surgery. Shave across midline, from clavicle (collarbone) to below the right nipple.   Begin antibiotics 3 days prior to procedure   Wash chest and neck daily for 3 days prior to surgery with chloraprep or hibiclense (can find at local pharmacy)   Intranasal bactroban daily for 7 days prior to surgery and 7 days postoperatively     There are no Patient Instructions on file for this visit.

## 2025-03-11 NOTE — PATIENT INSTRUCTIONS
HYPOGLOSSAL NERVE STIMULATION DEVICE IMPLANT (Inspire) were discussed with the patient including the risks of bleeding, infection, the need for implant removal, malfunctioning, battery replacement and other issues were discussed at length.  Was also discussed that the patient needed to limit movement of the chest and arm for 10 to 12 days postoperatively in order to limit development of seroma or hematoma and therefore subsequent infection.  The patient understands risk benefits and options and wishes to proceed.    Patient and family were instructed on the proper use of scheduled prescription drugs including their impact on driving and the potential effects during pregnancy.  The potential for overdose was discussed and their safe storage and proper disposal.  The website www.[employer].ky.gov which contains other materials in this regard.         Shave facial hair 1 days prior to surgery. You may have mustache or sideburns, but no facial hair from bottom lip down.   Shave chest hair 3 days prior to surgery. Shave across midline, from clavicle (collarbone) to below the right nipple.   Begin antibiotics 3 days prior to procedure   Wash chest and neck daily for 3 days prior to surgery with chloraprep or hibiclense (can find at local pharmacy)   Intranasal bactroban daily for 7 days prior to surgery and 7 days postoperatively

## 2025-03-11 NOTE — PROGRESS NOTES
YOB: 1945  Location: [facility]  Location Address: [address] [place] [address], [employer] [address], [address] [place] [address]  Location Phone: [phone]    No chief complaint on file.      History of Present Illness  Justin Robledo is a 79 y.o. male.  Justin Robledo is here for follow up of ENT complaints. The patient has had problems with daytime somnolence, fatigue and sleep apnea   Patient was first diagnosed with sleep apnea several years ago   He has tried various masks with multiple settings without success.   He is currently wearing cpap approximately 4 hours per night but pulls it off his face frequently   Patient is not able to tolerate oral device        EPWORTH: 14  AHI: 30.8 on 3/28/2024  BMI 27.29    Past Medical History:   Diagnosis Date    Arthritis     BPH with obstruction/lower urinary tract symptoms     Chronic back pain     Class 1 obesity due to excess calories with serious comorbidity and body mass index (BMI) of 32.0 to 32.9 in adult 2020    COPD (chronic obstructive pulmonary disease)     Elevated cholesterol     Environmental allergies 2022    Essential hypertension 10/14/2019    GERD (gastroesophageal reflux disease)     Hyperlipidemia     Hypertension     Left bundle branch block     Lung infiltrate 2022    Right upper lobe, [place] [facility], 2022    Obstructive sleep apnea 2020    pt uses a cpap machine at home    Sleep apnea     CPAP       Past Surgical History:   Procedure Laterality Date    CARPAL TUNNEL RELEASE Right 2021    Procedure: CARPAL TUNNEL RELEASE right;  Surgeon: Luis Perdomo MD;  Location: [facility] OR;  Service: Neurosurgery;  Laterality: Right;    CARPAL TUNNEL RELEASE Left 2023    Procedure: CARPAL TUNNEL RELEASE Left;  Surgeon: Luis Perdomo MD;  Location: [facility] OR;  Service: Neurosurgery;  Laterality: Left;    COLONOSCOPY      COLONOSCOPY N/A 2022    Procedure: COLONOSCOPY WITH ANESTHESIA;  Surgeon: Bacilio  Cortes PEARL DO;  Location:  [facility];  Service: Gastroenterology;  Laterality: N/A;  preop; hx of poylps   postop; polyps   PCP Katerine Mcclain     KNEE ARTHROPLASTY Right     KNEE MENISCAL REPAIR Left 2018    PENILE PROSTHESIS IMPLANT N/A 2022    Procedure: MALLEABLE PENILE PROSTHESIS PLACEMENT;  Surgeon: Ignacio Moon MD;  Location:  [facility] OR;  Service: Urology;  Laterality: N/A;    ROTATOR CUFF REPAIR Right     SLEEP ENDOSCOPY N/A 2025    Procedure: Videosleep endoscopy;  Surgeon: Turner Saeed MD;  Location:  [facility] OR;  Service: ENT;  Laterality: N/A;    SPINE SURGERY  1981    lower back ruptured disc    TOTAL SHOULDER ARTHROPLASTY W/ DISTAL CLAVICLE EXCISION Right 2020    Procedure: RIGHT REVERSE TOTAL SHOULDER  ARTHROPLASTY;  Surgeon: Suman Ventura MD;  Location:  [facility] OR;  Service: Orthopedics       No outpatient medications have been marked as taking for the 25 encounter (Appointment) with Turner Saeed MD.       Bee venom    Family History   Problem Relation Age of Onset    Arthritis Mother     Diabetes Mother     Osteoporosis Mother     Hypertension Mother     Kidney disease Mother     Arthritis Father     Hypertension Father     Stroke Father     Diabetes Sister     Obesity Sister     Cancer Brother     Colon cancer Neg Hx     Colon polyps Neg Hx        Social History     Socioeconomic History    Marital status:    Tobacco Use    Smoking status: Former     Current packs/day: 0.00     Average packs/day: 1 pack/day for 50.0 years (50.0 ttl pk-yrs)     Types: Pipe, Cigarettes     Start date:      Quit date:      Years since quittin.1     Passive exposure: Past    Smokeless tobacco: Never   Vaping Use    Vaping status: Never Used   Substance and Sexual Activity    Alcohol use: Yes     Alcohol/week: 5.0 standard drinks of alcohol     Types: 5 Cans of beer per week     Comment: social    Drug use: No    Sexual activity: Defer        Review of Systems - Negative except fatigue, snoring daytime somnolence        There were no vitals filed for this visit.    There is no height or weight on file to calculate BMI.    Objective     Physical Exam  Vitals reviewed.   Constitutional:       Appearance: He is obese.   HENT:      Head: Normocephalic.      Right Ear: External ear normal.      Left Ear: External ear normal.      Nose: Nose normal.      Mouth/Throat:      Lips: Pink.      Mouth: Mucous membranes are moist.      Comments: Niño III     Musculoskeletal:      Cervical back: Full passive range of motion without pain.   Neurological:      Mental Status: He is alert.           Assessment & Plan   Diagnoses and all orders for this visit:    1. VINEET (obstructive sleep apnea) (Primary)  -     Case Request; Standing  -     Follow Anesthesia Guidelines / Protocol; Future  -     Comprehensive Metabolic Panel; Future  -     CBC and Differential; Future  -     Follow Anesthesia Guidelines / Protocol; Standing  -     Verify NPO Status; Standing  -     SCD (Sequential Compression Device) - To Be Placed on Patient in Pre-Op; Standing  -     Patient to Void Prior to Transfer to OR; Standing  -     Instructions for Nursing; Standing  -     Case Request    2. Other fatigue  -     Case Request; Standing  -     Follow Anesthesia Guidelines / Protocol; Future  -     Comprehensive Metabolic Panel; Future  -     CBC and Differential; Future  -     Follow Anesthesia Guidelines / Protocol; Standing  -     Verify NPO Status; Standing  -     SCD (Sequential Compression Device) - To Be Placed on Patient in Pre-Op; Standing  -     Patient to Void Prior to Transfer to OR; Standing  -     Instructions for Nursing; Standing  -     Case Request    3. Snoring  -     Case Request; Standing  -     Follow Anesthesia Guidelines / Protocol; Future  -     Comprehensive Metabolic Panel; Future  -     CBC and Differential; Future  -     Follow Anesthesia Guidelines / Protocol;  Standing  -     Verify NPO Status; Standing  -     SCD (Sequential Compression Device) - To Be Placed on Patient in Pre-Op; Standing  -     Patient to Void Prior to Transfer to OR; Standing  -     Instructions for Nursing; Standing  -     Case Request    4. Intolerance of continuous positive airway pressure (CPAP) ventilation  -     Case Request; Standing  -     Follow Anesthesia Guidelines / Protocol; Future  -     Comprehensive Metabolic Panel; Future  -     CBC and Differential; Future  -     Follow Anesthesia Guidelines / Protocol; Standing  -     Verify NPO Status; Standing  -     SCD (Sequential Compression Device) - To Be Placed on Patient in Pre-Op; Standing  -     Patient to Void Prior to Transfer to OR; Standing  -     Instructions for Nursing; Standing  -     Case Request    Other orders  -     Chlorhexidine Gluconate 4 % solution; Apply 1 Application topically Daily for 3 days.  Dispense: 90 mL; Refill: 0  -     mupirocin (BACTROBAN) 2 % ointment; Apply 1 Application topically to the appropriate area as directed 2 (Two) Times a Day for 14 days.  Dispense: 22 g; Refill: 0  -     cefuroxime (CEFTIN) 500 MG tablet; Take 1 tablet by mouth 2 (Two) Times a Day for 10 days.  Dispense: 20 tablet; Refill: 0      * Surgery not found *  No orders of the defined types were placed in this encounter.    No follow-ups on file.     The risk benefits and options of HYPOGLOSSAL NERVE STIMULATION DEVICE IMPLANT (Inspire) were discussed with the patient including the risks of bleeding, infection, the need for implant removal, malfunctioning, battery replacement and other issues were discussed at length.  Was also discussed that the patient needed to limit movement of the chest and arm for 10 to 12 days postoperatively in order to limit development of seroma or hematoma and therefore subsequent infection.  The patient understands risk benefits and options and wishes to proceed.    Patient and family were instructed on the  proper use of scheduled prescription drugs including their impact on driving and the potential effects during pregnancy.  The potential for overdose was discussed and their safe storage and proper disposal.  The website www.kbml.[place].gov which contains other materials in this regard.       Shave facial hair 1 days prior to surgery. You may have mustache or sideburns, but no facial hair from bottom lip down.   Shave chest hair 3 days prior to surgery. Shave across midline, from clavicle (collarbone) to below the right nipple.   Begin antibiotics 3 days prior to procedure   Wash chest and neck daily for 3 days prior to surgery with chloraprep or hibiclense (can find at local pharmacy)   Intranasal bactroban daily for 7 days prior to surgery and 7 days postoperatively     There are no Patient Instructions on file for this visit.   Quality 226: Preventive Care And Screening: Tobacco Use: Screening And Cessation Intervention: Patient screened for tobacco use and is an ex/non-smoker Quality 47: Advance Care Plan: Advance Care Planning discussed and documented; advance care plan or surrogate decision maker documented in the medical record. Quality 130: Documentation Of Current Medications In The Medical Record: Current Medications Documented Quality 431: Preventive Care And Screening: Unhealthy Alcohol Use - Screening: Patient screened for unhealthy alcohol use using a single question and scores less than 2 times per year Detail Level: Simple

## 2025-03-17 DIAGNOSIS — I10 ESSENTIAL HYPERTENSION: ICD-10-CM

## 2025-03-17 RX ORDER — BISOPROLOL FUMARATE 5 MG/1
5 TABLET, FILM COATED ORAL DAILY
Qty: 90 TABLET | Refills: 2 | Status: SHIPPED | OUTPATIENT
Start: 2025-03-17

## 2025-03-17 NOTE — TELEPHONE ENCOUNTER
Rx Refill Note  Requested Prescriptions     Pending Prescriptions Disp Refills    bisoprolol (ZEBeta) 5 MG tablet [Pharmacy Med Name: Bisoprolol Fumarate 5 MG Oral Tablet] 90 tablet 0     Sig: Take 1 tablet by mouth once daily      Last office visit with prescribing clinician: 3/3/2025   Last telemedicine visit with prescribing clinician: Visit date not found   Next office visit with prescribing clinician: 6/27/2025                         Would you like a call back once the refill request has been completed: [] Yes [] No    If the office needs to give you a call back, can they leave a voicemail: [] Yes [] No    Mena Hernandez RN  03/17/25, 07:11 CDT

## 2025-03-21 DIAGNOSIS — E66.811 CLASS 1 OBESITY: ICD-10-CM

## 2025-03-23 RX ORDER — TIRZEPATIDE 10 MG/.5ML
0.5 INJECTION, SOLUTION SUBCUTANEOUS WEEKLY
Qty: 4 ML | Refills: 0 | Status: SHIPPED | OUTPATIENT
Start: 2025-03-23

## 2025-03-31 ENCOUNTER — PRE-ADMISSION TESTING (OUTPATIENT)
Dept: PREADMISSION TESTING | Facility: HOSPITAL | Age: 80
End: 2025-03-31
Payer: COMMERCIAL

## 2025-03-31 VITALS
SYSTOLIC BLOOD PRESSURE: 115 MMHG | RESPIRATION RATE: 16 BRPM | DIASTOLIC BLOOD PRESSURE: 76 MMHG | HEART RATE: 78 BPM | OXYGEN SATURATION: 95 %

## 2025-03-31 DIAGNOSIS — R53.83 OTHER FATIGUE: ICD-10-CM

## 2025-03-31 DIAGNOSIS — G47.33 OSA (OBSTRUCTIVE SLEEP APNEA): ICD-10-CM

## 2025-03-31 DIAGNOSIS — Z78.9 INTOLERANCE OF CONTINUOUS POSITIVE AIRWAY PRESSURE (CPAP) VENTILATION: ICD-10-CM

## 2025-03-31 DIAGNOSIS — R06.83 SNORING: ICD-10-CM

## 2025-03-31 LAB
ALBUMIN SERPL-MCNC: 3.9 G/DL (ref 3.5–5.2)
ALBUMIN/GLOB SERPL: 1.5 G/DL
ALP SERPL-CCNC: 51 U/L (ref 39–117)
ALT SERPL W P-5'-P-CCNC: 36 U/L (ref 1–41)
ANION GAP SERPL CALCULATED.3IONS-SCNC: 12 MMOL/L (ref 5–15)
AST SERPL-CCNC: 34 U/L (ref 1–40)
BASOPHILS # BLD AUTO: 0.04 10*3/MM3 (ref 0–0.2)
BASOPHILS NFR BLD AUTO: 0.8 % (ref 0–1.5)
BILIRUB SERPL-MCNC: 0.5 MG/DL (ref 0–1.2)
BUN SERPL-MCNC: 11 MG/DL (ref 8–23)
BUN/CREAT SERPL: 16.4 (ref 7–25)
CALCIUM SPEC-SCNC: 8.8 MG/DL (ref 8.6–10.5)
CHLORIDE SERPL-SCNC: 102 MMOL/L (ref 98–107)
CO2 SERPL-SCNC: 26 MMOL/L (ref 22–29)
CREAT SERPL-MCNC: 0.67 MG/DL (ref 0.76–1.27)
DEPRECATED RDW RBC AUTO: 53.5 FL (ref 37–54)
EGFRCR SERPLBLD CKD-EPI 2021: 95 ML/MIN/1.73
EOSINOPHIL # BLD AUTO: 0.17 10*3/MM3 (ref 0–0.4)
EOSINOPHIL NFR BLD AUTO: 3.4 % (ref 0.3–6.2)
ERYTHROCYTE [DISTWIDTH] IN BLOOD BY AUTOMATED COUNT: 15.1 % (ref 12.3–15.4)
GLOBULIN UR ELPH-MCNC: 2.6 GM/DL
GLUCOSE SERPL-MCNC: 89 MG/DL (ref 65–99)
HCT VFR BLD AUTO: 38 % (ref 37.5–51)
HGB BLD-MCNC: 12.7 G/DL (ref 13–17.7)
IMM GRANULOCYTES # BLD AUTO: 0.01 10*3/MM3 (ref 0–0.05)
IMM GRANULOCYTES NFR BLD AUTO: 0.2 % (ref 0–0.5)
LYMPHOCYTES # BLD AUTO: 1.3 10*3/MM3 (ref 0.7–3.1)
LYMPHOCYTES NFR BLD AUTO: 26.2 % (ref 19.6–45.3)
MCH RBC QN AUTO: 32.5 PG (ref 26.6–33)
MCHC RBC AUTO-ENTMCNC: 33.4 G/DL (ref 31.5–35.7)
MCV RBC AUTO: 97.2 FL (ref 79–97)
MONOCYTES # BLD AUTO: 0.51 10*3/MM3 (ref 0.1–0.9)
MONOCYTES NFR BLD AUTO: 10.3 % (ref 5–12)
NEUTROPHILS NFR BLD AUTO: 2.93 10*3/MM3 (ref 1.7–7)
NEUTROPHILS NFR BLD AUTO: 59.1 % (ref 42.7–76)
NRBC BLD AUTO-RTO: 0 /100 WBC (ref 0–0.2)
PLATELET # BLD AUTO: 146 10*3/MM3 (ref 140–450)
PMV BLD AUTO: 10.8 FL (ref 6–12)
POTASSIUM SERPL-SCNC: 3.7 MMOL/L (ref 3.5–5.2)
PROT SERPL-MCNC: 6.5 G/DL (ref 6–8.5)
RBC # BLD AUTO: 3.91 10*6/MM3 (ref 4.14–5.8)
SODIUM SERPL-SCNC: 140 MMOL/L (ref 136–145)
WBC NRBC COR # BLD AUTO: 4.96 10*3/MM3 (ref 3.4–10.8)

## 2025-03-31 PROCEDURE — 36415 COLL VENOUS BLD VENIPUNCTURE: CPT

## 2025-03-31 PROCEDURE — 80053 COMPREHEN METABOLIC PANEL: CPT

## 2025-03-31 PROCEDURE — 85025 COMPLETE CBC W/AUTO DIFF WBC: CPT

## 2025-03-31 NOTE — DISCHARGE INSTRUCTIONS
Preparing for Surgery  Follow these instructions before the procedure:  Several days or weeks before your procedure  Medication(s) you need to stop   ___ONE WEEK____ days/week prior to surgery: ZEPBOUND      Ask your health care provider about:  Changing or stopping your regular medicines. This is especially important if you are taking diabetes medicines or blood thinners.  Taking medicines such as aspirin and ibuprofen. These medicines can thin your blood. Do not take these medicines unless your health care provider tells you to take them.  Taking over-the-counter medicines, vitamins, herbs, and supplements.    Contact your surgeon if you:  Develop a fever of more than 100.4°F (38°C) or other feelings of illness during the 48 hours before your surgery.  Have symptoms that get worse.  Have questions or concerns about your surgery.  If you are going home the same day of your surgery you will need to arrange for a responsible adult, age 18 years old or older, to drive you home from the hospital and stay with you for 24 hours. Verification of the  will be made prior to any procedure requiring sedation. You may not go home in a taxi or any form of public transportation by yourself.     Day before your procedure  Medication(s) you need to stop the day before your surgery:LOSARTAN    24 hours before your procedure DO NOT drink alcoholic beverages or smoke.  24 hours before your procedure STOP taking Erectile Dysfunction medication (i.e.,Cialis, Viagra)   You may be asked to shower with a germ-killing soap.  Day of your procedure   You may take the following medication(s) the morning of surgery with a sip of water: BISOPROLOL, GABAPENTIN, OMEPRAZOLE      8 hours before your scheduled arrival time, STOP all food, any dairy products, and full liquids. This includes hard candy, chewing gum or mints. This is extremely important to prevent serious complications.     Up to 2 hours before your scheduled arrival time, you  may have clear liquids no cream, powder, or pulp of any kind. Safe options are water, black coffee, plain tea, soda, Gatorade/Powerade, clear broth, apple juice.    2 hours before your scheduled arrival time, STOP drinking clear liquids.    You may need to take another shower with a germ-killing soap before you leave home in the morning. Do not use perfumes, colognes, or body lotions.  Wear comfortable loose-fitting clothing.  Remove all jewelry including body piercing and rings, dark colored nail polish, and make up prior to arrival at the hospital. Leave all valuables at home.   Bring your hearing aids if you rely on them.  Do not wear contact lenses. If you wear eyeglasses remember to bring a case to store them in while you are in surgery.  Do not use denture adhesives since you will be asked to remove them during your surgery.    You do not need to bring your home medications into the hospital.   Bring your sleep apnea device with you on the day of your surgery (if this applies to you).  If you have an Inspire implant for sleep apnea, please bring the remote with you on the day of surgery.  If you wear portable oxygen, bring it with you.   If you are staying overnight, you may bring a bag of items you may need such as slippers, robe and a change of clothes for your discharge. You may want to leave these items in the car until you are ready for them since your family will take your belongings when you leave the pre-operative area.  Arrive at the hospital as scheduled by the office. You will be asked to arrive 2 hours prior to your surgery time in order to prepare for your procedure.  When you arrive at the hospital  Go to the registration desk located at the main entrance of the hospital.  After registration is completed, you will be given a beeper and a sticker sheet. Take the stickers to Outpatient Surgery and place in the tray at the end of the desk to notify the staff that you have arrived and registered.    Return to the lobby to wait. You are not always called back according to the time of arrival but rather the time your doctor will be ready.  When your beeper lights up and vibrates proceed through the double doors, under the stairs, and a member of the Outpatient Surgery staff will escort you to your preoperative room.     Turner Saeed MD, FACS  [facility] Pre-Operative Instructions     Shave facial hair one week prior to surgery. You may mustache or sideburns but no facial hair from bottom lip down.    Shave chest hair 3 days prior to surgery. Shave across the midline, from clavicle (collarbone) to below the right nipple.     You will be prescribed 2nd generation cephalosporin antibiotic to begin 3 days prior to surgery and will end 7 days after surgery.     You will be prescribed Intranasal Bactoban/Mupirocin daily for 7 days prior to surgery to end 7 days postop.    Wash chest and neck daily for 3 days prior to surgery with Chloraprep

## 2025-04-02 ENCOUNTER — ANESTHESIA (OUTPATIENT)
Dept: PERIOP | Facility: HOSPITAL | Age: 80
End: 2025-04-02
Payer: COMMERCIAL

## 2025-04-02 ENCOUNTER — HOSPITAL ENCOUNTER (OUTPATIENT)
Facility: HOSPITAL | Age: 80
Setting detail: HOSPITAL OUTPATIENT SURGERY
Discharge: HOME | End: 2025-04-02
Attending: OTOLARYNGOLOGY | Admitting: OTOLARYNGOLOGY
Payer: COMMERCIAL

## 2025-04-02 ENCOUNTER — ANESTHESIA EVENT (OUTPATIENT)
Dept: PERIOP | Facility: HOSPITAL | Age: 80
End: 2025-04-02
Payer: COMMERCIAL

## 2025-04-02 VITALS
SYSTOLIC BLOOD PRESSURE: 114 MMHG | TEMPERATURE: 97.1 F | DIASTOLIC BLOOD PRESSURE: 64 MMHG | RESPIRATION RATE: 16 BRPM | HEART RATE: 85 BPM | OXYGEN SATURATION: 94 %

## 2025-04-02 DIAGNOSIS — R06.83 SNORING: ICD-10-CM

## 2025-04-02 DIAGNOSIS — R53.83 OTHER FATIGUE: ICD-10-CM

## 2025-04-02 DIAGNOSIS — G47.33 OSA (OBSTRUCTIVE SLEEP APNEA): Primary | ICD-10-CM

## 2025-04-02 DIAGNOSIS — Z78.9 INTOLERANCE OF CONTINUOUS POSITIVE AIRWAY PRESSURE (CPAP) VENTILATION: ICD-10-CM

## 2025-04-02 PROCEDURE — 25010000002 PROPOFOL 10 MG/ML EMULSION: Performed by: NURSE ANESTHETIST, CERTIFIED REGISTERED

## 2025-04-02 PROCEDURE — C1778 LEAD, NEUROSTIMULATOR: HCPCS | Performed by: OTOLARYNGOLOGY

## 2025-04-02 PROCEDURE — 25010000002 GLYCOPYRROLATE 0.4 MG/2ML SOLUTION: Performed by: NURSE ANESTHETIST, CERTIFIED REGISTERED

## 2025-04-02 PROCEDURE — C1767 GENERATOR, NEURO NON-RECHARG: HCPCS | Performed by: OTOLARYNGOLOGY

## 2025-04-02 PROCEDURE — 25010000002 LIDOCAINE PF 2% 2 % SOLUTION: Performed by: NURSE ANESTHETIST, CERTIFIED REGISTERED

## 2025-04-02 PROCEDURE — 25010000002 DEXAMETHASONE PER 1 MG: Performed by: NURSE ANESTHETIST, CERTIFIED REGISTERED

## 2025-04-02 PROCEDURE — 25010000002 ONDANSETRON PER 1 MG: Performed by: NURSE ANESTHETIST, CERTIFIED REGISTERED

## 2025-04-02 PROCEDURE — C1787 PATIENT PROGR, NEUROSTIM: HCPCS | Performed by: OTOLARYNGOLOGY

## 2025-04-02 PROCEDURE — 25010000002 CEFAZOLIN PER 500 MG: Performed by: OTOLARYNGOLOGY

## 2025-04-02 PROCEDURE — 64582 OPN MPLTJ HPGLSL NSTM ARY PG: CPT | Performed by: OTOLARYNGOLOGY

## 2025-04-02 PROCEDURE — 25810000003 LACTATED RINGERS PER 1000 ML: Performed by: OTOLARYNGOLOGY

## 2025-04-02 PROCEDURE — 25010000002 LIDOCAINE 1% - EPINEPHRINE 1:100000 1 %-1:100000 SOLUTION: Performed by: OTOLARYNGOLOGY

## 2025-04-02 DEVICE — GEN IPG INSPIRE4 RESP/SENSR/LD NONRECHG: Type: IMPLANTABLE DEVICE | Site: NECK | Status: FUNCTIONAL

## 2025-04-02 DEVICE — LD SENSR IPG INSPIRE RESP 45CM 3.6MM: Type: IMPLANTABLE DEVICE | Site: NECK | Status: FUNCTIONAL

## 2025-04-02 DEVICE — LD STIM IPG INSPIRE 3/ELECTRD 45CM: Type: IMPLANTABLE DEVICE | Site: NECK | Status: FUNCTIONAL

## 2025-04-02 RX ORDER — MUPIROCIN 20 MG/G
OINTMENT TOPICAL AS NEEDED
Status: DISCONTINUED | OUTPATIENT
Start: 2025-04-02 | End: 2025-04-02 | Stop reason: HOSPADM

## 2025-04-02 RX ORDER — SODIUM CHLORIDE 0.9 % (FLUSH) 0.9 %
3 SYRINGE (ML) INJECTION AS NEEDED
Status: DISCONTINUED | OUTPATIENT
Start: 2025-04-02 | End: 2025-04-02 | Stop reason: HOSPADM

## 2025-04-02 RX ORDER — ONDANSETRON 2 MG/ML
4 INJECTION INTRAMUSCULAR; INTRAVENOUS ONCE AS NEEDED
Status: DISCONTINUED | OUTPATIENT
Start: 2025-04-02 | End: 2025-04-02 | Stop reason: HOSPADM

## 2025-04-02 RX ORDER — ACETAMINOPHEN 500 MG
1000 TABLET ORAL ONCE
Status: COMPLETED | OUTPATIENT
Start: 2025-04-02 | End: 2025-04-02

## 2025-04-02 RX ORDER — HYDROCODONE BITARTRATE AND ACETAMINOPHEN 10; 325 MG/1; MG/1
1 TABLET ORAL EVERY 4 HOURS PRN
Status: DISCONTINUED | OUTPATIENT
Start: 2025-04-02 | End: 2025-04-02 | Stop reason: HOSPADM

## 2025-04-02 RX ORDER — MUPIROCIN 20 MG/G
OINTMENT TOPICAL EVERY 8 HOURS SCHEDULED
Status: DISCONTINUED | OUTPATIENT
Start: 2025-04-02 | End: 2025-04-02 | Stop reason: HOSPADM

## 2025-04-02 RX ORDER — HYDROCODONE BITARTRATE AND ACETAMINOPHEN 5; 325 MG/1; MG/1
1 TABLET ORAL EVERY 4 HOURS PRN
Status: DISCONTINUED | OUTPATIENT
Start: 2025-04-02 | End: 2025-04-02 | Stop reason: HOSPADM

## 2025-04-02 RX ORDER — DEXAMETHASONE SODIUM PHOSPHATE 4 MG/ML
INJECTION, SOLUTION INTRA-ARTICULAR; INTRALESIONAL; INTRAMUSCULAR; INTRAVENOUS; SOFT TISSUE AS NEEDED
Status: DISCONTINUED | OUTPATIENT
Start: 2025-04-02 | End: 2025-04-02 | Stop reason: SURG

## 2025-04-02 RX ORDER — NALOXONE HCL 0.4 MG/ML
0.4 VIAL (ML) INJECTION AS NEEDED
Status: DISCONTINUED | OUTPATIENT
Start: 2025-04-02 | End: 2025-04-02 | Stop reason: HOSPADM

## 2025-04-02 RX ORDER — SODIUM CHLORIDE, SODIUM LACTATE, POTASSIUM CHLORIDE, CALCIUM CHLORIDE 600; 310; 30; 20 MG/100ML; MG/100ML; MG/100ML; MG/100ML
1000 INJECTION, SOLUTION INTRAVENOUS CONTINUOUS
Status: DISCONTINUED | OUTPATIENT
Start: 2025-04-02 | End: 2025-04-02 | Stop reason: HOSPADM

## 2025-04-02 RX ORDER — HYDROCODONE BITARTRATE AND ACETAMINOPHEN 5; 325 MG/1; MG/1
1 TABLET ORAL EVERY 8 HOURS PRN
Qty: 8 TABLET | Refills: 0 | Status: SHIPPED | OUTPATIENT
Start: 2025-04-02

## 2025-04-02 RX ORDER — IBUPROFEN 600 MG/1
600 TABLET, FILM COATED ORAL EVERY 6 HOURS PRN
Status: DISCONTINUED | OUTPATIENT
Start: 2025-04-02 | End: 2025-04-02 | Stop reason: HOSPADM

## 2025-04-02 RX ORDER — MAGNESIUM HYDROXIDE 1200 MG/15ML
LIQUID ORAL AS NEEDED
Status: DISCONTINUED | OUTPATIENT
Start: 2025-04-02 | End: 2025-04-02 | Stop reason: HOSPADM

## 2025-04-02 RX ORDER — ONDANSETRON 4 MG/1
4 TABLET, ORALLY DISINTEGRATING ORAL ONCE AS NEEDED
Status: DISCONTINUED | OUTPATIENT
Start: 2025-04-02 | End: 2025-04-02 | Stop reason: HOSPADM

## 2025-04-02 RX ORDER — FLUMAZENIL 0.1 MG/ML
0.2 INJECTION INTRAVENOUS AS NEEDED
Status: DISCONTINUED | OUTPATIENT
Start: 2025-04-02 | End: 2025-04-02 | Stop reason: HOSPADM

## 2025-04-02 RX ORDER — LIDOCAINE HYDROCHLORIDE 20 MG/ML
INJECTION, SOLUTION EPIDURAL; INFILTRATION; INTRACAUDAL; PERINEURAL AS NEEDED
Status: DISCONTINUED | OUTPATIENT
Start: 2025-04-02 | End: 2025-04-02 | Stop reason: SURG

## 2025-04-02 RX ORDER — PROPOFOL 10 MG/ML
VIAL (ML) INTRAVENOUS AS NEEDED
Status: DISCONTINUED | OUTPATIENT
Start: 2025-04-02 | End: 2025-04-02 | Stop reason: SURG

## 2025-04-02 RX ORDER — ONDANSETRON 2 MG/ML
INJECTION INTRAMUSCULAR; INTRAVENOUS AS NEEDED
Status: DISCONTINUED | OUTPATIENT
Start: 2025-04-02 | End: 2025-04-02 | Stop reason: SURG

## 2025-04-02 RX ORDER — HYDROCODONE BITARTRATE AND ACETAMINOPHEN 5; 325 MG/1; MG/1
1 TABLET ORAL ONCE AS NEEDED
Refills: 0 | Status: DISCONTINUED | OUTPATIENT
Start: 2025-04-02 | End: 2025-04-02 | Stop reason: HOSPADM

## 2025-04-02 RX ORDER — SODIUM CHLORIDE, SODIUM LACTATE, POTASSIUM CHLORIDE, CALCIUM CHLORIDE 600; 310; 30; 20 MG/100ML; MG/100ML; MG/100ML; MG/100ML
100 INJECTION, SOLUTION INTRAVENOUS CONTINUOUS
Status: DISCONTINUED | OUTPATIENT
Start: 2025-04-02 | End: 2025-04-02 | Stop reason: HOSPADM

## 2025-04-02 RX ORDER — FENTANYL CITRATE 50 UG/ML
50 INJECTION, SOLUTION INTRAMUSCULAR; INTRAVENOUS
Status: DISCONTINUED | OUTPATIENT
Start: 2025-04-02 | End: 2025-04-02 | Stop reason: HOSPADM

## 2025-04-02 RX ORDER — SODIUM CHLORIDE 9 MG/ML
40 INJECTION, SOLUTION INTRAVENOUS AS NEEDED
Status: DISCONTINUED | OUTPATIENT
Start: 2025-04-02 | End: 2025-04-02 | Stop reason: HOSPADM

## 2025-04-02 RX ORDER — SODIUM CHLORIDE 0.9 % (FLUSH) 0.9 %
3 SYRINGE (ML) INJECTION EVERY 12 HOURS SCHEDULED
Status: DISCONTINUED | OUTPATIENT
Start: 2025-04-02 | End: 2025-04-02 | Stop reason: HOSPADM

## 2025-04-02 RX ORDER — LIDOCAINE HYDROCHLORIDE 10 MG/ML
0.5 INJECTION, SOLUTION EPIDURAL; INFILTRATION; INTRACAUDAL; PERINEURAL ONCE AS NEEDED
Status: DISCONTINUED | OUTPATIENT
Start: 2025-04-02 | End: 2025-04-02 | Stop reason: HOSPADM

## 2025-04-02 RX ORDER — LABETALOL HYDROCHLORIDE 5 MG/ML
5 INJECTION, SOLUTION INTRAVENOUS
Status: DISCONTINUED | OUTPATIENT
Start: 2025-04-02 | End: 2025-04-02 | Stop reason: HOSPADM

## 2025-04-02 RX ORDER — LIDOCAINE HYDROCHLORIDE AND EPINEPHRINE 10; 10 MG/ML; UG/ML
INJECTION, SOLUTION INFILTRATION; PERINEURAL AS NEEDED
Status: DISCONTINUED | OUTPATIENT
Start: 2025-04-02 | End: 2025-04-02 | Stop reason: HOSPADM

## 2025-04-02 RX ORDER — EPHEDRINE SULFATE 50 MG/ML
INJECTION INTRAVENOUS AS NEEDED
Status: DISCONTINUED | OUTPATIENT
Start: 2025-04-02 | End: 2025-04-02 | Stop reason: SURG

## 2025-04-02 RX ORDER — GLYCOPYRROLATE 0.2 MG/ML
INJECTION INTRAMUSCULAR; INTRAVENOUS AS NEEDED
Status: DISCONTINUED | OUTPATIENT
Start: 2025-04-02 | End: 2025-04-02 | Stop reason: SURG

## 2025-04-02 RX ORDER — SODIUM CHLORIDE 0.9 % (FLUSH) 0.9 %
3-10 SYRINGE (ML) INJECTION AS NEEDED
Status: DISCONTINUED | OUTPATIENT
Start: 2025-04-02 | End: 2025-04-02 | Stop reason: HOSPADM

## 2025-04-02 RX ADMIN — LIDOCAINE HYDROCHLORIDE 100 MG: 20 INJECTION, SOLUTION EPIDURAL; INFILTRATION; INTRACAUDAL; PERINEURAL at 09:12

## 2025-04-02 RX ADMIN — Medication 50 MG: at 09:12

## 2025-04-02 RX ADMIN — LIDOCAINE HYDROCHLORIDE 100 MG: 20 INJECTION, SOLUTION EPIDURAL; INFILTRATION; INTRACAUDAL; PERINEURAL at 09:13

## 2025-04-02 RX ADMIN — CEFAZOLIN 2000 MG: 2 INJECTION, POWDER, FOR SOLUTION INTRAMUSCULAR; INTRAVENOUS at 09:10

## 2025-04-02 RX ADMIN — EPHEDRINE SULFATE 15 MG: 50 INJECTION INTRAVENOUS at 09:25

## 2025-04-02 RX ADMIN — ACETAMINOPHEN TAB 500 MG 1000 MG: 500 TAB at 09:08

## 2025-04-02 RX ADMIN — HYDROCODONE BITARTRATE AND ACETAMINOPHEN 1 TABLET: 5; 325 TABLET ORAL at 11:22

## 2025-04-02 RX ADMIN — EPHEDRINE SULFATE 10 MG: 50 INJECTION INTRAVENOUS at 10:20

## 2025-04-02 RX ADMIN — GLYCOPYRROLATE 0.2 MG: 0.2 INJECTION INTRAMUSCULAR; INTRAVENOUS at 09:12

## 2025-04-02 RX ADMIN — PROPOFOL 150 MG: 10 INJECTION, EMULSION INTRAVENOUS at 09:12

## 2025-04-02 RX ADMIN — ONDANSETRON 4 MG: 2 INJECTION INTRAMUSCULAR; INTRAVENOUS at 09:25

## 2025-04-02 RX ADMIN — SODIUM CHLORIDE, POTASSIUM CHLORIDE, SODIUM LACTATE AND CALCIUM CHLORIDE 1000 ML: 600; 310; 30; 20 INJECTION, SOLUTION INTRAVENOUS at 08:41

## 2025-04-02 RX ADMIN — DEXAMETHASONE SODIUM PHOSPHATE 4 MG: 4 INJECTION, SOLUTION INTRA-ARTICULAR; INTRALESIONAL; INTRAMUSCULAR; INTRAVENOUS; SOFT TISSUE at 09:25

## 2025-04-02 NOTE — OP NOTE
OPERATIVE NOTE  4/2/2025    NAME: Justin Robledo    YOB: 1945  MRN: [id number]    PRE-OPERATIVE DIAGNOSIS:    VINEET (obstructive sleep apnea) [G47.33]  Other fatigue [R53.83]  Snoring [R06.83]  Intolerance of continuous positive airway pressure (CPAP) ventilation [Z78.9]    POST-OPERATIVE DIAGNOSIS:   * No Diagnosis Codes entered *    PROCEDURE PERFORMED:   12th cranial nerve (hypoglossal) stimulation implant with placement of chest wall respiratory sensor (CPT 73690).    SURGEON:   Turner Saeed MD    ASSISTANT(S):   None    ANESTHESIA:   General endotracheal anesthesia    INDICATIONS: The patient is a 79 y.o. male with VINEET (obstructive sleep apnea) [G47.33]  Other fatigue [R53.83]  Snoring [R06.83]  Intolerance of continuous positive airway pressure (CPAP) ventilation [Z78.9]    PROCEDURE:  The patient was brought to the operating room, given general endotracheal anesthesia, and prepped and draped in the usual manner.     Prior to prepping and draping, electrodes were placed in the genioglossus and hyoglossus muscle and connected to the NIM box for intraoperative nerve monitoring.  The patient was subsequently prepped and draped in the usual fashion.    A modified sub-mandibular incision was made in the right upper neck approximately 2 cm below the mandible. Dissection was carried down through the subcutaneous tissue and platysma. The anterior/inferior border of the submandibular gland was identified as well as the digastric tendon. The submandibular gland and the overlying fascia with the marginal mandibular nerve were retracted posteriorly. The digastric tendon was retracted inferiorly. Dissection continued down into the digastric triangle and the posterior border of the mylohyoid muscle was freed up and retracted anteriorly. With balanced retraction, the hypoglossal nerve was identified in its usual fashion and was dissected up towards the floor of the mouth. The superior/posterior  branches innervating the hyoglossus muscle were identified using the NIM stimulator and anatomical cues. The cuff electrode for the hypoglossal nerve stimulator was placed distally to these branches innervating genioglossus, transverse, and vertical muscles. The stimulation lead was anchored to the digastric tendon using two 3.0 silk sutures and lead body slack between the cuff and the anchor gently tucked deep to the submandibular gland.    A second 5 cm incision was made in the right upper chest over the second intercostal space, approximately 3cm lateral to the sternal margin. Dissection was carried down through the skin and subcutaneous tissue to the fascia of the pectoralis muscle. An inferior pocket for the generator was created deep to the subcutaneous layer and superficial to the fascia of the pectoralis muscle. The pectoralis major fascia was dissected directly over the second intercostal space with subsequent blunt dissection through the muscle. The pectoralis major/minor was then retracted to expose the fatty layer just superficial to the external intercostals. The fatty layer was carefully swept away to expose the external intercostal muscles. A throw-down base knot was placed to the fascia of the external intercostals just lateral to the anterior external membrane using 3.0 silk suture. A fasciotomy through the external intercostals was performed approximately 5 mm lateral to the suture knot and the respiratory sense lead (; ) was advanced with the sensor facing the pleura into the interfascial plane between the external and internal intercostals. The primary anchor was sutured into place with 3.0 silk on the external intercostals. The secondary anchor was sutured with 3.0 silk to the pectoralis major allowing adequate slack between the anchors.    The stimulation lead was then tunneled in a subplatysmal plane with blunt dissection under direct visualization and brought out into the  sub-clavicular pocket where both the stimulation lead and the respiratory sensing lead were connected to the implantable pulse generator, using the two person, three-handed approach.    The implantable pulse generator was placed in the subclavicular pocket ensuring lead body was deep to the generator and secured with use of air knots to the pectoralis fascia using 2.0 silk sutures.  Diagnostic evaluation  confirmed good placement of the stimulation cuff as demonstrated by activation of the genioglossus and transverse and vertical muscles, resulting in unhindered, stiffened tongue protrusion, confirmed visually. Diagnostic evaluation also confirmed good respiratory sensor placement as demonstrated by a sensing waveform with good rise and fall associated with patient respirations.    All the wounds were thoroughly irrigated and closed in three layers with deep 4-0 Monocryl in a running subcuticular stitch of 4-0 Monocryl to reapproximate the epidermis. Mastisol and Steri-Strips were applied followed by Bactroban ointment and sterile pressure dressings consisting of 4 x 4's and Tegaderm.     The patient tolerated the procedure well and was transported upon extubation to the postanesthesia care unit in stable condition.    SPECIMENS:  None    COMPLICATIONS: NONE    ESTIMATED BLOOD LOSS:  < 25 cc    Turner Saeed MD  4/2/2025

## 2025-04-02 NOTE — ANESTHESIA PROCEDURE NOTES
Airway  Reason: elective    Date/Time: 4/2/2025 9:14 AM  Airway not difficult    General Information and Staff    Patient location during procedure: OR  CRNA/CAA: KULDEEP Cunningham CRNA    Indications and Patient Condition  Indications for airway management: airway protection    Preoxygenated: yes  MILS maintained throughout    Mask difficulty assessment: 1 - vent by mask    Final Airway Details    Final airway type: endotracheal airway      Successful airway: ETT  Cuffed: yes   Successful intubation technique: direct laryngoscopy  Adjuncts used in placement: intubating stylet  Endotracheal tube insertion site: oral  Blade: Taylor  Blade size: 3  ETT size (mm): 7.5  Cormack-Lehane Classification: grade I - full view of glottis  Placement verified by: chest auscultation and capnometry   Cuff volume (mL): 8  Measured from: lips  ETT/EBT  to lips (cm): 21  Number of attempts at approach: 1  Assessment: lips, teeth, and gum same as pre-op and atraumatic intubation

## 2025-04-02 NOTE — DISCHARGE INSTRUCTIONS
Turner Saeed MD, FACS  [facility] Post-Operative Instructions   DIET:  Resume normal diet  HYGIENE:  Please wait until 48 hours after surgery before getting incisions on neck, chest, and torso wet.  In the first 48 hours after surgery, will likely need to take sponge baths.  WOUND CARE:  Please leave pressure dressing on for 48 hours after surgery.  Gently place antibiotic ointment over incisions 2 times per day; use clean Q-tip.  May place a clean bandage over incisions as needed.  After 48 hours, you may get incisions wet with warm soap and water, but do not soak the incisions.  Pat area dry gently.  Immediately place antibiotic ointment.  Take oral antibiotics as prescribed  If skin around incision starts to get red (> 1cm), swollen, and/or more painful, please call the office  ACTIVITY:  Try to avoid sleeping on the side of your surgery, to the extent possible.    You may walk for exercise starting the day after surgery.  For 2 weeks:  Do not  anything greater than 5 pounds with the hand/arm that's on the same side as the surgery.  After 2 weeks, you may increase weight to 10 pounds.    Consider performing neck rolls 10 clockwise and 10 counterclockwise 3x/day.  For 4 weeks, no strenuous activity (running, jogging, lifting weights, gardening, sports) or until cleared by physician.    PAIN MEDICATIONS:  You will be prescribed medication for pain unless you already have a prescription for pain medication.    If pain is not severe, consider taking Tylenol 650mg every 6 hours (account for the Tylenol in your narcotic pain medication if you are using both so as not to overdose on Tylenol.)  Avoid aspirin for 7 days after surgery  Avoid direct heat (such as heating pads) to incision sites.    May gently place ice over surgery sites as needed.  Please place a thin clean towel over skin first and then place ice bag over towel.  Ice for 10 minutes at a time only.  POST-OPERATIVE CLINIC APPOINTMENTS:  1 month:  device activation with your primary sleep doctor and wound check in the office around the same time.  3-4 months: titration sleep study based on usage of >4 hrs/night.   Yearly: device check at the primary sleep doctor's office.   SCAR CARE:  After incisions have healed, you will have a scar, which will continue to evolve over the course of 12 months.  Caring for your incision scars will help them to be as minimal as possible.  If you are out in the sun with incision exposure, please remember to place sunscreen over the incision and surrounding skin.    You may use vitamin E or “Scar ointment/cream” to help soften scar.  Please wait one month after surgery before starting this.      [phone]  Karlene Garcia RN, (Use this number to leave a message for a call back or a question)  [phone]  This is the “after hours” emergency number that will allow you to speak to the On-call provider

## 2025-04-02 NOTE — ANESTHESIA PREPROCEDURE EVALUATION
Anesthesia Evaluation     no history of anesthetic complications:   NPO Solid Status: > 8 hours  NPO Liquid Status: > 8 hours           Airway   Mallampati: II  TM distance: >3 FB  No difficulty expected  Dental      Pulmonary    (+) a smoker (2011) Former, COPD,sleep apnea on CPAP  Cardiovascular   Exercise tolerance: good (4-7 METS)    (+) hypertension, hyperlipidemia      Neuro/Psych  (-) seizures, TIA, CVA  GI/Hepatic/Renal/Endo    (+) GERD  (-) liver disease, no renal disease, diabetes    Musculoskeletal     Abdominal    Substance History      OB/GYN          Other   arthritis,                     Anesthesia Plan    ASA 2     general     intravenous induction     Anesthetic plan, risks, benefits, and alternatives have been provided, discussed and informed consent has been obtained with: patient.    CODE STATUS:

## 2025-04-02 NOTE — ANESTHESIA POSTPROCEDURE EVALUATION
Patient: Justin Robledo    Procedure Summary       Date: 04/02/25 Room / Location:  [facility] 02 /  [facility]    Anesthesia Start: 0910 Anesthesia Stop: 1043    Procedure: HYPOGLOSSAL NERVE STIMULATION DEVICE IMPLANT (Neck) Diagnosis:       VINEET (obstructive sleep apnea)      Other fatigue      Snoring      Intolerance of continuous positive airway pressure (CPAP) ventilation      (VINEET (obstructive sleep apnea) [G47.33])      (Other fatigue [R53.83])      (Snoring [R06.83])      (Intolerance of continuous positive airway pressure (CPAP) ventilation [Z78.9])    Surgeons: Turner Saeed MD Provider: KULDEEP Cunningham CRNA    Anesthesia Type: general ASA Status: 2            Anesthesia Type: general    Vitals  Vitals Value Taken Time   /62 04/02/25 11:20   Temp 97.1 °F (36.2 °C) 04/02/25 11:20   Pulse 87 04/02/25 11:25   Resp 16 04/02/25 11:20   SpO2 94 % 04/02/25 11:25   Vitals shown include unfiled device data.        Post Anesthesia Care and Evaluation    Patient location during evaluation: PHASE II  Patient participation: complete - patient participated  Level of consciousness: awake and awake and alert  Pain score: 0  Pain management: adequate    Airway patency: patent  Anesthetic complications: No anesthetic complications  PONV Status: none  Cardiovascular status: acceptable  Respiratory status: acceptable  Hydration status: acceptable    Comments: Patient discharged according to acceptable Ramya score per RN assessment. See nursing records for further information.     Blood pressure 112/74, pulse 84, temperature 97.1 °F (36.2 °C), temperature source Temporal, resp. rate 16, SpO2 93%.      
none

## 2025-04-04 ENCOUNTER — TELEPHONE (OUTPATIENT)
Dept: NEUROLOGY | Age: 80
End: 2025-04-04

## 2025-04-04 NOTE — TELEPHONE ENCOUNTER
Spoke with patient, patient is aware of their Inspire Activation appointment time and date along with location. Sent welcome letter.

## 2025-04-06 DIAGNOSIS — G62.9 PERIPHERAL POLYNEUROPATHY: ICD-10-CM

## 2025-04-07 RX ORDER — GABAPENTIN 400 MG/1
800 CAPSULE ORAL 2 TIMES DAILY
Qty: 120 CAPSULE | Refills: 0 | OUTPATIENT
Start: 2025-04-07

## 2025-04-07 RX ORDER — GABAPENTIN 400 MG/1
800 CAPSULE ORAL 2 TIMES DAILY
Qty: 120 CAPSULE | Refills: 0 | Status: SHIPPED | OUTPATIENT
Start: 2025-04-07

## 2025-04-07 NOTE — TELEPHONE ENCOUNTER
Rx Refill Note  Requested Prescriptions     Pending Prescriptions Disp Refills    gabapentin (NEURONTIN) 400 MG capsule 120 capsule 0     Sig: Take 2 capsules by mouth 2 (Two) Times a Day.      Last office visit with prescribing clinician: 3/3/2025   Last telemedicine visit with prescribing clinician: Visit date not found   Next office visit with prescribing clinician: 6/27/2025                       Shwetha Flaherty MA  04/07/25, 09:30 CDT  
WDL

## 2025-04-08 PROBLEM — G47.33 OSA (OBSTRUCTIVE SLEEP APNEA): Status: RESOLVED | Noted: 2025-01-06 | Resolved: 2025-04-08

## 2025-04-08 NOTE — PROGRESS NOTES
Chief Complaint  Bronchiectasis without complication    Subjective    History of Present Illness {CC  Problem List  Visit Diagnosis   Encounters  Notes  Medications  Labs  Result Review Imaging  Media: 23}    Justin Robledo presents to [facility] for:    History of Present Illness  Management of his COPD, emphysema, and bronchiectasis. Most recent FEV1 107. He is a former smoker. He is a 1 pack/day smoker for 50 years. He quit in 2011.  Last CT January 2025 showing emphysema, 2 mm nodule left lower lobe and improving 4 mm nodule left upper lobe.       He has shortness of breath and productive coughing daily. He is on Spiriva and Symbicort. He has a rescue inhaler and nebulizer he can use when needed.  He has never had to use the rescue inhaler.  He needs refills on his daily inhalers.     He is on CPAP for sleep apnea. He is compliant with this but not benefiting from it like he used to.  Last office visit I referred him to ENT for consideration for inspire placement.  He did have the inspire device implanted 4 weeks ago.  He has had persistent headache and ear pain on the side of the device implantation since the surgery.  He has an appoint with ENT today..      Cough and congestion aggravated by underlying allergies.  He is doing well on azelastine.         Current Outpatient Medications:     acetaminophen (TYLENOL) 650 MG 8 hr tablet, Take 1 tablet by mouth 2 (Two) Times a Day., Disp: , Rfl:     albuterol sulfate  (90 Base) MCG/ACT inhaler, Inhale 2 puffs Every 4 (Four) Hours As Needed for Wheezing., Disp: 18 g, Rfl: 6    amLODIPine (NORVASC) 5 MG tablet, Take 1 tablet by mouth once daily, Disp: 90 tablet, Rfl: 3    aspirin 81 MG EC tablet, Take 1 tablet by mouth Daily., Disp: , Rfl:     Azelastine HCl 137 MCG/SPRAY solution, USE 2 SPRAY(S) IN EACH NOSTRIL TWICE DAILY AS DIRECTED, Disp: 90 mL, Rfl: 0    bisoprolol (ZEBeta) 5 MG tablet,  Take 1 tablet by mouth once daily, Disp: 90 tablet, Rfl: 2    budesonide-formoterol (Symbicort) 160-4.5 MCG/ACT inhaler, Inhale 2 puffs 2 (Two) Times a Day for 90 days., Disp: 30.6 each, Rfl: 3    calcium carbonate (OS-TASHIA) 600 MG tablet, Take 1 tablet by mouth 2 (Two) Times a Day., Disp: , Rfl:     Cholecalciferol (VITAMIN D3) 1000 units capsule, Take 2 capsules by mouth Daily., Disp: , Rfl:     coenzyme Q10 100 MG capsule, Take 1 capsule by mouth Daily., Disp: , Rfl:     Collagen-Boron-Hyaluronic Acid (Move Free Corduro) 10-5-3.3 MG tablet, Take 1 tablet by mouth Daily., Disp: , Rfl:     finasteride (PROSCAR) 5 MG tablet, Take 1 tablet by mouth Daily., Disp: 90 tablet, Rfl: 5    gabapentin (NEURONTIN) 400 MG capsule, Take 2 capsules by mouth 2 (Two) Times a Day., Disp: 120 capsule, Rfl: 0    Garlic 1000 MG capsule, Take 1 capsule by mouth Daily., Disp: , Rfl:     HYDROcodone-acetaminophen (NORCO) 5-325 MG per tablet, Take 1 tablet by mouth Every 8 (Eight) Hours As Needed for Moderate Pain or Severe Pain (Pain) for up to 8 doses., Disp: 8 tablet, Rfl: 0    ibuprofen (ADVIL,MOTRIN) 200 MG tablet, Take 2 tablets by mouth Every 6 (Six) Hours As Needed for Mild Pain., Disp: , Rfl:     Krill Oil 300 MG capsule, Take 300 mg by mouth Daily., Disp: , Rfl:     lidocaine (LMX) 4 % cream, Apply 1 Application topically to the appropriate area as directed Daily., Disp: 28 g, Rfl: 1    losartan (COZAAR) 25 MG tablet, Take 1 tablet by mouth once daily, Disp: 60 tablet, Rfl: 6    Multiple Vitamins-Minerals (OCUVITE ADULT 50+ PO), Take 1 tablet by mouth Daily., Disp: , Rfl:     NON FORMULARY, CPAP, Disp: , Rfl:     omeprazole (priLOSEC) 20 MG capsule, Take 1 capsule by mouth Daily., Disp: 90 capsule, Rfl: 1    simvastatin (ZOCOR) 40 MG tablet, Take 1 tablet by mouth nightly, Disp: 90 tablet, Rfl: 1    SUPER B COMPLEX/C PO, Take 1 tablet by mouth Every Night., Disp: , Rfl:     tamsulosin (FLOMAX) 0.4 MG capsule 24 hr  "capsule, Take 2 capsules by mouth Every Night., Disp: 180 capsule, Rfl: 5    tiotropium bromide monohydrate (Spiriva Respimat) 2.5 MCG/ACT aerosol solution inhaler, Inhale 2 puffs Daily for 90 days., Disp: 12 g, Rfl: 3    vitamin C (ASCORBIC ACID) 500 MG tablet, Take 1 tablet by mouth Daily., Disp: , Rfl:     Zepbound 10 MG/0.5ML solution auto-injector, INJECT 0.5 ML SUBCUTANEOUSLY  ONCE A WEEK, Disp: 4 mL, Rfl: 0    Social History     Socioeconomic History    Marital status:    Tobacco Use    Smoking status: Former     Current packs/day: 0.00     Average packs/day: 1 pack/day for 50.0 years (50.0 ttl pk-yrs)     Types: Pipe, Cigarettes     Start date:      Quit date:      Years since quittin.3     Passive exposure: Past    Smokeless tobacco: Never   Vaping Use    Vaping status: Never Used   Substance and Sexual Activity    Alcohol use: Yes     Alcohol/week: 5.0 standard drinks of alcohol     Types: 5 Cans of beer per week     Comment: social    Drug use: No    Sexual activity: Defer       Objective   Vital Signs:   /72   Pulse 75   Ht 167.6 cm (65.98\")   Wt 73.9 kg (163 lb)   SpO2 96% Comment: RA  BMI 26.32 kg/m²     Physical Exam  Eyes:      Comments: Glasses   Cardiovascular:      Rate and Rhythm: Normal rate and regular rhythm.      Heart sounds: No murmur heard.  Pulmonary:      Effort: Pulmonary effort is normal.      Breath sounds: Normal breath sounds.   Musculoskeletal:      Right lower leg: No edema.      Left lower leg: No edema.   Neurological:      Mental Status: He is alert and oriented to person, place, and time.        Result Review :    PFT Values          10/23/2023    11:30 2024    14:00   Pre Drug PFT Results   FVC 84 109   FEV1 82 107   FEF 25-75% 80 90   FEV1/FVC 74 76.35     My interpretation of the PFT : none    Results for orders placed in visit on 24    Spirometry    Narrative  Spirometry    Performed by: Dax Gooden CMA  Authorized by: Loren " SALONI Dick  Pre Drug % Predicted  FVC: 109%  FEV1: 107%  FEF 25-75%: 90%  FEV1/FVC: 76.35%    Interpretation  Spirometry  Spirometry shows normal results.  Review of FVL curve  Patient's effort is normal.      Results for orders placed in visit on 10/23/23    Spirometry    Narrative  Spirometry    Performed by: Dax Gooden CMA  Authorized by: Hien Pimentel APRN  Pre Drug % Predicted  FVC: 84%  FEV1: 82%  FEF 25-75%: 80%  FEV1/FVC: 74%    Interpretation  Spirometry  Spirometry shows normal results. Post-bronchodilator the ratio shows normal results.  Review of FVL curve  Patient's effort is normal.      Results for orders placed in visit on 10/17/22    Pulmonary Function Test    Narrative  Pulmonary Function Test  Performed by: Marla Heller RRT  Authorized by: Hien Pimentel APRN    Pre Drug % Predicted  FVC: 80%  FEV1: 81%  FEF 25-75%: 98%  FEV1/FVC: 76%    Interpretation  Spirometry  Spirometry shows normal results.  Review of FVL curve  Patient's effort is normal.    CT Chest Low Dose Cancer Screening WO (01/23/2025 13:30)     My interpretation of imaging: Left upper lobe nodule 4 mm, previously 6 mm, left lower lobe nodule, stable at 2 mm, pleural thickening    My interpretation of labs: None      Assessment and Plan {CC Problem List  Visit Diagnosis  ROS  Review (Popup)  Health Maintenance  Quality  BestPractice  Medications  SmartSets  SnapShot Encounters  Media : 23}    Diagnoses and all orders for this visit:    1. Bronchiectasis without complication (Primary)  Comments:  No daily productive cough with sputum production, monitor    2. Centrilobular emphysema  Comments:  Stable on imaging in January.  PFTs with follow-up    3. Stage 1 mild COPD by GOLD classification  Comments:  Spiriva and Symbicort refill sent to pharmacy.  Albuterol as needed.  PFTs with follow-up  Orders:  -     tiotropium bromide monohydrate (Spiriva Respimat) 2.5 MCG/ACT aerosol solution inhaler;  Inhale 2 puffs Daily for 90 days.  Dispense: 12 g; Refill: 3  -     budesonide-formoterol (Symbicort) 160-4.5 MCG/ACT inhaler; Inhale 2 puffs 2 (Two) Times a Day for 90 days.  Dispense: 30.6 each; Refill: 3    4. Obstructive sleep apnea  Comments:  Follow-up with ENT today after inspire device implantation    5. Personal history of nicotine dependence  Comments:  Continue to avoid smoking.  He has aged out of low-dose lung cancer screening    6. Environmental allergies  Comments:  Can contribute to exacerbations.  Continue azelastine      Justin Harjinder Robledo  reports that he quit smoking about 14 years ago. His smoking use included pipe and cigarettes. He started smoking about 64 years ago. He has a 50 pack-year smoking history. He has been exposed to tobacco smoke. He has never used smokeless tobacco.          Body mass index is 26.32 kg/m².    Hien Pimentel, SALONI  4/29/2025  12:06 CDT    Follow Up   Return in about 6 months (around 10/29/2025) for FVL with diffusion.    Patient was given instructions and counseling regarding his condition or for health maintenance advice. Please see specific information pulled into the AVS if appropriate.

## 2025-04-08 NOTE — PROGRESS NOTES
Orthopaedic Clinic Note - Established Patient    NAME:  Jose Brambila   : 1945  MRN: [id number]    2025    CHIEF COMPLAINT:  follow up Left shoulder pain, repeat injection    HISTORY OF PRESENT ILLNESS:   The patient is a 79 y.o. male who returns today for follow up of left shoulder pain, requesting repeat injection. It has been 3 months since last injection. Patient denies any recent trauma, fall, or other other injury. Pain is rated 6/10.     Past Medical History:        Diagnosis Date    COPD (chronic obstructive pulmonary disease) (HCC)     GERD (gastroesophageal reflux disease)     Hyperlipidemia     Hypertension     LBBB (left bundle branch block)     Sleep apnea     CPAP       Past Surgical History:        Procedure Laterality Date    BACK SURGERY      LUMBAR (\"disc removal\"    KNEE ARTHROSCOPY Bilateral     OTHER SURGICAL HISTORY  2025    HYPOGLOSSAL NERVE STIMULATION DEVICE IMPLANT    ROTATOR CUFF REPAIR Left     SHOULDER ARTHROPLASTY Right 2020    TOTAL KNEE ARTHROPLASTY Left 2020    LEFT TOTAL KNEE ARTHROPLASTY performed by Nain Bonner MD at [facility] OR       Current Medications:   Prior to Admission medications    Medication Sig Start Date End Date Taking? Authorizing Provider   fluticasone (FLONASE) 50 MCG/ACT nasal spray 2 sprays daily 25  Yes Provider, Historical, MD   atorvastatin (LIPITOR) 10 MG tablet Take 1 tablet by mouth daily   Yes Provider, HistoricalMD BENAVIDESVY 1.7 MG/0.75ML SOAJ SC injection Inject 1.7 mg into the skin once a week 24  Yes Provider, MD Tesha   amLODIPine (NORVASC) 5 MG tablet Take 1 tablet by mouth nightly   Yes Provider, MD Tesha   simvastatin (ZOCOR) 40 MG tablet Take 1 tablet by mouth nightly   Yes Provider, MD Tesha   tiotropium (SPIRIVA) 18 MCG inhalation capsule Inhale 1 capsule into the lungs nightly   Yes Provider, Historical, MD   bisoprolol (ZEBETA) 5 MG tablet Take 1 tablet by mouth daily   Yes Provider,

## 2025-04-11 RX ORDER — OMEPRAZOLE 20 MG/1
20 CAPSULE, DELAYED RELEASE ORAL DAILY
Qty: 90 CAPSULE | Refills: 1 | Status: SHIPPED | OUTPATIENT
Start: 2025-04-11

## 2025-04-11 RX ORDER — AZELASTINE HYDROCHLORIDE 137 UG/1
SPRAY, METERED NASAL
Qty: 90 ML | Refills: 0 | Status: SHIPPED | OUTPATIENT
Start: 2025-04-11

## 2025-04-16 ENCOUNTER — OFFICE VISIT (OUTPATIENT)
Age: 80
End: 2025-04-16
Payer: COMMERCIAL

## 2025-04-16 VITALS — WEIGHT: 164 LBS | BODY MASS INDEX: 26.36 KG/M2 | HEIGHT: 66 IN

## 2025-04-16 DIAGNOSIS — M75.122 NONTRAUMATIC COMPLETE TEAR OF LEFT ROTATOR CUFF: Primary | ICD-10-CM

## 2025-04-16 PROCEDURE — 20610 DRAIN/INJ JOINT/BURSA W/O US: CPT

## 2025-04-16 RX ORDER — TRIAMCINOLONE ACETONIDE 40 MG/ML
40 INJECTION, SUSPENSION INTRA-ARTICULAR; INTRAMUSCULAR ONCE
Status: COMPLETED | OUTPATIENT
Start: 2025-04-16 | End: 2025-04-16

## 2025-04-16 RX ORDER — BUPIVACAINE HYDROCHLORIDE 2.5 MG/ML
2 INJECTION, SOLUTION INFILTRATION; PERINEURAL ONCE
Status: COMPLETED | OUTPATIENT
Start: 2025-04-16 | End: 2025-04-16

## 2025-04-16 RX ORDER — LIDOCAINE HYDROCHLORIDE 10 MG/ML
2 INJECTION, SOLUTION INFILTRATION; PERINEURAL ONCE
Status: COMPLETED | OUTPATIENT
Start: 2025-04-16 | End: 2025-04-16

## 2025-04-16 RX ADMIN — TRIAMCINOLONE ACETONIDE 40 MG: 40 INJECTION, SUSPENSION INTRA-ARTICULAR; INTRAMUSCULAR at 11:06

## 2025-04-16 RX ADMIN — BUPIVACAINE HYDROCHLORIDE 5 MG: 2.5 INJECTION, SOLUTION INFILTRATION; PERINEURAL at 11:05

## 2025-04-16 RX ADMIN — LIDOCAINE HYDROCHLORIDE 2 ML: 10 INJECTION, SOLUTION INFILTRATION; PERINEURAL at 11:06

## 2025-04-29 ENCOUNTER — OFFICE VISIT (OUTPATIENT)
Dept: PULMONOLOGY | Facility: CLINIC | Age: 80
End: 2025-04-29
Payer: COMMERCIAL

## 2025-04-29 ENCOUNTER — OFFICE VISIT (OUTPATIENT)
Dept: OTOLARYNGOLOGY | Facility: CLINIC | Age: 80
End: 2025-04-29
Payer: COMMERCIAL

## 2025-04-29 VITALS
HEIGHT: 66 IN | HEART RATE: 75 BPM | SYSTOLIC BLOOD PRESSURE: 118 MMHG | BODY MASS INDEX: 26.2 KG/M2 | OXYGEN SATURATION: 96 % | DIASTOLIC BLOOD PRESSURE: 72 MMHG | WEIGHT: 163 LBS

## 2025-04-29 VITALS
BODY MASS INDEX: 26.18 KG/M2 | HEIGHT: 66 IN | DIASTOLIC BLOOD PRESSURE: 77 MMHG | TEMPERATURE: 97.7 F | WEIGHT: 162.92 LBS | RESPIRATION RATE: 18 BRPM | HEART RATE: 77 BPM | SYSTOLIC BLOOD PRESSURE: 135 MMHG

## 2025-04-29 DIAGNOSIS — J43.2 CENTRILOBULAR EMPHYSEMA: Chronic | ICD-10-CM

## 2025-04-29 DIAGNOSIS — J44.9 STAGE 1 MILD COPD BY GOLD CLASSIFICATION: Chronic | ICD-10-CM

## 2025-04-29 DIAGNOSIS — J34.89 SINUS PRESSURE: Primary | ICD-10-CM

## 2025-04-29 DIAGNOSIS — J47.9 BRONCHIECTASIS WITHOUT COMPLICATION: Primary | Chronic | ICD-10-CM

## 2025-04-29 DIAGNOSIS — Z87.891 PERSONAL HISTORY OF NICOTINE DEPENDENCE: Chronic | ICD-10-CM

## 2025-04-29 DIAGNOSIS — Z91.09 ENVIRONMENTAL ALLERGIES: Chronic | ICD-10-CM

## 2025-04-29 DIAGNOSIS — G47.33 OBSTRUCTIVE SLEEP APNEA: Chronic | ICD-10-CM

## 2025-04-29 DIAGNOSIS — G44.52 NEW DAILY PERSISTENT HEADACHE: ICD-10-CM

## 2025-04-29 RX ORDER — TIOTROPIUM BROMIDE INHALATION SPRAY 3.12 UG/1
2 SPRAY, METERED RESPIRATORY (INHALATION)
Qty: 12 G | Refills: 3 | Status: SHIPPED | OUTPATIENT
Start: 2025-04-29 | End: 2025-07-28

## 2025-04-29 RX ORDER — BUDESONIDE AND FORMOTEROL FUMARATE DIHYDRATE 160; 4.5 UG/1; UG/1
2 AEROSOL RESPIRATORY (INHALATION)
Qty: 30.6 EACH | Refills: 3 | Status: SHIPPED | OUTPATIENT
Start: 2025-04-29 | End: 2025-07-28

## 2025-04-29 RX ORDER — CEFUROXIME AXETIL 500 MG/1
500 TABLET ORAL 2 TIMES DAILY
COMMUNITY
End: 2025-04-29 | Stop reason: ALTCHOICE

## 2025-04-29 NOTE — PROGRESS NOTES
SALONI Iraheta  [facility]  [address], [address]  [place] [address]  Fax [phone]  Phone [phone]      Visit Type: FOLLOW UP   Chief Complaint   Patient presents with    Post-op     Inspire 4/2   Pt says things are not going good since the surgery. Pt reports he has had a migraine since the surgery, pain in the right ear, and jaw pain on the right side. Pt says nothing they have done has relieved the migraine.             HISTORY OBTAINED FROM: patient  CARITO Robledo is a 79 y.o.  male who presents for follow up s/p Hypoglossal Nerve Stimulation Device Implant on 4/2/2025. The patient's postoperative course was complicated by jaw pain, ear pain, and headache. He is having frontal headache.    Past Medical History:   Diagnosis Date    Arthritis     BPH with obstruction/lower urinary tract symptoms     Chronic back pain     Class 1 obesity due to excess calories with serious comorbidity and body mass index (BMI) of 32.0 to 32.9 in adult 01/20/2020    COPD (chronic obstructive pulmonary disease)     Elevated cholesterol     Environmental allergies 09/21/2022    Essential hypertension 10/14/2019    GERD (gastroesophageal reflux disease)     Hyperlipidemia     Hypertension     Left bundle branch block     Lung infiltrate 12/20/2022    Right upper lobe, CT [facility], October 2022    Obstructive sleep apnea 01/20/2020    pt uses a cpap machine at home    Sleep apnea     CPAP       Past Surgical History:   Procedure Laterality Date    CARPAL TUNNEL RELEASE Right 09/29/2021    Procedure: CARPAL TUNNEL RELEASE right;  Surgeon: Luis Perdomo MD;  Location:  [facility] OR;  Service: Neurosurgery;  Laterality: Right;    CARPAL TUNNEL RELEASE Left 8/9/2023    Procedure: CARPAL TUNNEL RELEASE Left;  Surgeon: Luis Perdomo MD;  Location:  [facility] OR;  Service: Neurosurgery;  Laterality: Left;    COLONOSCOPY      COLONOSCOPY  N/A 04/13/2022    Procedure: COLONOSCOPY WITH ANESTHESIA;  Surgeon: Cortes Ribera DO;  Location:  [facility];  Service: Gastroenterology;  Laterality: N/A;  preop; hx of poylps   postop; polyps   PCP Katerine Mcclain     HYPOGLOSSAL NERVE STIMULATION DEVICE IMPLANT N/A 4/2/2025    Procedure: HYPOGLOSSAL NERVE STIMULATION DEVICE IMPLANT;  Surgeon: Turner Saeed MD;  Location:  [facility];  Service: ENT;  Laterality: N/A;    KNEE ARTHROPLASTY Right     KNEE MENISCAL REPAIR Left 2018    PENILE PROSTHESIS IMPLANT N/A 4/27/2022    Procedure: MALLEABLE PENILE PROSTHESIS PLACEMENT;  Surgeon: Ignacio Moon MD;  Location:  [facility];  Service: Urology;  Laterality: N/A;    ROTATOR CUFF REPAIR Right 2000    SLEEP ENDOSCOPY N/A 1/17/2025    Procedure: Videosleep endoscopy;  Surgeon: Turner Saeed MD;  Location:  [facility];  Service: ENT;  Laterality: N/A;    SPINE SURGERY  1981    lower back ruptured disc    TOTAL SHOULDER ARTHROPLASTY W/ DISTAL CLAVICLE EXCISION Right 01/14/2020    Procedure: RIGHT REVERSE TOTAL SHOULDER  ARTHROPLASTY;  Surgeon: Suman Ventura MD;  Location:  [facility] OR;  Service: Orthopedics       Family History: His family history includes Arthritis in his father and mother; Cancer in his brother; Diabetes in his mother and sister; Hypertension in his father and mother; Kidney disease in his mother; Obesity in his sister; Osteoporosis in his mother; Stroke in his father.     Social History: He  reports that he quit smoking about 14 years ago. His smoking use included pipe and cigarettes. He started smoking about 64 years ago. He has a 50 pack-year smoking history. He has been exposed to tobacco smoke. He has never used smokeless tobacco. He reports current alcohol use of about 5.0 standard drinks of alcohol per week. He reports that he does not use drugs.    Home Medications:  Azelastine HCl, B Complex-C, Collagen-Boron-Hyaluronic Acid, Garlic, HYDROcodone-acetaminophen, Krill  Oil, Multiple Vitamins-Minerals, NON FORMULARY, Tirzepatide-Weight Management, Vitamin D3, acetaminophen, albuterol sulfate HFA, amLODIPine, aspirin, bisoprolol, budesonide-formoterol, calcium carbonate, coenzyme Q10, finasteride, gabapentin, ibuprofen, lidocaine, losartan, omeprazole, simvastatin, tamsulosin, tiotropium bromide monohydrate, and vitamin C    Allergies:  He is allergic to bee venom.       Vital Signs:   Temp:  [97.7 °F (36.5 °C)] 97.7 °F (36.5 °C)  Heart Rate:  [75-77] 77  Resp:  [18] 18  BP: (118-135)/(72-77) 135/77  ENT Physical Exam  Constitutional  Appearance: patient appears well-developed,  Communication/Voice: communication appropriate for developmental age;  Head and Face  Appearance: head appears normal, face appears normal and face appears atraumatic;  Palpation: sinus tenderness present;  Ear  Hearing: intact;  Auricles: right auricle normal; left auricle normal;  External Mastoids: right external mastoid normal; left external mastoid normal;  Ear Canals: right ear canal normal; left ear canal normal;  Tympanic Membranes: right tympanic membrane normal; left tympanic membrane normal;  Nose  External Nose: nares patent bilaterally; external nose normal;  Internal Nose: bilateral intranasal mucosa erythematous; nasal septal deviation present; bilateral inferior turbinates normal;  Oral Cavity/Oropharynx  Lips: normal;  Teeth: normal;  Gums: gingiva normal;  Tongue: normal;  Oral mucosa: normal;  Hard palate: normal;  Soft palate: normal;  Tonsils: normal;  Base of Tongue: normal;  Posterior pharyngeal wall: normal;  Neck  Neck: neck normal;  Respiratory  Inspection: breathing unlabored;  Cardiovascular  Inspection: extremities are warm and well perfused;  Neurovestibular  Mental Status: alert and oriented;  Psychiatric: mood normal;           Result Review       RESULTS REVIEW    I have reviewed the patients old records in the chart.             Assessment & Plan  Sinus pressure    New daily  persistent headache                 Frontal headache is not associated with inspire implant and was explained to patient. I have discussed with Dr. Saeed and we will proceed with CT sinus. He will continue nasal sprays.   Return in about 2 months (around 6/29/2025).        Electronically signed by SALONI Iraheta 04/29/25 2:03 PM CDT.

## 2025-05-08 ENCOUNTER — TELEPHONE (OUTPATIENT)
Dept: OTOLARYNGOLOGY | Facility: CLINIC | Age: 80
End: 2025-05-08
Payer: COMMERCIAL

## 2025-05-08 DIAGNOSIS — G44.52 NEW DAILY PERSISTENT HEADACHE: ICD-10-CM

## 2025-05-08 DIAGNOSIS — J34.89 SINUS PRESSURE: Primary | ICD-10-CM

## 2025-05-08 DIAGNOSIS — G62.9 PERIPHERAL POLYNEUROPATHY: ICD-10-CM

## 2025-05-08 RX ORDER — GABAPENTIN 400 MG/1
800 CAPSULE ORAL 2 TIMES DAILY
Qty: 120 CAPSULE | Refills: 0 | Status: SHIPPED | OUTPATIENT
Start: 2025-05-08

## 2025-05-08 NOTE — TELEPHONE ENCOUNTER
Spoke to patient and advised order was in as urgent and could walk in at [facility] and have done between 8-3:30 tomorrow.  Patient voiced understanding

## 2025-05-08 NOTE — TELEPHONE ENCOUNTER
Provider: NAVA GUALLPA    Caller: SRI HERZOG    Relationship to Patient: SELF        Reason for Call: CALLED STATING CT ORDERED HAS BEEN UPGRADED TO A STAT ORDER AND THE OFFICE WILL NEED TO SCHEDULE THIS. HE IS AVAILABLE AT ANY TIME FOR THIS TESTING. IF WE COULD GET THIS SCHEDULED AND THEN REACH OUT ON HIS CELL    YOU MAY LEAVE A MESSAGE IF NO ANSWER HE WOULD LIKE TO HAVE THIS DONE AT [facility]    When was the patient last seen: 04-29-25

## 2025-05-09 ENCOUNTER — HOSPITAL ENCOUNTER (OUTPATIENT)
Dept: CT IMAGING | Facility: HOSPITAL | Age: 80
Discharge: HOME | End: 2025-05-09
Admitting: NURSE PRACTITIONER
Payer: COMMERCIAL

## 2025-05-09 DIAGNOSIS — G44.52 NEW DAILY PERSISTENT HEADACHE: ICD-10-CM

## 2025-05-09 DIAGNOSIS — J34.89 SINUS PRESSURE: ICD-10-CM

## 2025-05-09 PROCEDURE — 70486 CT MAXILLOFACIAL W/O DYE: CPT

## 2025-05-12 ENCOUNTER — RESULTS FOLLOW-UP (OUTPATIENT)
Dept: OTOLARYNGOLOGY | Facility: CLINIC | Age: 80
End: 2025-05-12
Payer: COMMERCIAL

## 2025-05-12 DIAGNOSIS — E66.811 CLASS 1 OBESITY: ICD-10-CM

## 2025-05-12 RX ORDER — TIRZEPATIDE 10 MG/.5ML
INJECTION, SOLUTION SUBCUTANEOUS
Qty: 4 ML | Refills: 0 | Status: SHIPPED | OUTPATIENT
Start: 2025-05-12

## 2025-05-12 RX ORDER — AMOXICILLIN 500 MG/1
500 CAPSULE ORAL 3 TIMES DAILY
Qty: 30 CAPSULE | Refills: 0 | Status: SHIPPED | OUTPATIENT
Start: 2025-05-12 | End: 2025-05-22

## 2025-05-12 NOTE — TELEPHONE ENCOUNTER
Patient notified, abx called in    ----- Message from Karlene Garcia sent at 5/12/2025  9:56 AM CDT -----  Septal deviation. Could have acute sinus infection. Can give him amoxicillin 500 tid for 10 days  ----- Message -----  From: Interface, Rad Results [place] In  Sent: 5/9/2025  12:19 PM CDT  To: SALONI Iraheta

## 2025-05-14 NOTE — PROGRESS NOTES
[facility]  [address], [address]  [place] [address]  Phone [phone]  Fax [phone]        Inspire Activation visit      Information:   Patient Name: Jose Brambila  :   1945  Age:   79 y.o.  MRN:   [id number]  Account #:  [id number]  Today:               5/15/25    Provider:  Lesa Maher PA-C    Chief Complaint   Patient presents with    New Patient     Patient was implanted with the Inspire device 25 by Dr. Redd at [facility].    Sleep Apnea     Patient does currently use a sleep machine. Patient c/o a constant headache since implant, along with jaw and ear pain.         Subjective:   Jose Brambila is a 79 y.o. male  with a history of JUSTIN s/p Inspire device implant, 2025  per Dr. Sumanth Redd. He presents today for Inspire device activation.      He c/o constant headache, jaw pain, and ear pain since the implant. He denies dysarthria. He is unaware of tongue weakness.    He saw TRISHA Erwin for post-op visit, 2025 and expressed the above concerns. He completed a CT sinus which revealed:  Paranasal sinus mucosal thickening and small air-fluid levels in both maxillary sinuses right greater than left may represent acute sinusitis. Moderate right and mild left narrowing of the bilateral ostiomeatal complexes due to mucosal thickening and nasal septal deviation to the   left. He was prescribed amoxicillin 500 mg bid on 2025.     He persists with the noted symptoms now indicating that the pain is at the posterior aspect of his head.     PSG/HST: 3/28/2024  AHI: 30.8  ESS: 11 pre-implant      Objective:     Past Medical History:   Diagnosis Date    COPD (chronic obstructive pulmonary disease) (HCC)     GERD (gastroesophageal reflux disease)     Hyperlipidemia     Hypertension     LBBB (left bundle branch block)     Sleep apnea     Inspire implant       Past Surgical History:   Procedure Laterality Date    BACK SURGERY      LUMBAR (\"disc

## 2025-05-15 ENCOUNTER — OFFICE VISIT (OUTPATIENT)
Dept: NEUROLOGY | Age: 80
End: 2025-05-15
Payer: COMMERCIAL

## 2025-05-15 VITALS
BODY MASS INDEX: 24.11 KG/M2 | OXYGEN SATURATION: 97 % | HEART RATE: 72 BPM | DIASTOLIC BLOOD PRESSURE: 66 MMHG | SYSTOLIC BLOOD PRESSURE: 114 MMHG | HEIGHT: 66 IN | WEIGHT: 150 LBS

## 2025-05-15 DIAGNOSIS — R40.0 SOMNOLENCE, DAYTIME: ICD-10-CM

## 2025-05-15 DIAGNOSIS — G89.29 CHRONIC INTRACTABLE HEADACHE, UNSPECIFIED HEADACHE TYPE: ICD-10-CM

## 2025-05-15 DIAGNOSIS — H92.01 RIGHT EAR PAIN: ICD-10-CM

## 2025-05-15 DIAGNOSIS — T14.8XXA NEURAPRAXIA: ICD-10-CM

## 2025-05-15 DIAGNOSIS — G47.33 OBSTRUCTIVE SLEEP APNEA: Primary | ICD-10-CM

## 2025-05-15 DIAGNOSIS — R51.9 CHRONIC INTRACTABLE HEADACHE, UNSPECIFIED HEADACHE TYPE: ICD-10-CM

## 2025-05-15 PROCEDURE — 99212 OFFICE O/P EST SF 10 MIN: CPT | Performed by: PHYSICIAN ASSISTANT

## 2025-05-15 PROCEDURE — 1123F ACP DISCUSS/DSCN MKR DOCD: CPT | Performed by: PHYSICIAN ASSISTANT

## 2025-05-15 PROCEDURE — 1159F MED LIST DOCD IN RCRD: CPT | Performed by: PHYSICIAN ASSISTANT

## 2025-05-15 RX ORDER — FINASTERIDE 5 MG/1
5 TABLET, FILM COATED ORAL DAILY
COMMUNITY
Start: 2025-02-27

## 2025-05-15 RX ORDER — TIRZEPATIDE 10 MG/.5ML
INJECTION, SOLUTION SUBCUTANEOUS
COMMUNITY

## 2025-05-15 RX ORDER — AMOXICILLIN 500 MG/1
500 CAPSULE ORAL 3 TIMES DAILY
COMMUNITY
Start: 2025-05-12 | End: 2025-05-23

## 2025-05-15 RX ORDER — AZELASTINE HYDROCHLORIDE 137 UG/1
SPRAY, METERED NASAL
COMMUNITY

## 2025-05-15 NOTE — PATIENT INSTRUCTIONS
Pt advised that the observed difficulty in tongue movement is a recognized postoperative possibility, likely related to temporary inflammation or minor nerve irritation. Healing is expected over time, and the pt is encouraged to monitor for progressive improvement.     This condition is anticipated to resolve as normal healing progresses.    No immediate intervention is required unless symptoms worsen or new concerns arise.    The pt is advised to observe for any persistent deficits or changes in function.     A reassessment is scheduled in 6 weeks to evaluate recovery and confirm resolution. If symptoms persist or worsen prior to follow up, the pt is instructed to contact the clinic for further evaluation.

## 2025-06-04 DIAGNOSIS — G62.9 PERIPHERAL POLYNEUROPATHY: ICD-10-CM

## 2025-06-04 RX ORDER — GABAPENTIN 400 MG/1
800 CAPSULE ORAL 2 TIMES DAILY
Qty: 120 CAPSULE | Refills: 0 | Status: SHIPPED | OUTPATIENT
Start: 2025-06-04

## 2025-06-10 DIAGNOSIS — E66.811 CLASS 1 OBESITY: ICD-10-CM

## 2025-06-10 RX ORDER — TIRZEPATIDE 10 MG/.5ML
10 INJECTION, SOLUTION SUBCUTANEOUS WEEKLY
Qty: 4 ML | Refills: 5 | Status: SHIPPED | OUTPATIENT
Start: 2025-06-10

## 2025-06-23 RX ORDER — AZELASTINE HYDROCHLORIDE 137 UG/1
SPRAY, METERED NASAL
Qty: 90 ML | Refills: 0 | Status: SHIPPED | OUTPATIENT
Start: 2025-06-23

## 2025-06-27 ENCOUNTER — OFFICE VISIT (OUTPATIENT)
Dept: INTERNAL MEDICINE | Facility: CLINIC | Age: 80
End: 2025-06-27
Payer: COMMERCIAL

## 2025-06-27 VITALS
HEART RATE: 80 BPM | OXYGEN SATURATION: 97 % | SYSTOLIC BLOOD PRESSURE: 117 MMHG | TEMPERATURE: 97.7 F | HEIGHT: 66 IN | WEIGHT: 158 LBS | BODY MASS INDEX: 25.39 KG/M2 | DIASTOLIC BLOOD PRESSURE: 77 MMHG | RESPIRATION RATE: 19 BRPM

## 2025-06-27 DIAGNOSIS — Z12.5 PROSTATE CANCER SCREENING: ICD-10-CM

## 2025-06-27 DIAGNOSIS — Z79.899 LONG TERM USE OF DRUG: ICD-10-CM

## 2025-06-27 DIAGNOSIS — Z00.00 ROUTINE GENERAL MEDICAL EXAMINATION AT A HEALTH CARE FACILITY: Primary | ICD-10-CM

## 2025-06-27 PROCEDURE — 99397 PER PM REEVAL EST PAT 65+ YR: CPT | Performed by: NURSE PRACTITIONER

## 2025-06-27 NOTE — PROGRESS NOTES
Subjective     Chief Complaint   Patient presents with    Neuropathy    Annual Exam       History of Present Illness  The patient presents for evaluation of COPD, nerve damage, and medication management.    He reports no chest discomfort and maintains satisfactory respiratory function. He has observed an improvement in his activity tolerance, attributing it to recent weight loss. His appetite and hydration status are normal, with no instances of vomiting. Bowel movements are regular, although he is uncertain about the current status of his colonoscopy. He expresses a reluctance towards surgical interventions at his age. He reports no recent falls.    He has undergone an Inspire device implantation, which is currently inactive. He plans to visit [facility] on 06/30/2025 for device inspection. He experiences pain during chewing and yawning but notes an improvement in his headache symptoms. The device was implanted by Dr. Saeed on 04/2025.    He is seeking a refill of his gabapentin prescription, which he takes as two capsules twice daily.    PAST SURGICAL HISTORY:  Inspire device implantation on 04/2025.        Otherwise complete ROS reviewed and negative except as mentioned in the HPI.    Past Medical History:   Past Medical History:   Diagnosis Date    Arthritis     BPH with obstruction/lower urinary tract symptoms     Chronic back pain     Class 1 obesity due to excess calories with serious comorbidity and body mass index (BMI) of 32.0 to 32.9 in adult 01/20/2020    COPD (chronic obstructive pulmonary disease)     Elevated cholesterol     Environmental allergies 09/21/2022    Essential hypertension 10/14/2019    GERD (gastroesophageal reflux disease)     Hyperlipidemia     Hypertension     Left bundle branch block     Lung infiltrate 12/20/2022    Right upper lobe, CT [facility], October 2022    Obstructive sleep apnea 01/20/2020    pt uses a cpap machine at home    Sleep apnea     CPAP     Past Surgical  History:  Past Surgical History:   Procedure Laterality Date    CARPAL TUNNEL RELEASE Right 09/29/2021    Procedure: CARPAL TUNNEL RELEASE right;  Surgeon: Luis Perdomo MD;  Location: [facility] OR;  Service: Neurosurgery;  Laterality: Right;    CARPAL TUNNEL RELEASE Left 8/9/2023    Procedure: CARPAL TUNNEL RELEASE Left;  Surgeon: Luis Perdomo MD;  Location:  [facility] OR;  Service: Neurosurgery;  Laterality: Left;    COLONOSCOPY      COLONOSCOPY N/A 04/13/2022    Procedure: COLONOSCOPY WITH ANESTHESIA;  Surgeon: Cortes Ribera DO;  Location: [facility] ENDOSCOPY;  Service: Gastroenterology;  Laterality: N/A;  preop; hx of poylps   postop; polyps   PCP Katerine Mcclain     HYPOGLOSSAL NERVE STIMULATION DEVICE IMPLANT N/A 4/2/2025    Procedure: HYPOGLOSSAL NERVE STIMULATION DEVICE IMPLANT;  Surgeon: Turner Saeed MD;  Location: [facility] OR;  Service: ENT;  Laterality: N/A;    KNEE ARTHROPLASTY Right     KNEE MENISCAL REPAIR Left 2018    PENILE PROSTHESIS IMPLANT N/A 4/27/2022    Procedure: MALLEABLE PENILE PROSTHESIS PLACEMENT;  Surgeon: Ignacio Moon MD;  Location: [facility] OR;  Service: Urology;  Laterality: N/A;    ROTATOR CUFF REPAIR Right 2000    SLEEP ENDOSCOPY N/A 1/17/2025    Procedure: Videosleep endoscopy;  Surgeon: Turner Saeed MD;  Location: [facility] OR;  Service: ENT;  Laterality: N/A;    SPINE SURGERY  1981    lower back ruptured disc    TOTAL SHOULDER ARTHROPLASTY W/ DISTAL CLAVICLE EXCISION Right 01/14/2020    Procedure: RIGHT REVERSE TOTAL SHOULDER  ARTHROPLASTY;  Surgeon: Suman Ventura MD;  Location:  [facility] OR;  Service: Orthopedics     Social History:  reports that he quit smoking about 14 years ago. His smoking use included pipe and cigarettes. He started smoking about 64 years ago. He has a 50 pack-year smoking history. He has been exposed to tobacco smoke. He has never used smokeless tobacco. He reports current alcohol use of about 5.0 standard drinks of alcohol per  week. He reports that he does not use drugs.    Family History: family history includes Arthritis in his father and mother; Cancer in his brother; Diabetes in his mother and sister; Hypertension in his father and mother; Kidney disease in his mother; Obesity in his sister; Osteoporosis in his mother; Stroke in his father.       Allergies:  Allergies   Allergen Reactions    Bee Venom Swelling     Medications:  Prior to Admission medications    Medication Sig Start Date End Date Taking? Authorizing Provider   acetaminophen (TYLENOL) 650 MG 8 hr tablet Take 1 tablet by mouth 2 (Two) Times a Day.    Cristian Pfeiffer MD   albuterol sulfate  (90 Base) MCG/ACT inhaler Inhale 2 puffs Every 4 (Four) Hours As Needed for Wheezing. 4/11/22   Hien Pimentel APRN   amLODIPine (NORVASC) 5 MG tablet Take 1 tablet by mouth once daily 10/21/24   Katerine Mcclain APRN   aspirin 81 MG EC tablet Take 1 tablet by mouth Daily.  Patient not taking: Reported on 4/29/2025    Cristian Pfeiffer MD   Azelastine HCl 137 MCG/SPRAY solution USE 2 SPRAY(S) IN EACH NOSTRIL TWICE DAILY AS DIRECTED 6/23/25   Katerine Mcclain APRN   bisoprolol (ZEBeta) 5 MG tablet Take 1 tablet by mouth once daily 3/17/25   Katerine Mcclain APRN   budesonide-formoterol (Symbicort) 160-4.5 MCG/ACT inhaler Inhale 2 puffs 2 (Two) Times a Day for 90 days. 4/29/25 7/28/25  Hien Pimentel APRN   calcium carbonate (OS-TASHIA) 600 MG tablet Take 1 tablet by mouth 2 (Two) Times a Day.    Cristian Pfeiffer MD   Cholecalciferol (VITAMIN D3) 1000 units capsule Take 2 capsules by mouth Daily.    Cristian Pfeiffer MD   coenzyme Q10 100 MG capsule Take 1 capsule by mouth Daily.    Cristian Pfeiffer MD   Collagen-Boron-Hyaluronic Acid ([employer]) 10-5-3.3 MG tablet Take 1 tablet by mouth Daily.    Cristian Pfeiffer MD   finasteride (PROSCAR) 5 MG tablet Take 1 tablet by mouth Daily. 2/27/25   Ignacio Moon  MD [place]   gabapentin (NEURONTIN) 400 MG capsule Take 2 capsules by mouth twice daily 6/4/25   Katerine Mcclain APRN   Garlic 1000 MG capsule Take 1 capsule by mouth Daily.    Cristian Pfeiffer MD   HYDROcodone-acetaminophen (NORCO) 5-325 MG per tablet Take 1 tablet by mouth Every 8 (Eight) Hours As Needed for Moderate Pain or Severe Pain (Pain) for up to 8 doses. 4/2/25   Turner Saeed MD   ibuprofen (ADVIL,MOTRIN) 200 MG tablet Take 2 tablets by mouth Every 6 (Six) Hours As Needed for Mild Pain.    Cristian Pfeiffer MD   Krill Oil 300 MG capsule Take 300 mg by mouth Daily.    Cristian Pfeiffer MD   lidocaine (LMX) 4 % cream Apply 1 Application topically to the appropriate area as directed Daily. 1/2/25   Katerine Mcclain APRN   losartan (COZAAR) 25 MG tablet Take 1 tablet by mouth once daily 6/12/24   Katerine Mcclain APRN   Multiple Vitamins-Minerals (OCUVITE ADULT 50+ PO) Take 1 tablet by mouth Daily.    Cristian Pfeiffer MD   NON FORMULARY CPAP    Cristian Pfeiffer MD   omeprazole (priLOSEC) 20 MG capsule Take 1 capsule by mouth Daily. 4/11/25   Katerine Mcclain APRN   simvastatin (ZOCOR) 40 MG tablet Take 1 tablet by mouth nightly 2/24/25   Katerine Mcclain APRN   SUPER B COMPLEX/C PO Take 1 tablet by mouth Every Night.    Cristian Pfeiffer MD   tamsulosin (FLOMAX) 0.4 MG capsule 24 hr capsule Take 2 capsules by mouth Every Night. 2/27/25   Ignacio Moon MD   tiotropium bromide monohydrate (Spiriva Respimat) 2.5 MCG/ACT aerosol solution inhaler Inhale 2 puffs Daily for 90 days. 4/29/25 7/28/25  Hien Pimentel APRN   Tirzepatide-Weight Management (Zepbound) 10 MG/0.5ML solution auto-injector Inject 0.5 mL under the skin into the appropriate area as directed 1 (One) Time Per Week. 6/10/25   Katerine Mcclain APRN   vitamin C (ASCORBIC ACID) 500 MG tablet Take 1 tablet by mouth Daily.    Provider, MD Cristian  "      Objective     Vital Signs: /77   Pulse 80   Temp 97.7 °F (36.5 °C)   Resp 19   Ht 167.6 cm (65.98\")   Wt 71.7 kg (158 lb)   SpO2 97%   BMI 25.52 kg/m²     Physical Exam  Neurological: Tongue deviates to the right, likely due to nerve damage.  Mouth/Throat: Tongue deviates to the right, likely due to nerve damage.  Physical Exam  Vitals reviewed.   Constitutional:       Appearance: Normal appearance. He is well-developed.   HENT:      Head: Normocephalic and atraumatic.      Right Ear: Tympanic membrane normal.      Left Ear: Tympanic membrane normal.   Eyes:      Pupils: Pupils are equal, round, and reactive to light.   Neck:      Vascular: No JVD.   Cardiovascular:      Rate and Rhythm: Normal rate and regular rhythm.   Pulmonary:      Effort: Pulmonary effort is normal.      Breath sounds: Normal breath sounds.   Abdominal:      General: Bowel sounds are normal.      Palpations: Abdomen is soft.   Musculoskeletal:         General: No deformity.      Cervical back: Normal range of motion and neck supple.   Lymphadenopathy:      Cervical: No cervical adenopathy.   Skin:     General: Skin is warm and dry.   Neurological:      General: No focal deficit present.      Mental Status: He is alert and oriented to person, place, and time.      Comments: Tongue deviates to the right.    Psychiatric:         Mood and Affect: Mood normal.         Behavior: Behavior normal.         Thought Content: Thought content normal.         Judgment: Judgment normal.       Results Reviewed:  Glucose   Date Value Ref Range Status   03/31/2025 89 65 - 99 mg/dL Final   12/22/2020 92 74 - 109 mg/dL Final     BUN   Date Value Ref Range Status   03/31/2025 11 8 - 23 mg/dL Final   12/22/2020 16 8 - 23 mg/dL Final     Creatinine   Date Value Ref Range Status   03/31/2025 0.67 (L) 0.76 - 1.27 mg/dL Final   12/22/2020 0.8 0.5 - 1.2 mg/dL Final     Sodium   Date Value Ref Range Status   03/31/2025 140 136 - 145 mmol/L Final "   12/22/2020 139 136 - 145 mmol/L Final     Potassium   Date Value Ref Range Status   03/31/2025 3.7 3.5 - 5.2 mmol/L Final   12/22/2020 4.1 3.5 - 5.0 mmol/L Final     Chloride   Date Value Ref Range Status   03/31/2025 102 98 - 107 mmol/L Final   12/22/2020 102 98 - 111 mmol/L Final     CO2   Date Value Ref Range Status   03/31/2025 26.0 22.0 - 29.0 mmol/L Final   12/22/2020 25 22 - 29 mmol/L Final     Calcium   Date Value Ref Range Status   03/31/2025 8.8 8.6 - 10.5 mg/dL Final   12/22/2020 9.4 8.8 - 10.2 mg/dL Final     ALT (SGPT)   Date Value Ref Range Status   03/31/2025 36 1 - 41 U/L Final     AST (SGOT)   Date Value Ref Range Status   03/31/2025 34 1 - 40 U/L Final     WBC   Date Value Ref Range Status   03/31/2025 4.96 3.40 - 10.80 10*3/mm3 Final   07/01/2024 4.5 3.4 - 10.8 x10E3/uL Final   12/22/2020 6.6 4.8 - 10.8 K/uL Final     Hematocrit   Date Value Ref Range Status   03/31/2025 38.0 37.5 - 51.0 % Final   12/22/2020 40.7 (L) 42.0 - 52.0 % Final     Platelets   Date Value Ref Range Status   03/31/2025 146 140 - 450 10*3/mm3 Final   12/22/2020 175 130 - 400 K/uL Final     Triglycerides   Date Value Ref Range Status   07/01/2024 126 0 - 149 mg/dL Final     HDL Cholesterol   Date Value Ref Range Status   07/01/2024 55 >39 mg/dL Final     LDL Chol Calc (NIH)   Date Value Ref Range Status   07/01/2024 112 (H) 0 - 99 mg/dL Final     Hemoglobin A1C   Date Value Ref Range Status   07/01/2024 5.7 (H) 4.8 - 5.6 % Final     Comment:              Prediabetes: 5.7 - 6.4           Diabetes: >6.4           Glycemic control for adults with diabetes: <7.0     09/17/2021 5.4 % Final       Results        Assessment / Plan     Assessment/Plan:  Diagnoses and all orders for this visit:    1. Routine general medical examination at a health care facility (Primary)  -     CBC & Differential; Future  -     Comprehensive Metabolic Panel; Future  -     Lipid Panel; Future  -     TSH; Future  -     Vitamin B12; Future  -      Vitamin D,25-Hydroxy; Future  -     Hemoglobin A1c; Future  -     T4, Free; Future  -     T3, Free; Future    2. Prostate cancer screening  -     PSA Screen; Future        Assessment & Plan  1. Chronic Obstructive Pulmonary Disease (COPD).  - Respiratory function appears stable, with no recent exacerbations requiring medical attention.  - Reports improved activity tolerance with weight loss.  - Managing any exacerbations at home.    2. Nerve Damage.  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  - Damage is likely permanent, but there is hope for eventual healing.  - Experiences pain when chewing and yawning.    3. Medication Management.  - Requested a refill for gabapentin, which he takes 2 capsules twice a day.  - Last refill was sent on 06/04/2025.  - Advised to contact the office if there are any issues with obtaining the medication.    4. Health Maintenance.  - A urine sample will be collected today for analysis.  - Fasting blood work, including cholesterol levels, will be conducted on 07/02/2025.        No follow-ups on file. unless patient needs to be seen sooner or acute issues arise.    Code Status: Full  Patient or patient representative verbalized consent for the use of Ambient Listening during the visit with  SALONI Kaur for chart documentation. 6/27/2025  12:48 CDT  I have discussed the patient results/orders and and plan/recommendation with them at today's visit.      Signed by:    SALONI Kaur Date: 06/27/25

## 2025-06-30 ENCOUNTER — OFFICE VISIT (OUTPATIENT)
Dept: NEUROLOGY | Age: 80
End: 2025-06-30
Payer: COMMERCIAL

## 2025-06-30 VITALS
SYSTOLIC BLOOD PRESSURE: 119 MMHG | WEIGHT: 159 LBS | BODY MASS INDEX: 25.55 KG/M2 | DIASTOLIC BLOOD PRESSURE: 70 MMHG | HEART RATE: 75 BPM | HEIGHT: 66 IN | OXYGEN SATURATION: 99 %

## 2025-06-30 DIAGNOSIS — R51.9 CHRONIC INTRACTABLE HEADACHE, UNSPECIFIED HEADACHE TYPE: ICD-10-CM

## 2025-06-30 DIAGNOSIS — G89.29 CHRONIC INTRACTABLE HEADACHE, UNSPECIFIED HEADACHE TYPE: ICD-10-CM

## 2025-06-30 DIAGNOSIS — T14.8XXA NEURAPRAXIA: ICD-10-CM

## 2025-06-30 DIAGNOSIS — G47.33 OBSTRUCTIVE SLEEP APNEA: Primary | ICD-10-CM

## 2025-06-30 DIAGNOSIS — R40.0 SOMNOLENCE, DAYTIME: ICD-10-CM

## 2025-06-30 PROCEDURE — 1159F MED LIST DOCD IN RCRD: CPT | Performed by: PHYSICIAN ASSISTANT

## 2025-06-30 PROCEDURE — 1123F ACP DISCUSS/DSCN MKR DOCD: CPT | Performed by: PHYSICIAN ASSISTANT

## 2025-06-30 PROCEDURE — 99212 OFFICE O/P EST SF 10 MIN: CPT | Performed by: PHYSICIAN ASSISTANT

## 2025-06-30 NOTE — PROGRESS NOTES
REVIEW OF SYSTEMS    Constitutional: []Fever []Sweats []Chills [] Recent Injury [x] Denies all unless marked  HEENT:[]Headache  [] Head Injury [] Hearing Loss  [] Sore Throat  [] Ear Ache [x] Denies all unless marked  Spine:  [] Neck pain  [] Back pain  [] Sciaticia  [x] Denies all unless marked  Cardiovascular:[]Heart Disease []Palpitations [] Chest Pain   [x] Denies all unless marked  Pulmonary: []Shortness of Breath []Cough   [x] Denies all unless marked  Psychiatric/Behavioral:[] Depression [] Anxiety [x] Denies all unless marked  Gastrointestinal: []Nausea  []Vomiting  []Abdominal Pain  []Constipation  []Diarrhea  [x] Denies all unless marked  Genitourinary:   [] Frequency  [] Urgency  [] Dysuria [] Incontinence  [x] Denies all unless marked  Extremities: []Pain  []Swelling  [x] Denies all unless marked  Musculoskeletal: [] Myalgias  [] Joint Pain  [] Arthritis [] Muscle Cramps [] Muscle Twitches  [x] Denies all unless marked  Sleep: []Insomnia[]Snoring []Restless Legs  [x]Sleep Apnea  []Daytime Sleepiness  [x] Denies all unless marked  Skin:[] Rash [] Color Change [x] Denies all unless marked   Neurological:[]Visual Disturbance [] Memory Loss []Loss of Balance []Slurred Speech []Weakness []Seizures  [] Dizziness [x] Denies all unless marked     
patient/family/caregiver, ordering medications, tests, or procedures, documenting information in the medical record and care coordination.

## 2025-07-01 ENCOUNTER — OFFICE VISIT (OUTPATIENT)
Dept: OTOLARYNGOLOGY | Facility: CLINIC | Age: 80
End: 2025-07-01
Payer: COMMERCIAL

## 2025-07-01 VITALS
WEIGHT: 157 LBS | HEIGHT: 66 IN | DIASTOLIC BLOOD PRESSURE: 96 MMHG | BODY MASS INDEX: 25.23 KG/M2 | TEMPERATURE: 98.2 F | SYSTOLIC BLOOD PRESSURE: 134 MMHG | HEART RATE: 78 BPM

## 2025-07-01 DIAGNOSIS — J34.2 NASAL SEPTAL DEVIATION: ICD-10-CM

## 2025-07-01 DIAGNOSIS — G47.33 OBSTRUCTIVE SLEEP APNEA: Chronic | ICD-10-CM

## 2025-07-01 DIAGNOSIS — Z96.82 S/P INSERTION OF HYPOGLOSSAL NERVE STIMULATOR: Primary | ICD-10-CM

## 2025-07-01 DIAGNOSIS — G89.29 CHRONIC NONINTRACTABLE HEADACHE, UNSPECIFIED HEADACHE TYPE: ICD-10-CM

## 2025-07-01 DIAGNOSIS — R51.9 CHRONIC NONINTRACTABLE HEADACHE, UNSPECIFIED HEADACHE TYPE: ICD-10-CM

## 2025-07-01 DIAGNOSIS — K14.8: ICD-10-CM

## 2025-07-01 DIAGNOSIS — M26.609 TMJ (TEMPOROMANDIBULAR JOINT DISORDER): Chronic | ICD-10-CM

## 2025-07-01 RX ORDER — FLUTICASONE PROPIONATE 50 MCG
2 SPRAY, SUSPENSION (ML) NASAL DAILY
Qty: 16 G | Refills: 5 | Status: SHIPPED | OUTPATIENT
Start: 2025-07-01

## 2025-07-01 NOTE — PROGRESS NOTES
SALONI Iraheta [facility]  [address], [address]  [place] [address]  Fax [phone]  Phone [phone]      Visit Type: FOLLOW UP   Chief Complaint   Patient presents with    Sinus Problem     Patient states his sinuses seem fine. He does c/o jaw pain & a light headache almost all the time.            HISTORY OBTAINED FROM: patient  CARITO Robledo is a 80 y.o.  male who presents for follow up s/p Hypoglossal Nerve Stimulation Device Implant on 4/2/2025. The patient is having jaw pain. The patient has had a frontal headache daily. He has had a CT sinus and has mild sinus disease with septal deviation. He is not wanting any surgery. He is currently under a lot of stress and has a history of migraines. The patient is taking astelin. He has not been activated due to tongue issues. His tongue will not move to right.     7/1/2025 [place] [address]    Past Medical History:   Diagnosis Date    Arthritis     BPH with obstruction/lower urinary tract symptoms     Chronic back pain     Class 1 obesity due to excess calories with serious comorbidity and body mass index (BMI) of 32.0 to 32.9 in adult 01/20/2020    COPD (chronic obstructive pulmonary disease)     Elevated cholesterol     Environmental allergies 09/21/2022    Essential hypertension 10/14/2019    GERD (gastroesophageal reflux disease)     Hyperlipidemia     Hypertension     Left bundle branch block     Lung infiltrate 12/20/2022    Right upper lobe, CT [facility], October 2022    Obstructive sleep apnea 01/20/2020    pt uses a cpap machine at home    Sleep apnea     CPAP       Past Surgical History:   Procedure Laterality Date    CARPAL TUNNEL RELEASE Right 09/29/2021    Procedure: CARPAL TUNNEL RELEASE right;  Surgeon: Luis Perdomo MD;  Location: [facility];  Service: Neurosurgery;  Laterality: Right;    CARPAL TUNNEL RELEASE Left 8/9/2023    Procedure: CARPAL TUNNEL  RELEASE Left;  Surgeon: Luis Perdomo MD;  Location:  [facility] OR;  Service: Neurosurgery;  Laterality: Left;    COLONOSCOPY      COLONOSCOPY N/A 04/13/2022    Procedure: COLONOSCOPY WITH ANESTHESIA;  Surgeon: Cortes Ribera DO;  Location: [facility] ENDOSCOPY;  Service: Gastroenterology;  Laterality: N/A;  preop; hx of poylps   postop; polyps   PCP Katerine Mcclain     HYPOGLOSSAL NERVE STIMULATION DEVICE IMPLANT N/A 4/2/2025    Procedure: HYPOGLOSSAL NERVE STIMULATION DEVICE IMPLANT;  Surgeon: Turner Saeed MD;  Location:  [facility] OR;  Service: ENT;  Laterality: N/A;    KNEE ARTHROPLASTY Right     KNEE MENISCAL REPAIR Left 2018    PENILE PROSTHESIS IMPLANT N/A 4/27/2022    Procedure: MALLEABLE PENILE PROSTHESIS PLACEMENT;  Surgeon: Ignacio Moon MD;  Location:  [facility] OR;  Service: Urology;  Laterality: N/A;    ROTATOR CUFF REPAIR Right 2000    SLEEP ENDOSCOPY N/A 1/17/2025    Procedure: Videosleep endoscopy;  Surgeon: Turner Saeed MD;  Location:  [facility] OR;  Service: ENT;  Laterality: N/A;    SPINE SURGERY  1981    lower back ruptured disc    TOTAL SHOULDER ARTHROPLASTY W/ DISTAL CLAVICLE EXCISION Right 01/14/2020    Procedure: RIGHT REVERSE TOTAL SHOULDER  ARTHROPLASTY;  Surgeon: Suman Ventura MD;  Location:  [facility] OR;  Service: Orthopedics       Family History: His family history includes Arthritis in his father and mother; Cancer in his brother; Diabetes in his mother and sister; Hypertension in his father and mother; Kidney disease in his mother; Obesity in his sister; Osteoporosis in his mother; Stroke in his father.     Social History: He  reports that he quit smoking about 14 years ago. His smoking use included pipe and cigarettes. He started smoking about 64 years ago. He has a 50 pack-year smoking history. He has been exposed to tobacco smoke. He has never used smokeless tobacco. He reports current alcohol use of about 5.0 standard drinks of alcohol per week. He reports that he  does not use drugs.    Home Medications:  Azelastine HCl, B Complex-C, Collagen-Boron-Hyaluronic Acid, Garlic, HYDROcodone-acetaminophen, Krill Oil, Multiple Vitamins-Minerals, NON FORMULARY, Tirzepatide-Weight Management, Vitamin D3, acetaminophen, albuterol sulfate HFA, amLODIPine, aspirin, bisoprolol, budesonide-formoterol, calcium carbonate, coenzyme Q10, finasteride, fluticasone, gabapentin, ibuprofen, lidocaine, losartan, omeprazole, simvastatin, tamsulosin, tiotropium bromide monohydrate, and vitamin C    Allergies:  He is allergic to bee venom.       Vital Signs:   Temp:  [98.2 °F (36.8 °C)] 98.2 °F (36.8 °C)  Heart Rate:  [78] 78  BP: (134)/(96) 134/96  ENT Physical Exam  Constitutional  Appearance: patient appears well-developed,  Communication/Voice: communication appropriate for developmental age;  Head and Face  Appearance: head appears normal, face appears normal and face appears atraumatic;  Palpation: TMJ crepitus noted;  Salivary: glands normal;  Ear  Hearing: intact;  Auricles: right auricle normal; left auricle normal;  External Mastoids: right external mastoid normal; left external mastoid normal;  Ear Canals: right ear canal normal; left ear canal normal;  Tympanic Membranes: right tympanic membrane normal; left tympanic membrane normal;  Nose  External Nose: nares patent bilaterally; external nose normal;  Internal Nose: nasal mucosa normal; nasal septal deviation present; bilateral inferior turbinates normal;  Oral Cavity/Oropharynx  Lips: normal;  Teeth: normal;  Gums: gingiva normal;  Tongue: normal; Oral Tongue comments: Tongue shifts past midline when stuck straight out. He has difficulty with touching roof of mouth and moving tongue to side  Oral mucosa: normal;  Hard palate: normal;  Soft palate: normal;  Tonsils: normal;  Base of Tongue: normal;  Posterior pharyngeal wall: normal;  Neck  Neck: neck normal;  Respiratory  Inspection: breathing unlabored;  Cardiovascular  Inspection:  extremities are warm and well perfused;  Neurovestibular  Mental Status: alert and oriented;  Psychiatric: mood normal;           Result Review       RESULTS REVIEW    I have reviewed the patients old records in the chart.    CT Sinus Without Contrast (05/09/2025 12:03)            Assessment & Plan  S/P insertion of hypoglossal nerve stimulator    Obstructive sleep apnea    Tongue paralysis    Nasal septal deviation    TMJ (temporomandibular joint disorder)    Chronic nonintractable headache, unspecified headache type             Will discuss headaches with pcp and may need MRI brain or migraine treatment. Discussed TMJ precautions.  Will discuss with Dr. Saeed and contact patient  No follow-ups on file.        Electronically signed by SALONI Iraheta 07/01/25 2:49 PM CDT.

## 2025-07-01 NOTE — PATIENT INSTRUCTIONS
TemporoMandibular Joint Syndrome      TMJ is generally caused by the clenching or grinding of the teeth at night, but can happen during the day, also.    Symptoms of TMJ:    Pain in or around ear, jaw or neck area  Tenderness around ear or jaw  Popping/clicking from joint in jaw  Tension-type headaches  Stiffness/tightening of joint in jaw    Here are some things to avoid that can cause aggravation of your TMJ:    Do not chew gum.  Do not chew on hard candy.  Avoid eating hard to chew foods.  Decrease stress.    You will need to go on a soft food diet for two (2) weeks, especially when you are symptomatic of TMJ.    If taking any medicines for your TMJ, it is best to take those at night to decrease side effects of the medicine.    You will need to purchase a bite block or guard.  You can either purchase a custom fit guard from your dentist or you can purchase one from a local store in the sports section.

## 2025-07-02 ENCOUNTER — LAB (OUTPATIENT)
Dept: INTERNAL MEDICINE | Facility: CLINIC | Age: 80
End: 2025-07-02
Payer: COMMERCIAL

## 2025-07-02 DIAGNOSIS — Z12.5 PROSTATE CANCER SCREENING: ICD-10-CM

## 2025-07-02 DIAGNOSIS — Z00.00 ROUTINE GENERAL MEDICAL EXAMINATION AT A HEALTH CARE FACILITY: Primary | ICD-10-CM

## 2025-07-02 DIAGNOSIS — E78.5 HYPERLIPIDEMIA, UNSPECIFIED HYPERLIPIDEMIA TYPE: ICD-10-CM

## 2025-07-02 DIAGNOSIS — Z79.899 LONG TERM USE OF DRUG: ICD-10-CM

## 2025-07-03 LAB — DRUGS UR: NORMAL

## 2025-07-06 DIAGNOSIS — G62.9 PERIPHERAL POLYNEUROPATHY: ICD-10-CM

## 2025-07-07 RX ORDER — GABAPENTIN 400 MG/1
800 CAPSULE ORAL 2 TIMES DAILY
Qty: 120 CAPSULE | Refills: 0 | Status: SHIPPED | OUTPATIENT
Start: 2025-07-07

## 2025-07-08 ENCOUNTER — TELEPHONE (OUTPATIENT)
Dept: OTOLARYNGOLOGY | Facility: CLINIC | Age: 80
End: 2025-07-08
Payer: COMMERCIAL

## 2025-07-08 NOTE — TELEPHONE ENCOUNTER
Patient notified and appt made    ----- Message from Karlene Garcia sent at 7/8/2025  1:06 PM CDT -----  I spoke with Dr. Saeed. He said the paralysis of the tongue should improve but may take a few months. He would not get inspire activated until improved. I would make him a 6 week appt with dr saeed

## 2025-07-09 LAB
25(OH)D3+25(OH)D2 SERPL-MCNC: 89.6 NG/ML (ref 30–100)
ALBUMIN SERPL-MCNC: 4.1 G/DL (ref 3.8–4.8)
ALP SERPL-CCNC: 53 IU/L (ref 44–121)
ALT SERPL-CCNC: 27 IU/L (ref 0–44)
AST SERPL-CCNC: 23 IU/L (ref 0–40)
BASOPHILS # BLD AUTO: 0 X10E3/UL (ref 0–0.2)
BASOPHILS NFR BLD AUTO: 1 %
BILIRUB SERPL-MCNC: 0.4 MG/DL (ref 0–1.2)
BUN SERPL-MCNC: 10 MG/DL (ref 8–27)
BUN/CREAT SERPL: 11 (ref 10–24)
CALCIUM SERPL-MCNC: 9.2 MG/DL (ref 8.6–10.2)
CHLORIDE SERPL-SCNC: 103 MMOL/L (ref 96–106)
CHOLEST SERPL-MCNC: 132 MG/DL (ref 100–199)
CO2 SERPL-SCNC: 20 MMOL/L (ref 20–29)
CREAT SERPL-MCNC: 0.93 MG/DL (ref 0.76–1.27)
DRUGS UR: NORMAL
EGFRCR SERPLBLD CKD-EPI 2021: 83 ML/MIN/1.73
EOSINOPHIL # BLD AUTO: 0.3 X10E3/UL (ref 0–0.4)
EOSINOPHIL NFR BLD AUTO: 6 %
ERYTHROCYTE [DISTWIDTH] IN BLOOD BY AUTOMATED COUNT: 12.8 % (ref 11.6–15.4)
GLOBULIN SER CALC-MCNC: 2.2 G/DL (ref 1.5–4.5)
GLUCOSE SERPL-MCNC: 84 MG/DL (ref 70–99)
HBA1C MFR BLD: 5.2 % (ref 4.8–5.6)
HCT VFR BLD AUTO: 40.7 % (ref 37.5–51)
HDLC SERPL-MCNC: 63 MG/DL
HGB BLD-MCNC: 13.2 G/DL (ref 13–17.7)
IMM GRANULOCYTES # BLD AUTO: 0 X10E3/UL (ref 0–0.1)
IMM GRANULOCYTES NFR BLD AUTO: 0 %
LDLC SERPL CALC-MCNC: 59 MG/DL (ref 0–99)
LYMPHOCYTES # BLD AUTO: 1.9 X10E3/UL (ref 0.7–3.1)
LYMPHOCYTES NFR BLD AUTO: 41 %
MCH RBC QN AUTO: 33.7 PG (ref 26.6–33)
MCHC RBC AUTO-ENTMCNC: 32.4 G/DL (ref 31.5–35.7)
MCV RBC AUTO: 104 FL (ref 79–97)
MONOCYTES # BLD AUTO: 0.5 X10E3/UL (ref 0.1–0.9)
MONOCYTES NFR BLD AUTO: 10 %
NEUTROPHILS # BLD AUTO: 2 X10E3/UL (ref 1.4–7)
NEUTROPHILS NFR BLD AUTO: 42 %
PLATELET # BLD AUTO: 152 X10E3/UL (ref 150–450)
POTASSIUM SERPL-SCNC: 3.9 MMOL/L (ref 3.5–5.2)
PROT SERPL-MCNC: 6.3 G/DL (ref 6–8.5)
PSA SERPL-MCNC: 0.4 NG/ML (ref 0–4)
RBC # BLD AUTO: 3.92 X10E6/UL (ref 4.14–5.8)
SODIUM SERPL-SCNC: 141 MMOL/L (ref 134–144)
T3FREE SERPL-MCNC: 2.7 PG/ML (ref 2–4.4)
T4 FREE SERPL-MCNC: 1.22 NG/DL (ref 0.82–1.77)
TRIGL SERPL-MCNC: 43 MG/DL (ref 0–149)
TSH SERPL DL<=0.005 MIU/L-ACNC: 2.1 UIU/ML (ref 0.45–4.5)
VIT B12 SERPL-MCNC: 886 PG/ML (ref 232–1245)
VLDLC SERPL CALC-MCNC: 10 MG/DL (ref 5–40)
WBC # BLD AUTO: 4.6 X10E3/UL (ref 3.4–10.8)

## 2025-07-23 NOTE — PROGRESS NOTES
tablet by mouth nightly   Yes Tesha Neumann MD   tiotropium (SPIRIVA) 18 MCG inhalation capsule Inhale 1 capsule into the lungs nightly   Yes Tesha Neumann MD   bisoprolol (ZEBETA) 5 MG tablet Take 1 tablet by mouth daily   Yes Tesha Neumann MD   budesonide-formoterol (SYMBICORT) 160-4.5 MCG/ACT AERO Inhale 2 puffs into the lungs 2 times daily   Yes Tesha Neumann MD   albuterol sulfate HFA (PROVENTIL HFA) 108 (90 Base) MCG/ACT inhaler Inhale 2 puffs into the lungs every 6 hours as needed for Wheezing   Yes Tesha Neumann MD   tamsulosin (FLOMAX) 0.4 MG capsule Take 1 capsule by mouth nightly   Yes Tesha Neumann MD   gabapentin (NEURONTIN) 300 MG capsule Take 2 capsules by mouth 2 times daily.   Yes Tesha Neumann MD   calcium carbonate 600 MG TABS tablet Take 1 tablet by mouth 2 times daily   Yes Tesha Neumann MD   Multiple Vitamins-Minerals (MULTIVITAMIN ADULTS 50+) TABS Take 1 tablet by mouth daily   Yes ProviderTesha MD   Krill Oil 300 MG CAPS Take 1 capsule by mouth daily   Yes ProviderTesha MD   Coenzyme Q10 (COQ10) 100 MG CAPS Take 1 capsule by mouth daily   Yes ProviderTesha MD   Garlic 1000 MG CAPS Take 1 capsule by mouth daily   Yes ProviderTesha MD   vitamin D 25 MCG (1000 UT) CAPS Take 1 capsule by mouth daily   Yes ProviderTesha MD   ascorbic acid (VITAMIN C) 500 MG tablet Take 1 tablet by mouth daily   Yes Tesha Neumann MD   aspirin 81 MG EC tablet Take 1 tablet by mouth daily   Yes Tesha Neumann MD   B Complex-Folic Acid (SUPER B COMPLEX MAXI) TABS Take 1 tablet by mouth 2 times daily   Yes Tesha Neumann MD   ibuprofen (ADVIL;MOTRIN) 800 MG tablet Take 1 tablet by mouth every 6 hours as needed for Pain   Yes Tesha Neumann MD   acetaminophen (TYLENOL 8 HOUR ARTHRITIS PAIN) 650 MG extended release tablet Take 1 tablet by mouth 2 times daily   Yes Tesha Neumann MD

## 2025-07-24 DIAGNOSIS — I10 ESSENTIAL HYPERTENSION: ICD-10-CM

## 2025-07-24 RX ORDER — LOSARTAN POTASSIUM 25 MG/1
25 TABLET ORAL DAILY
Qty: 60 TABLET | Refills: 2 | Status: SHIPPED | OUTPATIENT
Start: 2025-07-24

## 2025-07-28 ENCOUNTER — OFFICE VISIT (OUTPATIENT)
Age: 80
End: 2025-07-28

## 2025-07-28 VITALS — WEIGHT: 157 LBS | HEIGHT: 66 IN | BODY MASS INDEX: 25.23 KG/M2

## 2025-07-28 DIAGNOSIS — M75.122 NONTRAUMATIC COMPLETE TEAR OF LEFT ROTATOR CUFF: Primary | ICD-10-CM

## 2025-07-28 RX ORDER — ROPIVACAINE HYDROCHLORIDE 5 MG/ML
2 INJECTION, SOLUTION EPIDURAL; INFILTRATION; PERINEURAL ONCE
Status: COMPLETED | OUTPATIENT
Start: 2025-07-28 | End: 2025-07-28

## 2025-07-28 RX ORDER — TRIAMCINOLONE ACETONIDE 40 MG/ML
40 INJECTION, SUSPENSION INTRA-ARTICULAR; INTRAMUSCULAR ONCE
Status: COMPLETED | OUTPATIENT
Start: 2025-07-28 | End: 2025-07-28

## 2025-07-28 RX ORDER — LIDOCAINE HYDROCHLORIDE 10 MG/ML
2 INJECTION, SOLUTION INFILTRATION; PERINEURAL ONCE
Status: COMPLETED | OUTPATIENT
Start: 2025-07-28 | End: 2025-07-28

## 2025-07-28 RX ADMIN — TRIAMCINOLONE ACETONIDE 40 MG: 40 INJECTION, SUSPENSION INTRA-ARTICULAR; INTRAMUSCULAR at 11:11

## 2025-07-28 RX ADMIN — ROPIVACAINE HYDROCHLORIDE 2 ML: 5 INJECTION, SOLUTION EPIDURAL; INFILTRATION; PERINEURAL at 11:11

## 2025-07-28 RX ADMIN — LIDOCAINE HYDROCHLORIDE 2 ML: 10 INJECTION, SOLUTION INFILTRATION; PERINEURAL at 11:11

## 2025-07-31 DIAGNOSIS — E66.811 CLASS 1 OBESITY: ICD-10-CM

## 2025-07-31 RX ORDER — TIRZEPATIDE 10 MG/.5ML
10 INJECTION, SOLUTION SUBCUTANEOUS WEEKLY
Qty: 4 ML | Refills: 5 | Status: SHIPPED | OUTPATIENT
Start: 2025-07-31

## 2025-08-04 DIAGNOSIS — G62.9 PERIPHERAL POLYNEUROPATHY: ICD-10-CM

## 2025-08-04 RX ORDER — GABAPENTIN 400 MG/1
800 CAPSULE ORAL 2 TIMES DAILY
Qty: 120 CAPSULE | Refills: 0 | Status: SHIPPED | OUTPATIENT
Start: 2025-08-04

## 2025-08-21 DIAGNOSIS — E78.5 HYPERLIPIDEMIA, UNSPECIFIED HYPERLIPIDEMIA TYPE: ICD-10-CM

## 2025-08-21 RX ORDER — SIMVASTATIN 40 MG
40 TABLET ORAL NIGHTLY
Qty: 90 TABLET | Refills: 2 | Status: SHIPPED | OUTPATIENT
Start: 2025-08-21

## (undated) DEVICE — DISPOSABLE IRRIGATION BIPOLAR CORD, M1000 TYPE: Brand: KIRWAN

## (undated) DEVICE — 3M™ IOBAN™ 2 ANTIMICROBIAL INCISE DRAPE 6651EZ: Brand: IOBAN™ 2

## (undated) DEVICE — BLADE, TONGUE, 6", STERILE: Brand: MEDLINE

## (undated) DEVICE — APPL CHLORAPREP HI/LITE 26ML ORNG

## (undated) DEVICE — PK SHLDR 30

## (undated) DEVICE — PK TURNOVER RM ADV

## (undated) DEVICE — BANDAGE COMPR W3INXL15FT BGE E SGL LAYERED CLP CLSR

## (undated) DEVICE — CATH IV JELCO 20GAX1 1/4IN

## (undated) DEVICE — HANDPIECE SET WITH HIGH FLOW TIP AND SUCTION TUBE: Brand: INTERPULSE

## (undated) DEVICE — STRIP CLS WND CURAD MEDI/STRIP HYPOALLERG 0.25X4IN PK/10

## (undated) DEVICE — GLOVE SURG SZ 65 CRM LTX FREE POLYISOPRENE POLYMER BEAD ANTI

## (undated) DEVICE — GLV SURG BIOGEL LTX PF 8

## (undated) DEVICE — SYR CONTRL PRESS/LO FIX/M/LL W/THMB/RNG 10ML

## (undated) DEVICE — SOLUTION IV 1000ML LAC RINGERS PH 6.5 INJ USP VIAFLX PLAS

## (undated) DEVICE — ELECTRODE 8227304 5PK PRASS PR 18MM ROHS

## (undated) DEVICE — SYR CONTRL LUERLOK 10CC

## (undated) DEVICE — SYSTEM SKIN CLSR 22CM DERMBND PRINEO

## (undated) DEVICE — THE SINGLE USE ETRAP – POLYP TRAP IS USED FOR SUCTION RETRIEVAL OF ENDOSCOPICALLY REMOVED POLYPS.: Brand: ETRAP

## (undated) DEVICE — PROXIMATE RH ROTATING HEAD SKIN STAPLERS (35 REGULAR) CONTAINS 35 STAINLESS STEEL STAPLES: Brand: PROXIMATE

## (undated) DEVICE — GLV SURG DERMASSURE GRN LF PF 8.0

## (undated) DEVICE — TOWEL,OR,DSP,ST,BLUE,DLX,10/PK,8PK/CS: Brand: MEDLINE

## (undated) DEVICE — 4-PORT MANIFOLD: Brand: NEPTUNE 2

## (undated) DEVICE — DUAL CUT SAGITTAL BLADE

## (undated) DEVICE — PK EXTRM 30

## (undated) DEVICE — SKIN AFFIX SURG ADHESIVE 72/CS 0.55ML: Brand: MEDLINE

## (undated) DEVICE — COVER,MAYO STAND,STERILE: Brand: MEDLINE

## (undated) DEVICE — GLV SURG BIOGEL M LTX PF 7 1/2

## (undated) DEVICE — NEEDLE,18GX1.5",REG,BEVEL: Brand: MEDLINE

## (undated) DEVICE — BANDAGE,GAUZE,BULKEE II,4.5"X4.1YD,STRL: Brand: MEDLINE

## (undated) DEVICE — ELECTRD BLD EZ CLN MOD XLNG 2.75IN

## (undated) DEVICE — ZIMMER® STERILE DISPOSABLE TOURNIQUET CUFF WITH PLC, DUAL PORT, SINGLE BLADDER, 34 IN. (86 CM)

## (undated) DEVICE — SUREFIT, DUAL DISPERSIVE ELECTRODE, CONTACT QUALITY MONITOR: Brand: SUREFIT

## (undated) DEVICE — POSITIONER,HEAD,MULTIRING,36CS: Brand: MEDLINE

## (undated) DEVICE — SURGICAL SUCTION CONNECTING TUBE WITH MALE CONNECTOR AND SUCTION CLAMP, 2 FT. LONG (.6 M), 5 MM I.D.: Brand: CONMED

## (undated) DEVICE — ARM SLING II: Brand: DEROYAL

## (undated) DEVICE — SUT MNCRYL 3/0 PS2 18IN MCP497G

## (undated) DEVICE — LARYNGOSCOPE BLDE MAC HNDL M SZ 35 ST CURAPLEX CURAVIEW LED

## (undated) DEVICE — BRONCH VIDEO GLIDESCOPE BFLEX/2 2.8MM ULTR/SLIM 1P/U

## (undated) DEVICE — 3M™ STERI-DRAPE™ U-DRAPE 1015: Brand: STERI-DRAPE™

## (undated) DEVICE — SPNG GZ STRL 2S 4X4 12PLY

## (undated) DEVICE — PK ENT HD AND NK 30

## (undated) DEVICE — KIT,ANTI FOG,W/SPONGE & FLUID,SOFT PACK: Brand: MEDLINE

## (undated) DEVICE — 1010 S-DRAPE TOWEL DRAPE 10/BX: Brand: STERI-DRAPE™

## (undated) DEVICE — SHOULDER STABILIZATION KIT,                                    DISPOSABLE 12 PER BOX

## (undated) DEVICE — SUT SILK 4/0 SUTUPAK TIES 24IN SA73H

## (undated) DEVICE — NEEDLE SPNL L3.5IN PNK HUB S STL REG WALL FIT STYL W/ QNCKE

## (undated) DEVICE — INTENDED FOR TISSUE SEPARATION, AND OTHER PROCEDURES THAT REQUIRE A SHARP SURGICAL BLADE TO PUNCTURE OR CUT.: Brand: BARD-PARKER ® STAINLESS STEEL BLADES

## (undated) DEVICE — TOWEL,OR,DSP,ST,BLUE,STD,4/PK,20PK/CS: Brand: MEDLINE

## (undated) DEVICE — PAD MAJOR: Brand: MEDLINE INDUSTRIES, INC.

## (undated) DEVICE — DRSNG BRDR MEPILEX FLX/LITE FM 4X5CM

## (undated) DEVICE — ANTIBACTERIAL UNDYED BRAIDED (POLYGLACTIN 910), SYNTHETIC ABSORBABLE SUTURE: Brand: COATED VICRYL

## (undated) DEVICE — 3M™ STERI-DRAPE™ FLUOROSCOPE DRAPE, 10 PER CARTON / 4 CARTONS PER CASE, 1012: Brand: STERI-DRAPE™

## (undated) DEVICE — DRSNG TELFA PAD NONADH STR 1S 3X8IN

## (undated) DEVICE — SUTURE VCRL SZ 3-0 L27IN ABSRB UD L26MM SH 1/2 CIR J416H

## (undated) DEVICE — PROXIMATE RH ROTATING HEAD SKIN STAPLERS (35 WIDE) CONTAINS 35 STAINLESS STEEL STAPLES: Brand: PROXIMATE

## (undated) DEVICE — TUBING, SUCTION, 1/4" X 12', STRAIGHT: Brand: MEDLINE

## (undated) DEVICE — STERILE (14X122CM) TELESCOPICALLY-FOLDED COVER: Brand: CIV-CLEAR™ TRANSDUCER COVER

## (undated) DEVICE — 3M™ IOBAN™ 2 ANTIMICROBIAL INCISE DRAPE 6650EZ: Brand: IOBAN™ 2

## (undated) DEVICE — TLC MALE UROLOGY RETRACTOR SYSTEM WITH FRAME (BOXED): Brand: TLC SELF-RETAINING RETRACTOR SYSTEM

## (undated) DEVICE — SENSR O2 OXIMAX FNGR A/ 18IN NONSTR

## (undated) DEVICE — SYR LUERLOK 50ML

## (undated) DEVICE — UNDERGLOVE SURG SZ 8 FNGR THK0.21MIL GRN LTX BEAD CUF

## (undated) DEVICE — Device: Brand: DEFENDO AIR/WATER/SUCTION AND BIOPSY VALVE

## (undated) DEVICE — SURGICAL PROCEDURE PACK KNEE TOT DBD CDS LOURDES HOSP LF

## (undated) DEVICE — SUT ETHLN 5/0 FS2 18IN 661H

## (undated) DEVICE — SUT ETHLN 4/0 P3 18IN 699H

## (undated) DEVICE — PENCL ES MEGADINE EZ/CLEAN BUTN W/HOLSTR 10FT

## (undated) DEVICE — ADHS LIQ MASTISOL 2/3ML

## (undated) DEVICE — SUT VIC 4/0 P3 18IN UD VCP494H

## (undated) DEVICE — OPTIFOAM GENTLE SA, POSTOP, 4X8: Brand: MEDLINE

## (undated) DEVICE — PAD GRND REM POLYHESIVE A/ DISP

## (undated) DEVICE — SPONGE,DISSECTOR,K,XRAY,9/16"X1/4",STRL: Brand: MEDLINE

## (undated) DEVICE — DRSNG SURESITE WNDW 2.38X2.75

## (undated) DEVICE — BNDG ELAS CO-FLEX SLF ADHR 4IN5YD LF STRL

## (undated) DEVICE — STERILE POLYISOPRENE POWDER-FREE SURGICAL GLOVES: Brand: PROTEXIS

## (undated) DEVICE — UTILITY MARKER W/MED LABELS: Brand: MEDLINE

## (undated) DEVICE — SOLUTION IV IRRIG POUR BRL 0.9% SODIUM CHL 2F7124

## (undated) DEVICE — THE CHANNEL CLEANING BRUSH IS A NYLON FLEXI BRUSH ATTACHED TO A FLEXIBLE PLASTIC SHEATH DESIGNED TO SAFELY REMOVE DEBRIS FROM FLEXIBLE ENDOSCOPES.

## (undated) DEVICE — LP VESL MAXI 2.5X1MM RED 2PK

## (undated) DEVICE — GAUZE,SPONGE,FLUFF,6"X6.75",STRL,10/TRAY: Brand: MEDLINE

## (undated) DEVICE — GLV SURG BIOGEL LTX PF 6 1/2

## (undated) DEVICE — TBG PENCL TELESCP MEGADYNE SMOKE EVAC 10FT

## (undated) DEVICE — GLV SURG SENSICARE PI LF PF 8 GRN STRL

## (undated) DEVICE — VAGINAL PREP TRAY: Brand: MEDLINE INDUSTRIES, INC.

## (undated) DEVICE — CHLORAPREP 26ML ORANGE

## (undated) DEVICE — PREP RAZOR: Brand: DEROYAL

## (undated) DEVICE — SUTURE VCRL SZ 1 L27IN ABSRB UD L36MM CP-1 1/2 CIR REV CUT J268H

## (undated) DEVICE — BNDG GZ SOF-FORM CONFRM 2X75IN LF STRL

## (undated) DEVICE — SNAR POLYP CAPTIVATOR MICROHEX 13 240CM

## (undated) DEVICE — SUT PDS 2/0 CT2 27IN VIL PDP333H

## (undated) DEVICE — BLADE RMR L46MM PAT PILOT H

## (undated) DEVICE — SYR LL TP 10ML STRL

## (undated) DEVICE — CODMAN® DISPOSABLE CATHETER PASSER: Brand: CODMAN®

## (undated) DEVICE — BIPOLAR SEALER 23-112-1 AQM 6.0: Brand: AQUAMANTYS™

## (undated) DEVICE — DRESSING FOAM W4XL12IN SIL RECT ADH WTRPRF FLM BK W/ BORD

## (undated) DEVICE — T-MAX DISPOSABLE FACE MASK 8 PER BOX

## (undated) DEVICE — ST PIN FIX TEMP UNIVERS REVERS W/OSTEO/GUIDE/PIN 2.4MM STRL

## (undated) DEVICE — GLOVE SURG SZ 7 CRM LTX FREE POLYISOPRENE POLYMER BEAD ANTI

## (undated) DEVICE — SUT ETHIB 5 V37 30IN MB66G

## (undated) DEVICE — Device: Brand: POWER-FLO®

## (undated) DEVICE — SUT SILK 3/0 RB1 CR8 18IN C053D

## (undated) DEVICE — SUT SILK 2/0 SH CR8 18IN CR8 C012D

## (undated) DEVICE — PATIENT RETURN ELECTRODE, SINGLE-USE, CONTACT QUALITY MONITORING, ADULT, WITH 9FT CORD, FOR PATIENTS WEIGING OVER 33LBS. (15KG): Brand: MEGADYNE

## (undated) DEVICE — SUT SILK 3/0 SUTUPAK TIES 24IN SA74H

## (undated) DEVICE — RMT NEUROSTIM INSPIRESLEEPREMOTE SLEEP/APNEA MDL/2580

## (undated) DEVICE — TUBE ET 7.5MM NSL ORAL BASIC CUF INTMED MURPHY EYE RADPQ

## (undated) DEVICE — HARMONIC FOCUS SHEARS 9CM LENGTH + ADAPTIVE TISSUE TECHNOLOGY FOR USE WITH BLUE HAND PIECE ONLY: Brand: HARMONIC FOCUS

## (undated) DEVICE — PROBE 8225401 5PK SD-SD BIPOL STIM ROHS

## (undated) DEVICE — TRAP FLD MINIVAC MEGADYNE 100ML

## (undated) DEVICE — SUTURE VCRL SZ 2-0 L36IN ABSRB UD L36MM CT-1 1/2 CIR J945H

## (undated) DEVICE — CONN FLX BREATHE CIRCT

## (undated) DEVICE — TBG SMPL FLTR LINE NASL 02/C02 A/ BX/100

## (undated) DEVICE — MASK,OXYGEN,MED CONC,ADLT,7' TUB, UC: Brand: PENDING

## (undated) DEVICE — MARKER,SKIN,WI/RULER AND LABELS: Brand: MEDLINE

## (undated) DEVICE — TOTAL TRAY, 16FR 10ML SIL FOLEY, URN: Brand: MEDLINE

## (undated) DEVICE — YANKAUER,BULB TIP WITH VENT: Brand: ARGYLE

## (undated) DEVICE — Z INACTIVE USE 2660664 SOLUTION IRRIG 3000ML 0.9% SOD CHL USP UROMATIC PLAS CONT

## (undated) DEVICE — SPNG GZ WOVN 4X4IN 12PLY 10/BX STRL

## (undated) DEVICE — PLUG,CATHETER,DRAINAGE PROTECTOR,TUBE: Brand: MEDLINE

## (undated) DEVICE — BAPTIST TURNOVER KIT: Brand: MEDLINE INDUSTRIES, INC.

## (undated) DEVICE — SUT MNCRYL 4/0 P3 18IN UD MCP494G

## (undated) DEVICE — RECIPROCATING BLADE DOUBLE SIDE (74.0 X 0.77MM)

## (undated) DEVICE — SUT VIC 4/0 RB1 CR8 18IN UD VCP714D

## (undated) DEVICE — SUT MNCRYL 5/0 P3 18IN UD MCP493G